# Patient Record
Sex: FEMALE | HISPANIC OR LATINO | URBAN - METROPOLITAN AREA
[De-identification: names, ages, dates, MRNs, and addresses within clinical notes are randomized per-mention and may not be internally consistent; named-entity substitution may affect disease eponyms.]

---

## 2017-05-25 ENCOUNTER — HOSPITAL ENCOUNTER (OUTPATIENT)
Dept: RADIOLOGY | Facility: MEDICAL CENTER | Age: 57
End: 2017-05-25
Attending: PHYSICIAN ASSISTANT
Payer: COMMERCIAL

## 2017-05-25 DIAGNOSIS — Z12.31 VISIT FOR SCREENING MAMMOGRAM: ICD-10-CM

## 2017-05-25 PROCEDURE — G0202 SCR MAMMO BI INCL CAD: HCPCS

## 2019-08-17 ENCOUNTER — HOSPITAL ENCOUNTER (EMERGENCY)
Dept: HOSPITAL 8 - ED | Age: 59
Discharge: HOME | End: 2019-08-17
Payer: COMMERCIAL

## 2019-08-17 VITALS — WEIGHT: 149.91 LBS | HEIGHT: 62 IN | BODY MASS INDEX: 27.59 KG/M2

## 2019-08-17 VITALS — DIASTOLIC BLOOD PRESSURE: 99 MMHG | SYSTOLIC BLOOD PRESSURE: 162 MMHG

## 2019-08-17 DIAGNOSIS — R11.2: ICD-10-CM

## 2019-08-17 DIAGNOSIS — I10: ICD-10-CM

## 2019-08-17 DIAGNOSIS — K80.20: Primary | ICD-10-CM

## 2019-08-17 DIAGNOSIS — R10.13: ICD-10-CM

## 2019-08-17 PROCEDURE — 81001 URINALYSIS AUTO W/SCOPE: CPT

## 2019-08-17 PROCEDURE — 96361 HYDRATE IV INFUSION ADD-ON: CPT

## 2019-08-17 PROCEDURE — 76700 US EXAM ABDOM COMPLETE: CPT

## 2019-08-17 PROCEDURE — 83690 ASSAY OF LIPASE: CPT

## 2019-08-17 PROCEDURE — 85025 COMPLETE CBC W/AUTO DIFF WBC: CPT

## 2019-08-17 PROCEDURE — 36415 COLL VENOUS BLD VENIPUNCTURE: CPT

## 2019-08-17 PROCEDURE — 99284 EMERGENCY DEPT VISIT MOD MDM: CPT

## 2019-08-17 PROCEDURE — 96374 THER/PROPH/DIAG INJ IV PUSH: CPT

## 2019-08-17 PROCEDURE — 96372 THER/PROPH/DIAG INJ SC/IM: CPT

## 2019-08-17 PROCEDURE — 93005 ELECTROCARDIOGRAM TRACING: CPT

## 2019-08-17 PROCEDURE — 80053 COMPREHEN METABOLIC PANEL: CPT

## 2019-08-17 PROCEDURE — 96375 TX/PRO/DX INJ NEW DRUG ADDON: CPT

## 2024-01-31 ENCOUNTER — ANESTHESIA (OUTPATIENT)
Dept: RADIOLOGY | Facility: MEDICAL CENTER | Age: 64
DRG: 020 | End: 2024-01-31
Payer: COMMERCIAL

## 2024-01-31 ENCOUNTER — HOSPITAL ENCOUNTER (INPATIENT)
Facility: MEDICAL CENTER | Age: 64
LOS: 23 days | DRG: 020 | End: 2024-02-23
Attending: INTERNAL MEDICINE | Admitting: INTERNAL MEDICINE
Payer: COMMERCIAL

## 2024-01-31 ENCOUNTER — ANESTHESIA EVENT (OUTPATIENT)
Dept: RADIOLOGY | Facility: MEDICAL CENTER | Age: 64
DRG: 020 | End: 2024-01-31
Payer: COMMERCIAL

## 2024-01-31 ENCOUNTER — APPOINTMENT (OUTPATIENT)
Dept: RADIOLOGY | Facility: MEDICAL CENTER | Age: 64
DRG: 020 | End: 2024-01-31
Attending: NEUROLOGICAL SURGERY
Payer: COMMERCIAL

## 2024-01-31 ENCOUNTER — APPOINTMENT (OUTPATIENT)
Dept: RADIOLOGY | Facility: MEDICAL CENTER | Age: 64
DRG: 020 | End: 2024-01-31
Attending: RADIOLOGY
Payer: COMMERCIAL

## 2024-01-31 ENCOUNTER — HOSPITAL ENCOUNTER (OUTPATIENT)
Dept: RADIOLOGY | Facility: MEDICAL CENTER | Age: 64
End: 2024-01-31

## 2024-01-31 ENCOUNTER — APPOINTMENT (OUTPATIENT)
Dept: RADIOLOGY | Facility: MEDICAL CENTER | Age: 64
DRG: 020 | End: 2024-01-31
Attending: INTERNAL MEDICINE
Payer: COMMERCIAL

## 2024-01-31 ENCOUNTER — ANESTHESIA (OUTPATIENT)
Dept: SURGERY | Facility: MEDICAL CENTER | Age: 64
DRG: 020 | End: 2024-01-31
Payer: COMMERCIAL

## 2024-01-31 ENCOUNTER — APPOINTMENT (OUTPATIENT)
Dept: RADIOLOGY | Facility: MEDICAL CENTER | Age: 64
DRG: 020 | End: 2024-01-31
Attending: NURSE PRACTITIONER
Payer: COMMERCIAL

## 2024-01-31 ENCOUNTER — ANESTHESIA EVENT (OUTPATIENT)
Dept: SURGERY | Facility: MEDICAL CENTER | Age: 64
DRG: 020 | End: 2024-01-31
Payer: COMMERCIAL

## 2024-01-31 DIAGNOSIS — I60.9 SAH (SUBARACHNOID HEMORRHAGE) (HCC): ICD-10-CM

## 2024-01-31 DIAGNOSIS — J96.01 ACUTE RESPIRATORY FAILURE WITH HYPOXIA (HCC): ICD-10-CM

## 2024-01-31 DIAGNOSIS — I71.21 ANEURYSM OF ASCENDING AORTA WITHOUT RUPTURE (HCC): ICD-10-CM

## 2024-01-31 DIAGNOSIS — R56.9 SEIZURE (HCC): ICD-10-CM

## 2024-01-31 PROBLEM — I15.9 SECONDARY HYPERTENSION: Status: ACTIVE | Noted: 2024-01-31

## 2024-01-31 PROBLEM — J81.0 ACUTE PULMONARY EDEMA (HCC): Status: ACTIVE | Noted: 2024-01-31

## 2024-01-31 LAB
AMPHET UR QL SCN: NEGATIVE
ANION GAP SERPL CALC-SCNC: 14 MMOL/L (ref 7–16)
ANION GAP SERPL CALC-SCNC: 17 MMOL/L (ref 7–16)
APTT PPP: 37 SEC (ref 24.7–36)
APTT PPP: >240 SEC (ref 24.7–36)
ARTERIAL PATENCY WRIST A: ABNORMAL
BARBITURATES UR QL SCN: NEGATIVE
BASE EXCESS BLDA CALC-SCNC: -2 MMOL/L (ref -4–3)
BASE EXCESS BLDA CALC-SCNC: -2 MMOL/L (ref -4–3)
BASE EXCESS BLDA CALC-SCNC: -4 MMOL/L (ref -4–3)
BASOPHILS # BLD AUTO: 0.3 % (ref 0–1.8)
BASOPHILS # BLD AUTO: 0.3 % (ref 0–1.8)
BASOPHILS # BLD: 0.05 K/UL (ref 0–0.12)
BASOPHILS # BLD: 0.05 K/UL (ref 0–0.12)
BENZODIAZ UR QL SCN: NEGATIVE
BODY TEMPERATURE: ABNORMAL DEGREES
BREATHS SETTING VENT: 20
BREATHS SETTING VENT: 26
BREATHS SETTING VENT: 26
BUN SERPL-MCNC: 10 MG/DL (ref 8–22)
BUN SERPL-MCNC: 8 MG/DL (ref 8–22)
BZE UR QL SCN: NEGATIVE
CALCIUM SERPL-MCNC: 8 MG/DL (ref 8.5–10.5)
CALCIUM SERPL-MCNC: 8.2 MG/DL (ref 8.5–10.5)
CANNABINOIDS UR QL SCN: NEGATIVE
CFT BLD TEG: 5.7 MIN (ref 4.6–9.1)
CFT P HPASE BLD TEG: 5.3 MIN (ref 4.3–8.3)
CHLORIDE SERPL-SCNC: 102 MMOL/L (ref 96–112)
CHLORIDE SERPL-SCNC: 103 MMOL/L (ref 96–112)
CHOLEST SERPL-MCNC: 165 MG/DL (ref 100–199)
CLOT ANGLE BLD TEG: 73.8 DEGREES (ref 63–78)
CLOT LYSIS 30M P MA LENFR BLD TEG: 0 % (ref 0–2.6)
CO2 BLDA-SCNC: 24 MMOL/L (ref 20–33)
CO2 BLDA-SCNC: 24 MMOL/L (ref 20–33)
CO2 BLDA-SCNC: 26 MMOL/L (ref 20–33)
CO2 SERPL-SCNC: 20 MMOL/L (ref 20–33)
CO2 SERPL-SCNC: 23 MMOL/L (ref 20–33)
CREAT SERPL-MCNC: 0.52 MG/DL (ref 0.5–1.4)
CREAT SERPL-MCNC: 0.61 MG/DL (ref 0.5–1.4)
CT.EXTRINSIC BLD ROTEM: 1.3 MIN (ref 0.8–2.1)
DELSYS IDSYS: ABNORMAL
END TIDAL CARBON DIOXIDE IECO2: 33 MMHG
END TIDAL CARBON DIOXIDE IECO2: 36 MMHG
END TIDAL CARBON DIOXIDE IECO2: 37 MMHG
EOSINOPHIL # BLD AUTO: 0 K/UL (ref 0–0.51)
EOSINOPHIL # BLD AUTO: 0.01 K/UL (ref 0–0.51)
EOSINOPHIL NFR BLD: 0 % (ref 0–6.9)
EOSINOPHIL NFR BLD: 0.1 % (ref 0–6.9)
ERYTHROCYTE [DISTWIDTH] IN BLOOD BY AUTOMATED COUNT: 36.7 FL (ref 35.9–50)
ERYTHROCYTE [DISTWIDTH] IN BLOOD BY AUTOMATED COUNT: 37.7 FL (ref 35.9–50)
EXPOSED MRN EXMRN: NORMAL
EXPOSED MRN EXMRN: NORMAL
FENTANYL UR QL: NEGATIVE
GFR SERPLBLD CREATININE-BSD FMLA CKD-EPI: 101 ML/MIN/1.73 M 2
GFR SERPLBLD CREATININE-BSD FMLA CKD-EPI: 105 ML/MIN/1.73 M 2
GLUCOSE SERPL-MCNC: 155 MG/DL (ref 65–99)
GLUCOSE SERPL-MCNC: 186 MG/DL (ref 65–99)
HBV SURFACE AG SER QL: NORMAL
HCO3 BLDA-SCNC: 23 MMOL/L (ref 17–25)
HCO3 BLDA-SCNC: 23.2 MMOL/L (ref 17–25)
HCO3 BLDA-SCNC: 24.5 MMOL/L (ref 17–25)
HCT VFR BLD AUTO: 39.6 % (ref 37–47)
HCT VFR BLD AUTO: 41.7 % (ref 37–47)
HCV AB SER QL: NORMAL
HDLC SERPL-MCNC: 36 MG/DL
HGB BLD-MCNC: 13.4 G/DL (ref 12–16)
HGB BLD-MCNC: 14.2 G/DL (ref 12–16)
HIV 1+2 AB+HIV1 P24 AG SERPL QL IA: NORMAL
HOROWITZ INDEX BLDA+IHG-RTO: 116 MM[HG]
HOROWITZ INDEX BLDA+IHG-RTO: 376 MM[HG]
HOROWITZ INDEX BLDA+IHG-RTO: 96 MM[HG]
IMM GRANULOCYTES # BLD AUTO: 0.08 K/UL (ref 0–0.11)
IMM GRANULOCYTES # BLD AUTO: 0.15 K/UL (ref 0–0.11)
IMM GRANULOCYTES NFR BLD AUTO: 0.4 % (ref 0–0.9)
IMM GRANULOCYTES NFR BLD AUTO: 0.8 % (ref 0–0.9)
INR PPP: 1.06 (ref 0.87–1.13)
LACTATE BLD-SCNC: 1.3 MMOL/L (ref 0.5–2)
LACTATE BLD-SCNC: 1.3 MMOL/L (ref 0.5–2)
LDLC SERPL CALC-MCNC: 113 MG/DL
LYMPHOCYTES # BLD AUTO: 0.87 K/UL (ref 1–4.8)
LYMPHOCYTES # BLD AUTO: 1.42 K/UL (ref 1–4.8)
LYMPHOCYTES NFR BLD: 4.4 % (ref 22–41)
LYMPHOCYTES NFR BLD: 7.9 % (ref 22–41)
MCF BLD TEG: 62.1 MM (ref 52–69)
MCF.PLATELET INHIB BLD ROTEM: 19.9 MM (ref 15–32)
MCH RBC QN AUTO: 25.9 PG (ref 27–33)
MCH RBC QN AUTO: 26.2 PG (ref 27–33)
MCHC RBC AUTO-ENTMCNC: 33.8 G/DL (ref 32.2–35.5)
MCHC RBC AUTO-ENTMCNC: 34.1 G/DL (ref 32.2–35.5)
MCV RBC AUTO: 76.1 FL (ref 81.4–97.8)
MCV RBC AUTO: 77.3 FL (ref 81.4–97.8)
METHADONE UR QL SCN: NEGATIVE
MODE IMODE: ABNORMAL
MONOCYTES # BLD AUTO: 1.2 K/UL (ref 0–0.85)
MONOCYTES # BLD AUTO: 1.3 K/UL (ref 0–0.85)
MONOCYTES NFR BLD AUTO: 6.1 % (ref 0–13.4)
MONOCYTES NFR BLD AUTO: 7.3 % (ref 0–13.4)
NEUTROPHILS # BLD AUTO: 15.07 K/UL (ref 1.82–7.42)
NEUTROPHILS # BLD AUTO: 17.33 K/UL (ref 1.82–7.42)
NEUTROPHILS NFR BLD: 84 % (ref 44–72)
NEUTROPHILS NFR BLD: 88.4 % (ref 44–72)
NRBC # BLD AUTO: 0 K/UL
NRBC # BLD AUTO: 0 K/UL
NRBC BLD-RTO: 0 /100 WBC (ref 0–0.2)
NRBC BLD-RTO: 0 /100 WBC (ref 0–0.2)
O2/TOTAL GAS SETTING VFR VENT: 50 %
O2/TOTAL GAS SETTING VFR VENT: 70 %
O2/TOTAL GAS SETTING VFR VENT: 90 %
OPIATES UR QL SCN: NEGATIVE
OSMOLALITY SERPL: 291 MOSM/KG H2O (ref 278–298)
OSMOLALITY UR: 463 MOSM/KG H2O (ref 300–900)
OXYCODONE UR QL SCN: NEGATIVE
PA AA BLD-ACNC: 25.3 % (ref 0–11)
PA ADP BLD-ACNC: 84.4 % (ref 0–17)
PCO2 BLDA: 40.9 MMHG (ref 26–37)
PCO2 BLDA: 46.2 MMHG (ref 26–37)
PCO2 BLDA: 48 MMHG (ref 26–37)
PCO2 TEMP ADJ BLDA: 41.3 MMHG (ref 26–37)
PCO2 TEMP ADJ BLDA: 43.7 MMHG (ref 26–37)
PCO2 TEMP ADJ BLDA: 45.5 MMHG (ref 26–37)
PCP UR QL SCN: NEGATIVE
PEEP END EXPIRATORY PRESSURE IPEEP: 10 CMH20
PEEP END EXPIRATORY PRESSURE IPEEP: 14 CMH20
PEEP END EXPIRATORY PRESSURE IPEEP: 8 CMH20
PH BLDA: 7.29 [PH] (ref 7.4–7.5)
PH BLDA: 7.33 [PH] (ref 7.4–7.5)
PH BLDA: 7.36 [PH] (ref 7.4–7.5)
PH TEMP ADJ BLDA: 7.31 [PH] (ref 7.4–7.5)
PH TEMP ADJ BLDA: 7.35 [PH] (ref 7.4–7.5)
PH TEMP ADJ BLDA: 7.36 [PH] (ref 7.4–7.5)
PLATELET # BLD AUTO: 256 K/UL (ref 164–446)
PLATELET # BLD AUTO: 271 K/UL (ref 164–446)
PMV BLD AUTO: 10 FL (ref 9–12.9)
PMV BLD AUTO: 10.4 FL (ref 9–12.9)
PO2 BLDA: 338 MMHG (ref 64–87)
PO2 BLDA: 58 MMHG (ref 64–87)
PO2 BLDA: 67 MMHG (ref 64–87)
PO2 TEMP ADJ BLDA: 339 MMHG (ref 64–87)
PO2 TEMP ADJ BLDA: 53 MMHG (ref 64–87)
PO2 TEMP ADJ BLDA: 61 MMHG (ref 64–87)
POTASSIUM SERPL-SCNC: 3.1 MMOL/L (ref 3.6–5.5)
POTASSIUM SERPL-SCNC: 3.6 MMOL/L (ref 3.6–5.5)
PROPOXYPH UR QL SCN: NEGATIVE
PROTHROMBIN TIME: 13.9 SEC (ref 12–14.6)
RBC # BLD AUTO: 5.12 M/UL (ref 4.2–5.4)
RBC # BLD AUTO: 5.48 M/UL (ref 4.2–5.4)
SAO2 % BLDA: 100 % (ref 93–99)
SAO2 % BLDA: 86 % (ref 93–99)
SAO2 % BLDA: 91 % (ref 93–99)
SODIUM SERPL-SCNC: 137 MMOL/L (ref 135–145)
SODIUM SERPL-SCNC: 142 MMOL/L (ref 135–145)
SODIUM UR-SCNC: 167 MMOL/L
SPECIMEN DRAWN FROM PATIENT: ABNORMAL
TEG ALGORITHM TGALG: ABNORMAL
TIDAL VOLUME IVT: 320 ML
TRIGL SERPL-MCNC: 79 MG/DL (ref 0–149)
WBC # BLD AUTO: 17.9 K/UL (ref 4.8–10.8)
WBC # BLD AUTO: 19.6 K/UL (ref 4.8–10.8)

## 2024-01-31 PROCEDURE — 87186 SC STD MICRODIL/AGAR DIL: CPT

## 2024-01-31 PROCEDURE — 700111 HCHG RX REV CODE 636 W/ 250 OVERRIDE (IP): Performed by: INTERNAL MEDICINE

## 2024-01-31 PROCEDURE — 700105 HCHG RX REV CODE 258: Performed by: INTERNAL MEDICINE

## 2024-01-31 PROCEDURE — 87147 CULTURE TYPE IMMUNOLOGIC: CPT

## 2024-01-31 PROCEDURE — 94799 UNLISTED PULMONARY SVC/PX: CPT

## 2024-01-31 PROCEDURE — C1729 CATH, DRAINAGE: HCPCS

## 2024-01-31 PROCEDURE — 110454 HCHG SHELL REV 250: Performed by: NEUROLOGICAL SURGERY

## 2024-01-31 PROCEDURE — 94760 N-INVAS EAR/PLS OXIMETRY 1: CPT

## 2024-01-31 PROCEDURE — 03LG3DZ OCCLUSION OF INTRACRANIAL ARTERY WITH INTRALUMINAL DEVICE, PERCUTANEOUS APPROACH: ICD-10-PCS | Performed by: RADIOLOGY

## 2024-01-31 PROCEDURE — 85347 COAGULATION TIME ACTIVATED: CPT

## 2024-01-31 PROCEDURE — 700111 HCHG RX REV CODE 636 W/ 250 OVERRIDE (IP): Performed by: ANESTHESIOLOGY

## 2024-01-31 PROCEDURE — 700101 HCHG RX REV CODE 250: Performed by: ANESTHESIOLOGY

## 2024-01-31 PROCEDURE — 83935 ASSAY OF URINE OSMOLALITY: CPT

## 2024-01-31 PROCEDURE — 700101 HCHG RX REV CODE 250: Performed by: NEUROLOGICAL SURGERY

## 2024-01-31 PROCEDURE — 160041 HCHG SURGERY MINUTES - EA ADDL 1 MIN LEVEL 4: Performed by: NEUROLOGICAL SURGERY

## 2024-01-31 PROCEDURE — 009630Z DRAINAGE OF CEREBRAL VENTRICLE WITH DRAINAGE DEVICE, PERCUTANEOUS APPROACH: ICD-10-PCS | Performed by: NEUROLOGICAL SURGERY

## 2024-01-31 PROCEDURE — 700111 HCHG RX REV CODE 636 W/ 250 OVERRIDE (IP): Mod: JZ

## 2024-01-31 PROCEDURE — 160048 HCHG OR STATISTICAL LEVEL 1-5: Performed by: NEUROLOGICAL SURGERY

## 2024-01-31 PROCEDURE — A9270 NON-COVERED ITEM OR SERVICE: HCPCS | Performed by: NURSE PRACTITIONER

## 2024-01-31 PROCEDURE — 83605 ASSAY OF LACTIC ACID: CPT

## 2024-01-31 PROCEDURE — 8E09XBZ COMPUTER ASSISTED PROCEDURE OF HEAD AND NECK REGION: ICD-10-PCS | Performed by: NEUROLOGICAL SURGERY

## 2024-01-31 PROCEDURE — 32555 ASPIRATE PLEURA W/ IMAGING: CPT | Mod: RT

## 2024-01-31 PROCEDURE — 36556 INSERT NON-TUNNEL CV CATH: CPT | Performed by: NURSE PRACTITIONER

## 2024-01-31 PROCEDURE — 85384 FIBRINOGEN ACTIVITY: CPT

## 2024-01-31 PROCEDURE — 5A1945Z RESPIRATORY VENTILATION, 24-96 CONSECUTIVE HOURS: ICD-10-PCS | Performed by: INTERNAL MEDICINE

## 2024-01-31 PROCEDURE — 84100 ASSAY OF PHOSPHORUS: CPT

## 2024-01-31 PROCEDURE — 99285 EMERGENCY DEPT VISIT HI MDM: CPT

## 2024-01-31 PROCEDURE — 84300 ASSAY OF URINE SODIUM: CPT

## 2024-01-31 PROCEDURE — 71045 X-RAY EXAM CHEST 1 VIEW: CPT

## 2024-01-31 PROCEDURE — 99153 MOD SED SAME PHYS/QHP EA: CPT

## 2024-01-31 PROCEDURE — 700102 HCHG RX REV CODE 250 W/ 637 OVERRIDE(OP): Performed by: INTERNAL MEDICINE

## 2024-01-31 PROCEDURE — 700117 HCHG RX CONTRAST REV CODE 255: Performed by: INTERNAL MEDICINE

## 2024-01-31 PROCEDURE — 4410465 IR-EMBOLIZATION

## 2024-01-31 PROCEDURE — 700111 HCHG RX REV CODE 636 W/ 250 OVERRIDE (IP): Mod: JZ | Performed by: INTERNAL MEDICINE

## 2024-01-31 PROCEDURE — 83930 ASSAY OF BLOOD OSMOLALITY: CPT

## 2024-01-31 PROCEDURE — 700102 HCHG RX REV CODE 250 W/ 637 OVERRIDE(OP): Performed by: NURSE PRACTITIONER

## 2024-01-31 PROCEDURE — 700101 HCHG RX REV CODE 250: Performed by: INTERNAL MEDICINE

## 2024-01-31 PROCEDURE — 70450 CT HEAD/BRAIN W/O DYE: CPT

## 2024-01-31 PROCEDURE — 700105 HCHG RX REV CODE 258: Performed by: ANESTHESIOLOGY

## 2024-01-31 PROCEDURE — 85576 BLOOD PLATELET AGGREGATION: CPT | Mod: 91

## 2024-01-31 PROCEDURE — 80048 BASIC METABOLIC PNL TOTAL CA: CPT

## 2024-01-31 PROCEDURE — 160029 HCHG SURGERY MINUTES - 1ST 30 MINS LEVEL 4: Performed by: NEUROLOGICAL SURGERY

## 2024-01-31 PROCEDURE — 302022 DRAINAGE MONITORING SYS, VENTR: Performed by: INTERNAL MEDICINE

## 2024-01-31 PROCEDURE — 87040 BLOOD CULTURE FOR BACTERIA: CPT

## 2024-01-31 PROCEDURE — 85730 THROMBOPLASTIN TIME PARTIAL: CPT | Mod: 91

## 2024-01-31 PROCEDURE — 99291 CRITICAL CARE FIRST HOUR: CPT | Mod: 25 | Performed by: INTERNAL MEDICINE

## 2024-01-31 PROCEDURE — 85025 COMPLETE CBC W/AUTO DIFF WBC: CPT

## 2024-01-31 PROCEDURE — B3151ZZ FLUOROSCOPY OF BILATERAL COMMON CAROTID ARTERIES USING LOW OSMOLAR CONTRAST: ICD-10-PCS | Performed by: RADIOLOGY

## 2024-01-31 PROCEDURE — 99291 CRITICAL CARE FIRST HOUR: CPT | Performed by: PSYCHIATRY & NEUROLOGY

## 2024-01-31 PROCEDURE — 87070 CULTURE OTHR SPECIMN AEROBIC: CPT

## 2024-01-31 PROCEDURE — 87205 SMEAR GRAM STAIN: CPT

## 2024-01-31 PROCEDURE — 85610 PROTHROMBIN TIME: CPT

## 2024-01-31 PROCEDURE — 02HV33Z INSERTION OF INFUSION DEVICE INTO SUPERIOR VENA CAVA, PERCUTANEOUS APPROACH: ICD-10-PCS | Performed by: INTERNAL MEDICINE

## 2024-01-31 PROCEDURE — 99152 MOD SED SAME PHYS/QHP 5/>YRS: CPT

## 2024-01-31 PROCEDURE — 83735 ASSAY OF MAGNESIUM: CPT

## 2024-01-31 PROCEDURE — 61107 TDH PNXR IMPLT VENTR CATH: CPT

## 2024-01-31 PROCEDURE — 37799 UNLISTED PX VASCULAR SURGERY: CPT

## 2024-01-31 PROCEDURE — 770022 HCHG ROOM/CARE - ICU (200)

## 2024-01-31 PROCEDURE — 80307 DRUG TEST PRSMV CHEM ANLYZR: CPT

## 2024-01-31 PROCEDURE — 700101 HCHG RX REV CODE 250

## 2024-01-31 PROCEDURE — 87077 CULTURE AEROBIC IDENTIFY: CPT

## 2024-01-31 PROCEDURE — 94002 VENT MGMT INPAT INIT DAY: CPT

## 2024-01-31 PROCEDURE — 82803 BLOOD GASES ANY COMBINATION: CPT | Mod: 91

## 2024-01-31 PROCEDURE — 700111 HCHG RX REV CODE 636 W/ 250 OVERRIDE (IP): Performed by: NEUROLOGICAL SURGERY

## 2024-01-31 PROCEDURE — 160009 HCHG ANES TIME/MIN: Performed by: NEUROLOGICAL SURGERY

## 2024-01-31 PROCEDURE — 80061 LIPID PANEL: CPT

## 2024-01-31 PROCEDURE — C1729 CATH, DRAINAGE: HCPCS | Performed by: NEUROLOGICAL SURGERY

## 2024-01-31 DEVICE — CATHETER VENTRICLEAR EVD ANTIOBITIC (5EA/PK): Type: IMPLANTABLE DEVICE | Status: FUNCTIONAL

## 2024-01-31 RX ORDER — PROCHLORPERAZINE EDISYLATE 5 MG/ML
10 INJECTION INTRAMUSCULAR; INTRAVENOUS EVERY 6 HOURS PRN
Status: DISCONTINUED | OUTPATIENT
Start: 2024-01-31 | End: 2024-02-23 | Stop reason: HOSPADM

## 2024-01-31 RX ORDER — NIMODIPINE 30 MG/1
60 CAPSULE, LIQUID FILLED ORAL EVERY 4 HOURS
Status: DISCONTINUED | OUTPATIENT
Start: 2024-01-31 | End: 2024-01-31

## 2024-01-31 RX ORDER — NOREPINEPHRINE BITARTRATE 0.03 MG/ML
0-1 INJECTION, SOLUTION INTRAVENOUS CONTINUOUS
Status: DISCONTINUED | OUTPATIENT
Start: 2024-01-31 | End: 2024-02-03

## 2024-01-31 RX ORDER — ENALAPRILAT 1.25 MG/ML
1.25 INJECTION INTRAVENOUS EVERY 6 HOURS PRN
Status: DISCONTINUED | OUTPATIENT
Start: 2024-01-31 | End: 2024-02-04

## 2024-01-31 RX ORDER — ONDANSETRON 4 MG/1
4 TABLET, ORALLY DISINTEGRATING ORAL EVERY 4 HOURS PRN
Status: DISCONTINUED | OUTPATIENT
Start: 2024-01-31 | End: 2024-02-21

## 2024-01-31 RX ORDER — CEFAZOLIN SODIUM 1 G/3ML
INJECTION, POWDER, FOR SOLUTION INTRAMUSCULAR; INTRAVENOUS
Status: DISCONTINUED | OUTPATIENT
Start: 2024-01-31 | End: 2024-01-31 | Stop reason: HOSPADM

## 2024-01-31 RX ORDER — SODIUM CHLORIDE 9 MG/ML
INJECTION, SOLUTION INTRAVENOUS
Status: DISCONTINUED | OUTPATIENT
Start: 2024-01-31 | End: 2024-01-31 | Stop reason: SURG

## 2024-01-31 RX ORDER — LEVETIRACETAM 500 MG/5ML
500 INJECTION, SOLUTION, CONCENTRATE INTRAVENOUS EVERY 12 HOURS
Status: DISCONTINUED | OUTPATIENT
Start: 2024-01-31 | End: 2024-02-01

## 2024-01-31 RX ORDER — LABETALOL HYDROCHLORIDE 5 MG/ML
10 INJECTION, SOLUTION INTRAVENOUS EVERY 4 HOURS PRN
Status: DISCONTINUED | OUTPATIENT
Start: 2024-01-31 | End: 2024-02-03

## 2024-01-31 RX ORDER — ACETAMINOPHEN 650 MG/1
650 SUPPOSITORY RECTAL EVERY 4 HOURS PRN
Status: DISCONTINUED | OUTPATIENT
Start: 2024-01-31 | End: 2024-02-08

## 2024-01-31 RX ORDER — DEXTROSE MONOHYDRATE 25 G/50ML
25 INJECTION, SOLUTION INTRAVENOUS
Status: DISCONTINUED | OUTPATIENT
Start: 2024-01-31 | End: 2024-02-04

## 2024-01-31 RX ORDER — ONDANSETRON 2 MG/ML
4 INJECTION INTRAMUSCULAR; INTRAVENOUS EVERY 4 HOURS PRN
Status: DISCONTINUED | OUTPATIENT
Start: 2024-01-31 | End: 2024-02-21

## 2024-01-31 RX ORDER — LORAZEPAM 2 MG/ML
2 INJECTION INTRAMUSCULAR
Status: DISCONTINUED | OUTPATIENT
Start: 2024-01-31 | End: 2024-02-23 | Stop reason: HOSPADM

## 2024-01-31 RX ORDER — SODIUM CHLORIDE 9 MG/ML
INJECTION, SOLUTION INTRAVENOUS CONTINUOUS
Status: DISCONTINUED | OUTPATIENT
Start: 2024-01-31 | End: 2024-01-31

## 2024-01-31 RX ORDER — BISACODYL 10 MG
10 SUPPOSITORY, RECTAL RECTAL
Status: DISCONTINUED | OUTPATIENT
Start: 2024-01-31 | End: 2024-02-19

## 2024-01-31 RX ORDER — BUPIVACAINE HYDROCHLORIDE AND EPINEPHRINE 5; 5 MG/ML; UG/ML
INJECTION, SOLUTION EPIDURAL; INTRACAUDAL; PERINEURAL
Status: DISCONTINUED | OUTPATIENT
Start: 2024-01-31 | End: 2024-01-31 | Stop reason: HOSPADM

## 2024-01-31 RX ORDER — ACETAMINOPHEN 325 MG/1
650 TABLET ORAL EVERY 4 HOURS PRN
Status: DISCONTINUED | OUTPATIENT
Start: 2024-01-31 | End: 2024-02-08

## 2024-01-31 RX ORDER — FAMOTIDINE 20 MG/1
20 TABLET, FILM COATED ORAL EVERY 12 HOURS
Status: DISCONTINUED | OUTPATIENT
Start: 2024-01-31 | End: 2024-02-03

## 2024-01-31 RX ORDER — HEPARIN SODIUM 1000 [USP'U]/ML
INJECTION, SOLUTION INTRAVENOUS; SUBCUTANEOUS
Status: COMPLETED
Start: 2024-01-31 | End: 2024-01-31

## 2024-01-31 RX ORDER — AMOXICILLIN 250 MG
2 CAPSULE ORAL 2 TIMES DAILY
Status: DISCONTINUED | OUTPATIENT
Start: 2024-01-31 | End: 2024-02-19

## 2024-01-31 RX ORDER — SODIUM CHLORIDE 9 MG/ML
500 INJECTION, SOLUTION INTRAVENOUS ONCE
Status: ACTIVE | OUTPATIENT
Start: 2024-01-31 | End: 2024-02-01

## 2024-01-31 RX ORDER — HYDRALAZINE HYDROCHLORIDE 20 MG/ML
10 INJECTION INTRAMUSCULAR; INTRAVENOUS EVERY 4 HOURS PRN
Status: DISCONTINUED | OUTPATIENT
Start: 2024-01-31 | End: 2024-02-03

## 2024-01-31 RX ORDER — FUROSEMIDE 10 MG/ML
20 INJECTION INTRAMUSCULAR; INTRAVENOUS ONCE
Status: COMPLETED | OUTPATIENT
Start: 2024-01-31 | End: 2024-01-31

## 2024-01-31 RX ORDER — ONDANSETRON 2 MG/ML
4 INJECTION INTRAMUSCULAR; INTRAVENOUS
Status: DISCONTINUED | OUTPATIENT
Start: 2024-01-31 | End: 2024-01-31

## 2024-01-31 RX ORDER — DIPHENHYDRAMINE HYDROCHLORIDE 50 MG/ML
12.5 INJECTION INTRAMUSCULAR; INTRAVENOUS
Status: DISCONTINUED | OUTPATIENT
Start: 2024-01-31 | End: 2024-01-31

## 2024-01-31 RX ORDER — PROTAMINE SULFATE 10 MG/ML
10 INJECTION, SOLUTION INTRAVENOUS ONCE
Status: COMPLETED | OUTPATIENT
Start: 2024-01-31 | End: 2024-01-31

## 2024-01-31 RX ORDER — HALOPERIDOL 5 MG/ML
1 INJECTION INTRAMUSCULAR
Status: DISCONTINUED | OUTPATIENT
Start: 2024-01-31 | End: 2024-01-31

## 2024-01-31 RX ORDER — CEFAZOLIN SODIUM 1 G/3ML
INJECTION, POWDER, FOR SOLUTION INTRAMUSCULAR; INTRAVENOUS PRN
Status: DISCONTINUED | OUTPATIENT
Start: 2024-01-31 | End: 2024-01-31 | Stop reason: SURG

## 2024-01-31 RX ORDER — SODIUM CHLORIDE, SODIUM GLUCONATE, SODIUM ACETATE, POTASSIUM CHLORIDE AND MAGNESIUM CHLORIDE 526; 502; 368; 37; 30 MG/100ML; MG/100ML; MG/100ML; MG/100ML; MG/100ML
INJECTION, SOLUTION INTRAVENOUS
Status: DISCONTINUED | OUTPATIENT
Start: 2024-01-31 | End: 2024-01-31 | Stop reason: SURG

## 2024-01-31 RX ORDER — POLYETHYLENE GLYCOL 3350 17 G/17G
1 POWDER, FOR SOLUTION ORAL
Status: DISCONTINUED | OUTPATIENT
Start: 2024-01-31 | End: 2024-02-19

## 2024-01-31 RX ORDER — HEPARIN SODIUM,PORCINE 1000/ML
VIAL (ML) INJECTION PRN
Status: DISCONTINUED | OUTPATIENT
Start: 2024-01-31 | End: 2024-01-31 | Stop reason: SURG

## 2024-01-31 RX ORDER — LIDOCAINE HYDROCHLORIDE 10 MG/ML
INJECTION, SOLUTION INFILTRATION; PERINEURAL
Status: COMPLETED
Start: 2024-01-31 | End: 2024-01-31

## 2024-01-31 RX ADMIN — LIDOCAINE HYDROCHLORIDE: 10 INJECTION, SOLUTION INFILTRATION; PERINEURAL at 12:35

## 2024-01-31 RX ADMIN — ONDANSETRON 4 MG: 4 TABLET, ORALLY DISINTEGRATING ORAL at 22:48

## 2024-01-31 RX ADMIN — HEPARIN SODIUM 4000 UNITS: 1000 INJECTION, SOLUTION INTRAVENOUS; SUBCUTANEOUS at 13:35

## 2024-01-31 RX ADMIN — SODIUM CHLORIDE 5 MG/HR: 9 INJECTION, SOLUTION INTRAVENOUS at 16:01

## 2024-01-31 RX ADMIN — LABETALOL HYDROCHLORIDE 10 MG: 5 INJECTION, SOLUTION INTRAVENOUS at 15:41

## 2024-01-31 RX ADMIN — PROCHLORPERAZINE EDISYLATE 10 MG: 5 INJECTION INTRAMUSCULAR; INTRAVENOUS at 18:58

## 2024-01-31 RX ADMIN — ROCURONIUM BROMIDE 50 MG: 10 INJECTION, SOLUTION INTRAVENOUS at 14:07

## 2024-01-31 RX ADMIN — IOHEXOL 133 ML: 300 INJECTION, SOLUTION INTRAVENOUS at 12:00

## 2024-01-31 RX ADMIN — SODIUM CHLORIDE, SODIUM GLUCONATE, SODIUM ACETATE, POTASSIUM CHLORIDE AND MAGNESIUM CHLORIDE: 526; 502; 368; 37; 30 INJECTION, SOLUTION INTRAVENOUS at 11:55

## 2024-01-31 RX ADMIN — CEFAZOLIN 2 G: 1 INJECTION, POWDER, FOR SOLUTION INTRAMUSCULAR; INTRAVENOUS at 19:24

## 2024-01-31 RX ADMIN — NIMODIPINE 60 MG: 60 SOLUTION ORAL at 22:39

## 2024-01-31 RX ADMIN — NOREPINEPHRINE BITARTRATE 0.05 MCG/KG/MIN: 1 INJECTION, SOLUTION, CONCENTRATE INTRAVENOUS at 22:59

## 2024-01-31 RX ADMIN — NOREPINEPHRINE BITARTRATE 0.05 MCG/KG/MIN: 1 INJECTION, SOLUTION, CONCENTRATE INTRAVENOUS at 12:12

## 2024-01-31 RX ADMIN — PROPOFOL 50 MCG/KG/MIN: 10 INJECTION, EMULSION INTRAVENOUS at 14:33

## 2024-01-31 RX ADMIN — FENTANYL CITRATE 50 MCG: 50 INJECTION, SOLUTION INTRAMUSCULAR; INTRAVENOUS at 17:12

## 2024-01-31 RX ADMIN — CEFTRIAXONE SODIUM 2000 MG: 10 INJECTION, POWDER, FOR SOLUTION INTRAVENOUS at 16:14

## 2024-01-31 RX ADMIN — ROCURONIUM BROMIDE 50 MG: 10 INJECTION, SOLUTION INTRAVENOUS at 19:17

## 2024-01-31 RX ADMIN — ONDANSETRON 4 MG: 2 INJECTION INTRAMUSCULAR; INTRAVENOUS at 18:54

## 2024-01-31 RX ADMIN — POTASSIUM BICARBONATE 50 MEQ: 978 TABLET, EFFERVESCENT ORAL at 22:51

## 2024-01-31 RX ADMIN — FUROSEMIDE 20 MG: 10 INJECTION, SOLUTION INTRAVENOUS at 17:37

## 2024-01-31 RX ADMIN — NITROGLYCERIN 10 ML: 20 INJECTION INTRAVENOUS at 13:05

## 2024-01-31 RX ADMIN — ROCURONIUM BROMIDE 100 MG: 10 INJECTION, SOLUTION INTRAVENOUS at 11:55

## 2024-01-31 RX ADMIN — SODIUM CHLORIDE: 9 INJECTION, SOLUTION INTRAVENOUS at 19:18

## 2024-01-31 RX ADMIN — FAMOTIDINE 20 MG: 10 INJECTION, SOLUTION INTRAVENOUS at 17:37

## 2024-01-31 RX ADMIN — PROTAMINE SULFATE 10 MG: 10 INJECTION, SOLUTION INTRAVENOUS at 17:11

## 2024-01-31 RX ADMIN — LEVETIRACETAM 500 MG: 100 INJECTION, SOLUTION, CONCENTRATE INTRAVENOUS at 17:37

## 2024-01-31 RX ADMIN — ROCURONIUM BROMIDE 50 MG: 10 INJECTION, SOLUTION INTRAVENOUS at 13:02

## 2024-01-31 RX ADMIN — NIMODIPINE 60 MG: 60 SOLUTION ORAL at 16:27

## 2024-01-31 ASSESSMENT — PAIN DESCRIPTION - PAIN TYPE
TYPE: ACUTE PAIN

## 2024-01-31 NOTE — PROGRESS NOTES
Pt presents to IR2. No consent was obtained as this was an emergent case per MD and no family contact info could be found on file. Pt transferred to IR table in supine position. Patient underwent a cerebral angiogram and embolization of the left ICA by Dr. Eason. Procedure site was marked by MD and verified using imaging guidance. Pt placed on monitor, prepped and draped in a sterile fashion. Vitals were taken every 5 minutes and remained stable during procedure (see doc flow sheet for results). CO2 waveform capnography was monitored and remained WNL throughout procedure. Report called to SHAWNA Woods RN. Pt transported by stretcher with RN to [facility].       1.)  MicroVention   TERUMO  HydroFrame 18  Endovascular Embolization Coil  12mm x 43cm  REF: [id number]  LOT: [id number]  EXP: 08/31/2027  Deployed 1/31/24 @ 1315    2.)  MicroVention   TERUMO  HydroFrame 10  Endovascular Embolization Coil  8mm x 17cm  REF: [id number]  LOT: [id number]  EXP: 04/30/2027  Deployed 1/31/24 @ 1328    3.)  MicroVention   TERUMO  HydroFrame 10  Endovascular Embolization Coil  9mm x 31cm  REF: [id number]  LOT: [id number]  EXP: 01/31/2028  Deployed 1/31/24 @ 1333    4.)  MicroVention   TERUMO  HydroFrame 10  Endovascular Embolization Coil  7mm x 15cm  REF: [id number]  LOT: [id number]  EXP: 04/30/2028  Deployed 1/31/24 @ 1337    5.)  MicroVention   TERUMO  HydroFrame 10  Endovascular Embolization Coil  6mm x 19cm  REF: [id number]  LOT: [id number]  EXP: 07/31/2028  Deployed 1/31/24 @ 1340    6.)  MicroVention   TERUMO  HydroFrame 10  Endovascular Embolization Coil  7mm x 28cm  REF: [id number]  LOT: [id number]  EXP: 12/31/2027  Deployed 1/31/24 @1343    7.)  MicroVention   TERUMO  HydroFrame 10  Endovascular Embolization Coil  5mm x 20cm  REF: [id number]  LOT: [id number]  EXP: 05/31/2027  Implanted 1/31/24 @ 1351    8.)  MicroVention   TERUMO  HydroFrame 10  Endovascular Embolization Coil  4mm x 10cm  REF: [id number]  LOT: [id number]  EXP:  09/30/24  Deployed 1/31/24 @ 1355    9.)  MicroVention   TERUMO  Microplex VFC  3-6mm x 10cm  REF: [id number]  LOT: [id number]  EXP: 12/31/2027  Deployed 1/31/24 @ 1358    10.)  MicroVention  TERUMO  Microplex VFC  3-6mm x 10cm  REF: [id number]  LOT: [id number]  EXP: 12/31/2027  Deployed 1/31/24 @ 1402      Abbott  Perclose ProStyle  Suture-Mediated Closure and Repair System  REF: [id number]  LOT: [id number]  EXP: 10/31/2025

## 2024-01-31 NOTE — ANESTHESIA PREPROCEDURE EVALUATION
Anesthesia Start Date/Time: 01/31/24 1155    Scheduled providers: Jhonathan Barrera M.D.    Procedure: IR-EMBOLIZATION    Indications: SAH    Location: [facility]            62y/o F with unknown PMH presents by [facility] from [facility] with ICH for IR embolization.    No past medical history on file.    No past surgical history on file.    No current outpatient medications    There were no vitals filed for this visit.    Physical Exam    Airway     Comments: ETT in place   Cardiovascular - normal exam  Rhythm: regular  Rate: normal     Dental     Unable to assess dental       Pulmonary   (+) decreased breath sounds     Abdominal   (+) obese     Neurological - sedated/unconcious                   Anesthesia Plan    ASA 5- EMERGENT   ASA physical status 5 criteria: intracranial bleed with mass effectASA physical status emergent criteria: acute hemorrhage and neurologic compromise requiring immediate intervention    Plan - general       Airway plan will be ETT    (Arterial line)      Induction: intravenous      Pertinent diagnostic labs and testing reviewed    Informed Consent:  Emergent - Consent given by clinician

## 2024-01-31 NOTE — OR SURGEON
Immediate Post- Operative Note        Findings: Large left pcomm aneurysm. Small rt ICA terminus aneurysm. Left Pcomm aneurysm embolized with coils. Final angiogram shows no significant residual filling.      Procedure(s): Cerebral Angiogram and Intracranial aneurysm embolization      Estimated Blood Loss: ~ 30 ml        Complications: None            1/31/2024     2:28 PM     Stephanie Eason M.D.

## 2024-01-31 NOTE — DISCHARGE PLANNING
Pt family arrived to ED. SW notified Pt was taken straight to IR for procedure. SW contacted IR and it was requested family go to the [facility] ICU waiting area. SW took family to [facility] Waiting area, oriented to area.   No other needs at this time.

## 2024-01-31 NOTE — ANESTHESIA PROCEDURE NOTES
Arterial Line    Performed by: Laure Chaney M.D.  Authorized by: Laure Chaney M.D.    Start Time:  1/31/2024 2:47 PM  End Time:  1/31/2024 2:49 PM  Localization: surface landmarks    Patient Location:  OR  Indication: continuous blood pressure monitoring        Catheter Size:  20 G  Seldinger Technique?: Yes    Laterality:  Left  Site:  Radial artery  Line Secured:  Antimicrobial disc, tape and transparent dressing  Events: patient tolerated procedure well with no complications

## 2024-01-31 NOTE — ANESTHESIA PROCEDURE NOTES
Arterial Line    Performed by: Neptali Castillo M.D.  Authorized by: Neptali Castillo M.D.    Start Time:  1/31/2024 12:07 PM  Localization: ultrasound guidance  Image captured, interpreted and electronically stored.  Patient Location:  OR  Indication: continuous blood pressure monitoring        Catheter Size:  20 G  Seldinger Technique?: Yes    Laterality:  Left  Site:  Radial artery  Line Secured:  Antimicrobial disc, tape and transparent dressing  Events: patient tolerated procedure well with no complications and hematoma     Placed with two attempts. + hematoma at distal site.

## 2024-01-31 NOTE — FLOWSHEET NOTE
01/31/24 1500   Events/Summary/Plan   Events/Summary/Plan pt arrival to the unit, desatting, increased to peep 14, 100%, suctioned moderate amount of bethel blood from ETT

## 2024-01-31 NOTE — ASSESSMENT & PLAN NOTE
Carpio and Potts Classification of Subarachnoid Hemorrhage: 4=Stuporous, More Severe Focal Deficit  Modified Swenson score = 4  Secondary to large left P-comm aneurysm, incidental small right ICA terminus aneurysm  1/31-MAURICE embolization of P-comm aneurysm and EVD placement,removal of posterior drain on 2/2, continue to monitor ICP and output, moved to 20 cm and clamped  2/12-EVD removed    Neuro checks every 2 hours  Nimodipine 60mg every 4 hours  Strict blood pressure management with new goal -200 given potential vasospasm on 2/4  Unable to get TCD's ultrasound windows for spasm monitoring, CTA/CTP as needed neuro changes  Continue close neurologic monitoring  Cleared for Lovenox by NSG/neurology  PT/OT/SLP   Goal euthermia, euvolemia, euglycemia, and eunatremia  On NaCl tabs to maintain eunatremia  On ASA 81mg daily given large coil-vessel lumen interface and high risk for coil-lumen thrombosis     Family members counseled on screening given potential connective tissue disorder seen and patient and sister as cause of SAH and aortic aneurysm    Repeat CT head on 2/12 stable  2/13-Subtle neuro changes this AM, CTA without flow limiting spasm (L MCA narrowing but without perfusion deficit), continue to keep BP > 140, vasospasm watch

## 2024-01-31 NOTE — PROGRESS NOTES
Dr. Vasquez f contacted me regarding Ms. Turner. She is a 63 year old with sudden onset of  neurological symptoms and obtundation.         Imaging Findings:  Large left pcomm region aneurysm with SAH, Carpio lara grade 3-4. Small right ICA terminus aneurysm. No hydrocephalus.    Neurointerventional Recommendation:  Emergent embolization of left Pcomm aneurysm. D/w  and Dr. Monroe.    Consent: No family available, emergent institutional consent.    Plan:  Will proceed with emergent aneurysm embolizatio as soon as patient is brought to the IR suite.  CT after to assess for EVD need.

## 2024-01-31 NOTE — ANESTHESIA POSTPROCEDURE EVALUATION
Patient: Fay Turner    Procedure Summary       Date: 01/31/24 Room / Location: [facility] - [facility]    Anesthesia Start: 1155 Anesthesia Stop: 1518    Procedure: IR-EMBOLIZATION Diagnosis: (SAH)    Scheduled Providers: Jhonathan Barrera M.D. Responsible Provider: Laure Chaney M.D.    Anesthesia Type: general ASA Status: 5 - Emergent            Final Anesthesia Type: general  Last vitals  BP   121/79   Temp     96.5   Pulse   79   Resp   20    SpO2   96%     Anesthesia Post Evaluation    Patient location during evaluation: ICU  Patient participation: complete - patient cannot participate  Post-procedure mental status: sedated.  Pain scale: unable to assess.    Airway patency: patent  Anesthetic complications: no  Cardiovascular status: hemodynamically stable  Respiratory status: ETT  Hydration status: acceptable    Anesthesia PONV: unable to assess.          No notable events documented.     Nurse Pain Score: 0 (NPRS)
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
## 2024-01-31 NOTE — ASSESSMENT & PLAN NOTE
Intubated at outside hospital 1/31-2/3  Encourage incentive spirometry if able/PEP, mobilization  Aggressive pulmonary toilet  Weaning FiO2 as able

## 2024-01-31 NOTE — ANESTHESIA TIME REPORT
Anesthesia Start and Stop Event Times       Date Time Event    1/31/2024 1150 Ready for Procedure     1155 Anesthesia Start     1518 Anesthesia Stop          Responsible Staff  01/31/24      Name Role Begin End    Neptali Castillo M.D. Anesth 1155 1319    Laure Chaney M.D. Anesth 1319 1518          Overtime Reason:  overtime    Comments:

## 2024-02-01 ENCOUNTER — APPOINTMENT (OUTPATIENT)
Dept: RADIOLOGY | Facility: MEDICAL CENTER | Age: 64
DRG: 020 | End: 2024-02-01
Attending: INTERNAL MEDICINE
Payer: COMMERCIAL

## 2024-02-01 ENCOUNTER — HOSPITAL ENCOUNTER (OUTPATIENT)
Dept: RADIOLOGY | Facility: MEDICAL CENTER | Age: 64
End: 2024-02-01
Attending: NEUROLOGICAL SURGERY

## 2024-02-01 PROBLEM — I95.89 HYPOTENSION DUE TO HYPOVOLEMIA: Status: ACTIVE | Noted: 2024-02-01

## 2024-02-01 PROBLEM — E87.6 HYPOKALEMIA: Status: ACTIVE | Noted: 2024-02-01

## 2024-02-01 PROBLEM — E86.1 HYPOTENSION DUE TO HYPOVOLEMIA: Status: ACTIVE | Noted: 2024-02-01

## 2024-02-01 LAB
ANION GAP SERPL CALC-SCNC: 11 MMOL/L (ref 7–16)
APTT PPP: 39.4 SEC (ref 24.7–36)
ARTERIAL PATENCY WRIST A: ABNORMAL
ARTERIAL PATENCY WRIST A: ABNORMAL
BASE EXCESS BLDA CALC-SCNC: -1 MMOL/L (ref -4–3)
BASE EXCESS BLDA CALC-SCNC: 1 MMOL/L (ref -4–3)
BODY TEMPERATURE: ABNORMAL DEGREES
BODY TEMPERATURE: ABNORMAL DEGREES
BREATHS SETTING VENT: 26
BREATHS SETTING VENT: 26
BUN SERPL-MCNC: 10 MG/DL (ref 8–22)
CALCIUM SERPL-MCNC: 8.3 MG/DL (ref 8.5–10.5)
CHLORIDE SERPL-SCNC: 103 MMOL/L (ref 96–112)
CO2 BLDA-SCNC: 24 MMOL/L (ref 20–33)
CO2 BLDA-SCNC: 27 MMOL/L (ref 20–33)
CO2 SERPL-SCNC: 25 MMOL/L (ref 20–33)
CREAT SERPL-MCNC: 0.55 MG/DL (ref 0.5–1.4)
CRP SERPL HS-MCNC: 5.45 MG/DL (ref 0–0.75)
DELSYS IDSYS: ABNORMAL
DELSYS IDSYS: ABNORMAL
END TIDAL CARBON DIOXIDE IECO2: 35 MMHG
END TIDAL CARBON DIOXIDE IECO2: 36 MMHG
EST. AVERAGE GLUCOSE BLD GHB EST-MCNC: 120 MG/DL
GFR SERPLBLD CREATININE-BSD FMLA CKD-EPI: 103 ML/MIN/1.73 M 2
GLUCOSE BLD STRIP.AUTO-MCNC: 116 MG/DL (ref 65–99)
GLUCOSE BLD STRIP.AUTO-MCNC: 130 MG/DL (ref 65–99)
GLUCOSE BLD STRIP.AUTO-MCNC: 156 MG/DL (ref 65–99)
GLUCOSE BLD STRIP.AUTO-MCNC: 161 MG/DL (ref 65–99)
GLUCOSE SERPL-MCNC: 158 MG/DL (ref 65–99)
GRAM STN SPEC: NORMAL
HBA1C MFR BLD: 5.8 % (ref 4–5.6)
HCO3 BLDA-SCNC: 22.8 MMOL/L (ref 17–25)
HCO3 BLDA-SCNC: 25.4 MMOL/L (ref 17–25)
HOROWITZ INDEX BLDA+IHG-RTO: 267 MM[HG]
HOROWITZ INDEX BLDA+IHG-RTO: 321 MM[HG]
LACTATE BLD-SCNC: 1.3 MMOL/L (ref 0.5–2)
MAGNESIUM SERPL-MCNC: 1.8 MG/DL (ref 1.5–2.5)
MAGNESIUM SERPL-MCNC: 1.8 MG/DL (ref 1.5–2.5)
MODE IMODE: ABNORMAL
MODE IMODE: ABNORMAL
O2/TOTAL GAS SETTING VFR VENT: 60 %
O2/TOTAL GAS SETTING VFR VENT: 70 %
PCO2 BLDA: 35.1 MMHG (ref 26–37)
PCO2 BLDA: 39 MMHG (ref 26–37)
PCO2 TEMP ADJ BLDA: 36.5 MMHG (ref 26–37)
PCO2 TEMP ADJ BLDA: 39.9 MMHG (ref 26–37)
PEEP END EXPIRATORY PRESSURE IPEEP: 10 CMH20
PEEP END EXPIRATORY PRESSURE IPEEP: 12 CMH20
PH BLDA: 7.42 [PH] (ref 7.4–7.5)
PH BLDA: 7.42 [PH] (ref 7.4–7.5)
PH TEMP ADJ BLDA: 7.41 [PH] (ref 7.4–7.5)
PH TEMP ADJ BLDA: 7.41 [PH] (ref 7.4–7.5)
PHOSPHATE SERPL-MCNC: 2.8 MG/DL (ref 2.5–4.5)
PHOSPHATE SERPL-MCNC: 3.9 MG/DL (ref 2.5–4.5)
PO2 BLDA: 160 MMHG (ref 64–87)
PO2 BLDA: 225 MMHG (ref 64–87)
PO2 TEMP ADJ BLDA: 163 MMHG (ref 64–87)
PO2 TEMP ADJ BLDA: 229 MMHG (ref 64–87)
POTASSIUM SERPL-SCNC: 3.2 MMOL/L (ref 3.6–5.5)
PREALB SERPL-MCNC: 16.8 MG/DL (ref 18–38)
PROCALCITONIN SERPL-MCNC: 0.41 NG/ML
SAO2 % BLDA: 100 % (ref 93–99)
SAO2 % BLDA: 99 % (ref 93–99)
SIGNIFICANT IND 70042: NORMAL
SITE SITE: NORMAL
SODIUM SERPL-SCNC: 136 MMOL/L (ref 135–145)
SODIUM SERPL-SCNC: 137 MMOL/L (ref 135–145)
SODIUM SERPL-SCNC: 138 MMOL/L (ref 135–145)
SODIUM SERPL-SCNC: 139 MMOL/L (ref 135–145)
SOURCE SOURCE: NORMAL
SPECIMEN DRAWN FROM PATIENT: ABNORMAL
SPECIMEN DRAWN FROM PATIENT: ABNORMAL
TIDAL VOLUME IVT: 320 ML
TIDAL VOLUME IVT: 320 ML

## 2024-02-01 PROCEDURE — 86140 C-REACTIVE PROTEIN: CPT

## 2024-02-01 PROCEDURE — 93886 INTRACRANIAL COMPLETE STUDY: CPT

## 2024-02-01 PROCEDURE — 99233 SBSQ HOSP IP/OBS HIGH 50: CPT | Performed by: PSYCHIATRY & NEUROLOGY

## 2024-02-01 PROCEDURE — 83605 ASSAY OF LACTIC ACID: CPT

## 2024-02-01 PROCEDURE — 83735 ASSAY OF MAGNESIUM: CPT

## 2024-02-01 PROCEDURE — 770022 HCHG ROOM/CARE - ICU (200)

## 2024-02-01 PROCEDURE — 700105 HCHG RX REV CODE 258: Performed by: INTERNAL MEDICINE

## 2024-02-01 PROCEDURE — 94799 UNLISTED PULMONARY SVC/PX: CPT

## 2024-02-01 PROCEDURE — 82962 GLUCOSE BLOOD TEST: CPT | Mod: 91

## 2024-02-01 PROCEDURE — 37799 UNLISTED PX VASCULAR SURGERY: CPT

## 2024-02-01 PROCEDURE — 99292 CRITICAL CARE ADDL 30 MIN: CPT | Performed by: INTERNAL MEDICINE

## 2024-02-01 PROCEDURE — 84134 ASSAY OF PREALBUMIN: CPT

## 2024-02-01 PROCEDURE — 93886 INTRACRANIAL COMPLETE STUDY: CPT | Mod: 26 | Performed by: INTERNAL MEDICINE

## 2024-02-01 PROCEDURE — 700102 HCHG RX REV CODE 250 W/ 637 OVERRIDE(OP): Performed by: INTERNAL MEDICINE

## 2024-02-01 PROCEDURE — 70450 CT HEAD/BRAIN W/O DYE: CPT

## 2024-02-01 PROCEDURE — 83036 HEMOGLOBIN GLYCOSYLATED A1C: CPT

## 2024-02-01 PROCEDURE — 700111 HCHG RX REV CODE 636 W/ 250 OVERRIDE (IP): Mod: JZ | Performed by: INTERNAL MEDICINE

## 2024-02-01 PROCEDURE — 85730 THROMBOPLASTIN TIME PARTIAL: CPT

## 2024-02-01 PROCEDURE — C1751 CATH, INF, PER/CENT/MIDLINE: HCPCS

## 2024-02-01 PROCEDURE — A9270 NON-COVERED ITEM OR SERVICE: HCPCS | Performed by: INTERNAL MEDICINE

## 2024-02-01 PROCEDURE — 80048 BASIC METABOLIC PNL TOTAL CA: CPT

## 2024-02-01 PROCEDURE — 84145 PROCALCITONIN (PCT): CPT

## 2024-02-01 PROCEDURE — 84295 ASSAY OF SERUM SODIUM: CPT | Mod: 91

## 2024-02-01 PROCEDURE — 36556 INSERT NON-TUNNEL CV CATH: CPT

## 2024-02-01 PROCEDURE — 700101 HCHG RX REV CODE 250: Performed by: INTERNAL MEDICINE

## 2024-02-01 PROCEDURE — 84100 ASSAY OF PHOSPHORUS: CPT

## 2024-02-01 PROCEDURE — 99291 CRITICAL CARE FIRST HOUR: CPT | Performed by: INTERNAL MEDICINE

## 2024-02-01 PROCEDURE — 700102 HCHG RX REV CODE 250 W/ 637 OVERRIDE(OP): Performed by: NURSE PRACTITIONER

## 2024-02-01 PROCEDURE — 82803 BLOOD GASES ANY COMBINATION: CPT | Mod: 91

## 2024-02-01 PROCEDURE — 94003 VENT MGMT INPAT SUBQ DAY: CPT

## 2024-02-01 RX ORDER — DEXMEDETOMIDINE HYDROCHLORIDE 4 UG/ML
.1-1.5 INJECTION, SOLUTION INTRAVENOUS CONTINUOUS
Status: DISCONTINUED | OUTPATIENT
Start: 2024-02-01 | End: 2024-02-03

## 2024-02-01 RX ORDER — SODIUM CHLORIDE 9 MG/ML
500 INJECTION, SOLUTION INTRAVENOUS ONCE
Status: COMPLETED | OUTPATIENT
Start: 2024-02-01 | End: 2024-02-01

## 2024-02-01 RX ORDER — LEVETIRACETAM 500 MG/5ML
500 INJECTION, SOLUTION, CONCENTRATE INTRAVENOUS EVERY 12 HOURS
Status: DISCONTINUED | OUTPATIENT
Start: 2024-02-01 | End: 2024-02-02

## 2024-02-01 RX ORDER — MAGNESIUM SULFATE HEPTAHYDRATE 40 MG/ML
2 INJECTION, SOLUTION INTRAVENOUS ONCE
Status: COMPLETED | OUTPATIENT
Start: 2024-02-01 | End: 2024-02-01

## 2024-02-01 RX ADMIN — POTASSIUM BICARBONATE 25 MEQ: 978 TABLET, EFFERVESCENT ORAL at 18:08

## 2024-02-01 RX ADMIN — NIMODIPINE 30 MG: 60 SOLUTION ORAL at 09:37

## 2024-02-01 RX ADMIN — POTASSIUM BICARBONATE 25 MEQ: 978 TABLET, EFFERVESCENT ORAL at 12:13

## 2024-02-01 RX ADMIN — LEVETIRACETAM 500 MG: 100 INJECTION, SOLUTION, CONCENTRATE INTRAVENOUS at 18:08

## 2024-02-01 RX ADMIN — FENTANYL CITRATE 50 MCG: 50 INJECTION, SOLUTION INTRAMUSCULAR; INTRAVENOUS at 08:14

## 2024-02-01 RX ADMIN — FENTANYL CITRATE 50 MCG: 50 INJECTION, SOLUTION INTRAMUSCULAR; INTRAVENOUS at 21:39

## 2024-02-01 RX ADMIN — NIMODIPINE 30 MG: 60 SOLUTION ORAL at 00:15

## 2024-02-01 RX ADMIN — NOREPINEPHRINE BITARTRATE 0.12 MCG/KG/MIN: 1 INJECTION, SOLUTION, CONCENTRATE INTRAVENOUS at 10:55

## 2024-02-01 RX ADMIN — POTASSIUM BICARBONATE 25 MEQ: 978 TABLET, EFFERVESCENT ORAL at 14:10

## 2024-02-01 RX ADMIN — NIMODIPINE 30 MG: 60 SOLUTION ORAL at 02:17

## 2024-02-01 RX ADMIN — NIMODIPINE 30 MG: 60 SOLUTION ORAL at 18:08

## 2024-02-01 RX ADMIN — SODIUM CHLORIDE 500 ML: 9 INJECTION, SOLUTION INTRAVENOUS at 09:43

## 2024-02-01 RX ADMIN — NIMODIPINE 30 MG: 60 SOLUTION ORAL at 22:09

## 2024-02-01 RX ADMIN — FAMOTIDINE 20 MG: 10 INJECTION, SOLUTION INTRAVENOUS at 06:29

## 2024-02-01 RX ADMIN — DOCUSATE SODIUM 50 MG AND SENNOSIDES 8.6 MG 2 TABLET: 8.6; 5 TABLET, FILM COATED ORAL at 06:31

## 2024-02-01 RX ADMIN — NIMODIPINE 30 MG: 60 SOLUTION ORAL at 20:07

## 2024-02-01 RX ADMIN — FENTANYL CITRATE 50 MCG: 50 INJECTION, SOLUTION INTRAMUSCULAR; INTRAVENOUS at 12:31

## 2024-02-01 RX ADMIN — NIMODIPINE 30 MG: 60 SOLUTION ORAL at 06:31

## 2024-02-01 RX ADMIN — MAGNESIUM SULFATE HEPTAHYDRATE 2 G: 2 INJECTION, SOLUTION INTRAVENOUS at 12:16

## 2024-02-01 RX ADMIN — NIMODIPINE 30 MG: 60 SOLUTION ORAL at 10:58

## 2024-02-01 RX ADMIN — FENTANYL CITRATE 50 MCG: 50 INJECTION, SOLUTION INTRAMUSCULAR; INTRAVENOUS at 15:39

## 2024-02-01 RX ADMIN — NIMODIPINE 30 MG: 60 SOLUTION ORAL at 15:39

## 2024-02-01 RX ADMIN — NIMODIPINE 30 MG: 60 SOLUTION ORAL at 14:10

## 2024-02-01 RX ADMIN — INSULIN HUMAN 1 UNITS: 100 INJECTION, SOLUTION PARENTERAL at 00:22

## 2024-02-01 RX ADMIN — DOCUSATE SODIUM 50 MG AND SENNOSIDES 8.6 MG 2 TABLET: 8.6; 5 TABLET, FILM COATED ORAL at 18:08

## 2024-02-01 RX ADMIN — CEFTRIAXONE SODIUM 2000 MG: 10 INJECTION, POWDER, FOR SOLUTION INTRAVENOUS at 15:39

## 2024-02-01 RX ADMIN — LEVETIRACETAM 500 MG: 100 INJECTION, SOLUTION, CONCENTRATE INTRAVENOUS at 06:29

## 2024-02-01 RX ADMIN — NIMODIPINE 30 MG: 60 SOLUTION ORAL at 03:59

## 2024-02-01 RX ADMIN — INSULIN HUMAN 1 UNITS: 100 INJECTION, SOLUTION PARENTERAL at 06:38

## 2024-02-01 RX ADMIN — FAMOTIDINE 20 MG: 20 TABLET ORAL at 18:08

## 2024-02-01 RX ADMIN — NIMODIPINE 30 MG: 60 SOLUTION ORAL at 12:13

## 2024-02-01 ASSESSMENT — PAIN DESCRIPTION - PAIN TYPE
TYPE: ACUTE PAIN

## 2024-02-01 NOTE — DISCHARGE PLANNING
[facility] Transitional Care Coordination  Referral from:  Gonda  Insurance Provider on Facesheet:Gerry  Potential Rehab Diagnosis: SAH    Chart review indicates patient may have on going medical management and may have therapy needs to possibly meet inpatient rehab facility criteria with the goal of returning to community.    D/C support: Unknown     Physiatry consultation pended per protocol.   When medically appropriate, would appreciate therapy evaluations         Thank you for the referral.

## 2024-02-01 NOTE — OP REPORT
DATE OF SERVICE:  01/31/2024     PREOPERATIVE DIAGNOSES:  1.  Obstructive hydrocephalus.  2.  Ruptured aneurysmal subarachnoid hemorrhage.     POSTOPERATIVE DIAGNOSES:  1.  Obstructive hydrocephalus.  2.  Ruptured aneurysmal subarachnoid hemorrhage.     PROCEDURE PERFORMED: Right frontal external ventricular drain.     SURGEON:  René Monroe III, MD     ASSISTANT:  None.     ESTIMATED BLOOD LOSS:  Scant.     FINDINGS:  Unable to cannulate the ventricle despite 4 passes in all 4   quadrants, catheter left and post-placement stealth CT showed a slightly   posterior placement into the third ventricle but not draining, brought back to   the ICU and reprepped and draped.  A repeat anterior korin hole did not yield   any CSF again  despite medialization and anterior trajectories, so will go to the OR for a   stealth-guided EVD placement.     COMPLICATIONS:  None.     DISPOSITION:  To the operative suite.     HISTORY OF PRESENT ILLNESS:  The patient is a 63-year-old woman with a left   PCOM aneurysm rupture with a poor GCS but  ventriculomegaly/hydrocephalus with increasing   ventricular size after coiling.  I consented her for a right frontal   external ventricular drain placement with the family.  Risks to include pain,   infection, bleeding, CSF leak, need for other procedures, need for shunt   eventually and they understood the risks, benefits and agreed to consent      SUMMARY OF OPERATIVE PROCEDURE: The patient was in the ICU and a sterile field   was created.  A midpupillary line 9 cm back from the glabella, which is   standard procedure was drawn on the skin.  Preoperative antibiotics were   given.  Proper timeout was performed.  The patient was prepped and draped in   sterile fashion.     A linear incision was made and soft tissues dissected and held back with   self-retaining retractor. The wound had been infiltrated with Marcaine with   epinephrine.  A twist drill korin hole was made and using standard    perpendicular technique, catheter was passed at 7 cm at the skull but no CSF   was found.  We tried 4-quadrant trajectories with slight deviation in all 4   quadrants.  We were unable to cannulate the ventricle.  The EVD was left,   will go down for a stat CT scan.  The wound was irrigated and closed with   running locking 3-0 Vicryl and the EVD secured to the skin.  A sterile   dressing was applied. It did not drain upon return from CT.     There were no complications.  Needle and sponge count were correct at the end   of the case.        ______________________________  MD WILDA Hubbard III/MARY JO/SAMEER    DD:  01/31/2024 18:46  DT:  01/31/2024 19:19    Job#:  [id number]

## 2024-02-01 NOTE — PROGRESS NOTES
Neurosurgery Progress Note    Subjective:  63 year old presented with HH4 ruptured left Pcomm aneurysm, post coil day 1.     POD#1 anterior EVD placement with stealth, at 10cm H20  Posterior EVD also placed, clamped currently     ICPs stable between 3-9 overnight  EVD output from anterior EVD 7-1cc/hr overnight.     Per RN, with deep suctioning/turning patient opens right eye, spontaneous movement in lowers and right greater than left upper     Exam:  GCS: E1VtM4.  Patient intubated, sedated.   +corneal, +cough/gag.    Pupils right 1-2mm reactive, left 3-4mm non reactive, gaze conjugate  Withdraws to lower extremities, right upper withdraws, very minimal withdrawal to left upper.    EVD in place and functioning at [id number] above the tragus   ICP waveform adequate    BP  Min: 101/69  Max: 158/112  Pulse  Av.4  Min: 68  Max: 105  Resp  Av.9  Min: 18  Max: 58  Monitored Temp 2  Av.2 °C (98.9 °F)  Min: 35.6 °C (96.1 °F)  Max: 37.9 °C (100.2 °F)  SpO2  Av.8 %  Min: 91 %  Max: 100 %    ICP  Av.3 MM HG  Min: 2 MM HG  Max: 9 MM HG    Recent Labs     24   WBC 17.9* 19.6*   RBC 5.12 5.48*   HEMOGLOBIN 13.4 14.2   HEMATOCRIT 39.6 41.7   MCV 77.3* 76.1*   MCH 26.2* 25.9*   MCHC 33.8 34.1   RDW 37.7 36.7   PLATELETCT 256 271   MPV 10.4 10.0     Recent Labs     24  15224   SODIUM 137 142 138   POTASSIUM 3.6 3.1*  --    CHLORIDE 103 102  --    CO2 20 23  --    GLUCOSE 155* 186*  --    BUN 10 8  --    CREATININE 0.52 0.61  --    CALCIUM 8.0* 8.2*  --      Recent Labs     24  1525 24  0320   APTT >240.0* 37.0* 39.4*   INR 1.06  --   --      Recent Labs     24  1819   REACTMIN 5.7   CLOTKINET 1.3   CLOTANGL 73.8   MAXCLOTS 62.1   JIT49MHD 0.0   PRCINADP 84.4*   PRCINAA 25.3*       Intake/Output                         24 07 - 24 0659 24 - 24 0659     9104-7126 4106-2472 Total 8829-9779  7846-0109 Total                 Intake    I.V.  746.1  1549.4 2295.5  --  -- --    Cardene Volume 46.1 -- 46.1 -- -- --    Norepinephrine Volume -- 149.4 149.4 -- -- --    Volume (mL) (electrolyte-A (Plasmalyte-A) infusion) 700 -- 700 -- -- --    Volume (mL) (NS infusion) -- 900 900 -- -- --    Volume (mL) (NS (Bolus) 0.9 % infusion 500 mL) -- 500 500 -- -- --    Other  --  480 480  --  -- --    Medications (PO/Enteral Liquids) -- 480 480 -- -- --    NG/GT  0  0 0  --  -- --    Intake (mL) (Enteral Tube 01/31/24 Nasogastric Right nare) 0 0 0 -- -- --    Total Intake 746.1 2029.4 2775.5 -- -- --       Output    Urine  1200  2345 3545  --  -- --    Urine 500 100 600 -- -- --    Output (mL) (Urethral Catheter Non-latex;Temperature probe) 700 2245 2945 -- -- --    Drains  --  83 83  --  -- --    Output (mL) (ICP/Ventriculostomy Right ) -- 68 68 -- -- --    Output (mL) (ICP/Ventriculostomy Right) -- 15 15 -- -- --    Stool  --  -- --  --  -- --    Number of Times Stooled -- 0 x 0 x -- -- --    Emesis/NG output  0  0 0  --  -- --    Output (mL) (Enteral Tube 01/31/24 Nasogastric Right nare) 0 0 0 -- -- --    Blood  75  5 80  --  -- --    Est. Blood Loss 75 5 80 -- -- --    Total Output 1275 2433 3708 -- -- --       Net I/O     -528.9 -403.6 -932.5 -- -- --              Intake/Output Summary (Last 24 hours) at 2/1/2024 0857  Last data filed at 2/1/2024 0600  Gross per 24 hour   Intake 2775.51 ml   Output 3708 ml   Net -932.49 ml             Respiratory Therapy Consult   Continuous RT    famotidine  20 mg Q12HRS    Or    famotidine  20 mg Q12HRS    senna-docusate  2 Tablet BID    And    polyethylene glycol/lytes  1 Packet QDAY PRN    And    magnesium hydroxide  30 mL QDAY PRN    And    bisacodyl  10 mg QDAY PRN    MD Alert...Adult ICU Electrolyte Replacement per Pharmacy   PHARMACY TO DOSE    lidocaine  2 mL Q30 MIN PRN    Pharmacy  1 Each PHARMACY TO DOSE    [Held by provider] propofol  0-80 mcg/kg/min (Ideal) Continuous     fentaNYL  25 mcg Q HOUR PRN    Or    fentaNYL  50 mcg Q HOUR PRN    Or    fentaNYL  100 mcg Q HOUR PRN    ondansetron  4 mg Q4HRS PRN    Or    ondansetron  4 mg Q4HRS PRN    acetaminophen  650 mg Q4HRS PRN    Or    acetaminophen  650 mg Q4HRS PRN    LORazepam  2 mg Q5 MIN PRN    niCARdipine (Cardene) 25 mg in  mL Standard Infusion  0-15 mg/hr Continuous    labetalol  10 mg Q4HRS PRN    hydrALAZINE  10 mg Q4HRS PRN    enalaprilat  1.25 mg Q6HRS PRN    NORepinephrine  0-1 mcg/kg/min (Ideal) Continuous    cefTRIAXone (ROCEPHIN) IV  2,000 mg Q24HR    levETIRAcetam (Keppra) IV  500 mg Q12HRS    prochlorperazine  10 mg Q6HRS PRN    NS  500 mL Once    insulin regular  1-6 Units Q6HRS    And    dextrose bolus  25 g Q15 MIN PRN    niMODipine  30 mg Q2HRS       Assessment and Plan:  Hospital day # 2  Post coil day 1  Keep posterior EVD clamped, will remove tomorrow 2/1  Anterior EVD at 10cm H20 above tragus.  Continue Q1 neurochecks   Appreciate neurology, IR and intensivist care  Following closely.     Chemical prophylactic DVT therapy: No  Start date/time: tbd

## 2024-02-01 NOTE — FLOWSHEET NOTE
02/01/24 0619   Spontaneous Breathing Trial (SBT)   Safety screen spontaneous breathing trial (SBT)   (no spont at this time, per nursing)

## 2024-02-01 NOTE — THERAPY
Speech Language Therapy Contact Note    Patient Name: Fay Turner  Age:  63 y.o., Sex:  female  Medical Record #: [id number]  Today's Date: 2/1/2024 02/01/24 0826   Treatment Variance   Reason For Missed Therapy Medical - Patient not Able To Participate;Medical - Patient Is Not Medically Stable   Initial Contact Note    Initial Contact Note  Order Received and Verified, Speech Therapy Evaluation in Progress with Full Report to Follow.   Interdisciplinary Plan of Care Collaboration   IDT Collaboration with  Other (See Comments)  (EMR)   Collaboration Comments Order for CSE received/chart reviewed. Per EMR, pt remains intubated at this junction. SLP to follow/monitor for extubation and complete CSE as appropriate. Thank you.
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
## 2024-02-01 NOTE — PROCEDURES
Central Line Insertion    Date/Time: 1/31/2024 10:55 PM    Performed by: Oriana Michaels  Authorized by: Oriana Michaels    Consent:     Consent obtained:  Emergent situation    Consent given by: unable- pt condition, no family.    Risks discussed:  Arterial puncture, incorrect placement, nerve damage, bleeding, infection and pneumothorax    Alternatives discussed:  No treatment and delayed treatment  Universal protocol:     Patient states understanding of procedure being performed: unable- pt condition, no family.      Relevant documents present and verified: unable- pt condition, no family.      Test results available and properly labeled: yes      Imaging studies available: yes      Required blood products, implants, devices, and special equipment available: yes      Site/side marked: yes      Immediately prior to procedure, a time out was called: yes      Patient identity confirmed:  Hospital-assigned identification number and arm band  Pre-procedure details:     Hand hygiene: Hand hygiene performed prior to insertion      Sterile barrier technique: All elements of maximal sterile technique followed      Skin preparation:  ChloraPrep    Skin preparation agent: Skin preparation agent completely dried prior to procedure    Sedation:     Sedation type:  None  Anesthesia:     Anesthesia method:  Local infiltration    Local anesthetic:  Lidocaine 1% w/o epi  Procedure details:     Location:  R internal jugular    Patient position: Maintained HOB up until immediately prior to procedure (EVDx2 clamped briefly), HOB immediately elevated after access was obtained.    Procedural supplies:  Triple lumen    Catheter size:  7 Fr    Landmarks identified: yes      Ultrasound guidance: yes      Sterile ultrasound techniques: Sterile gel and sterile probe covers were used      Number of attempts:  1    Successful placement: yes    Post-procedure details:     Post-procedure:  Dressing applied and line sutured    Guidewire:  guidewire removal confirmed      Assessment:  Blood return through all ports, no pneumothorax on x-ray, free fluid flow and placement verified by x-ray    Patient tolerance of procedure:  Tolerated well, no immediate complications  Comments:      The wire is accounted for, there were no neuro changes (HOB was down for <10min), the line is ok to use.

## 2024-02-01 NOTE — ANESTHESIA TIME REPORT
Anesthesia Start and Stop Event Times       Date Time Event    1/31/2024 1901 Ready for Procedure     1918 Anesthesia Start     2023 Anesthesia Stop          Responsible Staff  01/31/24      Name Role Begin End    Kevin Marquez M.D. Anesth 1918 2023          Overtime Reason:  no overtime (within assigned shift)    Comments:

## 2024-02-01 NOTE — PROGRESS NOTES
Still unable to cannulate ventricle despite anterior new korin hole and medial pass, to OR for stealth guided EVD placement

## 2024-02-01 NOTE — PROGRESS NOTES
*late entry    0000- Anterior EVD fully primed with serosanguineous fluid from patient. Per MD Monroe's order, both anterior and posterior EVDs raised to 10 cm H2O (previously at 0 cm H2O).
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
## 2024-02-01 NOTE — OR NURSING
Patient rolled through preop without stopping in a room, identity verified. ICU RN gave report to anesthesiologist prior to patient rolling back to surgery. Dr Monroe states this is emergent and we need to proceed without getting anesthesia consent signed and prior to coming to preop, the ICU scanned in a surgical consent that they had the  signed. This is not witnessed, but Dr Monroe states that we need to get into the OR right now.

## 2024-02-01 NOTE — ANESTHESIA PREPROCEDURE EVALUATION
Case: [id number] Date/Time: 01/31/24 1840    Procedure: CRANIOTOMY (Right)    Location: [facility] OR 05 / SURGERY [facility]    Surgeons: René Monroe III, M.D.          ICH  Coiling of Cerebral aneurysm    Relevant Problems   NEURO   (positive) Seizure (HCC)      CARDIAC   (positive) Secondary hypertension       Physical Exam    Airway - unable to assess  Neck ROM: limited       Cardiovascular - normal exam  Rhythm: regular  Rate: normal  (-) murmur     Dental - normal exam           Pulmonary - normal exam  Breath sounds clear to auscultation     Abdominal    Neurological - normal exam                   Anesthesia Plan    ASA 4- EMERGENT   ASA physical status 4 criteria: CVA or TIA - recent (< 3 months)ASA physical status emergent criteria: neurologic compromise requiring immediate intervention    Plan - general       Airway plan will be ETT          Induction: intravenous      Pertinent diagnostic labs and testing reviewed    Informed Consent:  Emergent - Consent given by clinician

## 2024-02-01 NOTE — CARE PLAN
The patient is Watcher - Medium risk of patient condition declining or worsening         Progress made toward(s) clinical / shift goals:      Problem: Safety - Medical Restraint  Goal: Remains free of injury from restraints (Restraint for Interference with Medical Device)  Outcome: Progressing  Flowsheets (Taken 1/31/2024 1938)  Addressed this shift: Remains free of injury from restraints (restraint for interference with medical device):   Determine that other, less restrictive measures have been tried or would not be effective before applying the restraint   Evaluate the patient's condition at the time of restraint application   Inform patient/family regarding the reason for restraint   Every 2 hours: Monitor safety, psychosocial status, comfort, nutrition and hydration       Patient is not progressing towards the following goals:      Problem: Safety - Medical Restraint  Goal: Free from restraint(s) (Restraint for Interference with Medical Device)  Outcome: Not Progressing  Flowsheets (Taken 1/31/2024 1938)  Addressed this shift: Free from restraint(s) (restraint for interference with medical device):   ONCE/SHIFT or MINIMUM Every 12 hours: Assess and document the continuing need for restraints   Every 24 hours: Continued use of restraint requires Licensed Independent Practitioner to perform face to face examination and written order   Identify and implement measures to help patient regain control

## 2024-02-01 NOTE — THERAPY
Physical Therapy Contact Note    Patient Name: Fay Turner  Age:  63 y.o., Sex:  female  Medical Record #: [id number]  Today's Date: 2/1/2024    Discussed missed therapy with RN       02/01/24 0952   Treatment Variance   Reason For Missed Therapy Medical - Patient Is Not Medically Stable   Interdisciplinary Plan of Care Collaboration   IDT Collaboration with  Nursing   Collaboration Comments order for PT acknowledged. Pt not medically stable for mobility.   Session Information   Date / Session Number  2/1 hold

## 2024-02-01 NOTE — CARE PLAN
The patient is Watcher - Medium risk of patient condition declining or worsening    Shift Goals  Clinical Goals: Improve neuro, SBP:100-160  Patient Goals: ALEJANDRA  Family Goals: ALEJANDRA    Progress made toward(s) clinical / shift goals:    Problem: Skin Integrity  Goal: Skin integrity is maintained or improved  Outcome: Progressing     Problem: Fall Risk  Goal: Patient will remain free from falls  Outcome: Progressing     Problem: Pain - Standard  Goal: Alleviation of pain or a reduction in pain to the patient’s comfort goal  Outcome: Progressing       Patient is not progressing towards the following goals:      Problem: Knowledge Deficit - Standard  Goal: Patient and family/care givers will demonstrate understanding of plan of care, disease process/condition, diagnostic tests and medications  Outcome: Not Progressing   RASS -4    Problem: Safety - Medical Restraint  Goal: Remains free of injury from restraints (Restraint for Interference with Medical Device)  Outcome: Not Progressing   RASS -4

## 2024-02-01 NOTE — OP REPORT
DATE OF SERVICE:  01/31/2024     PREOPERATIVE DIAGNOSES:  1.  Obstructive hydrocephalus.  2.  Aneurysmal subarachnoid hemorrhage.     POSTOPERATIVE DIAGNOSES:  1.  Obstructive hydrocephalus.  2.  Aneurysmal subarachnoid hemorrhage.     PROCEDURES PERFORMED:  1.  Stealth guidance for the cranium.  2.  Right frontal external ventricular drain under Stealth.     SURGEON:  René Monroe III, MD     ASSISTANT:  None.     ESTIMATED BLOOD LOSS:  Scant.     FINDINGS:  Second EVD had small amount of CSF draining tunneled out laterally,   decided to leave the posterior previous EVD in because it started working   after transport to the OR.     COMPLICATIONS:  None.     DRAINS LEFT:  As above.     DISPOSITION:  To remain intubated back to the ICU.     HISTORY OF PRESENT ILLNESS:  This is a 63-year-old woman who undergone 2   attempted right ventricular external drain placement with freehand and I was   unable despite several passes to cannulate the ventricle.  I decided with the   Stealth guidance I would place, under Stealth guidance to assure it was   working.  I explained the risks, benefits and alternatives to the family   previously and they had already agreed to consent.     SUMMARY OF OPERATIVE PROCEDURE:  The patient was brought to the operating   suite, placed under general anesthesia.  She was on a regular bed supine, head   in a horseshoe.  We prepped all the area of the previous work with   ChloraPrep.  Preoperative antibiotics were given.  Proper timeout was   performed.  The patient was prepped and draped in sterile fashion.     A linear incision was reopened and held back with self-retaining retractors,   using monopolar for hemostasis.  Using Stealth guidance for the cranium that   had been obtained preoperatively with 1.9 mm error, we did pass the EVD   catheter.  After confirming the patency of the dural opening to a depth of 5.2   cm.  A small amount of clear CSF came out and then stopped draining,  probably   because of the posterior EVD started draining on transport.  We deemed this   one to be a little more posterior in the third ventricle more likely to fail,   so we decided that it was important to place this one.  We tunneled this out   laterally and secured to skin with stitch.  We irrigated the wound with   bacitracin saline and closed in anatomic layers with a running 3-0 locking   nylon and then 3-0 nylon, secured to skin with staples.  Sterile dressings   were applied. I decided to leave the posterior EVD in again as well because   this had drained significantly and was getting allow drainage of the anterior   one, so we will get a CAT scan in the morning and will try them both to   drainage and monitoring ICP.     There were no complications.  Needle and sponge count correct at the end of   the case.        ______________________________  MD WILDA Hubbard III/TEO/BANDAR    DD:  01/31/2024 20:12  DT:  01/31/2024 20:34    Job#:  [id number]

## 2024-02-01 NOTE — FLOWSHEET NOTE
02/01/24 0619   Spontaneous Breathing Trial (SBT)   Safety screen spontaneous breathing trial (SBT)   (no spont at this time, pt going to CT)

## 2024-02-01 NOTE — CARE PLAN
Problem: Ventilation  Goal: Ability to achieve and maintain unassisted ventilation or tolerate decreased levels of ventilator support  Description: Target End Date:  4 days     Document on Vent flowsheet    1.  Support and monitor invasive and noninvasive mechanical ventilation  2.  Monitor ventilator weaning response  3.  Perform ventilator associated pneumonia prevention interventions  4.  Manage ventilation therapy by monitoring diagnostic test results  Outcome: Not Met       Vent day 2  ETT 7.5 @ 23  APVCMV 26, 320, 10, 60%

## 2024-02-01 NOTE — CONSULTS
NEUROLOGY INITIAL CONSULT NOTE  Neurohospitalist Service, [facility]    Referring Physician: Jeremy M Gonda, M.D.    STROKE CODE: No chief complaint on file.        HPI: Fay Turner is a 63 y.o. female with history of hypertension who was transferred from [facility] after she presented with alteration of mental status with rapidly progressive neurological changes and obtundation.  She was noted to have left ptosis and dilated left pupil.  She was intubated and underwent brain CT which revealed subarachnoid hemorrhage with evidence of large left P-comm aneurysm on CTA.  I discussed the case with ER physician at [facility] and accepted transfer for further intervention with IR.  I discussed with Dr. Eason, neuro IR with a plan to take the patient to interventional suite for embolization.  Patient has already been seen by neurosurgery at [facility].      Review of systems: In addition to what is detailed in the HPI above, all other systems reviewed and are negative.    Past Medical History:    has no past medical history on file.    FHx:  family history is not on file.    SHx:       Allergies:  Not on File    Medications:    Current Facility-Administered Medications:     Respiratory Therapy Consult, , Nebulization, Continuous RT, Jeremy M Gonda, M.D.    famotidine (Pepcid) tablet 20 mg, 20 mg, Enteral Tube, Q12HRS **OR** famotidine (Pepcid) injection 20 mg, 20 mg, Intravenous, Q12HRS, Jeremy M Gonda, M.D.    senna-docusate (Pericolace Or Senokot S) 8.6-50 MG per tablet 2 Tablet, 2 Tablet, Enteral Tube, BID **AND** polyethylene glycol/lytes (Miralax) Packet 1 Packet, 1 Packet, Enteral Tube, QDAY PRN **AND** magnesium hydroxide (Milk Of Magnesia) suspension 30 mL, 30 mL, Enteral Tube, QDAY PRN **AND** bisacodyl (Dulcolax) suppository 10 mg, 10 mg, Rectal, QDAY PRN, Jeremy M Gonda, M.D.    MD Alert...ICU Electrolyte Replacement per Pharmacy, , Other, PHARMACY  TO DOSE, Jeremy M Gonda, M.D.    lidocaine (Xylocaine) 1 % injection 2 mL, 2 mL, Tracheal Tube, Q30 MIN PRN, Jeremy M Gonda, M.D.    Pharmacy Consult: Enteral tube insertion - review meds/change route/product selection, 1 Each, Other, PHARMACY TO DOSE, Jeremy M Gonda, M.D.    propofol (DIPRIVAN) injection, 0-80 mcg/kg/min (Ideal), Intravenous, Continuous, Paused at 01/31/24 1545 **AND** Triglycerides Starting now and then Every 3 Days, , , Every 3 Days (0300), Jeremy M Gonda, M.D.    fentaNYL (Sublimaze) injection 25 mcg, 25 mcg, Intravenous, Q HOUR PRN **OR** fentaNYL (Sublimaze) injection 50 mcg, 50 mcg, Intravenous, Q HOUR PRN **OR** fentaNYL (Sublimaze) injection 100 mcg, 100 mcg, Intravenous, Q HOUR PRN, Jeremy M Gonda, M.D.    ondansetron (Zofran ODT) dispertab 4 mg, 4 mg, Enteral Tube, Q4HRS PRN **OR** ondansetron (Zofran) syringe/vial injection 4 mg, 4 mg, Intravenous, Q4HRS PRN, Jeremy M Gonda, M.D.    acetaminophen (Tylenol) tablet 650 mg, 650 mg, Enteral Tube, Q4HRS PRN **OR** acetaminophen (Tylenol) suppository 650 mg, 650 mg, Rectal, Q4HRS PRN, Jeremy M Gonda, M.D.    LORazepam (Ativan) injection 2 mg, 2 mg, Intravenous, Q5 MIN PRN, Jeremy M Gonda, M.D.    niCARdipine (Cardene) 25 mg in  mL Standard Infusion, 0-15 mg/hr, Intravenous, Continuous, Jeremy M Gonda, M.D., Last Rate: 50 mL/hr at 01/31/24 1601, 5 mg/hr at 01/31/24 1601    labetalol (Normodyne/Trandate) injection 10 mg, 10 mg, Intravenous, Q4HRS PRN, Jeremy M Gonda, M.D., 10 mg at 01/31/24 1541    hydrALAZINE (Apresoline) injection 10 mg, 10 mg, Intravenous, Q4HRS PRN, Jeremy M Gonda, M.D.    enalaprilat (Vasotec) injection 1.25 mg 1 mL, 1.25 mg, Intravenous, Q6HRS PRN, Jeremy M Gonda, M.D.    norepinephrine (Levophed) 8 mg in 250 mL NS infusion (premix), 0-1 mcg/kg/min (Ideal), Intravenous, Continuous, Jeremy M Gonda, M.D., Dose not Required at 01/31/24 1545    niMODipine (Nymalize) oral syringe 60 mg, 60 mg, Enteral Tube, Q4HRS, Sin  "M Gonda, M.D., 60 mg at 01/31/24 1627    furosemide (Lasix) injection 20 mg, 20 mg, Intravenous, Once, Jeremy M Gonda, M.D.    cefTRIAXone (Rocephin) syringe 2,000 mg, 2,000 mg, Intravenous, Q24HR, Jeremy M Gonda, M.D., 2,000 mg at 01/31/24 1614    levETIRAcetam (Keppra) injection 500 mg, 500 mg, Intravenous, Q12HRS, Jeremy M Gonda, M.D.    Physical Examination:    Vitals:    01/31/24 1546 01/31/24 1600 01/31/24 1615 01/31/24 1632   BP:  (!) 158/112     Pulse: 77 80 89 85   Resp: (!) 28 20 20 (!) 26   SpO2: 98% 94% 93% 93%   Weight:       Height:           General:   Patient is intubated and sedated.  Eyes: Midline, Pupils: Left is 5 mm and nonreactive.  Right is 3 mm and sluggish.  CV: RRR  Lungs: No respiratory distress, on vent.  Extremities: No cyanosis, warm, no significant edema.    NEUROLOGICAL EXAM:   Mental status: The patient is unresponsive, intubated on vent.    Speech and language: Unable to assess, intubated.  Cranial nerve exam: Pupils are equal, round and reactive to light bilaterally but sluggish. Visual fields cannot be tested.  Face appears symmetric.  Facial sensation cannot be tested.  Motor exam: No withdrawal to physical stimulation at this point, however she is sedated.  Sensory exam: No reaction to physical stimulation.  Coordination: Cannot be assessed  Plantar reflexes: Equivocal  Gait: deferred      Baseline modified Nuvia Scale (MRS): 0 = No symptoms    Carpio and Potts Classification of Subarachnoid Hemorrhage: 4=Stuporous, More Severe Focal Deficit    Modified Swenson Scale: 3      Objective Data:    Labs:  No results found for: \"PROTHROMBTM\", \"INR\"   Lab Results   Component Value Date/Time    WBC 17.9 (H) 01/31/2024 03:25 PM    RBC 5.12 01/31/2024 03:25 PM    HEMOGLOBIN 13.4 01/31/2024 03:25 PM    HEMATOCRIT 39.6 01/31/2024 03:25 PM    MCV 77.3 (L) 01/31/2024 03:25 PM    MCH 26.2 (L) 01/31/2024 03:25 PM    MCHC 33.8 01/31/2024 03:25 PM    MPV 10.4 01/31/2024 03:25 PM    NEUTSPOLYS 84.00 " (H) 01/31/2024 03:25 PM    LYMPHOCYTES 7.90 (L) 01/31/2024 03:25 PM    MONOCYTES 7.30 01/31/2024 03:25 PM    EOSINOPHILS 0.10 01/31/2024 03:25 PM    BASOPHILS 0.30 01/31/2024 03:25 PM      Lab Results   Component Value Date/Time    SODIUM 137 01/31/2024 03:25 PM    POTASSIUM 3.6 01/31/2024 03:25 PM    CHLORIDE 103 01/31/2024 03:25 PM    CO2 20 01/31/2024 03:25 PM    GLUCOSE 155 (H) 01/31/2024 03:25 PM    BUN 10 01/31/2024 03:25 PM    CREATININE 0.52 01/31/2024 03:25 PM      Lab Results   Component Value Date/Time    CHOLSTRLTOT 165 01/31/2024 03:25 PM     (H) 01/31/2024 03:25 PM    HDL 36 (A) 01/31/2024 03:25 PM    TRIGLYCERIDE 79 01/31/2024 03:25 PM       Lab Results   Component Value Date/Time    ALKPHOSPHAT 261 (H) 08/27/2019 04:57 AM    ASTSGOT 64 (H) 08/27/2019 04:57 AM    ALTSGPT 125 (H) 08/27/2019 04:57 AM    TBILIRUBIN 0.5 08/27/2019 04:57 AM        Imaging/Testing:    I interpreted and/or reviewed the patient's neuroimaging    DX-CHEST-PORTABLE (1 VIEW)   Final Result      CT-HEAD W/O   Final Result      1. Interval aneurysm coiling of left posterior communicating artery aneurysm.   2. Possible increased subarachnoid hemorrhage.   3. Intraventricular hemorrhage. There is developing mild hydrocephalus with mildly increased ventricles.   4. Small right convexity subdural hematoma measuring 5 mm in thickness.      These findings were discussed with EMILY BYRD on 1/31/2024 3:14 PM.      IR-EMBOLIZATION    (Results Pending)   EC-ECHOCARDIOGRAM COMPLETE W/O CONT    (Results Pending)       Assessment:  Fay Turner is a 63 y.o. female with history of hypertension who was transferred from [facility] after she presented with alteration of mental status with rapidly progressive neurological changes and obtundation.  She was noted to have left ptosis and dilated left pupil.  She was intubated and underwent brain CT which revealed diffuse subarachnoid hemorrhage with evidence of  large left P-comm aneurysm on CTA.  I discussed the case with ER physician at [facility] and accepted transfer for further intervention with IR.  I discussed with Dr. Eason, neuro IR with a plan to take the patient to interventional suite for embolization.  Patient has already been seen by neurosurgery at [facility].  Subsequently patient underwent successful coil embolization of the left P-comm aneurysm.  Patient underwent repeat brain CT following coil embolization which revealed slight increase in subarachnoid hemorrhage with developing hydrocephalus.  Plan for EVD placement by neurosurgery.    Plan:  - q1h and PRN neuro assessment. VS per nursing/unit protocol.   - SBP goal 100-160; continue cardene PRN, may need to liberalize blood pressure in coming days to mitigate vasospasm.  - Antihypertensives per primary team.   - Please call neurology with further concern for neurological decline or new/focal neurological deficits and obtain Stat CT head.   -Neurosurgery was consulted for EVD placement given hydrocephalus and relatively poor exam  - Telemetry   - Maintain strict normothermia, eunatremia, euglycemia, FSBS .   - Net even I/O, normal Na goal 135-145 for now, monitor for cerebral salt  wasting, Current/most recent Na is 137.  - Nimodipine 30 mg PO/NGT q2h or 60mg q4h x21 days.    -Begin daily TCDs starting on post-day 1 for baseline values  - Avoid blood thinners ie. anticoagulation, and antithrombotic  - no known underlying coagulopathy, maintain INR < 1.5, platelets > 100k  -Apparently had a seizure-like activity while in CT scanner at the outside hospital for which she was loaded with Keppra, continue with Keppra 500 mg twice a day.  - All other medical management per ICU team and per neurosurgery.   - DVT Prophylaxis: SCDs.   - Not a current candidate for PT/OT/SLP at this time     The plan of care above has been discussed with Jeremy M Gonda, M.D.  Discussed with Dr. Eason.    Upon  my evaluation, this patient had a high probability of imminent or life-threatening deterioration due to subarachnoid hemorrhage which required my direct attention, intervention, and personal management.  I personally provided 55 minutes of total critical care time outside of time spent on separately billable/documented procedures. Time includes: review of laboratory data, review of radiology studies, discussion with consultants, discussion with family/patient, monitoring for potential decompensation.  Interventions were performed as documented in the chart.      Please note that this dictation was created using voice recognition software.  I have made every reasonable attempt to correct obvious errors, but I expect that there are errors of grammar and possibly content that I did not discover before finalizing the note.         Corey Vasquez MD  [facility]

## 2024-02-01 NOTE — DISCHARGE PLANNING
Case Management Discharge Planning    Admission Date: 1/31/2024  GMLOS: 10.2  ALOS: 1    6-Clicks ADL Score:    6-Clicks Mobility Score:        Anticipated Discharge Disposition: Disch to  rehab facility or distinct part unit (62)    DME Needed: No    Action(s) Taken: Chart reviewed and case discussed in ICU rounds:   Patient admitted on 1/31/2024 for SAH, seizure, acute pulmonary edema, acute respiratory failure with hypoxia.   POD#1 s/p cerebral angiogram and intracranial aneurysm embolization, POD#1 s/p right frontal external ventricular drain, POD#1 s/p right frontal external ventricular drain under Stealth.    +Vent (day 2)/Drips/EVD x 2 (posterior clamped, anterior open)/NGT/Nimodipine/Q6H sodium checks/Q1H neurochecks.  NSGY following.    CM met at bedside with patient's spouse/Jake and daughter/Christel to complete assessment and discuss DCP.   Most info provided by Christel:   Patient lives in a single level house (address: [address] [place] [address] , [place] [address]) with her family (, daughter, and 10 yo grandchild).   Prior to admission she was employed full-time, driving, independent with ADLs, and ambulating w/o assistive devices.  No DME used or owned.   No Hx of ETOH or drug abuse.   No POA for healthcare; Advance Directive Planning Guide provided.    PCP:   Per Christel, patient has a PCP (name unk to Christel or spouse)    PT/OT/SLP eval pending at time of encounter (to be done when appropriate).  Anticipated need for post-acute placement discussed with  and daughter who both verbalized understanding.   DCP to be revisited based on clinical progression.        [facility]        Escalations Completed: None    Medically Clear: No    Next Steps: CM will f/u on clinical progression and submit referrals as indicated    Barriers to Discharge: Medical clearance, pending PT/OT/SLP eval    Is the patient up for discharge tomorrow: No      Care Transition Team Assessment    Information  Source  Orientation Level: Unable to assess (Intubated)  Information Given By: Spouse, Relative (Spouse & daughter)  Informant's Name: Jake Turner (spouse) & Christel Casanova (daughter)  Who is responsible for making decisions for patient? : Legal next of kin  Name(s) of Primary Decision Maker: Jake Kearney (spouse & daughter)    Readmission Evaluation  Is this a readmission?: No    Elopement Risk  Legal Hold: No  Ambulatory or Self Mobile in Wheelchair: No-Not an Elopement Risk  Elopement Risk: Not at Risk for Elopement     Discharge Preparedness  What is your plan after discharge?: Uncertain - pending medical team collaboration  What are your discharge supports?: Spouse, Child (Spouse and adult children (daughter & son))  Prior Functional Level: Ambulatory, Drives Self, Independent with Activities of Daily Living    Functional Assesment  Prior Functional Level: Ambulatory, Drives Self, Independent with Activities of Daily Living    Finances  Financial Barriers to Discharge: No  Prescription Coverage: Yes     Advance Directive  Advance Directive?: None  Advance Directive offered?: AD Booklet given     Psychological Assessment  History of Substance Abuse: None  History of Psychiatric Problems: No  Non-compliant with Treatment: No  Newly Diagnosed Illness: Yes    Discharge Risks or Barriers  Discharge risks or barriers?: Complex medical needs  Patient risk factors: Complex medical needs    Anticipated Discharge Information  Discharge Disposition: Disch to IP rehab facility or distinct part unit (62)

## 2024-02-01 NOTE — PROGRESS NOTES
4 Eyes Skin Assessment Completed by CHARLES Woods and CHARLES Ho.    Head WDL  Ears WDL  Nose WDL  Mouth WDL  Neck WDL  Breast/Chest WDL  Shoulder Blades WDL  Spine WDL  (R) Arm/Elbow/Hand WDL  (L) Arm/Elbow/Hand Bruising  Abdomen WDL  Groin Right femoral access site, clean, dry intact  Scrotum/Coccyx/Buttocks Redness, Blanching, and Scar  (R) Leg WDL  (L) Leg WDL  (R) Heel/Foot/Toe Redness and Blanching  (L) Heel/Foot/Toe Redness and Blanching          Devices In Places ECG, Blood Pressure Cuff, Pulse Ox, Cuellar, Arterial Line, SCD's, ET Tube, and OG/NG      Interventions In Place Pillows, Q2 Turns, Low Air Loss Mattress, Barrier Cream, and Heels Loaded W/Pillows    Possible Skin Injury No    Pictures Uploaded Into Epic N/A  Wound Consult Placed N/A  RN Wound Prevention Protocol Ordered No

## 2024-02-01 NOTE — OR SURGEON
Immediate Post OP Note    PreOp Diagnosis: obstructive hydrocephalus, aneurysmal subarachnoid hemorrhage      PostOp Diagnosis: same      Procedure(s)  R frontal EVD placement, with stealth    Surgeon(s):  René Monroe III, M.D.    Anesthesiologist/Type of Anesthesia:  Anesthesiologist: Kevin Marquez M.D./* No anesthesia type entered *    Surgical Staff:  Circulator: Escobar Elliott R.N.  Scrub Person: Rachel Patel    Specimens removed if any:  * No specimens in log *    Estimated Blood Loss: scant    Findings: see OP note    Complications: none    To ICU intubated        1/31/2024 8:07 PM René Monroe III, M.D.

## 2024-02-01 NOTE — OR NURSING
Patient arrived into the OR with an EVD that appeared to be working when reassessed in the OR. Per MD, case was cancelled. After some deliberation, MD decided another drain would be beneficial as the initial EVD that was placed prior to coming to the OR was not earlier draining properly. Case proceeded, time out completed, and it was decided both EVDs will stay in. Per MD's request, source patient lab drawn as MD stated an exposure during the first EVD placement.

## 2024-02-01 NOTE — OR SURGEON
Immediate Post OP Note    PreOp Diagnosis: non communicating hydrocephalus, aneurysmal SAH      PostOp Diagnosis: same    R Frontal EVD    Anesthesiologist/Type of Anesthesia:  Anesthesia staff cannot be found from this context./* No surgery found *    Surgical Staff:  NAGI Monroe MD        Estimated Blood Loss: scant    Findings: despite 4 passes with slight varying angles, unable to cannulate ventricle    Complications: none    Dispo: check head CT to verify location of current catheter      1/31/2024 4:34 PM René Monroe III, M.D.

## 2024-02-01 NOTE — ANESTHESIA POSTPROCEDURE EVALUATION
Patient: Fay Turner    Procedure Summary       Date: 01/31/24 Room / Location: [facility] 05 / SURGERY [facility]    Anesthesia Start: 1918 Anesthesia Stop: 2023    Procedure: CRANIOTOMY (Right) Diagnosis:     Surgeons: René Monroe III, M.D. Responsible Provider: Kevin Marquez M.D.    Anesthesia Type: general ASA Status: 4 - Emergent            Final Anesthesia Type: general  Last vitals  BP   Blood Pressure: 116/71, NIBP: 113/76, Arterial BP: 132/79    Temp        Pulse   81   Resp   (!) 26    SpO2   99 %      Anesthesia Post Evaluation    Patient location during evaluation: PACU  Patient participation: complete - patient participated  Level of consciousness: awake and alert    Airway patency: patent  Anesthetic complications: no  Cardiovascular status: hemodynamically stable  Respiratory status: acceptable  Hydration status: euvolemic    PONV: none          There were no known notable events for this encounter.     Nurse Pain Score: 0 (NPRS)

## 2024-02-01 NOTE — DIETARY
"Nutrition Support Assessment   Day 1 of admit.  Fay Turner is a 63 y.o. female with admitting DX of Acute aneurysmal subarachnoid bleed.      Current problem list:  SAH  Acute respiratory failure with hypoxia  Secondary HTN  Acute pulmonary edema  Seizure     Assessment:  Estimated Nutritional Needs: based on:   Height: 157.5 cm (5' 2.01\")  Weight: 71.5 kg (157 lb 10.1 oz) - unknown weight scale, very likely bed scale  Weight to Use in Calculations: 71.5 kg (157 lb 10.1 oz)  Body mass index is 28.82 kg/m²., BMI classification: overweight    Calculation/Equation: REE per MSJ x 1.2 = 1475 kcals  RMR per PSU (VE: 12 L/min and Tmax: 37.9 C) = 1667 kcals  Total Calories / day: 1500 - 1700 (Calories / k - 24)  Total Grams Protein / day: 72 - 86 (Grams Protein / k - 1.2)     Evaluation:   Consult received for TF.   IRIS feeding tube placed and confirmed on KUB in stomach for enteral access.   Pt is intubated on ventilator support. Pt with ruptured aneurysmal SAH s/p R EVD placement and cerebral angiogram with embolization of intracranial aneurysm both procedures on .   Per ICU rounds, reports emesis yesterday and okay to start feeding today.  Pt appears adequately nourished. Last weight on file is 160 lbs on 17 at Family Med visit, weight appears stable.  Skin: R head incision, pulmonary edema noted, no edema to extremities.  Labs: K 3.1, glucose 186, A1C 5.8, HDL 36,   Meds: pepcid, SSI, keppra, senna, previously on lasix for one time dose, anti-emetics prn, bowel protocol, cardene paused and Levo is at 0.18 mcg/kg/min per ICU rounds  Last BM: PTA  Low CHO high protein formula indicated while on high dose pressor support - opting for Novasource Renal as formula is fiber free, provides CHO-control, and is concentrated so less volume.     Malnutrition Risk: Does not meet ASPEN criteria at this time.     Recommendations/Plan:  Start TF Novasource Renal at 25 ml/hr and advance per protocol to " goal rate of 35 ml/hr to provide 1680 kcals, 76 gm protein, and 605 ml free water per day.   Fluids per MD.   Monitor Levo infusion rate.     RD following.

## 2024-02-01 NOTE — PROGRESS NOTES
Neurology Progress Note  Neurohospitalist Service, [facility]    Referring Physician: Jeremy M Gonda, M.D.    No chief complaint on file.      HPI: Refer to initial documented Neurology H&P, as detailed in the patient's chart.    Interval History: No acute events overnight.  No significant changes.  Remains intubated on vent.    Review of systems: In addition to what is detailed in the HPI and/or updated in the interval history, all other systems reviewed and are negative.    Past Medical History:    has no past medical history on file.    FHx:  family history is not on file.    SHx:       Medications:    Current Facility-Administered Medications:     Pharmacy Consult: Enteral tube insertion - review meds/change route/product selection, 1 Each, Other, PHARMACY TO DOSE, Jeremy M Gonda, M.D.    dexmedetomidine (PRECEDEX) 400 mcg/100mL NS premix infusion, 0.1-1.5 mcg/kg/hr (Ideal), Intravenous, Continuous, Jeremy M Gonda, M.D.    levETIRAcetam (Keppra) injection 500 mg, 500 mg, Intravenous, Q12HRS, Jeremy M Gonda, M.D.    potassium bicarbonate (Klyte) effervescent tablet 25 mEq, 25 mEq, Enteral Tube, Q4HRS, Jeremy M Gonda, M.D., 25 mEq at 02/01/24 1213    magnesium sulfate IVPB premix 2 g, 2 g, Intravenous, Once, Jeremy M Gonda, M.D., Last Rate: 25 mL/hr at 02/01/24 1216, 2 g at 02/01/24 1216    Respiratory Therapy Consult, , Nebulization, Continuous RT, Jeremy M Gonda, M.D.    famotidine (Pepcid) tablet 20 mg, 20 mg, Enteral Tube, Q12HRS **OR** famotidine (Pepcid) injection 20 mg, 20 mg, Intravenous, Q12HRS, Jeremy M Gonda, M.D., 20 mg at 02/01/24 0629    senna-docusate (Pericolace Or Senokot S) 8.6-50 MG per tablet 2 Tablet, 2 Tablet, Enteral Tube, BID, 2 Tablet at 02/01/24 0631 **AND** polyethylene glycol/lytes (Miralax) Packet 1 Packet, 1 Packet, Enteral Tube, QDAY PRN **AND** magnesium hydroxide (Milk Of Magnesia) suspension 30 mL, 30 mL, Enteral Tube, QDAY PRN **AND** bisacodyl (Dulcolax)  suppository 10 mg, 10 mg, Rectal, QDAY PRN, Jeremy M Gonda, M.D. MD Alert...ICU Electrolyte Replacement per Pharmacy, , Other, PHARMACY TO DOSE, Jeremy M Gonda, M.D.    lidocaine (Xylocaine) 1 % injection 2 mL, 2 mL, Tracheal Tube, Q30 MIN PRN, Jeremy M Gonda, M.D.    fentaNYL (Sublimaze) injection 25 mcg, 25 mcg, Intravenous, Q HOUR PRN **OR** fentaNYL (Sublimaze) injection 50 mcg, 50 mcg, Intravenous, Q HOUR PRN, 50 mcg at 02/01/24 1231 **OR** fentaNYL (Sublimaze) injection 100 mcg, 100 mcg, Intravenous, Q HOUR PRN, Jeremy M Gonda, M.D.    ondansetron (Zofran ODT) dispertab 4 mg, 4 mg, Enteral Tube, Q4HRS PRN, 4 mg at 01/31/24 2248 **OR** ondansetron (Zofran) syringe/vial injection 4 mg, 4 mg, Intravenous, Q4HRS PRN, Jeremy M Gonda, M.D., 4 mg at 01/31/24 1854    acetaminophen (Tylenol) tablet 650 mg, 650 mg, Enteral Tube, Q4HRS PRN **OR** acetaminophen (Tylenol) suppository 650 mg, 650 mg, Rectal, Q4HRS PRN, Jeremy M Gonda, M.D.    LORazepam (Ativan) injection 2 mg, 2 mg, Intravenous, Q5 MIN PRN, Jeremy M Gonda, M.D.    niCARdipine (Cardene) 25 mg in  mL Standard Infusion, 0-15 mg/hr, Intravenous, Continuous, Jeremy M Gonda, M.D., Paused at 01/31/24 1635    labetalol (Normodyne/Trandate) injection 10 mg, 10 mg, Intravenous, Q4HRS PRN, Jeremy M Gonda, M.D., 10 mg at 01/31/24 1541    hydrALAZINE (Apresoline) injection 10 mg, 10 mg, Intravenous, Q4HRS PRN, Jeremy M Gonda, M.D.    enalaprilat (Vasotec) injection 1.25 mg 1 mL, 1.25 mg, Intravenous, Q6HRS PRN, Jeremy M Gonda, M.D.    norepinephrine (Levophed) 8 mg in 250 mL NS infusion (premix), 0-1 mcg/kg/min (Ideal), Intravenous, Continuous, Jeremy M Gonda, M.D., Last Rate: 9.8 mL/hr at 02/01/24 1200, 0.1 mcg/kg/min at 02/01/24 1200    cefTRIAXone (Rocephin) syringe 2,000 mg, 2,000 mg, Intravenous, Q24HR, Jeremy M Gonda, M.D., 2,000 mg at 01/31/24 1614    prochlorperazine (Compazine) injection 10 mg, 10 mg, Intravenous, Q6HRS PRN, David Mclaughlin Jr. D.OJennifer,  10 mg at 01/31/24 1858    NS (Bolus) 0.9 % infusion 500 mL, 500 mL, Intravenous, Once, Oriana Michaels    insulin regular (HumuLIN R,NovoLIN R) injection, 1-6 Units, Subcutaneous, Q6HRS, 1 Units at 02/01/24 0638 **AND** POC blood glucose manual result, , , Q6H **AND** NOTIFY MD and PharmD, , , Once **AND** Administer 20 grams of glucose (approximately 8 ounces of fruit juice) every 15 minutes PRN FSBG less than 70 mg/dL, , , PRN **AND** dextrose 50% (D50W) injection 25 g, 25 g, Intravenous, Q15 MIN PRN, Oriana Michaels    niMODipine (Nymalize) oral syringe 30 mg, 30 mg, Enteral Tube, Q2HRS, Oriana HAINES Latona, 30 mg at 02/01/24 1213    Physical Examination:    Vitals:    02/01/24 1300 02/01/24 1315 02/01/24 1330 02/01/24 1345   BP: 113/68      Pulse: 73 84 73 76   Resp: (!) 24 (!) 24 (!) 24 (!) 24   TempSrc: Bladder      SpO2: 100% 100% 100% 100%   Weight:       Height:           General:   Patient is intubated and sedated.  Eyes: Midline, Pupils: Left is 5 mm and nonreactive.  Right is 3 mm and brisk.  CV: RRR  Lungs: No respiratory distress, on vent.  Extremities: No cyanosis, warm, no significant edema.     NEUROLOGICAL EXAM:   Mental status: The patient is unresponsive, intubated on vent.  She has cough and gag reflex.  Speech and language: Unable to assess, intubated.  Cranial nerve exam: Pupils are equal, round and reactive to light bilaterally but sluggish. Visual fields cannot be tested.  Face appears symmetric.  Facial sensation cannot be tested.  Motor exam: Withdrawal to physical stimulation with some posturing of upper extremity.  Sensory exam: No reaction to physical stimulation.  Coordination: Cannot be assessed  Plantar reflexes: Upgoing bilaterally.  Gait: deferred      Objective Data:    Labs:  Lab Results   Component Value Date/Time    PROTHROMBTM 13.9 01/31/2024 03:25 PM    INR 1.06 01/31/2024 03:25 PM      Lab Results   Component Value Date/Time    WBC 19.6 (H) 01/31/2024 09:18 PM    RBC 5.48 (H)  01/31/2024 09:18 PM    HEMOGLOBIN 14.2 01/31/2024 09:18 PM    HEMATOCRIT 41.7 01/31/2024 09:18 PM    MCV 76.1 (L) 01/31/2024 09:18 PM    MCH 25.9 (L) 01/31/2024 09:18 PM    MCHC 34.1 01/31/2024 09:18 PM    MPV 10.0 01/31/2024 09:18 PM    NEUTSPOLYS 88.40 (H) 01/31/2024 09:18 PM    LYMPHOCYTES 4.40 (L) 01/31/2024 09:18 PM    MONOCYTES 6.10 01/31/2024 09:18 PM    EOSINOPHILS 0.00 01/31/2024 09:18 PM    BASOPHILS 0.30 01/31/2024 09:18 PM      Lab Results   Component Value Date/Time    SODIUM 139 02/01/2024 09:46 AM    POTASSIUM 3.2 (L) 02/01/2024 09:46 AM    CHLORIDE 103 02/01/2024 09:46 AM    CO2 25 02/01/2024 09:46 AM    GLUCOSE 158 (H) 02/01/2024 09:46 AM    BUN 10 02/01/2024 09:46 AM    CREATININE 0.55 02/01/2024 09:46 AM      Lab Results   Component Value Date/Time    CHOLSTRLTOT 165 01/31/2024 03:25 PM     (H) 01/31/2024 03:25 PM    HDL 36 (A) 01/31/2024 03:25 PM    TRIGLYCERIDE 79 01/31/2024 03:25 PM       Lab Results   Component Value Date/Time    ALKPHOSPHAT 261 (H) 08/27/2019 04:57 AM    ASTSGOT 64 (H) 08/27/2019 04:57 AM    ALTSGPT 125 (H) 08/27/2019 04:57 AM    TBILIRUBIN 0.5 08/27/2019 04:57 AM        Imaging/Testing:    I interpreted and/or reviewed the patient's neuroimaging    US-INTRACRANIAL ARTERY COMP         CT-HEAD W/O   Final Result         1. Slightly decreased subarachnoid hemorrhage overlying the cerebral hemispheres.   2. Otherwise, stable as above.         DX-CHEST-FOR LINE PLACEMENT Perform procedure in: Patient's Room   Final Result      1.  Moderate interstitial pulmonary edema.   2.   Right-sided central venous catheter terminates with the tip projecting over the expected region of the mid to distal superior vena cava.   3.  Endotracheal tube terminates in satisfactory position at the level of the aortic arch.   4.  Enteric feeding tube terminates in the left upper quadrant projecting over the expected location of the stomach.      CT-STEALTH HEAD W/O   Final Result      1.   Interval placement of a right transparietal ventriculostomy catheter which terminates with the tip near the third ventricle. There is minimally decreased caliber of the ventricular system and compared to previous exam.   2.  Findings of intracranial hemorrhage appear similar to the previous exam. No definite new acute intracranial hemorrhage is identified.   3.  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         DX-CHEST-PORTABLE (1 VIEW)   Final Result      CT-HEAD W/O   Final Result      1. Interval aneurysm coiling of left posterior communicating artery aneurysm.   2. Possible increased subarachnoid hemorrhage.   3. Intraventricular hemorrhage. There is developing mild hydrocephalus with mildly increased ventricles.   4. Small right convexity subdural hematoma measuring 5 mm in thickness.      These findings were discussed with EMILY BYRD on 1/31/2024 3:14 PM.      IR-EMBOLIZATION    (Results Pending)   EC-ECHOCARDIOGRAM COMPLETE W/O CONT    (Results Pending)       Assessment and Plan:  Fay Turner is a 63 y.o. female with history of hypertension who was transferred from [facility] after she presented with alteration of mental status with rapidly progressive neurological changes and obtundation.  She was noted to have left ptosis and dilated left pupil.  She was intubated and underwent brain CT which revealed diffuse subarachnoid hemorrhage with evidence of large left P-comm aneurysm on CTA.  She is status post successful coil embolization of the left P-comm aneurysm.  Carpio and Potts grade 4, modified Swenson grade 3.  She is status post EVD placement x 2 by neurosurgery.  Postop day #1 for coil embolization of left P-comm aneurysm.  Postop day #1 for EVD placement.     Plan:  - q1h and PRN neuro assessment. VS per nursing/unit protocol.   - SBP goal 120-160; continue cardene PRN, may need to liberalize blood pressure in coming days to mitigate  vasospasm.  - Antihypertensives per primary team.   - Please call neurology with further concern for neurological decline or new/focal neurological deficits and obtain Stat CT head.   - Telemetry   - Maintain strict normothermia, eunatremia, euglycemia, FSBS .   - Net even I/O, normal Na goal 135-145 for now, monitor for cerebral salt  wasting, Current/most recent Na is 139.  - Nimodipine 30 mg PO/NGT q2h or 60mg q4h x21 days.    -Daily TCDs: Limited exam today with no bitemporal window.  - Avoid blood thinners ie. anticoagulation, and antithrombotic  - no known underlying coagulopathy, maintain INR < 1.5, platelets > 100k  -Given seizure at the onset, continue with Keppra 500 mg twice a day.  - All other medical management per ICU team.   - Appreciate EVD management per neurosurgery.  - DVT Prophylaxis: SCDs.   - Not a current candidate for PT/OT/SLP at this time    The evaluation of the patient, and recommended management, was discussed with Jeremy M Gonda, M.D.    Spent 52 minutes evaluating the patient, reviewing hospital notes, lab work, neuroimaging and documenting in the EMR.    Please note that this dictation was created using voice recognition software. I have made every reasonable attempt to correct obvious errors, but I expect that there are errors of grammar and possibly content that I did not discover before finalizing the note.      Corey Vasquez MD  [facility]ute [facility] Neurology Services

## 2024-02-01 NOTE — CONSULTS
DATE OF SERVICE:  01/31/2024     INPATIENT CONSULTATION     CHIEF COMPLAINT:  Aneurysmal subarachnoid hemorrhage.     HISTORY OF PRESENT ILLNESS:  This is a 63-year-old woman whose past medical   history is unknown.  She came into the outside hospital [facility] for   sudden-onset headache.  She collapsed while there.  She was intubated.  She   had a CAT scan up there that showed a starburst subarachnoid hemorrhage with a   significant clot anterior to the brainstem that may have been causing her   pupil asymmetry and ptosis.  She has no hydrocephalus.  It indicates a left   PCOM aneurysm.  She was transferred to [facility] for definitive coiling, Dr. Stephanie Eason.     PAST MEDICAL HISTORY:  Unknown.     PAST SURGICAL HISTORY:  Unknown.     SOCIAL HISTORY:  Unknown.     PHYSICAL EXAMINATION:  GENERAL:  Intubated, sedated with slight pupil asymmetry 1 mm, small.  VITAL SIGNS:  Blood pressure is 150/72.     LABORATORY DATA:  Admission labs show normal platelet count.  Sodium is   pending.     ASSESSMENT AND PLAN:  This is a 63-year-old woman with a subdued neurologic   exam, Carpio-Potts 4, Swenson grade 3 secondary to thickness of the clot.  There   is no intraventricular hemorrhage.  I will follow along.  The patient is going   to be admitted for coiling, q. 1 hour neuro checks and post-coiling head CT.    Blood pressure control should be based on securing the aneurysm of less than   140 for now.  She needs Keppra secondary to the seizure, may need to be   evaluated by neurology and Nimotop 60 mg q. 4.  She will need to be on the ICU   TCD protocol, will be followed by ICU and interventional radiology.  _____   consult.  If it is unable to be coiled effectively, I will discuss with the   neurosurgeons here who do operative open vascular clipping.     Thanks for allowing me to participate in her care.        ______________________________  MD WILDA Hubbard III/VERN/BANDAR    DD:  01/31/2024 15:07  DT:   01/31/2024 18:09    Job#:  [id number]

## 2024-02-02 ENCOUNTER — APPOINTMENT (OUTPATIENT)
Dept: RADIOLOGY | Facility: MEDICAL CENTER | Age: 64
DRG: 020 | End: 2024-02-02
Attending: INTERNAL MEDICINE
Payer: COMMERCIAL

## 2024-02-02 ENCOUNTER — APPOINTMENT (OUTPATIENT)
Dept: CARDIOLOGY | Facility: MEDICAL CENTER | Age: 64
DRG: 020 | End: 2024-02-02
Attending: INTERNAL MEDICINE
Payer: COMMERCIAL

## 2024-02-02 PROBLEM — E83.39 HYPOPHOSPHATEMIA: Status: ACTIVE | Noted: 2024-02-02

## 2024-02-02 PROBLEM — D64.9 ANEMIA: Status: ACTIVE | Noted: 2024-02-02

## 2024-02-02 LAB
ANION GAP SERPL CALC-SCNC: 12 MMOL/L (ref 7–16)
ARTERIAL PATENCY WRIST A: ABNORMAL
BASE EXCESS BLDA CALC-SCNC: 4 MMOL/L (ref -4–3)
BASOPHILS # BLD AUTO: 0.2 % (ref 0–1.8)
BASOPHILS # BLD: 0.03 K/UL (ref 0–0.12)
BODY TEMPERATURE: ABNORMAL DEGREES
BREATHS SETTING VENT: 24
BUN SERPL-MCNC: 13 MG/DL (ref 8–22)
CALCIUM SERPL-MCNC: 9.2 MG/DL (ref 8.5–10.5)
CHLORIDE SERPL-SCNC: 103 MMOL/L (ref 96–112)
CO2 BLDA-SCNC: 29 MMOL/L (ref 20–33)
CO2 SERPL-SCNC: 24 MMOL/L (ref 20–33)
CREAT SERPL-MCNC: 0.54 MG/DL (ref 0.5–1.4)
CRP SERPL HS-MCNC: 10.99 MG/DL (ref 0–0.75)
DELSYS IDSYS: ABNORMAL
END TIDAL CARBON DIOXIDE IECO2: 37 MMHG
EOSINOPHIL # BLD AUTO: 0.01 K/UL (ref 0–0.51)
EOSINOPHIL NFR BLD: 0.1 % (ref 0–6.9)
ERYTHROCYTE [DISTWIDTH] IN BLOOD BY AUTOMATED COUNT: 39.1 FL (ref 35.9–50)
GFR SERPLBLD CREATININE-BSD FMLA CKD-EPI: 104 ML/MIN/1.73 M 2
GLUCOSE BLD STRIP.AUTO-MCNC: 136 MG/DL (ref 65–99)
GLUCOSE BLD STRIP.AUTO-MCNC: 137 MG/DL (ref 65–99)
GLUCOSE BLD STRIP.AUTO-MCNC: 153 MG/DL (ref 65–99)
GLUCOSE BLD STRIP.AUTO-MCNC: 154 MG/DL (ref 65–99)
GLUCOSE SERPL-MCNC: 153 MG/DL (ref 65–99)
HCO3 BLDA-SCNC: 28.2 MMOL/L (ref 17–25)
HCT VFR BLD AUTO: 32 % (ref 37–47)
HGB BLD-MCNC: 10.5 G/DL (ref 12–16)
HOROWITZ INDEX BLDA+IHG-RTO: 220 MM[HG]
IMM GRANULOCYTES # BLD AUTO: 0.07 K/UL (ref 0–0.11)
IMM GRANULOCYTES NFR BLD AUTO: 0.5 % (ref 0–0.9)
LACTATE BLD-SCNC: 1.2 MMOL/L (ref 0.5–2)
LV EJECT FRACT  99904: 55
LYMPHOCYTES # BLD AUTO: 1 K/UL (ref 1–4.8)
LYMPHOCYTES NFR BLD: 6.9 % (ref 22–41)
MAGNESIUM SERPL-MCNC: 2.5 MG/DL (ref 1.5–2.5)
MCH RBC QN AUTO: 26.1 PG (ref 27–33)
MCHC RBC AUTO-ENTMCNC: 32.8 G/DL (ref 32.2–35.5)
MCV RBC AUTO: 79.6 FL (ref 81.4–97.8)
MODE IMODE: ABNORMAL
MONOCYTES # BLD AUTO: 1.19 K/UL (ref 0–0.85)
MONOCYTES NFR BLD AUTO: 8.3 % (ref 0–13.4)
NEUTROPHILS # BLD AUTO: 12.09 K/UL (ref 1.82–7.42)
NEUTROPHILS NFR BLD: 84 % (ref 44–72)
NRBC # BLD AUTO: 0 K/UL
NRBC BLD-RTO: 0 /100 WBC (ref 0–0.2)
O2/TOTAL GAS SETTING VFR VENT: 40 %
PCO2 BLDA: 40.8 MMHG (ref 26–37)
PCO2 TEMP ADJ BLDA: 41.2 MMHG (ref 26–37)
PEEP END EXPIRATORY PRESSURE IPEEP: 8 CMH20
PH BLDA: 7.45 [PH] (ref 7.4–7.5)
PH TEMP ADJ BLDA: 7.44 [PH] (ref 7.4–7.5)
PHOSPHATE SERPL-MCNC: 2.3 MG/DL (ref 2.5–4.5)
PLATELET # BLD AUTO: 203 K/UL (ref 164–446)
PMV BLD AUTO: 11 FL (ref 9–12.9)
PO2 BLDA: 88 MMHG (ref 64–87)
PO2 TEMP ADJ BLDA: 89 MMHG (ref 64–87)
POTASSIUM SERPL-SCNC: 3.4 MMOL/L (ref 3.6–5.5)
PREALB SERPL-MCNC: 11.5 MG/DL (ref 18–38)
RBC # BLD AUTO: 4.02 M/UL (ref 4.2–5.4)
SAO2 % BLDA: 97 % (ref 93–99)
SODIUM SERPL-SCNC: 137 MMOL/L (ref 135–145)
SODIUM SERPL-SCNC: 139 MMOL/L (ref 135–145)
SODIUM SERPL-SCNC: 141 MMOL/L (ref 135–145)
SODIUM SERPL-SCNC: 141 MMOL/L (ref 135–145)
SPECIMEN DRAWN FROM PATIENT: ABNORMAL
TIDAL VOLUME IVT: 320 ML
WBC # BLD AUTO: 14.4 K/UL (ref 4.8–10.8)

## 2024-02-02 PROCEDURE — 85025 COMPLETE CBC W/AUTO DIFF WBC: CPT

## 2024-02-02 PROCEDURE — 82962 GLUCOSE BLOOD TEST: CPT

## 2024-02-02 PROCEDURE — 700102 HCHG RX REV CODE 250 W/ 637 OVERRIDE(OP): Performed by: INTERNAL MEDICINE

## 2024-02-02 PROCEDURE — 93888 INTRACRANIAL LIMITED STUDY: CPT

## 2024-02-02 PROCEDURE — 94799 UNLISTED PULMONARY SVC/PX: CPT

## 2024-02-02 PROCEDURE — 93306 TTE W/DOPPLER COMPLETE: CPT | Mod: 26 | Performed by: INTERNAL MEDICINE

## 2024-02-02 PROCEDURE — 80048 BASIC METABOLIC PNL TOTAL CA: CPT

## 2024-02-02 PROCEDURE — 93888 INTRACRANIAL LIMITED STUDY: CPT | Mod: 26 | Performed by: INTERNAL MEDICINE

## 2024-02-02 PROCEDURE — 700111 HCHG RX REV CODE 636 W/ 250 OVERRIDE (IP): Performed by: INTERNAL MEDICINE

## 2024-02-02 PROCEDURE — 97163 PT EVAL HIGH COMPLEX 45 MIN: CPT

## 2024-02-02 PROCEDURE — A9270 NON-COVERED ITEM OR SERVICE: HCPCS | Performed by: INTERNAL MEDICINE

## 2024-02-02 PROCEDURE — 84100 ASSAY OF PHOSPHORUS: CPT

## 2024-02-02 PROCEDURE — 93306 TTE W/DOPPLER COMPLETE: CPT

## 2024-02-02 PROCEDURE — 97167 OT EVAL HIGH COMPLEX 60 MIN: CPT

## 2024-02-02 PROCEDURE — 700105 HCHG RX REV CODE 258: Performed by: INTERNAL MEDICINE

## 2024-02-02 PROCEDURE — 37799 UNLISTED PX VASCULAR SURGERY: CPT

## 2024-02-02 PROCEDURE — 84134 ASSAY OF PREALBUMIN: CPT

## 2024-02-02 PROCEDURE — 83735 ASSAY OF MAGNESIUM: CPT

## 2024-02-02 PROCEDURE — 700101 HCHG RX REV CODE 250: Performed by: INTERNAL MEDICINE

## 2024-02-02 PROCEDURE — 86140 C-REACTIVE PROTEIN: CPT

## 2024-02-02 PROCEDURE — 84295 ASSAY OF SERUM SODIUM: CPT

## 2024-02-02 PROCEDURE — 99232 SBSQ HOSP IP/OBS MODERATE 35: CPT | Performed by: STUDENT IN AN ORGANIZED HEALTH CARE EDUCATION/TRAINING PROGRAM

## 2024-02-02 PROCEDURE — 700102 HCHG RX REV CODE 250 W/ 637 OVERRIDE(OP): Performed by: NURSE PRACTITIONER

## 2024-02-02 PROCEDURE — 82803 BLOOD GASES ANY COMBINATION: CPT

## 2024-02-02 PROCEDURE — 99291 CRITICAL CARE FIRST HOUR: CPT | Performed by: INTERNAL MEDICINE

## 2024-02-02 PROCEDURE — 71045 X-RAY EXAM CHEST 1 VIEW: CPT

## 2024-02-02 PROCEDURE — 83605 ASSAY OF LACTIC ACID: CPT

## 2024-02-02 PROCEDURE — 770022 HCHG ROOM/CARE - ICU (200)

## 2024-02-02 PROCEDURE — 94003 VENT MGMT INPAT SUBQ DAY: CPT

## 2024-02-02 RX ORDER — LEVETIRACETAM 500 MG/1
500 TABLET ORAL 2 TIMES DAILY
Status: DISCONTINUED | OUTPATIENT
Start: 2024-02-02 | End: 2024-02-21

## 2024-02-02 RX ORDER — POTASSIUM CHLORIDE 14.9 MG/ML
20 INJECTION INTRAVENOUS ONCE
Status: COMPLETED | OUTPATIENT
Start: 2024-02-02 | End: 2024-02-02

## 2024-02-02 RX ADMIN — NIMODIPINE 30 MG: 60 SOLUTION ORAL at 15:26

## 2024-02-02 RX ADMIN — NIMODIPINE 30 MG: 60 SOLUTION ORAL at 10:20

## 2024-02-02 RX ADMIN — LEVETIRACETAM 500 MG: 100 INJECTION, SOLUTION, CONCENTRATE INTRAVENOUS at 05:08

## 2024-02-02 RX ADMIN — POTASSIUM CHLORIDE 20 MEQ: 14.9 INJECTION, SOLUTION INTRAVENOUS at 12:29

## 2024-02-02 RX ADMIN — NIMODIPINE 30 MG: 60 SOLUTION ORAL at 08:25

## 2024-02-02 RX ADMIN — FAMOTIDINE 20 MG: 20 TABLET ORAL at 05:08

## 2024-02-02 RX ADMIN — FENTANYL CITRATE 50 MCG: 50 INJECTION, SOLUTION INTRAMUSCULAR; INTRAVENOUS at 22:00

## 2024-02-02 RX ADMIN — NIMODIPINE 30 MG: 60 SOLUTION ORAL at 04:13

## 2024-02-02 RX ADMIN — INSULIN HUMAN 1 UNITS: 100 INJECTION, SOLUTION PARENTERAL at 05:08

## 2024-02-02 RX ADMIN — NIMODIPINE 30 MG: 60 SOLUTION ORAL at 16:25

## 2024-02-02 RX ADMIN — FENTANYL CITRATE 50 MCG: 50 INJECTION, SOLUTION INTRAMUSCULAR; INTRAVENOUS at 19:59

## 2024-02-02 RX ADMIN — NIMODIPINE 30 MG: 60 SOLUTION ORAL at 17:47

## 2024-02-02 RX ADMIN — FAMOTIDINE 20 MG: 20 TABLET ORAL at 17:47

## 2024-02-02 RX ADMIN — FENTANYL CITRATE 50 MCG: 50 INJECTION, SOLUTION INTRAMUSCULAR; INTRAVENOUS at 16:10

## 2024-02-02 RX ADMIN — POTASSIUM PHOSPHATE, MONOBASIC AND POTASSIUM PHOSPHATE, DIBASIC 15 MMOL: 224; 236 INJECTION, SOLUTION, CONCENTRATE INTRAVENOUS at 08:24

## 2024-02-02 RX ADMIN — NIMODIPINE 30 MG: 60 SOLUTION ORAL at 02:05

## 2024-02-02 RX ADMIN — NIMODIPINE 30 MG: 60 SOLUTION ORAL at 06:04

## 2024-02-02 RX ADMIN — NIMODIPINE 30 MG: 60 SOLUTION ORAL at 00:03

## 2024-02-02 RX ADMIN — NIMODIPINE 30 MG: 60 SOLUTION ORAL at 21:59

## 2024-02-02 RX ADMIN — INSULIN HUMAN 1 UNITS: 100 INJECTION, SOLUTION PARENTERAL at 18:14

## 2024-02-02 RX ADMIN — FENTANYL CITRATE 50 MCG: 50 INJECTION, SOLUTION INTRAMUSCULAR; INTRAVENOUS at 01:35

## 2024-02-02 RX ADMIN — CEFTRIAXONE SODIUM 2000 MG: 10 INJECTION, POWDER, FOR SOLUTION INTRAVENOUS at 16:26

## 2024-02-02 RX ADMIN — FENTANYL CITRATE 50 MCG: 50 INJECTION, SOLUTION INTRAMUSCULAR; INTRAVENOUS at 05:12

## 2024-02-02 RX ADMIN — ONDANSETRON 4 MG: 2 INJECTION INTRAMUSCULAR; INTRAVENOUS at 17:58

## 2024-02-02 RX ADMIN — NIMODIPINE 30 MG: 60 SOLUTION ORAL at 12:16

## 2024-02-02 RX ADMIN — LEVETIRACETAM 500 MG: 500 TABLET, FILM COATED ORAL at 17:47

## 2024-02-02 RX ADMIN — FENTANYL CITRATE 50 MCG: 50 INJECTION, SOLUTION INTRAMUSCULAR; INTRAVENOUS at 23:53

## 2024-02-02 RX ADMIN — NIMODIPINE 30 MG: 60 SOLUTION ORAL at 19:59

## 2024-02-02 ASSESSMENT — PAIN DESCRIPTION - PAIN TYPE
TYPE: ACUTE PAIN

## 2024-02-02 ASSESSMENT — COGNITIVE AND FUNCTIONAL STATUS - GENERAL
DRESSING REGULAR LOWER BODY CLOTHING: TOTAL
SUGGESTED CMS G CODE MODIFIER DAILY ACTIVITY: CN
TURNING FROM BACK TO SIDE WHILE IN FLAT BAD: UNABLE
TOILETING: TOTAL
DAILY ACTIVITIY SCORE: 6
WALKING IN HOSPITAL ROOM: TOTAL
EATING MEALS: TOTAL
HELP NEEDED FOR BATHING: TOTAL
STANDING UP FROM CHAIR USING ARMS: TOTAL
DRESSING REGULAR UPPER BODY CLOTHING: TOTAL
SUGGESTED CMS G CODE MODIFIER MOBILITY: CN
MOVING FROM LYING ON BACK TO SITTING ON SIDE OF FLAT BED: UNABLE
CLIMB 3 TO 5 STEPS WITH RAILING: TOTAL
MOBILITY SCORE: 6
PERSONAL GROOMING: TOTAL
MOVING TO AND FROM BED TO CHAIR: UNABLE

## 2024-02-02 ASSESSMENT — ACTIVITIES OF DAILY LIVING (ADL): TOILETING: INDEPENDENT

## 2024-02-02 ASSESSMENT — GAIT ASSESSMENTS: GAIT LEVEL OF ASSIST: UNABLE TO PARTICIPATE

## 2024-02-02 NOTE — THERAPY
Occupational Therapy   Initial Evaluation     Patient Name: Fay Turner  Age:  63 y.o., Sex:  female  Medical Record #: [id number]  Today's Date: 2/2/2024     Precautions  Precautions: Fall Risk  Comments: EVD clamped pre/off post;    Assessment  Patient is 63 y.o. female with a diagnosis of SAH, .  Pt currently limited by decreased functional mobility, activity tolerance, cognition, sensation, strength, AROM, coordination, balance, and pain which are affecting pt's ability to complete ADLs/IADLs at baseline. Pt would benefit from OT services in the acute care setting to maximize functional recovery.       Plan    Occupational Therapy Initial Treatment Plan   Treatment Interventions: (P) Self Care / Activities of Daily Living, Therapeutic Activity, Neuro Re-Education / Balance  Treatment Frequency: (P) 3 Times per Week  Duration: (P) Until Therapy Goals Met       Discharge Recommendations: (P) Recommend post-acute placement for additional occupational therapy services prior to discharge home        02/02/24 1110   Prior Living Situation   Prior Services Home-Independent   Steps Into Home 0   Steps In Home 0   Equipment Owned None   Lives with - Patient's Self Care Capacity Spouse  (can assist)   Comments per son   Prior Level of ADL Function   Self Feeding Independent   Grooming / Hygiene Independent   Bathing Independent   Dressing Independent   Toileting Independent   ADL Assessment   Grooming Total Assist   Upper Body Dressing Total Assist   Lower Body Dressing Total Assist   Toileting Total Assist   Functional Mobility   Sit to Stand Total Assist   Bed, Chair, Wheelchair Transfer Unable to Participate   Short Term Goals   Short Term Goal # 1 mod A with G/H sitting EOB   Short Term Goal # 2 mod A with UB dressing   Short Term Goal # 3 mod A with ADL txfs   Occupational Therapy Initial Treatment Plan    Treatment Interventions Self Care / Activities of Daily Living;Therapeutic Activity;Neuro Re-Education /  Balance   Treatment Frequency 3 Times per Week   Duration Until Therapy Goals Met   Anticipated Discharge Equipment and Recommendations   Discharge Recommendations Recommend post-acute placement for additional occupational therapy services prior to discharge home
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
## 2024-02-02 NOTE — DISCHARGE PLANNING
Physiatry consult remains pending,  Vent day #3  When medically appropriate , would appreciate therapy evaluations  TCC remains following

## 2024-02-02 NOTE — DIETARY
Nutrition Services Brief Update:    Problem: Nutritional:  Goal: Nutrition support tolerated and meeting greater than 85% of estimated needs  Outcome: Met    Per flowsheet pt currently receiving tube feeding Novasource Renal @ goal rate of 35 mL/hr providing 1680 kcals, 76 grams protein and 605 mL free water.     RD following

## 2024-02-02 NOTE — PROGRESS NOTES
Neurosurgery Progress Note    Subjective:  63 year old presented with HH4 ruptured left Pcomm aneurysm, post coil day 1.     POD#2 anterior EVD placement with stealth, at 10cm H20  Posterior EVD also placed, clamped currently     ICPs stable between 2-12 overnight  EVD output from anterior EVD 20-1cc/hr overnight.     Patient not on sedation currently    Exam:  GCS: [id number].  Patient intubated, off sedation  +corneal, +cough/gag.    Pupils right 1-2mm reactive, left 3-4mm non reactive, gaze conjugate  Follows commands to upper extremities, spontaneous movement to lower extremities, withdrawal in lower extremities.    Anterior EVD in place and functioning at [id number] above the tragus   ICP waveform adequate    BP  Min: 95/55  Max: 125/70  Pulse  Av.6  Min: 73  Max: 114  Resp  Av  Min: 16  Max: 53  Monitored Temp 2  Av.5 °C (99.5 °F)  Min: 37.1 °C (98.8 °F)  Max: 37.7 °C (99.9 °F)  SpO2  Av %  Min: 95 %  Max: 100 %    ICP  Av.7 MM HG  Min: 4 MM HG  Max: 12 MM HG  CPP   Av.8  Min: 75  Max: 83    Recent Labs     24  1525 24  0530   WBC 17.9* 19.6* 14.4*   RBC 5.12 5.48* 4.02*   HEMOGLOBIN 13.4 14.2 10.5*   HEMATOCRIT 39.6 41.7 32.0*   MCV 77.3* 76.1* 79.6*   MCH 26.2* 25.9* 26.1*   MCHC 33.8 34.1 32.8   RDW 37.7 36.7 39.1   PLATELETCT 256 271 203   MPV 10.4 10.0 11.0       Recent Labs     248 24  0320 24  0946 24  1507 24  0320   SODIUM 142   < > 139 136 137 139   POTASSIUM 3.1*  --  3.2*  --   --  3.4*   CHLORIDE 102  --  103  --   --  103   CO2 23  --  25  --   --  24   GLUCOSE 186*  --  158*  --   --  153*   BUN 8  --  10  --   --  13   CREATININE 0.61  --  0.55  --   --  0.54   CALCIUM 8.2*  --  8.3*  --   --  9.2    < > = values in this interval not displayed.       Recent Labs     24  1525 24  2118 24  0320   APTT >240.0* 37.0* 39.4*   INR 1.06  --   --        Recent Labs     24  1819    REACTMIN 5.7   CLOTKINET 1.3   CLOTANGL 73.8   MAXCLOTS 62.1   [id number] 0.0   PRCINADP 84.4*   PRCINAA 25.3*         Intake/Output                         02/01/24 0700 - 02/02/24 0659 02/02/24 0700 - 02/03/24 0659     0700-1859 1900-0659 Total 0700-1859 1900-0659 Total                 Intake    I.V.  665.3  -- 665.3  0  -- 0    Magnesium Sulfate Volume 55 -- 55 -- -- --    Norepinephrine Volume 110 -- 110 0 -- 0    Volume (mL) (NS (Bolus) 0.9 % infusion 500 mL) -- -- -- 0 -- 0    Volume (mL) (NS (Bolus) 0.9 % infusion 500 mL) 500.3 -- 500.3 0 -- 0    Other  470  590 1060  60  -- 60    Medications (PO/Enteral Liquids)  60 -- 60    NG/GT  175  410 585  70  -- 70    Intake (mL) (Enteral Tube 01/31/24 Nasogastric Right nare) 175 410 585 70 -- 70    IV Piggyback  --  -- --  0  -- 0    Volume (mL) (potassium phosphate 15 mmol in dextrose 5% 250 mL ivpb) -- -- -- 0 -- 0    Volume (mL) (potassium chloride in water (Kcl) ivpb **Administer in ICU only** 20 mEq) -- -- -- 0 -- 0    Enteral  --  -- --  30  -- 30    Free Water / Tube Flush -- -- -- 30 -- 30    Total Intake 1310.3 1000 2310.3 160 -- 160       Output    Urine  473  502 975  150  -- 150    Output (mL) (Urethral Catheter Non-latex;Temperature probe) 473 502 975 150 -- 150    Drains  105  131 236  --  -- --    Output (mL) (ICP/Ventriculostomy Right ) 10 0 10 -- -- --    Output (mL) (ICP/Ventriculostomy Right) 95 131 226 -- -- --    Stool  --  -- --  --  -- --    Number of Times Stooled -- 3 x 3 x 0 x -- 0 x    Total Output  150 -- 150       Net I/O     732.3 367 1099.3 10 -- 10              Intake/Output Summary (Last 24 hours) at 2/2/2024 0920  Last data filed at 2/2/2024 0800  Gross per 24 hour   Intake 2400.92 ml   Output 1243 ml   Net 1157.92 ml               potassium phosphate 15 mmol in dextrose 5% 250 mL ivpb  15 mmol Once    potassium chloride (KCL-CENTRAL) IV *Administer in ICU only*  20 mEq Once    levETIRAcetam  500 mg BID     Pharmacy  1 Each PHARMACY TO DOSE    dexmedetomidine (Precedex) infusion  0.1-1.5 mcg/kg/hr (Ideal) Continuous    Respiratory Therapy Consult   Continuous RT    famotidine  20 mg Q12HRS    Or    famotidine  20 mg Q12HRS    senna-docusate  2 Tablet BID    And    polyethylene glycol/lytes  1 Packet QDAY PRN    And    magnesium hydroxide  30 mL QDAY PRN    And    bisacodyl  10 mg QDAY PRN    MD Alert...Adult ICU Electrolyte Replacement per Pharmacy   PHARMACY TO DOSE    lidocaine  2 mL Q30 MIN PRN    fentaNYL  25 mcg Q HOUR PRN    Or    fentaNYL  50 mcg Q HOUR PRN    Or    fentaNYL  100 mcg Q HOUR PRN    ondansetron  4 mg Q4HRS PRN    Or    ondansetron  4 mg Q4HRS PRN    acetaminophen  650 mg Q4HRS PRN    Or    acetaminophen  650 mg Q4HRS PRN    LORazepam  2 mg Q5 MIN PRN    niCARdipine (Cardene) 25 mg in  mL Standard Infusion  0-15 mg/hr Continuous    labetalol  10 mg Q4HRS PRN    hydrALAZINE  10 mg Q4HRS PRN    enalaprilat  1.25 mg Q6HRS PRN    NORepinephrine  0-1 mcg/kg/min (Ideal) Continuous    cefTRIAXone (ROCEPHIN) IV  2,000 mg Q24HR    prochlorperazine  10 mg Q6HRS PRN    insulin regular  1-6 Units Q6HRS    And    dextrose bolus  25 g Q15 MIN PRN    niMODipine  30 mg Q2HRS       Assessment and Plan:  Hospital day # 3  Post coil day 2  Posterior EVD removed without issue today. Staple placed at drain site.   Anterior EVD at 10cm H20 above tragus.ICP monitor moved to anterior EVD.  Continue Q1 neurochecks   Appreciate neurology, IR and intensivist care  Following closely.     Chemical prophylactic DVT therapy: No  Start date/time: tbd

## 2024-02-02 NOTE — CARE PLAN
The patient is Watcher - Medium risk of patient condition declining or worsening    Shift Goals  Clinical Goals: -160, improve neuro exam  Patient Goals: ALEJANDRA  Family Goals: ALEJANDRA    Progress made toward(s) clinical / shift goals:        Problem: Safety - Medical Restraint  Goal: Remains free of injury from restraints (Restraint for Interference with Medical Device)  Outcome: Progressing  Flowsheets (Taken 2/2/2024 0642)  Addressed this shift: Remains free of injury from restraints (restraint for interference with medical device):   Determine that other, less restrictive measures have been tried or would not be effective before applying the restraint   Evaluate the patient's condition at the time of restraint application   Inform patient/family regarding the reason for restraint   Every 2 hours: Monitor safety, psychosocial status, comfort, nutrition and hydration  Goal: Free from restraint(s) (Restraint for Interference with Medical Device)  Outcome: Progressing  Flowsheets (Taken 2/2/2024 0642)  Addressed this shift: Free from restraint(s) (restraint for interference with medical device):   ONCE/SHIFT or MINIMUM Every 12 hours: Assess and document the continuing need for restraints   Every 24 hours: Continued use of restraint requires Licensed Independent Practitioner to perform face to face examination and written order   Identify and implement measures to help patient regain control     Problem: Skin Integrity  Goal: Skin integrity is maintained or improved  Outcome: Progressing     Problem: Hemodynamics  Goal: Patient's hemodynamics, fluid balance and neurologic status will be stable or improve  Outcome: Progressing       Patient is not progressing towards the following goals:      Problem: Pain - Standard  Goal: Alleviation of pain or a reduction in pain to the patient’s comfort goal  Outcome: Not Progressing

## 2024-02-02 NOTE — FLOWSHEET NOTE
02/02/24 1420   Spontaneous Breathing Trial (SBT)   Safety screen spontaneous breathing trial (SBT) Proceed with SBT - no exclusion criteria met   Spontaneous breathing trial (SBT) outcome RSBI >105 (Rapid Shallow Breathing Index) - SBT Failure   Length of Weaning Trial (Hours) .5   Pt passed SBT but not ready to extubate   (RSBI continued to be elevated post spont in ASV >119 switched to CMV for rest)

## 2024-02-02 NOTE — PROGRESS NOTES
Neurology Progress Note  Neurohospitalist Service, [facility]    Referring Physician: Jeremy M Gonda, M.D.      Interval History: Patient seen and examined this AM. Patient remains intubated, has sluggish response to vocal stimuli but not following clear commands. No reported acute overnight events.     Review of systems: In addition to what is detailed in the HPI and/or updated in the interval history, all other systems reviewed and are negative.    Past Medical History, Past Surgical History and Social History reviewed and unchanged from prior    Medications:    Current Facility-Administered Medications:     Pharmacy Consult: Enteral tube insertion - review meds/change route/product selection, 1 Each, Other, PHARMACY TO DOSE, Jeremy M Gonda, M.D.    dexmedetomidine (PRECEDEX) 400 mcg/100mL NS premix infusion, 0.1-1.5 mcg/kg/hr (Ideal), Intravenous, Continuous, Jeremy M Gonda, M.D.    levETIRAcetam (Keppra) injection 500 mg, 500 mg, Intravenous, Q12HRS, Jeremy M Gonda, M.D., 500 mg at 02/02/24 0508    Respiratory Therapy Consult, , Nebulization, Continuous RT, Jeremy M Gonda, M.D.    famotidine (Pepcid) tablet 20 mg, 20 mg, Enteral Tube, Q12HRS, 20 mg at 02/02/24 0508 **OR** famotidine (Pepcid) injection 20 mg, 20 mg, Intravenous, Q12HRS, Jeremy M Gonda, M.D., 20 mg at 02/01/24 0629    senna-docusate (Pericolace Or Senokot S) 8.6-50 MG per tablet 2 Tablet, 2 Tablet, Enteral Tube, BID, 2 Tablet at 02/01/24 1808 **AND** polyethylene glycol/lytes (Miralax) Packet 1 Packet, 1 Packet, Enteral Tube, QDAY PRN **AND** magnesium hydroxide (Milk Of Magnesia) suspension 30 mL, 30 mL, Enteral Tube, QDAY PRN **AND** bisacodyl (Dulcolax) suppository 10 mg, 10 mg, Rectal, QDAY PRN, Jeremy M Gonda, M.D.    MD Alert...ICU Electrolyte Replacement per Pharmacy, , Other, PHARMACY TO DOSE, Jeremy M Gonda, M.D.    lidocaine (Xylocaine) 1 % injection 2 mL, 2 mL, Tracheal Tube, Q30 MIN PRN, Jeremy M Gonda, M.D.     fentaNYL (Sublimaze) injection 25 mcg, 25 mcg, Intravenous, Q HOUR PRN **OR** fentaNYL (Sublimaze) injection 50 mcg, 50 mcg, Intravenous, Q HOUR PRN, 50 mcg at 02/02/24 0512 **OR** fentaNYL (Sublimaze) injection 100 mcg, 100 mcg, Intravenous, Q HOUR PRN, Jeremy M Gonda, M.D.    ondansetron (Zofran ODT) dispertab 4 mg, 4 mg, Enteral Tube, Q4HRS PRN, 4 mg at 01/31/24 2248 **OR** ondansetron (Zofran) syringe/vial injection 4 mg, 4 mg, Intravenous, Q4HRS PRN, Jeremy M Gonda, M.D., 4 mg at 01/31/24 1854    acetaminophen (Tylenol) tablet 650 mg, 650 mg, Enteral Tube, Q4HRS PRN **OR** acetaminophen (Tylenol) suppository 650 mg, 650 mg, Rectal, Q4HRS PRN, Jeremy M Gonda, M.D.    LORazepam (Ativan) injection 2 mg, 2 mg, Intravenous, Q5 MIN PRN, Jeremy M Gonda, M.D.    niCARdipine (Cardene) 25 mg in  mL Standard Infusion, 0-15 mg/hr, Intravenous, Continuous, Jeremy M Gonda, M.D., Paused at 01/31/24 1635    labetalol (Normodyne/Trandate) injection 10 mg, 10 mg, Intravenous, Q4HRS PRN, Jeremy M Gonda, M.D., 10 mg at 01/31/24 1541    hydrALAZINE (Apresoline) injection 10 mg, 10 mg, Intravenous, Q4HRS PRN, Jeremy M Gonda, M.D.    enalaprilat (Vasotec) injection 1.25 mg 1 mL, 1.25 mg, Intravenous, Q6HRS PRN, Jeremy M Gonda, M.D.    norepinephrine (Levophed) 8 mg in 250 mL NS infusion (premix), 0-1 mcg/kg/min (Ideal), Intravenous, Continuous, Jeremy M Gonda, M.D., Stopped at 02/01/24 1540    cefTRIAXone (Rocephin) syringe 2,000 mg, 2,000 mg, Intravenous, Q24HR, Jeremy M Gonda, M.D., 2,000 mg at 02/01/24 1539    prochlorperazine (Compazine) injection 10 mg, 10 mg, Intravenous, Q6HRS PRN, David Mclaughlin Jr., D.O., 10 mg at 01/31/24 1858    insulin regular (HumuLIN R,NovoLIN R) injection, 1-6 Units, Subcutaneous, Q6HRS, 1 Units at 02/02/24 0508 **AND** POC blood glucose manual result, , , Q6H **AND** NOTIFY MD and PharmD, , , Once **AND** Administer 20 grams of glucose (approximately 8 ounces of fruit juice) every 15 minutes  "PRN FSBG less than 70 mg/dL, , , PRN **AND** dextrose 50% (D50W) injection 25 g, 25 g, Intravenous, Q15 MIN PRN, Oriana HAINES Latona    niMODipine (Nymalize) oral syringe 30 mg, 30 mg, Enteral Tube, Q2HRS, Oriana L. Latona, 30 mg at 02/02/24 0604    Physical Examination:   BP 95/55   Pulse 87   Resp (!) 24   Ht 1.575 m (5' 2.01\")   Wt 73.3 kg (161 lb 9.6 oz)   SpO2 95%   Breastfeeding No   BMI 29.55 kg/m²     NEUROLOGICAL EXAM:     Sedated (None). Intubated and Ventilated.    Level of consciousness: Has sluggish eye opening to vigorous tactile stimuli, brisk response to painful stimuli in bilateral upper extremities. Lower extremities with movement to painful stimuli but difficult to differentiate withdrawal from reflexive movements at this time.    Pupils: Reactive to bright light (OU)  Oculomotor: Disconjugate eyes, left eye with 5-6 mm sluggishly reactive pupil in down and out position. Right eye with 2-3 mm, reactive to direct and consensual light, small roving eye movements which divert to midline  Corneal reflexes: Not tested  Blink to threat: Intact  Cough reflex: Intact  Gag reflex: Intact    Face appears symmetric    Motor: Responsive to tactile and painful stimuli in bilateral upper and lower extremities although movement in lower extremities difficult to differentiate reflexive movements vs withdrawal at this time  Deep tendon reflexes:  No clonus.   Plantar responses: Mute      Objective Data:    Labs:  Lab Results   Component Value Date/Time    PROTHROMBTM 13.9 01/31/2024 03:25 PM    INR 1.06 01/31/2024 03:25 PM      Lab Results   Component Value Date/Time    WBC 14.4 (H) 02/02/2024 05:30 AM    RBC 4.02 (L) 02/02/2024 05:30 AM    HEMOGLOBIN 10.5 (L) 02/02/2024 05:30 AM    HEMATOCRIT 32.0 (L) 02/02/2024 05:30 AM    MCV 79.6 (L) 02/02/2024 05:30 AM    MCH 26.1 (L) 02/02/2024 05:30 AM    MCHC 32.8 02/02/2024 05:30 AM    MPV 11.0 02/02/2024 05:30 AM    NEUTSPOLYS 84.00 (H) 02/02/2024 05:30 AM    " LYMPHOCYTES 6.90 (L) 02/02/2024 05:30 AM    MONOCYTES 8.30 02/02/2024 05:30 AM    EOSINOPHILS 0.10 02/02/2024 05:30 AM    BASOPHILS 0.20 02/02/2024 05:30 AM      Lab Results   Component Value Date/Time    SODIUM 139 02/02/2024 03:20 AM    POTASSIUM 3.4 (L) 02/02/2024 03:20 AM    CHLORIDE 103 02/02/2024 03:20 AM    CO2 24 02/02/2024 03:20 AM    GLUCOSE 153 (H) 02/02/2024 03:20 AM    BUN 13 02/02/2024 03:20 AM    CREATININE 0.54 02/02/2024 03:20 AM      Lab Results   Component Value Date/Time    CHOLSTRLTOT 165 01/31/2024 03:25 PM     (H) 01/31/2024 03:25 PM    HDL 36 (A) 01/31/2024 03:25 PM    TRIGLYCERIDE 79 01/31/2024 03:25 PM       Lab Results   Component Value Date/Time    ALKPHOSPHAT 261 (H) 08/27/2019 04:57 AM    ASTSGOT 64 (H) 08/27/2019 04:57 AM    ALTSGPT 125 (H) 08/27/2019 04:57 AM    TBILIRUBIN 0.5 08/27/2019 04:57 AM        Imaging/Testing:    I interpreted and/or reviewed the patient's neuroimaging    DX-CHEST-PORTABLE (1 VIEW)   Final Result      1.  Lines and tubes appear appropriately located      2.  Improvement of pulmonary edema/airspace process      US-INTRACRANIAL ARTERY COMP   Final Result      CT-HEAD W/O   Final Result         1. Slightly decreased subarachnoid hemorrhage overlying the cerebral hemispheres.   2. Otherwise, stable as above.         DX-CHEST-FOR LINE PLACEMENT Perform procedure in: Patient's Room   Final Result      1.  Moderate interstitial pulmonary edema.   2.   Right-sided central venous catheter terminates with the tip projecting over the expected region of the mid to distal superior vena cava.   3.  Endotracheal tube terminates in satisfactory position at the level of the aortic arch.   4.  Enteric feeding tube terminates in the left upper quadrant projecting over the expected location of the stomach.      CT-STEALTH HEAD W/O   Final Result      1.  Interval placement of a right transparietal ventriculostomy catheter which terminates with the tip near the third  ventricle. There is minimally decreased caliber of the ventricular system and compared to previous exam.   2.  Findings of intracranial hemorrhage appear similar to the previous exam. No definite new acute intracranial hemorrhage is identified.   3.  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         DX-CHEST-PORTABLE (1 VIEW)   Final Result      CT-HEAD W/O   Final Result      1. Interval aneurysm coiling of left posterior communicating artery aneurysm.   2. Possible increased subarachnoid hemorrhage.   3. Intraventricular hemorrhage. There is developing mild hydrocephalus with mildly increased ventricles.   4. Small right convexity subdural hematoma measuring 5 mm in thickness.      These findings were discussed with EMILY BYRD on 1/31/2024 3:14 PM.      IR-EMBOLIZATION    (Results Pending)   EC-ECHOCARDIOGRAM COMPLETE W/O CONT    (Results Pending)   US-INTRACRANIAL ARTERY COMP    (Results Pending)       Assessment and Plan:  Fay Turner is a 63 y.o. female with history of hypertension who was transferred from [facility] after she presented with alteration of mental status with rapidly progressive neurological changes and obtundation.  She was noted to have left ptosis and dilated left pupil.  She was intubated and underwent brain CT which revealed diffuse Hunt and Potts grade 4, modified Swenson grade 3. subarachnoid hemorrhage with evidence of large left P-comm aneurysm on CTA.  She is status post successful coil embolization of the left P-comm aneurysm. NSGY have removed posterior EVD today, anterior EVD @ 10cm today. Patient remains arousable but does not follow any specific commands. Still with bilateral extremities movements appearing symmetric.     POD #3 EVD placement  POD #2 Coil embolization of Lt- P-comm aneurysm.  POD #2 EVD placement.    Problem list:    Recommendations:  -- q1h and PRN neuro assessment. VS per nursing/unit protocol,  maintain telemetry monitoring  -- SBP goal 120-160; continue cardene PRN, may need to liberalize blood pressure in coming days to mitigate vasospasm.  -- Please call neurology with further concern for neurological decline or new/focal neurological deficits and obtain Stat CT head.   -- Maintain strict normothermia, eunatremia, euglycemia, FSBS .   -- Net even I/O, normal Na goal 135-145 for now, monitor for cerebral salt  wasting, Current/most recent Na is 139  -- Nimodipine 30 mg PO/NGT q2h or 60mg q4h x21 days.  -- Daily TCDs: Limited exam today with no bitemporal window; may need to pursue CTP imaging in the setting of limited neurologic examination and no ability to visualize intracerebral velocities  -- Hold antithrombotic therapy at this time  -- no known underlying coagulopathy, maintain INR < 1.5, platelets > 100k  -- Given seizure at the onset, continue with Keppra 500 mg twice a day.  -- EVD management per NSGY; adjusted to 10 cm today   ICPs 24 hrs: 4-10   Output 24 hrs: 226  -- DVT Prophylaxis: SCDs.   -- Not a current candidate for PT/OT/SLP at this time      I have performed a physical exam and reviewed and updated ROS and Plan today (2/2/2024).     Bhanu Daniel III, MD  Vascular Neurology, [facility] Acute Care Services

## 2024-02-02 NOTE — PROGRESS NOTES
Neuro Interventional Radiology   Progress Note     Author: Allison Weber D.N.P. Date & Time created: 2/1/2024  5:46 PM   Date of admission  1/31/2024  Note to reader: this note follows the APSO format rather than the historical SOAP format. Assessment and plan located at the top of the note for ease of use.    Chief Complaint  63 y.o. female admitted 1/31/2024 with subarachnoid hemorrhage.    HPI  Fay France is a 62 yo female with PMH significant for HTN who presented to OSH for sudden onset of new eyelid droop, and generalized headache without significant head trauma. Pt seized in OSH CT scan, was loaded with Keppra and transferred for higher level of care. 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 2/1/24 MAURICE Eason completed a cerebral aneurysm with coil embolization of large left PCOMM aneurysm.    Interval History IR:  2/1/24: RIGHT groin access site soft, non-tender, without ecchymosis. Dressing clean, dry, intact. Pedal pulses +2 bilaterally with feet pink, and warm, cap refill <3 sec. Pt is intubated and ventilated with EVD. She is not following commands, but moves right foot to stimuli. I reviewed the most recent labs including WBC 19.6, Hgb 14.2, Cr 0.61. Coordinated post IR procedure care with hospital nursing staff.     Assessment/Plan     Principal Problem:    SAH (subarachnoid hemorrhage) (HCC)  Active Problems:    Acute respiratory failure with hypoxia (HCC)    Secondary hypertension    Acute pulmonary edema (HCC)    Seizure (HCC)    Hypokalemia    Hypotension due to hypovolemia      Plan IR  -- Post-angioseal instructions: no lifting greater than 5 lbs and no baths/ swimming/ soaking in tub for 5 days. Shower OK. OK to change dressings/band aid as needed.  - Neuro checks per ICU protocol  - Neurosurgery EVD placement  - Vascular Neurology and Neurosurgery following  - Outpatient follow up in approximately 4  weeks. Our office will call to schedule      -Thank you for allowing Interventional Radiology team to participate in the patients care, if any additional care or requests are needed in the future please do not hesitate call or place IR order   [phone]                 Review of Systems  Physical Exam   Review of Systems   Unable to perform ROS: Intubated      Vitals:    02/01/24 1645   BP:    Pulse: 82   Resp: (!) 23   SpO2: 100%        Physical Exam  Vitals and nursing note reviewed.   Constitutional:       Appearance: She is obese.   HENT:      Head:      Comments: EVD  Cardiovascular:      Rate and Rhythm: Normal rate.      Pulses: Normal pulses.   Pulmonary:      Effort: No respiratory distress.      Comments: ETT and ventilator  Abdominal:      General: There is no distension.      Palpations: Abdomen is soft.      Tenderness: There is no guarding.   Musculoskeletal:         General: Normal range of motion.      Right lower leg: No edema.      Left lower leg: No edema.   Skin:     General: Skin is warm and dry.      Capillary Refill: Capillary refill takes 2 to 3 seconds.      Comments: RIGHT groin access site soft, non-tender, without ecchymosis; dressing cdi.     Neurological:      Mental Status: She is alert.      Comments: Intubated    Psychiatric:      Comments: intubated             Labs    Recent Labs     01/31/24  1525 01/31/24 2118   WBC 17.9* 19.6*   RBC 5.12 5.48*   HEMOGLOBIN 13.4 14.2   HEMATOCRIT 39.6 41.7   MCV 77.3* 76.1*   MCH 26.2* 25.9*   MCHC 33.8 34.1   RDW 37.7 36.7   PLATELETCT 256 271   MPV 10.4 10.0     Recent Labs     01/31/24  1525 01/31/24  2118 02/01/24  0320 02/01/24  0946 02/01/24  1507   SODIUM 137 142 138 139 136   POTASSIUM 3.6 3.1*  --  3.2*  --    CHLORIDE 103 102  --  103  --    CO2 20 23  --  25  --    GLUCOSE 155* 186*  --  158*  --    BUN 10 8  --  10  --    CREATININE 0.52 0.61  --  0.55  --    CALCIUM 8.0* 8.2*  --  8.3*  --      Recent Labs     01/31/24  1525  01/31/24 2118 02/01/24  0946   CREATININE 0.52 0.61 0.55     US-INTRACRANIAL ARTERY COMP         CT-HEAD W/O   Final Result         1. Slightly decreased subarachnoid hemorrhage overlying the cerebral hemispheres.   2. Otherwise, stable as above.         DX-CHEST-FOR LINE PLACEMENT Perform procedure in: Patient's Room   Final Result      1.  Moderate interstitial pulmonary edema.   2.   Right-sided central venous catheter terminates with the tip projecting over the expected region of the mid to distal superior vena cava.   3.  Endotracheal tube terminates in satisfactory position at the level of the aortic arch.   4.  Enteric feeding tube terminates in the left upper quadrant projecting over the expected location of the stomach.      CT-STEALTH HEAD W/O   Final Result      1.  Interval placement of a right transparietal ventriculostomy catheter which terminates with the tip near the third ventricle. There is minimally decreased caliber of the ventricular system and compared to previous exam.   2.  Findings of intracranial hemorrhage appear similar to the previous exam. No definite new acute intracranial hemorrhage is identified.   3.  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         DX-CHEST-PORTABLE (1 VIEW)   Final Result      CT-HEAD W/O   Final Result      1. Interval aneurysm coiling of left posterior communicating artery aneurysm.   2. Possible increased subarachnoid hemorrhage.   3. Intraventricular hemorrhage. There is developing mild hydrocephalus with mildly increased ventricles.   4. Small right convexity subdural hematoma measuring 5 mm in thickness.      These findings were discussed with EMILY BYRD on 1/31/2024 3:14 PM.      IR-EMBOLIZATION    (Results Pending)   EC-ECHOCARDIOGRAM COMPLETE W/O CONT    (Results Pending)       INR   Date Value Ref Range Status   01/31/2024 1.06 0.87 - 1.13 Final     Comment:     INR - Non-therapeutic Reference Range:  "0.87-1.13  INR - Therapeutic Reference Range: 2.0-4.0       No results found for: \"POCINR\"     Intake/Output Summary (Last 24 hours) at 2/1/2024 1746  Last data filed at 2/1/2024 1600  Gross per 24 hour   Intake 3089.64 ml   Output 3143 ml   Net -53.36 ml      Labs not explicitly included in this progress note were reviewed by the author. Radiology/imaging not explicitly included in this progress note was reviewed by the author.     I have performed a physical exam and reviewed and updated ROS and Plan today (2/1/2024).     56 minutes in directly providing and coordinating care and extensive data review.  No time overlap and excludes procedures.  "
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
## 2024-02-02 NOTE — FLOWSHEET NOTE
02/02/24 0700   Spontaneous Breathing Trial (SBT)   Safety screen spontaneous breathing trial (SBT) Proceed with SBT - no exclusion criteria met   Spontaneous breathing trial (SBT) outcome Pt weaned for 1 hour and returned to rest settings per protocol - SBT Pass   Length of Weaning Trial (Hours) 1   Pt passed SBT but not ready to extubate   (Pt started desatting RSBI climbing, returned to rest.)

## 2024-02-02 NOTE — CARE PLAN
Problem: Ventilation  Goal: Ability to achieve and maintain unassisted ventilation or tolerate decreased levels of ventilator support  Description: Target End Date:  4 days     Document on Vent flowsheet    1.  Support and monitor invasive and noninvasive mechanical ventilation  2.  Monitor ventilator weaning response  3.  Perform ventilator associated pneumonia prevention interventions  4.  Manage ventilation therapy by monitoring diagnostic test results  Outcome: Progressing     Ventilator Daily Summary    Vent Day #3  Airway: 7.5 @24    Ventilator settings:24/320/8/30   Weaning trials: none  Respiratory Procedures: none     Plan: Continue current ventilator settings and wean mechanical ventilation as tolerated per physician orders.

## 2024-02-02 NOTE — CARE PLAN
Problem: Ventilation  Goal: Ability to achieve and maintain unassisted ventilation or tolerate decreased levels of ventilator support  Description: Target End Date:  4 days     Document on Vent flowsheet    1.  Support and monitor invasive and noninvasive mechanical ventilation  2.  Monitor ventilator weaning response  3.  Perform ventilator associated pneumonia prevention interventions  4.  Manage ventilation therapy by monitoring diagnostic test results  Outcome: Progressing   Vent Day 2  Apvcmv: 24, 320, 8, 40%   Spont 1 hr  RSBI > 105

## 2024-02-03 ENCOUNTER — APPOINTMENT (OUTPATIENT)
Dept: RADIOLOGY | Facility: MEDICAL CENTER | Age: 64
DRG: 020 | End: 2024-02-03
Attending: INTERNAL MEDICINE
Payer: COMMERCIAL

## 2024-02-03 ENCOUNTER — APPOINTMENT (OUTPATIENT)
Dept: RADIOLOGY | Facility: MEDICAL CENTER | Age: 64
DRG: 020 | End: 2024-02-03
Attending: NURSE PRACTITIONER
Payer: COMMERCIAL

## 2024-02-03 PROBLEM — J15.211 PNEUMONIA OF BOTH LUNGS DUE TO METHICILLIN SUSCEPTIBLE STAPHYLOCOCCUS AUREUS (MSSA) (HCC): Status: ACTIVE | Noted: 2024-02-03

## 2024-02-03 PROBLEM — I71.21 ANEURYSM OF ASCENDING AORTA WITHOUT RUPTURE (HCC): Status: ACTIVE | Noted: 2024-02-03

## 2024-02-03 LAB
ANION GAP SERPL CALC-SCNC: 11 MMOL/L (ref 7–16)
ARTERIAL PATENCY WRIST A: ABNORMAL
BACTERIA SPEC RESP CULT: ABNORMAL
BACTERIA SPEC RESP CULT: ABNORMAL
BASE EXCESS BLDA CALC-SCNC: 1 MMOL/L (ref -4–3)
BASOPHILS # BLD AUTO: 0.4 % (ref 0–1.8)
BASOPHILS # BLD: 0.05 K/UL (ref 0–0.12)
BODY TEMPERATURE: ABNORMAL DEGREES
BREATHS SETTING VENT: 20
BUN SERPL-MCNC: 13 MG/DL (ref 8–22)
CALCIUM SERPL-MCNC: 8.8 MG/DL (ref 8.5–10.5)
CHLORIDE SERPL-SCNC: 103 MMOL/L (ref 96–112)
CO2 BLDA-SCNC: 26 MMOL/L (ref 20–33)
CO2 SERPL-SCNC: 22 MMOL/L (ref 20–33)
CREAT SERPL-MCNC: 0.42 MG/DL (ref 0.5–1.4)
DELSYS IDSYS: ABNORMAL
END TIDAL CARBON DIOXIDE IECO2: 35 MMHG
EOSINOPHIL # BLD AUTO: 0.04 K/UL (ref 0–0.51)
EOSINOPHIL NFR BLD: 0.3 % (ref 0–6.9)
ERYTHROCYTE [DISTWIDTH] IN BLOOD BY AUTOMATED COUNT: 39.8 FL (ref 35.9–50)
GFR SERPLBLD CREATININE-BSD FMLA CKD-EPI: 110 ML/MIN/1.73 M 2
GLUCOSE BLD STRIP.AUTO-MCNC: 117 MG/DL (ref 65–99)
GLUCOSE BLD STRIP.AUTO-MCNC: 129 MG/DL (ref 65–99)
GLUCOSE BLD STRIP.AUTO-MCNC: 135 MG/DL (ref 65–99)
GLUCOSE BLD STRIP.AUTO-MCNC: 141 MG/DL (ref 65–99)
GLUCOSE SERPL-MCNC: 139 MG/DL (ref 65–99)
GRAM STN SPEC: ABNORMAL
HCO3 BLDA-SCNC: 24.5 MMOL/L (ref 17–25)
HCT VFR BLD AUTO: 31.2 % (ref 37–47)
HGB BLD-MCNC: 10.4 G/DL (ref 12–16)
HOROWITZ INDEX BLDA+IHG-RTO: 210 MM[HG]
IMM GRANULOCYTES # BLD AUTO: 0.05 K/UL (ref 0–0.11)
IMM GRANULOCYTES NFR BLD AUTO: 0.4 % (ref 0–0.9)
LACTATE BLD-SCNC: 0.5 MMOL/L (ref 0.5–2)
LYMPHOCYTES # BLD AUTO: 1.41 K/UL (ref 1–4.8)
LYMPHOCYTES NFR BLD: 10.9 % (ref 22–41)
MAGNESIUM SERPL-MCNC: 2 MG/DL (ref 1.5–2.5)
MCH RBC QN AUTO: 26.4 PG (ref 27–33)
MCHC RBC AUTO-ENTMCNC: 33.3 G/DL (ref 32.2–35.5)
MCV RBC AUTO: 79.2 FL (ref 81.4–97.8)
MODE IMODE: ABNORMAL
MONOCYTES # BLD AUTO: 0.86 K/UL (ref 0–0.85)
MONOCYTES NFR BLD AUTO: 6.7 % (ref 0–13.4)
NEUTROPHILS # BLD AUTO: 10.49 K/UL (ref 1.82–7.42)
NEUTROPHILS NFR BLD: 81.3 % (ref 44–72)
NRBC # BLD AUTO: 0 K/UL
NRBC BLD-RTO: 0 /100 WBC (ref 0–0.2)
O2/TOTAL GAS SETTING VFR VENT: 30 %
PCO2 BLDA: 34.5 MMHG (ref 26–37)
PCO2 TEMP ADJ BLDA: 34.9 MMHG (ref 26–37)
PEEP END EXPIRATORY PRESSURE IPEEP: 8 CMH20
PH BLDA: 7.46 [PH] (ref 7.4–7.5)
PH TEMP ADJ BLDA: 7.46 [PH] (ref 7.4–7.5)
PHOSPHATE SERPL-MCNC: 1.8 MG/DL (ref 2.5–4.5)
PLATELET # BLD AUTO: 220 K/UL (ref 164–446)
PMV BLD AUTO: 10.4 FL (ref 9–12.9)
PO2 BLDA: 63 MMHG (ref 64–87)
PO2 TEMP ADJ BLDA: 64 MMHG (ref 64–87)
POTASSIUM SERPL-SCNC: 3.4 MMOL/L (ref 3.6–5.5)
RBC # BLD AUTO: 3.94 M/UL (ref 4.2–5.4)
SAO2 % BLDA: 93 % (ref 93–99)
SIGNIFICANT IND 70042: ABNORMAL
SITE SITE: ABNORMAL
SODIUM SERPL-SCNC: 136 MMOL/L (ref 135–145)
SODIUM SERPL-SCNC: 138 MMOL/L (ref 135–145)
SODIUM SERPL-SCNC: 138 MMOL/L (ref 135–145)
SODIUM SERPL-SCNC: 139 MMOL/L (ref 135–145)
SODIUM SERPL-SCNC: 141 MMOL/L (ref 135–145)
SOURCE SOURCE: ABNORMAL
SPECIMEN DRAWN FROM PATIENT: ABNORMAL
TIDAL VOLUME IVT: 320 ML
WBC # BLD AUTO: 12.9 K/UL (ref 4.8–10.8)

## 2024-02-03 PROCEDURE — 84295 ASSAY OF SERUM SODIUM: CPT

## 2024-02-03 PROCEDURE — 700101 HCHG RX REV CODE 250: Performed by: INTERNAL MEDICINE

## 2024-02-03 PROCEDURE — 83735 ASSAY OF MAGNESIUM: CPT

## 2024-02-03 PROCEDURE — 770022 HCHG ROOM/CARE - ICU (200)

## 2024-02-03 PROCEDURE — 700111 HCHG RX REV CODE 636 W/ 250 OVERRIDE (IP): Performed by: INTERNAL MEDICINE

## 2024-02-03 PROCEDURE — 99291 CRITICAL CARE FIRST HOUR: CPT | Performed by: INTERNAL MEDICINE

## 2024-02-03 PROCEDURE — 93888 INTRACRANIAL LIMITED STUDY: CPT

## 2024-02-03 PROCEDURE — 71045 X-RAY EXAM CHEST 1 VIEW: CPT

## 2024-02-03 PROCEDURE — 700102 HCHG RX REV CODE 250 W/ 637 OVERRIDE(OP): Performed by: INTERNAL MEDICINE

## 2024-02-03 PROCEDURE — 83605 ASSAY OF LACTIC ACID: CPT

## 2024-02-03 PROCEDURE — 80048 BASIC METABOLIC PNL TOTAL CA: CPT

## 2024-02-03 PROCEDURE — 99233 SBSQ HOSP IP/OBS HIGH 50: CPT | Performed by: PSYCHIATRY & NEUROLOGY

## 2024-02-03 PROCEDURE — 94150 VITAL CAPACITY TEST: CPT

## 2024-02-03 PROCEDURE — 85025 COMPLETE CBC W/AUTO DIFF WBC: CPT

## 2024-02-03 PROCEDURE — 93888 INTRACRANIAL LIMITED STUDY: CPT | Mod: 26 | Performed by: INTERNAL MEDICINE

## 2024-02-03 PROCEDURE — 94003 VENT MGMT INPAT SUBQ DAY: CPT

## 2024-02-03 PROCEDURE — 700102 HCHG RX REV CODE 250 W/ 637 OVERRIDE(OP): Performed by: NURSE PRACTITIONER

## 2024-02-03 PROCEDURE — 37799 UNLISTED PX VASCULAR SURGERY: CPT

## 2024-02-03 PROCEDURE — 82803 BLOOD GASES ANY COMBINATION: CPT

## 2024-02-03 PROCEDURE — 84100 ASSAY OF PHOSPHORUS: CPT

## 2024-02-03 PROCEDURE — 94799 UNLISTED PULMONARY SVC/PX: CPT

## 2024-02-03 PROCEDURE — 700105 HCHG RX REV CODE 258: Performed by: INTERNAL MEDICINE

## 2024-02-03 PROCEDURE — 82962 GLUCOSE BLOOD TEST: CPT

## 2024-02-03 PROCEDURE — A9270 NON-COVERED ITEM OR SERVICE: HCPCS | Performed by: INTERNAL MEDICINE

## 2024-02-03 RX ORDER — LABETALOL HYDROCHLORIDE 5 MG/ML
10-20 INJECTION, SOLUTION INTRAVENOUS EVERY 4 HOURS PRN
Status: DISCONTINUED | OUTPATIENT
Start: 2024-02-03 | End: 2024-02-04

## 2024-02-03 RX ORDER — HYDRALAZINE HYDROCHLORIDE 20 MG/ML
10-20 INJECTION INTRAMUSCULAR; INTRAVENOUS EVERY 4 HOURS PRN
Status: DISCONTINUED | OUTPATIENT
Start: 2024-02-03 | End: 2024-02-04

## 2024-02-03 RX ADMIN — NIMODIPINE 60 MG: 60 SOLUTION ORAL at 15:30

## 2024-02-03 RX ADMIN — LABETALOL HYDROCHLORIDE 10 MG: 5 INJECTION, SOLUTION INTRAVENOUS at 16:50

## 2024-02-03 RX ADMIN — NIMODIPINE 30 MG: 60 SOLUTION ORAL at 00:06

## 2024-02-03 RX ADMIN — LEVETIRACETAM 500 MG: 500 TABLET, FILM COATED ORAL at 17:18

## 2024-02-03 RX ADMIN — DOCUSATE SODIUM 50 MG AND SENNOSIDES 8.6 MG 2 TABLET: 8.6; 5 TABLET, FILM COATED ORAL at 05:14

## 2024-02-03 RX ADMIN — NIMODIPINE 30 MG: 60 SOLUTION ORAL at 14:41

## 2024-02-03 RX ADMIN — HYDRALAZINE HYDROCHLORIDE 10 MG: 20 INJECTION, SOLUTION INTRAMUSCULAR; INTRAVENOUS at 22:33

## 2024-02-03 RX ADMIN — HYDRALAZINE HYDROCHLORIDE 20 MG: 20 INJECTION, SOLUTION INTRAMUSCULAR; INTRAVENOUS at 17:13

## 2024-02-03 RX ADMIN — LEVETIRACETAM 500 MG: 500 TABLET, FILM COATED ORAL at 05:15

## 2024-02-03 RX ADMIN — NIMODIPINE 30 MG: 60 SOLUTION ORAL at 03:59

## 2024-02-03 RX ADMIN — NIMODIPINE 30 MG: 60 SOLUTION ORAL at 10:49

## 2024-02-03 RX ADMIN — CEFTRIAXONE SODIUM 2000 MG: 10 INJECTION, POWDER, FOR SOLUTION INTRAVENOUS at 16:25

## 2024-02-03 RX ADMIN — NIMODIPINE 30 MG: 60 SOLUTION ORAL at 06:02

## 2024-02-03 RX ADMIN — FAMOTIDINE 20 MG: 20 TABLET ORAL at 05:14

## 2024-02-03 RX ADMIN — NIMODIPINE 30 MG: 60 SOLUTION ORAL at 02:04

## 2024-02-03 RX ADMIN — NIMODIPINE 30 MG: 60 SOLUTION ORAL at 08:20

## 2024-02-03 RX ADMIN — LABETALOL HYDROCHLORIDE 10 MG: 5 INJECTION, SOLUTION INTRAVENOUS at 21:37

## 2024-02-03 RX ADMIN — FENTANYL CITRATE 50 MCG: 50 INJECTION, SOLUTION INTRAMUSCULAR; INTRAVENOUS at 03:59

## 2024-02-03 RX ADMIN — POTASSIUM PHOSPHATE, MONOBASIC AND POTASSIUM PHOSPHATE, DIBASIC 30 MMOL: 224; 236 INJECTION, SOLUTION, CONCENTRATE INTRAVENOUS at 08:19

## 2024-02-03 RX ADMIN — NIMODIPINE 30 MG: 60 SOLUTION ORAL at 12:15

## 2024-02-03 RX ADMIN — NIMODIPINE 60 MG: 60 SOLUTION ORAL at 23:14

## 2024-02-03 ASSESSMENT — PAIN DESCRIPTION - PAIN TYPE
TYPE: ACUTE PAIN

## 2024-02-03 ASSESSMENT — PULMONARY FUNCTION TESTS: FVC: 0.75

## 2024-02-03 NOTE — CARE PLAN
The patient is Watcher - Medium risk of patient condition declining or worsening    Shift Goals  Clinical Goals: Improved neuro exam, -160  Patient Goals: ALEJANDRA  Family Goals: ALEJANDRA    Progress made toward(s) clinical / shift goals:        Problem: Safety - Medical Restraint  Goal: Remains free of injury from restraints (Restraint for Interference with Medical Device)  Outcome: Progressing  Flowsheets (Taken 2/3/2024 0409)  Addressed this shift: Remains free of injury from restraints (restraint for interference with medical device):   Determine that other, less restrictive measures have been tried or would not be effective before applying the restraint   Evaluate the patient's condition at the time of restraint application   Inform patient/family regarding the reason for restraint   Every 2 hours: Monitor safety, psychosocial status, comfort, nutrition and hydration  Goal: Free from restraint(s) (Restraint for Interference with Medical Device)  Outcome: Progressing  Flowsheets (Taken 2/3/2024 0409)  Addressed this shift: Free from restraint(s) (restraint for interference with medical device):   ONCE/SHIFT or MINIMUM Every 12 hours: Assess and document the continuing need for restraints   Every 24 hours: Continued use of restraint requires Licensed Independent Practitioner to perform face to face examination and written order   Identify and implement measures to help patient regain control     Problem: Skin Integrity  Goal: Skin integrity is maintained or improved  Outcome: Progressing     Problem: Fall Risk  Goal: Patient will remain free from falls  Outcome: Progressing     Problem: Hemodynamics  Goal: Patient's hemodynamics, fluid balance and neurologic status will be stable or improve  Outcome: Progressing       Patient is not progressing towards the following goals:      Problem: Pain - Standard  Goal: Alleviation of pain or a reduction in pain to the patient’s comfort goal  Outcome: Not Progressing

## 2024-02-03 NOTE — RESPIRATORY CARE
Extubation    Cuff leak noted yes  Stridor present no     FiO2%: 30 % (02/03/24 0611)        Patient toleration yes  RCP Complete? yes  Events/Summary/Plan: Pt returned to full support (02/03/24 0734)      Pt extubated, placed on 5 liter nasal cannula.

## 2024-02-03 NOTE — PROGRESS NOTES
Neurology Progress Note  Neurohospitalist Service, [facility]    Referring Physician: Jeremy M Gonda, M.D.    No chief complaint on file.      HPI: Refer to initial documented Neurology H&P, as detailed in the patient's chart.    Interval History 2/3: No new events overnight.    Review of systems: In addition to what is detailed in the HPI and/or updated in the interval history, all other systems reviewed and are negative.    Past Medical History:    has no past medical history on file.    FHx:  family history is not on file.    SHx:       Medications:    Current Facility-Administered Medications:     potassium phosphate 30 mmol in  mL ivpb, 30 mmol, Intravenous, Once, Jeremy M Gonda, M.D., Last Rate: 125 mL/hr at 02/03/24 0819, 30 mmol at 02/03/24 0819    levETIRAcetam (Keppra) tablet 500 mg, 500 mg, Enteral Tube, BID, Jeremy M Gonda, M.D., 500 mg at 02/03/24 0515    Pharmacy Consult: Enteral tube insertion - review meds/change route/product selection, 1 Each, Other, PHARMACY TO DOSE, Jeremy M Gonda, M.D.    dexmedetomidine (PRECEDEX) 400 mcg/100mL NS premix infusion, 0.1-1.5 mcg/kg/hr (Ideal), Intravenous, Continuous, Jeremy M Gonda, M.D.    Respiratory Therapy Consult, , Nebulization, Continuous RT, Jeremy M Gonda, M.D.    famotidine (Pepcid) tablet 20 mg, 20 mg, Enteral Tube, Q12HRS, 20 mg at 02/03/24 0514 **OR** famotidine (Pepcid) injection 20 mg, 20 mg, Intravenous, Q12HRS, Jeremy M Gonda, M.D., 20 mg at 02/01/24 0629    senna-docusate (Pericolace Or Senokot S) 8.6-50 MG per tablet 2 Tablet, 2 Tablet, Enteral Tube, BID, 2 Tablet at 02/03/24 0514 **AND** polyethylene glycol/lytes (Miralax) Packet 1 Packet, 1 Packet, Enteral Tube, QDAY PRN **AND** magnesium hydroxide (Milk Of Magnesia) suspension 30 mL, 30 mL, Enteral Tube, QDAY PRN **AND** bisacodyl (Dulcolax) suppository 10 mg, 10 mg, Rectal, QDAY PRN, Jeremy M Gonda, M.D.    MD Alert...ICU Electrolyte Replacement per Pharmacy, ,  Other, PHARMACY TO DOSE, Jeremy M Gonda, M.D.    lidocaine (Xylocaine) 1 % injection 2 mL, 2 mL, Tracheal Tube, Q30 MIN PRN, Jeremy M Gonda, M.D.    fentaNYL (Sublimaze) injection 25 mcg, 25 mcg, Intravenous, Q HOUR PRN **OR** fentaNYL (Sublimaze) injection 50 mcg, 50 mcg, Intravenous, Q HOUR PRN, 50 mcg at 02/03/24 0359 **OR** fentaNYL (Sublimaze) injection 100 mcg, 100 mcg, Intravenous, Q HOUR PRN, Jeremy M Gonda, M.D.    ondansetron (Zofran ODT) dispertab 4 mg, 4 mg, Enteral Tube, Q4HRS PRN, 4 mg at 01/31/24 2248 **OR** ondansetron (Zofran) syringe/vial injection 4 mg, 4 mg, Intravenous, Q4HRS PRN, Jeremy M Gonda, M.D., 4 mg at 02/02/24 1758    acetaminophen (Tylenol) tablet 650 mg, 650 mg, Enteral Tube, Q4HRS PRN **OR** acetaminophen (Tylenol) suppository 650 mg, 650 mg, Rectal, Q4HRS PRN, Jeremy M Gonda, M.D.    LORazepam (Ativan) injection 2 mg, 2 mg, Intravenous, Q5 MIN PRN, Jeremy M Gonda, M.D.    niCARdipine (Cardene) 25 mg in  mL Standard Infusion, 0-15 mg/hr, Intravenous, Continuous, Jeremy M Gonda, M.D., Paused at 01/31/24 1635    labetalol (Normodyne/Trandate) injection 10 mg, 10 mg, Intravenous, Q4HRS PRN, Jeremy M Gonda, M.D., 10 mg at 01/31/24 1541    hydrALAZINE (Apresoline) injection 10 mg, 10 mg, Intravenous, Q4HRS PRN, Jeremy M Gonda, M.D.    enalaprilat (Vasotec) injection 1.25 mg 1 mL, 1.25 mg, Intravenous, Q6HRS PRN, Jeremy M Gonda, M.D.    norepinephrine (Levophed) 8 mg in 250 mL NS infusion (premix), 0-1 mcg/kg/min (Ideal), Intravenous, Continuous, Jeremy M Gonda, M.D., Stopped at 02/01/24 1540    cefTRIAXone (Rocephin) syringe 2,000 mg, 2,000 mg, Intravenous, Q24HR, Jeremy M Gonda, M.D., 2,000 mg at 02/02/24 1626    prochlorperazine (Compazine) injection 10 mg, 10 mg, Intravenous, Q6HRS PRN, David Mclaughlin Jr., D.O., 10 mg at 01/31/24 1858    insulin regular (HumuLIN R,NovoLIN R) injection, 1-6 Units, Subcutaneous, Q6HRS, 1 Units at 02/02/24 1814 **AND** POC blood glucose manual  result, , , Q6H **AND** NOTIFY MD and PharmD, , , Once **AND** Administer 20 grams of glucose (approximately 8 ounces of fruit juice) every 15 minutes PRN FSBG less than 70 mg/dL, , , PRN **AND** dextrose 50% (D50W) injection 25 g, 25 g, Intravenous, Q15 MIN PRN, Oriana L. Latona    niMODipine (Nymalize) oral syringe 30 mg, 30 mg, Enteral Tube, Q2HRS, Oriana L. Latona, 30 mg at 02/03/24 0820    Physical Examination:     Vitals:    02/03/24 0300 02/03/24 0400 02/03/24 0500 02/03/24 0600   BP:  129/77     Pulse: (!) 109 (!) 110 89 97   Resp: 20 (!) 24 14 (!) 23   TempSrc:  Bladder  Bladder   SpO2: 100% 95% 96% 95%   Weight:       Height:               NEUROLOGICAL EXAM:     Patient is intubated but off sedation.  She does awaken to sounds.  She follows commands with both upper extremities.  Pupils are unequal.  Right pupil is reactive midline.  Left side has a ptosis with a nonreactive pupil that has gaze to the left typical of a cranial nerve III palsy.  Blinks to threat on the right side no blinks on the right side.  Positive cough.  Moves both upper extremities to command squeezes both hands and able to lift both arms antigravity.  Both lower extremities have lesser movement.  Questionable more movement on the right lower compared to left.  Response to tactile touch in both upper extremities.    Objective Data:    Labs:  Lab Results   Component Value Date/Time    PROTHROMBTM 13.9 01/31/2024 03:25 PM    INR 1.06 01/31/2024 03:25 PM      Lab Results   Component Value Date/Time    WBC 12.9 (H) 02/03/2024 05:10 AM    RBC 3.94 (L) 02/03/2024 05:10 AM    HEMOGLOBIN 10.4 (L) 02/03/2024 05:10 AM    HEMATOCRIT 31.2 (L) 02/03/2024 05:10 AM    MCV 79.2 (L) 02/03/2024 05:10 AM    MCH 26.4 (L) 02/03/2024 05:10 AM    MCHC 33.3 02/03/2024 05:10 AM    MPV 10.4 02/03/2024 05:10 AM    NEUTSPOLYS 81.30 (H) 02/03/2024 05:10 AM    LYMPHOCYTES 10.90 (L) 02/03/2024 05:10 AM    MONOCYTES 6.70 02/03/2024 05:10 AM    EOSINOPHILS 0.30  02/03/2024 05:10 AM    BASOPHILS 0.40 02/03/2024 05:10 AM      Lab Results   Component Value Date/Time    SODIUM 136 02/03/2024 05:10 AM    POTASSIUM 3.4 (L) 02/03/2024 05:10 AM    CHLORIDE 103 02/03/2024 05:10 AM    CO2 22 02/03/2024 05:10 AM    GLUCOSE 139 (H) 02/03/2024 05:10 AM    BUN 13 02/03/2024 05:10 AM    CREATININE 0.42 (L) 02/03/2024 05:10 AM      Lab Results   Component Value Date/Time    CHOLSTRLTOT 165 01/31/2024 03:25 PM     (H) 01/31/2024 03:25 PM    HDL 36 (A) 01/31/2024 03:25 PM    TRIGLYCERIDE 79 01/31/2024 03:25 PM       Lab Results   Component Value Date/Time    ALKPHOSPHAT 261 (H) 08/27/2019 04:57 AM    ASTSGOT 64 (H) 08/27/2019 04:57 AM    ALTSGPT 125 (H) 08/27/2019 04:57 AM    TBILIRUBIN 0.5 08/27/2019 04:57 AM        Imaging/Testing:  DX-CHEST-LIMITED (1 VIEW)   Final Result         1.  Stable chest with perihilar and lower lung zone interstitial and minimal airspace opacities most consistent with pulmonary edema.   2.  No new abnormalities.      EC-ECHOCARDIOGRAM COMPLETE W/O CONT   Final Result      US-INTRACRANIAL ARTERY LIMITED   Final Result      DX-CHEST-PORTABLE (1 VIEW)   Final Result      1.  Lines and tubes appear appropriately located      2.  Improvement of pulmonary edema/airspace process      US-INTRACRANIAL ARTERY COMP   Final Result      CT-HEAD W/O   Final Result         1. Slightly decreased subarachnoid hemorrhage overlying the cerebral hemispheres.   2. Otherwise, stable as above.         DX-CHEST-FOR LINE PLACEMENT Perform procedure in: Patient's Room   Final Result      1.  Moderate interstitial pulmonary edema.   2.   Right-sided central venous catheter terminates with the tip projecting over the expected region of the mid to distal superior vena cava.   3.  Endotracheal tube terminates in satisfactory position at the level of the aortic arch.   4.  Enteric feeding tube terminates in the left upper quadrant projecting over the expected location of the stomach.       CT-STEALTH HEAD W/O   Final Result      1.  Interval placement of a right transparietal ventriculostomy catheter which terminates with the tip near the third ventricle. There is minimally decreased caliber of the ventricular system and compared to previous exam.   2.  Findings of intracranial hemorrhage appear similar to the previous exam. No definite new acute intracranial hemorrhage is identified.   3.  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         DX-CHEST-PORTABLE (1 VIEW)   Final Result      CT-HEAD W/O   Final Result      1. Interval aneurysm coiling of left posterior communicating artery aneurysm.   2. Possible increased subarachnoid hemorrhage.   3. Intraventricular hemorrhage. There is developing mild hydrocephalus with mildly increased ventricles.   4. Small right convexity subdural hematoma measuring 5 mm in thickness.      These findings were discussed with EMILY BYRD on 1/31/2024 3:14 PM.      IR-EMBOLIZATION    (Results Pending)   US-INTRACRANIAL ARTERY LIMITED    (Results Pending)         Assessment and Plan:    63-year-old female transferred from outside facility found to have a left P-comm aneurysm status post coiling.  Post bleed and post coiling day #3.  Carpio Potts scale was a 4.  Modified Swenson scale 3.  Patient still intubated however following some commands.  Unable to obtain TCD's due to lack of window.  EVD has had an output approximately 278 cc last 24 hours.  ICPs ranged from 5-11.  Sodium within normal limits.  Plan today to wean and extubate.    Plan:  1.  Wean and extubate if able per the primary team.  2.  Continue with every hour neurochecks  3.  Blood pressure parameters will maintain between 120-160 for now.  May be able to liberalize lower and upper if we get a better exam and patient extubated.  4.  Stat CTA and CT perfusion if change in neurostatus if concern for vasospasm.  Exam is limited and there is no TCD's tomorrow.  5.   Will try to get TCD's again today.  6.  Maintain that evens I's and O's.   sodium levels.  Normothermia  7.  Continue with SCDs for DVT prophylaxis.  May use chemical DVT prophylactics once cleared by neurosurgery with the EVD.  8.  Dissipate neurosurgery to continue with EVD set at 10 since it is draining with normal ICPs.  9.  Nimodipine 60 mg every 4 hours    Spent over 58 minutes reviewing notes, images, going over the care plan with the primary team.    This chart was partially generated using voice recognition technology and sound alike word replacement may be present, best efforts were made to make the chart accurate.    Jose Raul Tenorio MD  Board Certified Neurology, ABPN  t) [phone]
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
## 2024-02-03 NOTE — PROGRESS NOTES
RIGHT groin access site soft, non-tender, without ecchymosis. Dressing clean dry intact. Distal pulses +2, skin warm, cap refill <3 sec.    Neuro Interventional Radiology signing off.

## 2024-02-03 NOTE — ASSESSMENT & PLAN NOTE
Likely from aspiration event  S/p Rocephin x 5 days and finished 2/8  Aspiration precautions  Repeated CXR 2/11 with rise in WBCs: noted LLL opacity with air bronchograms with slight improvement

## 2024-02-03 NOTE — CARE PLAN
Problem: Ventilation  Goal: Ability to achieve and maintain unassisted ventilation or tolerate decreased levels of ventilator support  Description: Target End Date:  4 days     Document on Vent flowsheet    1.  Support and monitor invasive and noninvasive mechanical ventilation  2.  Monitor ventilator weaning response  3.  Perform ventilator associated pneumonia prevention interventions  4.  Manage ventilation therapy by monitoring diagnostic test results  Outcome: Progressing     Ventilator Daily Summary    Vent Day #4  Airway: 7.5 @22    Ventilator settings:20/320/8/30   Weaning trials: none  Respiratory Procedures: none     Plan: Continue current ventilator settings and wean mechanical ventilation as tolerated per physician orders.

## 2024-02-03 NOTE — CARE PLAN
Problem: Ventilation  Goal: Ability to achieve and maintain unassisted ventilation or tolerate decreased levels of ventilator support  Description: Target End Date:  4 days     Document on Vent flowsheet    1.  Support and monitor invasive and noninvasive mechanical ventilation  2.  Monitor ventilator weaning response  3.  Perform ventilator associated pneumonia prevention interventions  4.  Manage ventilation therapy by monitoring diagnostic test results  Outcome: Progressing   Vent day 3  7.5 22 @ teeth  20/320/8/30%  Order is ASV as tolerated (switch to CMV when RSBI too high)

## 2024-02-03 NOTE — PROGRESS NOTES
Neurosurgery Progress Note    Subjective:  63 year old presented with HH4 ruptured left Pcomm aneurysm, post coil day 3.     ICPs stable between 2-12 overnight  EVD output from anterior EVD 20-1cc/hr overnight. Drainage still pretty bloody.    Patient not on sedation currently    Exam:  GCS: E3VtM6.  Patient intubated, off sedation  +corneal, +cough/gag.    Pupils right 1-2mm reactive, left 3-4mm non reactive, gaze conjugate  Follows commands to upper extremities, spontaneous movement to lower extremities, withdrawal in lower extremities.    Anterior EVD in place and functioning at [id number] above the tragus   ICP waveform adequate    BP  Min: 117/67  Max: 145/76  Pulse  Av.4  Min: 79  Max: 122  Resp  Av.4  Min: 14  Max: 42  Monitored Temp 2  Av.3 °C (99.2 °F)  Min: 37.2 °C (99 °F)  Max: 37.6 °C (99.7 °F)  SpO2  Av.6 %  Min: 91 %  Max: 100 %    ICP  Av.1 MM HG  Min: 6 MM HG  Max: 11 MM HG    Recent Labs     24  2118 24  0530 24  0510   WBC 19.6* 14.4* 12.9*   RBC 5.48* 4.02* 3.94*   HEMOGLOBIN 14.2 10.5* 10.4*   HEMATOCRIT 41.7 32.0* 31.2*   MCV 76.1* 79.6* 79.2*   MCH 25.9* 26.1* 26.4*   MCHC 34.1 32.8 33.3   RDW 36.7 39.1 39.8   PLATELETCT 271 203 220   MPV 10.0 11.0 10.4       Recent Labs     24  0946 24  1507 24  0320 24  1020 24  2210 24  0300 24  0510   SODIUM 139   < > 139   < > 141 138 136   POTASSIUM 3.2*  --  3.4*  --   --   --  3.4*   CHLORIDE 103  --  103  --   --   --  103   CO2 -  24  --   --   --  22   GLUCOSE 158*  --  153*  --   --   --  139*   BUN 10  --  13  --   --   --  13   CREATININE 0.55  --  0.54  --   --   --  0.42*   CALCIUM 8.3*  --  9.2  --   --   --  8.8    < > = values in this interval not displayed.       Recent Labs     24  1525 24  2118 24  0320   APTT >240.0* 37.0* 39.4*   INR 1.06  --   --        Recent Labs     24  1819   REACTMIN 5.7   CLOTKINET 1.3   CLOTANGL 73.8    MAXCLOTS 62.1   [id number] 0.0   PRCINADP 84.4*   PRCINAA 25.3*         Intake/Output                         02/02/24 0700 - 02/03/24 0659 02/03/24 0700 - 02/04/24 0659     0700-1859 1900-0659 Total 0700-1859 1900-0659 Total                 Intake    I.V.  0  -- 0  --  -- --    Norepinephrine Volume 0 -- 0 -- -- --    Volume (mL) (NS (Bolus) 0.9 % infusion 500 mL) 0 -- 0 -- -- --    Volume (mL) (NS (Bolus) 0.9 % infusion 500 mL) 0 -- 0 -- -- --    Other  500  460 960  --  -- --    Medications (PO/Enteral Liquids) 500 460 960 -- -- --    NG/GT  420  100 520  --  -- --    Intake (mL) (Enteral Tube 01/31/24 Nasogastric Right nare) 420 100 520 -- -- --    IV Piggyback  347.1  -- 347.1  --  -- --    Volume (mL) (potassium phosphate 15 mmol in dextrose 5% 250 mL ivpb) 248.3 -- 248.3 -- -- --    Volume (mL) (potassium chloride in water (Kcl) ivpb **Administer in ICU only** 20 mEq) 98.9 -- 98.9 -- -- --    Enteral  90  90 180  --  -- --    Free Water / Tube Flush 90 90 180 -- -- --    Total Intake 1357.1 650 2007.1 -- -- --       Output    Urine  865  675 1540  --  -- --    Output (mL) (Urethral Catheter Non-latex;Temperature probe)  -- -- --    Drains  159  119 278  --  -- --    Output (mL) ([REMOVED] ICP/Ventriculostomy Right  02/02/24 1000) 0 -- 0 -- -- --    Output (mL) (ICP/Ventriculostomy Right) 159 119 278 -- -- --    Stool  --  -- --  --  -- --    Number of Times Stooled 2 x 1 x 3 x -- -- --    Total Output 0428 324 9898 -- -- --       Net I/O     333.1 -144 189.1 -- -- --              Intake/Output Summary (Last 24 hours) at 2/3/2024 0749  Last data filed at 2/3/2024 0600  Gross per 24 hour   Intake 2007.14 ml   Output 1801 ml   Net 206.14 ml               potassium phosphate 30 mmol in  mL ivpb  30 mmol Once    levETIRAcetam  500 mg BID    Pharmacy  1 Each PHARMACY TO DOSE    dexmedetomidine (Precedex) infusion  0.1-1.5 mcg/kg/hr (Ideal) Continuous    Respiratory Therapy Consult   Continuous RT     famotidine  20 mg Q12HRS    Or    famotidine  20 mg Q12HRS    senna-docusate  2 Tablet BID    And    polyethylene glycol/lytes  1 Packet QDAY PRN    And    magnesium hydroxide  30 mL QDAY PRN    And    bisacodyl  10 mg QDAY PRN    MD Alert...Adult ICU Electrolyte Replacement per Pharmacy   PHARMACY TO DOSE    lidocaine  2 mL Q30 MIN PRN    fentaNYL  25 mcg Q HOUR PRN    Or    fentaNYL  50 mcg Q HOUR PRN    Or    fentaNYL  100 mcg Q HOUR PRN    ondansetron  4 mg Q4HRS PRN    Or    ondansetron  4 mg Q4HRS PRN    acetaminophen  650 mg Q4HRS PRN    Or    acetaminophen  650 mg Q4HRS PRN    LORazepam  2 mg Q5 MIN PRN    niCARdipine (Cardene) 25 mg in  mL Standard Infusion  0-15 mg/hr Continuous    labetalol  10 mg Q4HRS PRN    hydrALAZINE  10 mg Q4HRS PRN    enalaprilat  1.25 mg Q6HRS PRN    NORepinephrine  0-1 mcg/kg/min (Ideal) Continuous    cefTRIAXone (ROCEPHIN) IV  2,000 mg Q24HR    prochlorperazine  10 mg Q6HRS PRN    insulin regular  1-6 Units Q6HRS    And    dextrose bolus  25 g Q15 MIN PRN    niMODipine  30 mg Q2HRS       Assessment and Plan:  Hospital day # 4  Post coil day 3  Anterior EVD at 10cm H20 above tragus.ICP   Appreciate neurology, IR and intensivist care  Following closely.     Chemical prophylactic DVT therapy: No  Start date/time: tbd

## 2024-02-03 NOTE — DISCHARGE PLANNING
Following for post acute care ongoing medical management. Therapy evaluations 02/02. Per RT note extubated on 5lpm nc. EVD management. Off sedation Physiatry to consult per protocol.  Prominence Managed care. RRH not a benefit of the provider.

## 2024-02-04 ENCOUNTER — APPOINTMENT (OUTPATIENT)
Dept: RADIOLOGY | Facility: MEDICAL CENTER | Age: 64
DRG: 020 | End: 2024-02-04
Attending: NURSE PRACTITIONER
Payer: COMMERCIAL

## 2024-02-04 ENCOUNTER — APPOINTMENT (OUTPATIENT)
Dept: RADIOLOGY | Facility: MEDICAL CENTER | Age: 64
DRG: 020 | End: 2024-02-04
Attending: INTERNAL MEDICINE
Payer: COMMERCIAL

## 2024-02-04 PROBLEM — R73.9 HYPERGLYCEMIA: Status: ACTIVE | Noted: 2024-02-04

## 2024-02-04 LAB
AMMONIA PLAS-SCNC: 28 UMOL/L (ref 11–45)
ANION GAP SERPL CALC-SCNC: 12 MMOL/L (ref 7–16)
BASOPHILS # BLD AUTO: 0.3 % (ref 0–1.8)
BASOPHILS # BLD: 0.05 K/UL (ref 0–0.12)
BUN SERPL-MCNC: 13 MG/DL (ref 8–22)
CALCIUM SERPL-MCNC: 9 MG/DL (ref 8.5–10.5)
CHLORIDE SERPL-SCNC: 106 MMOL/L (ref 96–112)
CO2 SERPL-SCNC: 20 MMOL/L (ref 20–33)
CREAT SERPL-MCNC: 0.38 MG/DL (ref 0.5–1.4)
EOSINOPHIL # BLD AUTO: 0.03 K/UL (ref 0–0.51)
EOSINOPHIL NFR BLD: 0.2 % (ref 0–6.9)
ERYTHROCYTE [DISTWIDTH] IN BLOOD BY AUTOMATED COUNT: 39.2 FL (ref 35.9–50)
GFR SERPLBLD CREATININE-BSD FMLA CKD-EPI: 113 ML/MIN/1.73 M 2
GLUCOSE BLD STRIP.AUTO-MCNC: 146 MG/DL (ref 65–99)
GLUCOSE BLD STRIP.AUTO-MCNC: 163 MG/DL (ref 65–99)
GLUCOSE BLD STRIP.AUTO-MCNC: 174 MG/DL (ref 65–99)
GLUCOSE BLD STRIP.AUTO-MCNC: 224 MG/DL (ref 65–99)
GLUCOSE SERPL-MCNC: 240 MG/DL (ref 65–99)
HCT VFR BLD AUTO: 34 % (ref 37–47)
HGB BLD-MCNC: 11.6 G/DL (ref 12–16)
IMM GRANULOCYTES # BLD AUTO: 0.14 K/UL (ref 0–0.11)
IMM GRANULOCYTES NFR BLD AUTO: 0.7 % (ref 0–0.9)
LYMPHOCYTES # BLD AUTO: 1.13 K/UL (ref 1–4.8)
LYMPHOCYTES NFR BLD: 5.8 % (ref 22–41)
MAGNESIUM SERPL-MCNC: 2 MG/DL (ref 1.5–2.5)
MCH RBC QN AUTO: 26.5 PG (ref 27–33)
MCHC RBC AUTO-ENTMCNC: 34.1 G/DL (ref 32.2–35.5)
MCV RBC AUTO: 77.6 FL (ref 81.4–97.8)
MONOCYTES # BLD AUTO: 1.19 K/UL (ref 0–0.85)
MONOCYTES NFR BLD AUTO: 6.1 % (ref 0–13.4)
NEUTROPHILS # BLD AUTO: 17.06 K/UL (ref 1.82–7.42)
NEUTROPHILS NFR BLD: 86.9 % (ref 44–72)
NRBC # BLD AUTO: 0 K/UL
NRBC BLD-RTO: 0 /100 WBC (ref 0–0.2)
PHOSPHATE SERPL-MCNC: 2.5 MG/DL (ref 2.5–4.5)
PLATELET # BLD AUTO: 359 K/UL (ref 164–446)
PMV BLD AUTO: 10.4 FL (ref 9–12.9)
POTASSIUM SERPL-SCNC: 3.7 MMOL/L (ref 3.6–5.5)
RBC # BLD AUTO: 4.38 M/UL (ref 4.2–5.4)
SODIUM SERPL-SCNC: 138 MMOL/L (ref 135–145)
SODIUM SERPL-SCNC: 139 MMOL/L (ref 135–145)
SODIUM SERPL-SCNC: 139 MMOL/L (ref 135–145)
SODIUM SERPL-SCNC: 140 MMOL/L (ref 135–145)
WBC # BLD AUTO: 19.6 K/UL (ref 4.8–10.8)

## 2024-02-04 PROCEDURE — 770022 HCHG ROOM/CARE - ICU (200)

## 2024-02-04 PROCEDURE — 700101 HCHG RX REV CODE 250: Performed by: NURSE PRACTITIONER

## 2024-02-04 PROCEDURE — 84100 ASSAY OF PHOSPHORUS: CPT

## 2024-02-04 PROCEDURE — 700111 HCHG RX REV CODE 636 W/ 250 OVERRIDE (IP): Performed by: NURSE PRACTITIONER

## 2024-02-04 PROCEDURE — 700101 HCHG RX REV CODE 250: Performed by: INTERNAL MEDICINE

## 2024-02-04 PROCEDURE — 70498 CT ANGIOGRAPHY NECK: CPT

## 2024-02-04 PROCEDURE — 70496 CT ANGIOGRAPHY HEAD: CPT

## 2024-02-04 PROCEDURE — 99292 CRITICAL CARE ADDL 30 MIN: CPT | Performed by: INTERNAL MEDICINE

## 2024-02-04 PROCEDURE — 80048 BASIC METABOLIC PNL TOTAL CA: CPT

## 2024-02-04 PROCEDURE — 70551 MRI BRAIN STEM W/O DYE: CPT

## 2024-02-04 PROCEDURE — 99291 CRITICAL CARE FIRST HOUR: CPT | Performed by: NURSE PRACTITIONER

## 2024-02-04 PROCEDURE — 99233 SBSQ HOSP IP/OBS HIGH 50: CPT | Performed by: PSYCHIATRY & NEUROLOGY

## 2024-02-04 PROCEDURE — 82140 ASSAY OF AMMONIA: CPT

## 2024-02-04 PROCEDURE — 700105 HCHG RX REV CODE 258: Performed by: NURSE PRACTITIONER

## 2024-02-04 PROCEDURE — 700111 HCHG RX REV CODE 636 W/ 250 OVERRIDE (IP): Performed by: INTERNAL MEDICINE

## 2024-02-04 PROCEDURE — 700102 HCHG RX REV CODE 250 W/ 637 OVERRIDE(OP): Performed by: INTERNAL MEDICINE

## 2024-02-04 PROCEDURE — 700105 HCHG RX REV CODE 258: Performed by: INTERNAL MEDICINE

## 2024-02-04 PROCEDURE — 0042T CT-CEREBRAL PERFUSION ANALYSIS: CPT

## 2024-02-04 PROCEDURE — 700117 HCHG RX CONTRAST REV CODE 255: Performed by: NURSE PRACTITIONER

## 2024-02-04 PROCEDURE — 84295 ASSAY OF SERUM SODIUM: CPT | Mod: 91

## 2024-02-04 PROCEDURE — 85025 COMPLETE CBC W/AUTO DIFF WBC: CPT

## 2024-02-04 PROCEDURE — 83735 ASSAY OF MAGNESIUM: CPT

## 2024-02-04 PROCEDURE — A9270 NON-COVERED ITEM OR SERVICE: HCPCS | Performed by: INTERNAL MEDICINE

## 2024-02-04 PROCEDURE — 82962 GLUCOSE BLOOD TEST: CPT

## 2024-02-04 RX ORDER — DEXTROSE MONOHYDRATE 25 G/50ML
25 INJECTION, SOLUTION INTRAVENOUS
Status: DISCONTINUED | OUTPATIENT
Start: 2024-02-04 | End: 2024-02-09

## 2024-02-04 RX ORDER — SODIUM CHLORIDE 9 MG/ML
500 INJECTION, SOLUTION INTRAVENOUS ONCE
Status: COMPLETED | OUTPATIENT
Start: 2024-02-04 | End: 2024-02-04

## 2024-02-04 RX ORDER — SODIUM CHLORIDE 9 MG/ML
INJECTION, SOLUTION INTRAVENOUS CONTINUOUS
Status: DISCONTINUED | OUTPATIENT
Start: 2024-02-04 | End: 2024-02-18

## 2024-02-04 RX ORDER — NOREPINEPHRINE BITARTRATE 0.03 MG/ML
0-1 INJECTION, SOLUTION INTRAVENOUS CONTINUOUS
Status: DISCONTINUED | OUTPATIENT
Start: 2024-02-04 | End: 2024-02-07

## 2024-02-04 RX ORDER — LABETALOL HYDROCHLORIDE 5 MG/ML
10-20 INJECTION, SOLUTION INTRAVENOUS EVERY 4 HOURS PRN
Status: DISCONTINUED | OUTPATIENT
Start: 2024-02-04 | End: 2024-02-19

## 2024-02-04 RX ORDER — HYDRALAZINE HYDROCHLORIDE 20 MG/ML
10-20 INJECTION INTRAMUSCULAR; INTRAVENOUS EVERY 4 HOURS PRN
Status: DISCONTINUED | OUTPATIENT
Start: 2024-02-04 | End: 2024-02-19

## 2024-02-04 RX ORDER — SODIUM CHLORIDE 9 MG/ML
1000 INJECTION, SOLUTION INTRAVENOUS ONCE
Status: COMPLETED | OUTPATIENT
Start: 2024-02-04 | End: 2024-02-04

## 2024-02-04 RX ORDER — ENALAPRILAT 1.25 MG/ML
1.25 INJECTION INTRAVENOUS EVERY 6 HOURS PRN
Status: DISCONTINUED | OUTPATIENT
Start: 2024-02-04 | End: 2024-02-19

## 2024-02-04 RX ADMIN — INSULIN HUMAN 1 UNITS: 100 INJECTION, SOLUTION PARENTERAL at 00:07

## 2024-02-04 RX ADMIN — NIMODIPINE 60 MG: 60 SOLUTION ORAL at 22:20

## 2024-02-04 RX ADMIN — NIMODIPINE 60 MG: 60 SOLUTION ORAL at 02:02

## 2024-02-04 RX ADMIN — INSULIN HUMAN 2 UNITS: 100 INJECTION, SOLUTION PARENTERAL at 18:30

## 2024-02-04 RX ADMIN — NOREPINEPHRINE BITARTRATE 1 MCG/KG/MIN: 1 INJECTION, SOLUTION, CONCENTRATE INTRAVENOUS at 08:10

## 2024-02-04 RX ADMIN — NOREPINEPHRINE BITARTRATE 0.1 MCG/KG/MIN: 1 INJECTION, SOLUTION, CONCENTRATE INTRAVENOUS at 22:43

## 2024-02-04 RX ADMIN — NOREPINEPHRINE BITARTRATE 0.1 MCG/KG/MIN: 1 INJECTION, SOLUTION, CONCENTRATE INTRAVENOUS at 02:12

## 2024-02-04 RX ADMIN — NOREPINEPHRINE BITARTRATE 0.4 MCG/KG/MIN: 1 INJECTION, SOLUTION, CONCENTRATE INTRAVENOUS at 12:32

## 2024-02-04 RX ADMIN — IOHEXOL 80 ML: 350 INJECTION, SOLUTION INTRAVENOUS at 01:45

## 2024-02-04 RX ADMIN — LEVETIRACETAM 500 MG: 500 TABLET, FILM COATED ORAL at 05:33

## 2024-02-04 RX ADMIN — INSULIN HUMAN 2 UNITS: 100 INJECTION, SOLUTION PARENTERAL at 06:10

## 2024-02-04 RX ADMIN — ACETAMINOPHEN 650 MG: 325 TABLET, FILM COATED ORAL at 04:53

## 2024-02-04 RX ADMIN — LABETALOL HYDROCHLORIDE 10 MG: 5 INJECTION, SOLUTION INTRAVENOUS at 01:45

## 2024-02-04 RX ADMIN — SODIUM CHLORIDE: 9 INJECTION, SOLUTION INTRAVENOUS at 21:41

## 2024-02-04 RX ADMIN — PHENYLEPHRINE HYDROCHLORIDE 0.25 MCG/KG/MIN: 100 INJECTION INTRAVENOUS at 02:48

## 2024-02-04 RX ADMIN — SODIUM CHLORIDE 1000 ML: 9 INJECTION, SOLUTION INTRAVENOUS at 02:09

## 2024-02-04 RX ADMIN — LEVETIRACETAM 500 MG: 500 TABLET, FILM COATED ORAL at 18:15

## 2024-02-04 RX ADMIN — SODIUM CHLORIDE: 9 INJECTION, SOLUTION INTRAVENOUS at 07:40

## 2024-02-04 RX ADMIN — NIMODIPINE 60 MG: 60 SOLUTION ORAL at 14:14

## 2024-02-04 RX ADMIN — IOHEXOL 40 ML: 350 INJECTION, SOLUTION INTRAVENOUS at 01:45

## 2024-02-04 RX ADMIN — NIMODIPINE 60 MG: 60 SOLUTION ORAL at 10:02

## 2024-02-04 RX ADMIN — NIMODIPINE 60 MG: 60 SOLUTION ORAL at 06:05

## 2024-02-04 RX ADMIN — ACETAMINOPHEN 650 MG: 325 TABLET, FILM COATED ORAL at 15:58

## 2024-02-04 RX ADMIN — CEFTRIAXONE SODIUM 2000 MG: 10 INJECTION, POWDER, FOR SOLUTION INTRAVENOUS at 15:58

## 2024-02-04 RX ADMIN — ACETAMINOPHEN 650 MG: 325 TABLET, FILM COATED ORAL at 20:06

## 2024-02-04 RX ADMIN — SODIUM CHLORIDE 500 ML: 9 INJECTION, SOLUTION INTRAVENOUS at 06:21

## 2024-02-04 RX ADMIN — NIMODIPINE 60 MG: 60 SOLUTION ORAL at 18:15

## 2024-02-04 RX ADMIN — POTASSIUM BICARBONATE 25 MEQ: 978 TABLET, EFFERVESCENT ORAL at 10:02

## 2024-02-04 RX ADMIN — NOREPINEPHRINE BITARTRATE 1 MCG/KG/MIN: 1 INJECTION, SOLUTION, CONCENTRATE INTRAVENOUS at 05:10

## 2024-02-04 ASSESSMENT — PAIN DESCRIPTION - PAIN TYPE
TYPE: ACUTE PAIN

## 2024-02-04 NOTE — PROGRESS NOTES
ELIE Michaels notified that patient is no longer following commands on LUE and not answering orientation questions.   APRN to bedside. Order for 500 ml NS bolus.

## 2024-02-04 NOTE — PROGRESS NOTES
Neurosurgery Progress Note    Subjective:  63 year old presented with HH4 ruptured left Pcomm aneurysm, post coil day 4.     ICPs stable between <10 overnight  EVD output from anterior EVD 5-15cc/hr overnight. Drainage still pretty bloody.    Patient not on sedation currently.  Overnight on  had worsening exam with flaccid RUE, decreased mentation, more lethargy. CTA showed left MCA vasospasm. SBP goals increased to 180-200.     Exam:  GCS: E3VtM6.  Extubated.  +corneal, +cough/gag.    Minimal right eye opening to sounds.  Has left CNIII palsy with ptosis.   Pupils right 1-2mm reactive, left 3-4mm non reactive, gaze conjugate.   Trace command following LUE.   Withdrawal in RUE and in lower extremities.    Anterior EVD in place and functioning at [id number] above the tragus   ICP waveform adequate.    BP  Min: 145/75  Max: 212/109  Pulse  Av.6  Min: 88  Max: 125  Resp  Av.9  Min: 20  Max: 36  Monitored Temp 2  Av.7 °C (99.9 °F)  Min: 37.1 °C (98.8 °F)  Max: 38.2 °C (100.8 °F)  SpO2  Av %  Min: 92 %  Max: 99 %    ICP  Av.4 MM HG  Min: -1 MM HG  Max: 13 MM HG    Recent Labs     24  0530 24  0510 24  0415   WBC 14.4* 12.9* 19.6*   RBC 4.02* 3.94* 4.38   HEMOGLOBIN 10.5* 10.4* 11.6*   HEMATOCRIT 32.0* 31.2* 34.0*   MCV 79.6* 79.2* 77.6*   MCH 26.1* 26.4* 26.5*   MCHC 32.8 33.3 34.1   RDW 39.1 39.8 39.2   PLATELETCT 203 220 359   MPV 11.0 10.4 10.4       Recent Labs     24  0320 24  1020 24  0510 24  1050 24  1630 24  2221 24  0415   SODIUM 139   < > 136   < > 139 138 138   POTASSIUM 3.4*  --  3.4*  --   --   --  3.7   CHLORIDE 103  --  103  --   --   --  106   CO2 24  --  22  --   --   --  20   GLUCOSE 153*  --  139*  --   --   --  240*   BUN 13  --  13  --   --   --  13   CREATININE 0.54  --  0.42*  --   --   --  0.38*   CALCIUM 9.2  --  8.8  --   --   --  9.0    < > = values in this interval not displayed.                      Intake/Output                         02/03/24 0700 - 02/04/24 0659 02/04/24 0700 - 02/05/24 0659     0700-1859 1900-0659 Total 0700-1859 1900-0659 Total                 Intake    I.V.  0  1481.7 1481.7  133.2  -- 133.2    Cardene Volume 0 -- 0 -- -- --    Phenylephrine Volume -- 75.3 75.3 63.7 -- 63.7    Norepinephrine Volume -- 406.3 406.3 69.5 -- 69.5    Volume (mL) (NS (Bolus) 0.9 % infusion 1,000 mL) -- 1000.1 1000.1 -- -- --    Other  600  120 720  --  -- --    Medications (PO/Enteral Liquids) 600 120 720 -- -- --    NG/GT  400  350 750  --  -- --    Intake (mL) (Enteral Tube 01/31/24 Nasogastric Right nare) 400 350 750 -- -- --    IV Piggyback  498.6  -- 498.6  --  -- --    Volume (mL) (potassium phosphate 30 mmol in  mL ivpb) 498.6 -- 498.6 -- -- --    Enteral  60  30 90  --  -- --    Free Water / Tube Flush 60 30 90 -- -- --    Total Intake 1558.6 1981.7 3540.3 133.2 -- 133.2       Output    Urine  1580  1375 2955  --  -- --    Output (mL) (Urethral Catheter Non-latex;Temperature probe) 1580 1375 2955 -- -- --    Drains  109  123 232  --  -- --    Output (mL) (ICP/Ventriculostomy Right) 109 123 232 -- -- --    Stool  --  -- --  --  -- --    Number of Times Stooled 2 x 1 x 3 x -- -- --    Total Output 1689 1498 3187 -- -- --       Net I/O     -130.5 483.7 353.3 133.2 -- 133.2              Intake/Output Summary (Last 24 hours) at 2/4/2024 0859  Last data filed at 2/4/2024 0741  Gross per 24 hour   Intake 3533.51 ml   Output 3001 ml   Net 532.51 ml               NORepinephrine  0-1 mcg/kg/min (Ideal) Continuous    phenylephrine infusion  0-5 mcg/kg/min (Ideal) Continuous    NS   Continuous    niMODipine  60 mg Q4HRS    hydrALAZINE  10-20 mg Q4HRS PRN    labetalol  10-20 mg Q4HRS PRN    levETIRAcetam  500 mg BID    Pharmacy  1 Each PHARMACY TO DOSE    Respiratory Therapy Consult   Continuous RT    senna-docusate  2 Tablet BID    And    polyethylene glycol/lytes  1 Packet QDAY PRN    And    magnesium  hydroxide  30 mL QDAY PRN    And    bisacodyl  10 mg QDAY PRN    MD Alert...Adult ICU Electrolyte Replacement per Pharmacy   PHARMACY TO DOSE    ondansetron  4 mg Q4HRS PRN    Or    ondansetron  4 mg Q4HRS PRN    acetaminophen  650 mg Q4HRS PRN    Or    acetaminophen  650 mg Q4HRS PRN    LORazepam  2 mg Q5 MIN PRN    enalaprilat  1.25 mg Q6HRS PRN    cefTRIAXone (ROCEPHIN) IV  2,000 mg Q24HR    prochlorperazine  10 mg Q6HRS PRN    insulin regular  1-6 Units Q6HRS    And    dextrose bolus  25 g Q15 MIN PRN       Assessment and Plan:  Hospital day # 5  Post coil day 4  Anterior EVD at 10cm H20 above tragus and open.    Appreciate neurology, IR and intensivist care.  Brain MRI pending.   Q1 neurochecks.   Following closely.     Chemical prophylactic DVT therapy: No  Start date/time: tbd

## 2024-02-04 NOTE — PROGRESS NOTES
Late Entry    At 0010 this RN went to assess patient, patient following commands on LUE but not RUE as in previous exams. Patient also more lethargic at this time.  ELIE Michaels notified at 0015 and came to bedside immediately to assess patient.     APRN notified on call neurologist of patient condition. STAT head CT ordered.

## 2024-02-04 NOTE — PROGRESS NOTES
"Critical Care Medicine   Brief Cross-Coverage Progress Note    Date of service: 2/4/2024  Time: 0020 - 0630, not continuously        Assessment/Plan:  SAH 2/2 left P-comm aneurysm status post coiling 1/31  Acute mental status change due to possible L MCA vasospasm   - I was notified by the RN that the patient was previously oriented and following commands on all extremities. She is now flaccid to RUE and only says her name and \"ouch,\" eye opening to pain only, RLE moves to pain only  - SBP parameters have been between goal of 120-160 and ICP's have been <10cm.   - I notified Dr. Tenorio on call as his recommendations in his note stated \"get Stat CTA/CTP if change in neurostatus if concern for vasospasm.\"  - TCDs have not yet been obtained  - CTP unremarkable  - CTA showing segmental narrowing of the left MCA c/f vasospasm  - After discussion w/ Dr. Tenorio, will increase SBP goal to 180-200 overnight   - Ordered Levophed, phenylephrine and 1L NS bolus to achieve this goal   - Patient's neuro exam improving somewhat   - Dr. Tenorio to speak with Neuro IR regarding further treatments   - Patient noted to suddenly become less responsive again, SBP suddenly dropped to 150s    - ordered 500ml bolus, patient now maxed on levophed & neosynephrine    - Dr. Tenorio notified     - ICP 10-13 w/ patient laying flat in bed w/ knees up to achieve SBP goals.    I spent 75 min in reviewing the patient's condition, physical examination, laboratory and imaging data, prior documentation, in discussion with bedside RN, Charge RN, Dr. Tenorio, and Dr. Gonda - (in am signout) in formulating an assessment/plan.    Critical Care time: 75 min. No time overlap, procedures not included in time.    "
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
## 2024-02-04 NOTE — THERAPY
Speech Language Therapy Contact Note    Patient Name: Fay Turner  Age:  63 y.o., Sex:  female  Medical Record #: [id number]  Today's Date: 2/4/2024 02/04/24 0813   Treatment Variance   Reason For Missed Therapy Medical - Patient Is Not Medically Stable;Medical - Other (Please Comment)  (Neuro changes overnight)   Initial Contact Note    Initial Contact Note  Order Received and Verified, Speech Therapy Evaluation in Progress with Full Report to Follow.   Vitals   O2 (LPM) 5   O2 Delivery Device Silicone Nasal Cannula   Interdisciplinary Plan of Care Collaboration   IDT Collaboration with  Other (See Comments);Nursing   Collaboration Comments Per EMR - pt extubated. Discussed with RN who reported that patient had neuro changes overnight. Recommended to hold CSE this morning; SLP to complete evaluations as medically appropriate and as time allows.     - Rena Dumont, YAZ-SLP

## 2024-02-04 NOTE — PROGRESS NOTES
Neurology Progress Note  Neurohospitalist Service, [facility]    Referring Physician: Jeremy M Gonda, M.D.    No chief complaint on file.      HPI: Refer to initial documented Neurology H&P, as detailed in the patient's chart.    Interval History 2/4: Patient is extubated now.    Review of systems: In addition to what is detailed in the HPI and/or updated in the interval history, all other systems reviewed and are negative.    Past Medical History:    has no past medical history on file.    FHx:  family history is not on file.    SHx:       Medications:    Current Facility-Administered Medications:     norepinephrine (Levophed) 8 mg in 250 mL NS infusion (premix), 0-1 mcg/kg/min (Ideal), Intravenous, Continuous, Oriana HAINES Latona, Last Rate: 93.9 mL/hr at 02/04/24 0810, 1 mcg/kg/min at 02/04/24 0810    phenylephrine 40 mg/250 mL NS premix, 0-5 mcg/kg/min (Ideal), Intravenous, Continuous, Oriana LJennifer Latona, Last Rate: 65.8 mL/hr at 02/04/24 0734, 3.5 mcg/kg/min at 02/04/24 0734    NS infusion, , Intravenous, Continuous, Jeremy M Gonda, M.D., Last Rate: 83 mL/hr at 02/04/24 0740, New Bag at 02/04/24 0740    niMODipine (Nymalize) oral syringe 60 mg, 60 mg, Enteral Tube, Q4HRS, Jeremy M Gonda, M.D., 60 mg at 02/04/24 0605    hydrALAZINE (Apresoline) injection 10-20 mg, 10-20 mg, Intravenous, Q4HRS PRN, Jeremy M Gonda, M.D., 10 mg at 02/03/24 2233    labetalol (Normodyne/Trandate) injection 10-20 mg, 10-20 mg, Intravenous, Q4HRS PRN, Jeremy M Gonda, M.D., 10 mg at 02/04/24 0145    levETIRAcetam (Keppra) tablet 500 mg, 500 mg, Enteral Tube, BID, Jeremy M Gonda, M.D., 500 mg at 02/04/24 0533    Pharmacy Consult: Enteral tube insertion - review meds/change route/product selection, 1 Each, Other, PHARMACY TO DOSE, Jeremy M Gonda, M.D.    Respiratory Therapy Consult, , Nebulization, Continuous RT, Jeremy M Gonda, M.D.    senna-docusate (Pericolace Or Senokot S) 8.6-50 MG per tablet 2 Tablet, 2 Tablet,  Enteral Tube, BID, 2 Tablet at 02/03/24 0514 **AND** polyethylene glycol/lytes (Miralax) Packet 1 Packet, 1 Packet, Enteral Tube, QDAY PRN **AND** magnesium hydroxide (Milk Of Magnesia) suspension 30 mL, 30 mL, Enteral Tube, QDAY PRN **AND** bisacodyl (Dulcolax) suppository 10 mg, 10 mg, Rectal, QDAY PRN, Jeremy M Gonda, M.D.    MD Alert...ICU Electrolyte Replacement per Pharmacy, , Other, PHARMACY TO DOSE, Jeremy M Gonda, M.D.    ondansetron (Zofran ODT) dispertab 4 mg, 4 mg, Enteral Tube, Q4HRS PRN, 4 mg at 01/31/24 2248 **OR** ondansetron (Zofran) syringe/vial injection 4 mg, 4 mg, Intravenous, Q4HRS PRN, Jeremy M Gonda, M.D., 4 mg at 02/02/24 1758    acetaminophen (Tylenol) tablet 650 mg, 650 mg, Enteral Tube, Q4HRS PRN, 650 mg at 02/04/24 0453 **OR** acetaminophen (Tylenol) suppository 650 mg, 650 mg, Rectal, Q4HRS PRN, Jeremy M Gonda, M.D.    LORazepam (Ativan) injection 2 mg, 2 mg, Intravenous, Q5 MIN PRN, Jeremy M Gonda, M.D.    enalaprilat (Vasotec) injection 1.25 mg 1 mL, 1.25 mg, Intravenous, Q6HRS PRN, Jeremy M Gonda, M.D.    cefTRIAXone (Rocephin) syringe 2,000 mg, 2,000 mg, Intravenous, Q24HR, Jeremy M Gonda, M.D., 2,000 mg at 02/03/24 1625    prochlorperazine (Compazine) injection 10 mg, 10 mg, Intravenous, Q6HRS PRN, David Mclaughlin Jr., D.O., 10 mg at 01/31/24 8418    insulin regular (HumuLIN R,NovoLIN R) injection, 1-6 Units, Subcutaneous, Q6HRS, 2 Units at 02/04/24 0610 **AND** POC blood glucose manual result, , , Q6H **AND** NOTIFY MD and PharmD, , , Once **AND** Administer 20 grams of glucose (approximately 8 ounces of fruit juice) every 15 minutes PRN FSBG less than 70 mg/dL, , , PRN **AND** dextrose 50% (D50W) injection 25 g, 25 g, Intravenous, Q15 MIN PRN, Oriana Michaels    Physical Examination:     Vitals:    02/04/24 0545 02/04/24 0600 02/04/24 0615 02/04/24 0630   BP:  (!) 189/89 (!) 168/79 (!) 168/77   Pulse: 90 98 (!) 111 (!) 104   Resp: (!) 26 (!) 28 (!) 27 (!) 36   TempSrc:  Bladder      SpO2: 98% 98% 97%    Weight:       Height:               NEUROLOGICAL EXAM:       Patient is now extubated.  She responds to sounds by opening her right eye.  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  She will follow some simple commands more so on the left side than the right side.  I can get her to give some yes and no answers to some questions.  In able to get her to tell me her name.  But nothing else verbally.  The right pupil is reactive midline.  Left side is sluggish with the down and out appearance.  Cannot get her to move the lower extremities the legs or the toes to commands.  Upgoing toes bilaterally on stimulus.    Objective Data:    Labs:  Lab Results   Component Value Date/Time    PROTHROMBTM 13.9 01/31/2024 03:25 PM    INR 1.06 01/31/2024 03:25 PM      Lab Results   Component Value Date/Time    WBC 19.6 (H) 02/04/2024 04:15 AM    RBC 4.38 02/04/2024 04:15 AM    HEMOGLOBIN 11.6 (L) 02/04/2024 04:15 AM    HEMATOCRIT 34.0 (L) 02/04/2024 04:15 AM    MCV 77.6 (L) 02/04/2024 04:15 AM    MCH 26.5 (L) 02/04/2024 04:15 AM    MCHC 34.1 02/04/2024 04:15 AM    MPV 10.4 02/04/2024 04:15 AM    NEUTSPOLYS 86.90 (H) 02/04/2024 04:15 AM    LYMPHOCYTES 5.80 (L) 02/04/2024 04:15 AM    MONOCYTES 6.10 02/04/2024 04:15 AM    EOSINOPHILS 0.20 02/04/2024 04:15 AM    BASOPHILS 0.30 02/04/2024 04:15 AM      Lab Results   Component Value Date/Time    SODIUM 138 02/04/2024 04:15 AM    POTASSIUM 3.7 02/04/2024 04:15 AM    CHLORIDE 106 02/04/2024 04:15 AM    CO2 20 02/04/2024 04:15 AM    GLUCOSE 240 (H) 02/04/2024 04:15 AM    BUN 13 02/04/2024 04:15 AM    CREATININE 0.38 (L) 02/04/2024 04:15 AM      Lab Results   Component Value Date/Time    CHOLSTRLTOT 165 01/31/2024 03:25 PM     (H) 01/31/2024 03:25 PM    HDL 36 (A) 01/31/2024 03:25 PM    TRIGLYCERIDE 79 01/31/2024 03:25 PM       Lab Results   Component Value Date/Time    ALKPHOSPHAT 261 (H) 08/27/2019 04:57 AM    ASTSGOT 64 (H) 08/27/2019  04:57 AM    ALTSGPT 125 (H) 08/27/2019 04:57 AM    TBILIRUBIN 0.5 08/27/2019 04:57 AM        Imaging/Testing:  CT-CTA NECK WITH & W/O-POST PROCESSING   Final Result      No focal high-grade stenosis, dissection or occlusion of the cervical carotid or vertebral arteries.      CT-CEREBRAL PERFUSION ANALYSIS   Final Result      1.  Cerebral blood flow less than 30% likely representing completed infarct = 0 mL.      2.  T Max more than 6 seconds likely representing combination of completed infarct and ischemia = 0 mL.      3.  Mismatched volume likely representing ischemic brain/penumbra = None      4.  Please note that the cerebral perfusion was performed on the limited brain tissue around the basal ganglia region. Infarct/ischemia outside the CT perfusion sections can be missed in this study.      CT-CTA HEAD WITH & W/O-POST PROCESS   Final Result      1.  Prior LEFT middle cranial fossa aneurysm coiling.  Artifact limits exam.   2.  No abrupt large vessel cut off.   3.  Segmental narrowing of LEFT middle cerebral artery, vasospasm versus artifact.   4.  Ventriculostomy catheter in similar position.   5.  Evolving RIGHT caudate infarct.   6.  Stable intracranial hemorrhage.         DX-ABDOMEN FOR TUBE PLACEMENT   Final Result      Orogastric tube tip now at the mid stomach.      DX-ABDOMEN FOR TUBE PLACEMENT   Final Result      Orogastric tube tip at the proximal stomach with side port just above the GE junction.      US-INTRACRANIAL ARTERY LIMITED   Final Result      DX-CHEST-LIMITED (1 VIEW)   Final Result         1.  Stable chest with perihilar and lower lung zone interstitial and minimal airspace opacities most consistent with pulmonary edema.   2.  No new abnormalities.      EC-ECHOCARDIOGRAM COMPLETE W/O CONT   Final Result      US-INTRACRANIAL ARTERY LIMITED   Final Result      DX-CHEST-PORTABLE (1 VIEW)   Final Result      1.  Lines and tubes appear appropriately located      2.  Improvement of pulmonary  edema/airspace process      US-INTRACRANIAL ARTERY COMP   Final Result      CT-HEAD W/O   Final Result         1. Slightly decreased subarachnoid hemorrhage overlying the cerebral hemispheres.   2. Otherwise, stable as above.         DX-CHEST-FOR LINE PLACEMENT Perform procedure in: Patient's Room   Final Result      1.  Moderate interstitial pulmonary edema.   2.   Right-sided central venous catheter terminates with the tip projecting over the expected region of the mid to distal superior vena cava.   3.  Endotracheal tube terminates in satisfactory position at the level of the aortic arch.   4.  Enteric feeding tube terminates in the left upper quadrant projecting over the expected location of the stomach.      CT-STEALTH HEAD W/O   Final Result      1.  Interval placement of a right transparietal ventriculostomy catheter which terminates with the tip near the third ventricle. There is minimally decreased caliber of the ventricular system and compared to previous exam.   2.  Findings of intracranial hemorrhage appear similar to the previous exam. No definite new acute intracranial hemorrhage is identified.   3.  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         DX-CHEST-PORTABLE (1 VIEW)   Final Result      CT-HEAD W/O   Final Result      1. Interval aneurysm coiling of left posterior communicating artery aneurysm.   2. Possible increased subarachnoid hemorrhage.   3. Intraventricular hemorrhage. There is developing mild hydrocephalus with mildly increased ventricles.   4. Small right convexity subdural hematoma measuring 5 mm in thickness.      These findings were discussed with EMILY BYRD on 1/31/2024 3:14 PM.      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   This aneurysm was embolized to occlusion as described above. Final angiographic images demonstrate only minimal filling of the neck of the aneurysm. The patient will be followed up in   the neuro interventional clinic and brought back for a follow-up angiogram in 6 months.      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      I, Stephanie Eason was physically present and participated during the entire procedure of the IR-EMBOLIZATION.      MR-BRAIN-W/O    (Results Pending)         Assessment and Plan:    63-year-old female transferred from outside facility found to have a left P-comm aneurysm status post coiling.  Post bleed and post coiling day #3.  Carpio Potts scale was a 4.  Modified Swenson scale 3.  Patient still intubated however following some commands.  Unable to obtain TCD's due to lack of window.  EVD has had an output approximately 278 cc last 24 hours.  ICPs ranged from 5-11.  Sodium within normal limits.  Plan today to wean and extubate.      Update 2/4.  Patient was extubated yesterday.  Later in the afternoon/evening the nursing staff and NP mention the patient was awake and able to communicate following commands.  Around midnight patient became less interactive more lethargic.  A CTA and CT perfusion was done at that time.  Pressures were increased from 140s to 180s 190s systolic.  This morning patient is still interactive she is lethargic.  Based on upon my examination there is not a lot different compared to yesterday morning before she was extubated.  I reviewed the CT scans with neuro IR.  There is no obvious signs of vasospasm on scan.  Perfusion is normal.  At this time recommend keeping the pressure between 140-200 systolic.  Will get an MRI brain.  Unfortunately we do not have TCD's to follow for vasospasm.    Post bleed and postop day  #4.    Plan:  1.  MRI brain  2.  Continue with every hour neurochecks  3.  Blood pressure parameters to keep between 140-200 systolic.  4.  Nimodipine 30 mg every 12 hours.  5.  Will try to get TCD's again today-can DC if no windows also today..  6.  Maintain that evens I's and O's.   sodium levels.  Normothermia  7.  Continue with SCDs for DVT prophylaxis.  May use chemical DVT prophylactics once cleared by neurosurgery with the EVD.  8.  Neurosurgery managing the EVD.  Set at 10.  May try to raise either today or tomorrow.  9.  Continue Keppra patient had a clinical seizure on arrival.    Spent over 62 minutes reviewing notes, examined the patient and going over the care plan with the primary team.      This chart was partially generated using voice recognition technology and sound alike word replacement may be present, best efforts were made to make the chart accurate.    Jose Raul Tenorio MD  Board Certified Neurology, ABPN  t) [phone]

## 2024-02-04 NOTE — CARE PLAN
The patient is Watcher - Medium risk of patient condition declining or worsening    Shift Goals  Clinical Goals: -160, improve neuro exam  Patient Goals: ALEJANDRA  Family Goals: ALEJANDRA    Progress made toward(s) clinical / shift goals:  N/A    Patient is not progressing towards the following goals:      Problem: Safety - Medical Restraint  Goal: Remains free of injury from restraints (Restraint for Interference with Medical Device)  Outcome: Not Progressing  Goal: Free from restraint(s) (Restraint for Interference with Medical Device)  Outcome: Not Progressing     Problem: Hemodynamics  Goal: Patient's hemodynamics, fluid balance and neurologic status will be stable or improve  2/4/2024 0642 by Sandi Lau, R.N.  Outcome: Not Progressing

## 2024-02-05 ENCOUNTER — HOSPITAL ENCOUNTER (INPATIENT)
Facility: REHABILITATION | Age: 64
End: 2024-02-05
Attending: INTERNAL MEDICINE | Admitting: INTERNAL MEDICINE
Payer: COMMERCIAL

## 2024-02-05 LAB
ALBUMIN SERPL BCP-MCNC: 3.4 G/DL (ref 3.2–4.9)
ALBUMIN/GLOB SERPL: 1.1 G/DL
ALP SERPL-CCNC: 127 U/L (ref 30–99)
ALT SERPL-CCNC: 15 U/L (ref 2–50)
ANION GAP SERPL CALC-SCNC: 12 MMOL/L (ref 7–16)
AST SERPL-CCNC: 27 U/L (ref 12–45)
BASOPHILS # BLD AUTO: 0.4 % (ref 0–1.8)
BASOPHILS # BLD: 0.05 K/UL (ref 0–0.12)
BILIRUB SERPL-MCNC: 0.5 MG/DL (ref 0.1–1.5)
BUN SERPL-MCNC: 16 MG/DL (ref 8–22)
CALCIUM ALBUM COR SERPL-MCNC: 9.7 MG/DL (ref 8.5–10.5)
CALCIUM SERPL-MCNC: 9.2 MG/DL (ref 8.5–10.5)
CHLORIDE SERPL-SCNC: 103 MMOL/L (ref 96–112)
CO2 SERPL-SCNC: 25 MMOL/L (ref 20–33)
CREAT SERPL-MCNC: 0.4 MG/DL (ref 0.5–1.4)
EOSINOPHIL # BLD AUTO: 0.17 K/UL (ref 0–0.51)
EOSINOPHIL NFR BLD: 1.2 % (ref 0–6.9)
ERYTHROCYTE [DISTWIDTH] IN BLOOD BY AUTOMATED COUNT: 40.7 FL (ref 35.9–50)
GFR SERPLBLD CREATININE-BSD FMLA CKD-EPI: 111 ML/MIN/1.73 M 2
GLOBULIN SER CALC-MCNC: 3.2 G/DL (ref 1.9–3.5)
GLUCOSE BLD STRIP.AUTO-MCNC: 120 MG/DL (ref 65–99)
GLUCOSE BLD STRIP.AUTO-MCNC: 123 MG/DL (ref 65–99)
GLUCOSE BLD STRIP.AUTO-MCNC: 137 MG/DL (ref 65–99)
GLUCOSE BLD STRIP.AUTO-MCNC: 142 MG/DL (ref 65–99)
GLUCOSE SERPL-MCNC: 152 MG/DL (ref 65–99)
HCT VFR BLD AUTO: 32 % (ref 37–47)
HGB BLD-MCNC: 10.5 G/DL (ref 12–16)
IMM GRANULOCYTES # BLD AUTO: 0.05 K/UL (ref 0–0.11)
IMM GRANULOCYTES NFR BLD AUTO: 0.4 % (ref 0–0.9)
LYMPHOCYTES # BLD AUTO: 0.82 K/UL (ref 1–4.8)
LYMPHOCYTES NFR BLD: 6 % (ref 22–41)
MAGNESIUM SERPL-MCNC: 2.2 MG/DL (ref 1.5–2.5)
MCH RBC QN AUTO: 26 PG (ref 27–33)
MCHC RBC AUTO-ENTMCNC: 32.8 G/DL (ref 32.2–35.5)
MCV RBC AUTO: 79.2 FL (ref 81.4–97.8)
MONOCYTES # BLD AUTO: 0.86 K/UL (ref 0–0.85)
MONOCYTES NFR BLD AUTO: 6.3 % (ref 0–13.4)
NEUTROPHILS # BLD AUTO: 11.72 K/UL (ref 1.82–7.42)
NEUTROPHILS NFR BLD: 85.7 % (ref 44–72)
NRBC # BLD AUTO: 0 K/UL
NRBC BLD-RTO: 0 /100 WBC (ref 0–0.2)
PHOSPHATE SERPL-MCNC: 2.7 MG/DL (ref 2.5–4.5)
PLATELET # BLD AUTO: 258 K/UL (ref 164–446)
PMV BLD AUTO: 9.8 FL (ref 9–12.9)
POTASSIUM SERPL-SCNC: 3.2 MMOL/L (ref 3.6–5.5)
PROT SERPL-MCNC: 6.6 G/DL (ref 6–8.2)
RBC # BLD AUTO: 4.04 M/UL (ref 4.2–5.4)
SODIUM SERPL-SCNC: 140 MMOL/L (ref 135–145)
SODIUM SERPL-SCNC: 140 MMOL/L (ref 135–145)
WBC # BLD AUTO: 13.7 K/UL (ref 4.8–10.8)

## 2024-02-05 PROCEDURE — 84100 ASSAY OF PHOSPHORUS: CPT

## 2024-02-05 PROCEDURE — 99291 CRITICAL CARE FIRST HOUR: CPT | Performed by: INTERNAL MEDICINE

## 2024-02-05 PROCEDURE — 92610 EVALUATE SWALLOWING FUNCTION: CPT

## 2024-02-05 PROCEDURE — 83735 ASSAY OF MAGNESIUM: CPT

## 2024-02-05 PROCEDURE — 700105 HCHG RX REV CODE 258: Performed by: INTERNAL MEDICINE

## 2024-02-05 PROCEDURE — A9270 NON-COVERED ITEM OR SERVICE: HCPCS | Performed by: INTERNAL MEDICINE

## 2024-02-05 PROCEDURE — 99233 SBSQ HOSP IP/OBS HIGH 50: CPT | Performed by: PSYCHIATRY & NEUROLOGY

## 2024-02-05 PROCEDURE — 84295 ASSAY OF SERUM SODIUM: CPT

## 2024-02-05 PROCEDURE — 82962 GLUCOSE BLOOD TEST: CPT | Mod: 91

## 2024-02-05 PROCEDURE — 99292 CRITICAL CARE ADDL 30 MIN: CPT | Performed by: INTERNAL MEDICINE

## 2024-02-05 PROCEDURE — 700102 HCHG RX REV CODE 250 W/ 637 OVERRIDE(OP): Performed by: INTERNAL MEDICINE

## 2024-02-05 PROCEDURE — 85025 COMPLETE CBC W/AUTO DIFF WBC: CPT

## 2024-02-05 PROCEDURE — 80053 COMPREHEN METABOLIC PANEL: CPT

## 2024-02-05 PROCEDURE — 770022 HCHG ROOM/CARE - ICU (200)

## 2024-02-05 RX ADMIN — ACETAMINOPHEN 650 MG: 325 TABLET, FILM COATED ORAL at 22:03

## 2024-02-05 RX ADMIN — ACETAMINOPHEN 650 MG: 325 TABLET, FILM COATED ORAL at 10:01

## 2024-02-05 RX ADMIN — POTASSIUM BICARBONATE 25 MEQ: 978 TABLET, EFFERVESCENT ORAL at 10:00

## 2024-02-05 RX ADMIN — NIMODIPINE 60 MG: 60 SOLUTION ORAL at 02:25

## 2024-02-05 RX ADMIN — POTASSIUM BICARBONATE 25 MEQ: 978 TABLET, EFFERVESCENT ORAL at 17:23

## 2024-02-05 RX ADMIN — LEVETIRACETAM 500 MG: 500 TABLET, FILM COATED ORAL at 17:23

## 2024-02-05 RX ADMIN — NIMODIPINE 60 MG: 60 SOLUTION ORAL at 05:05

## 2024-02-05 RX ADMIN — POTASSIUM BICARBONATE 25 MEQ: 978 TABLET, EFFERVESCENT ORAL at 14:14

## 2024-02-05 RX ADMIN — LEVETIRACETAM 500 MG: 500 TABLET, FILM COATED ORAL at 05:05

## 2024-02-05 RX ADMIN — NIMODIPINE 60 MG: 60 SOLUTION ORAL at 10:00

## 2024-02-05 RX ADMIN — ACETAMINOPHEN 650 MG: 325 TABLET, FILM COATED ORAL at 02:25

## 2024-02-05 RX ADMIN — SODIUM CHLORIDE: 9 INJECTION, SOLUTION INTRAVENOUS at 09:55

## 2024-02-05 RX ADMIN — ACETAMINOPHEN 650 MG: 325 TABLET, FILM COATED ORAL at 15:32

## 2024-02-05 RX ADMIN — NIMODIPINE 60 MG: 60 SOLUTION ORAL at 17:23

## 2024-02-05 RX ADMIN — NIMODIPINE 60 MG: 60 SOLUTION ORAL at 14:13

## 2024-02-05 RX ADMIN — NIMODIPINE 60 MG: 60 SOLUTION ORAL at 22:03

## 2024-02-05 ASSESSMENT — PAIN DESCRIPTION - PAIN TYPE
TYPE: ACUTE PAIN

## 2024-02-05 NOTE — PROGRESS NOTES
Multiple small L MCA infarcts. Hypertensive therapy per ICU and IR, will follow, continue EVD drainage

## 2024-02-05 NOTE — PROGRESS NOTES
Neuro Interventional Radiology   Progress Note     Author: ELIE Hernandez Date & Time created: 2/5/2024  8:43 AM   Date of admission  1/31/2024  Note to reader: this note follows the APSO format rather than the historical SOAP format. Assessment and plan located at the top of the note for ease of use.    Chief Complaint  63 y.o. female admitted 1/31/2024 with subarachnoid hemorrhage.    HPI  Fay France is a 64 yo female with PMH significant for HTN who presented to OSH for sudden onset of new eyelid droop, and generalized headache without significant head trauma. Pt seized in OSH CT scan, was loaded with Keppra and transferred for higher level of care. 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 Carpio Potts 4; Modified Swenson scale 3.  01/31/24 MAURICE Eason completed a cerebral aneurysm with coil embolization of large left PCOMM aneurysm.    Interval History IR:  02/01/24: RIGHT groin access site soft, non-tender, without ecchymosis. Dressing clean, dry, intact. Pedal pulses +2 bilaterally with feet pink, and warm, cap refill <3 sec. Pt is intubated and ventilated with EVD. She is not following commands, but moves right foot to stimuli. I reviewed the most recent labs including WBC 19.6, Hgb 14.2, Cr 0.61. Coordinated post IR procedure care with hospital nursing staff.     02/05/24:  PBD/post Pcom aneurysm embolization with coil #5.  Pt extubated yesterday (2/4). A stat CTA and CT perfusion done early am on 2/4 (increased lethargy) no obvious vasospasm noted. TCD's with poor windows therefor no baseline Tcd's obtained.  I reviewed FU MRI brain(02/04) which showed small L MCA infarcts-Vasopressors infusing and  keeping -200 to mitigate vasospasm. Anterior EVD in place at 10 cm H20 above tragus with good waveform noted on monitor-R groin site soft, nontender without edema, bleeding, small scab with small  ecchymosis. I  reviewed today's labs: WBC 13.7; hemoglobin 10.5;  ; Cr 0.40; respiratory culture- +MSSA in sputum-continuing Rocephin today is day 5 of 5.  I's/O's-last 24 hours+ 551/since admission +1260; ICP 2-10 (good ICP waveform noted on monitor).        Assessment/Plan     Principal Problem:    SAH (subarachnoid hemorrhage) (HCC)  Active Problems:    Acute respiratory failure with hypoxia (HCC)    Secondary hypertension    Acute pulmonary edema (HCC)    Seizure (HCC)    Hypokalemia    Hypotension due to hypovolemia    Hypophosphatemia    Anemia    Pneumonia of both lungs due to methicillin susceptible Staphylococcus aureus (MSSA) (HCC)    Aneurysm of ascending aorta without rupture (HCC)    Hyperglycemia      Plan IR  -Blood pressure parameters per neuro- keep 140-200-to mitigate vasospasm  -Continue Nimodipine  -TCD's (poor windows) and neuro assessment for vasospasm watch  - If suspect vasospasm consider CTA and CT perfusion  - Neuro checks per ICU protocol  - Neurosurgery EVD placement- managed by neurosurgery  - Maintain normoglycemia, normothermia, normo-natremia and euvolemia  -Continue  post-angioseal instructions: no lifting greater than 5 lbs and no baths/ swimming/ soaking in tub for 5 days post Angio-Seal. shower OK.   - Vascular Neurology and Neurosurgery following  - Outpatient follow up in approximately 4 weeks. Our office will call to schedule      -Thank you for allowing Interventional Radiology team to participate in the patients care, if any additional care or requests are needed in the future please do not hesitate call or place IR order   [phone]                Review of Systems  Physical Exam   Review of Systems   Unable to perform ROS: Medical condition      Vitals:    02/05/24 0800   BP: (!) 175/88   Pulse: (!) 101   Resp: (!) 26   SpO2: 99%        Physical Exam  Vitals and nursing note reviewed.   Constitutional:       General: She is sleeping.      Comments: Wakes to stimuli   HENT:      Head:       Comments: Anterior EVD at 10cm H20 above tragus and open.         Mouth/Throat:      Mouth: Mucous membranes are dry.      Pharynx: Oropharynx is clear.   Eyes:      Comments: Left ptosis   CNIII palsy    Cardiovascular:      Rate and Rhythm: Regular rhythm. Tachycardia present.      Pulses:           Radial pulses are 2+ on the right side and 2+ on the left side.        Dorsalis pedis pulses are 2+ on the right side and 2+ on the left side.      Comments: Right groin access site soft, non-tender, without ecchymosis; dressing cdi.       Pulmonary:      Effort: No respiratory distress.      Breath sounds: Normal air entry.   Chest:   Breasts:     Breasts are symmetrical.   Abdominal:      Palpations: Abdomen is soft.   Genitourinary:     Comments: Cuellar cath to down drain- clear yellow urine  Skin:     General: Skin is warm and dry.      Capillary Refill: Capillary refill takes less than 2 seconds.   Neurological:      Mental Status: She is lethargic.      Comments: Opens right eye to voice. Left eye with CN III palsy with ptosis  Pupils- right pupil is reactive at 3-3.5mm; left pupil sluggish at 3.5mm- 4mm  Following commands on the left side - Held up two fingers  No movement to painful stimuli on the right   Non verbal  No movement noted to lower extrem.  Upward toes with stimuli only     Psychiatric:      Comments: Non verbal             Labs    Recent Labs     02/03/24  0510 02/04/24  0415 02/05/24 0415   WBC 12.9* 19.6* 13.7*   RBC 3.94* 4.38 4.04*   HEMOGLOBIN 10.4* 11.6* 10.5*   HEMATOCRIT 31.2* 34.0* 32.0*   MCV 79.2* 77.6* 79.2*   MCH 26.4* 26.5* 26.0*   MCHC 33.3 34.1 32.8   RDW 39.8 39.2 40.7   PLATELETCT 220 359 258   MPV 10.4 10.4 9.8       Recent Labs     02/03/24  0510 02/03/24  1050 02/04/24  0415 02/04/24  1005 02/04/24  1610 02/04/24  2230 02/05/24  0415   SODIUM 136   < > 138   < > 139 139 140   POTASSIUM 3.4*  --  3.7  --   --   --  3.2*   CHLORIDE 103  --  106  --   --   --  103   CO2 22   --  20  --   --   --  25   GLUCOSE 139*  --  240*  --   --   --  152*   BUN 13  --  13  --   --   --  16   CREATININE 0.42*  --  0.38*  --   --   --  0.40*   CALCIUM 8.8  --  9.0  --   --   --  9.2    < > = values in this interval not displayed.       Recent Labs     02/03/24  0510 02/04/24  0415 02/05/24  0415   ALBUMIN  --   --  3.4   TBILIRUBIN  --   --  0.5   ALKPHOSPHAT  --   --  127*   TOTPROTEIN  --   --  6.6   ALTSGPT  --   --  15   ASTSGOT  --   --  27   CREATININE 0.42* 0.38* 0.40*       MR-BRAIN-W/O   Final Result      1.  Multifocal areas of acute infarcts in the left cerebral hemisphere.   2.  Small area of acute infarct in the right basal ganglia adjacent to the tip of the ventriculostomy catheter.   3.  Cortical infarct in the right medial parietal lobe.   4.  Mild amount of subdural hemorrhages surrounding the bilateral cerebellar hemispheres and right cerebellum.   5.  Subarachnoid hemorrhage.   6.  There is no hydrocephalus.   7.  There are changes secondary to the previous endovascular repair left internal carotid aneurysm.      CT-CTA NECK WITH & W/O-POST PROCESSING   Final Result      No focal high-grade stenosis, dissection or occlusion of the cervical carotid or vertebral arteries.      CT-CEREBRAL PERFUSION ANALYSIS   Final Result      1.  Cerebral blood flow less than 30% likely representing completed infarct = 0 mL.      2.  T Max more than 6 seconds likely representing combination of completed infarct and ischemia = 0 mL.      3.  Mismatched volume likely representing ischemic brain/penumbra = None      4.  Please note that the cerebral perfusion was performed on the limited brain tissue around the basal ganglia region. Infarct/ischemia outside the CT perfusion sections can be missed in this study.      CT-CTA HEAD WITH & W/O-POST PROCESS   Final Result      1.  Prior LEFT middle cranial fossa aneurysm coiling.  Artifact limits exam.   2.  No abrupt large vessel cut off.   3.  Segmental  narrowing of LEFT middle cerebral artery, vasospasm versus artifact.   4.  Ventriculostomy catheter in similar position.   5.  Evolving RIGHT caudate infarct.   6.  Stable intracranial hemorrhage.         DX-ABDOMEN FOR TUBE PLACEMENT   Final Result      Orogastric tube tip now at the mid stomach.      DX-ABDOMEN FOR TUBE PLACEMENT   Final Result      Orogastric tube tip at the proximal stomach with side port just above the GE junction.      US-INTRACRANIAL ARTERY LIMITED   Final Result      DX-CHEST-LIMITED (1 VIEW)   Final Result         1.  Stable chest with perihilar and lower lung zone interstitial and minimal airspace opacities most consistent with pulmonary edema.   2.  No new abnormalities.      EC-ECHOCARDIOGRAM COMPLETE W/O CONT   Final Result      US-INTRACRANIAL ARTERY LIMITED   Final Result      DX-CHEST-PORTABLE (1 VIEW)   Final Result      1.  Lines and tubes appear appropriately located      2.  Improvement of pulmonary edema/airspace process      US-INTRACRANIAL ARTERY COMP   Final Result      CT-HEAD W/O   Final Result         1. Slightly decreased subarachnoid hemorrhage overlying the cerebral hemispheres.   2. Otherwise, stable as above.         DX-CHEST-FOR LINE PLACEMENT Perform procedure in: Patient's Room   Final Result      1.  Moderate interstitial pulmonary edema.   2.   Right-sided central venous catheter terminates with the tip projecting over the expected region of the mid to distal superior vena cava.   3.  Endotracheal tube terminates in satisfactory position at the level of the aortic arch.   4.  Enteric feeding tube terminates in the left upper quadrant projecting over the expected location of the stomach.      CT-STEALTH HEAD W/O   Final Result      1.  Interval placement of a right transparietal ventriculostomy catheter which terminates with the tip near the third ventricle. There is minimally decreased caliber of the ventricular system and compared to previous exam.   2.   "Findings of intracranial hemorrhage appear similar to the previous exam. No definite new acute intracranial hemorrhage is identified.   3.  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         DX-CHEST-PORTABLE (1 VIEW)   Final Result      CT-HEAD W/O   Final Result      1. Interval aneurysm coiling of left posterior communicating artery aneurysm.   2. Possible increased subarachnoid hemorrhage.   3. Intraventricular hemorrhage. There is developing mild hydrocephalus with mildly increased ventricles.   4. Small right convexity subdural hematoma measuring 5 mm in thickness.      These findings were discussed with STEPHANIE EASON on 1/31/2024 3:14 PM.      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   This aneurysm was embolized to occlusion as described above. Final angiographic images demonstrate only minimal filling of the neck of the aneurysm. The patient will be followed up in   the neuro interventional clinic and brought back for a follow-up angiogram in 6 months.      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      I, Stephanie Eason was physically present and participated during the entire procedure of the IR-EMBOLIZATION.          INR   Date Value Ref Range Status   01/31/2024 1.06 0.87 - 1.13 Final     Comment:     INR - Non-therapeutic Reference Range: 0.87-1.13  INR - Therapeutic Reference Range: 2.0-4.0       No results found for: \"POCINR\"     Intake/Output Summary (Last 24 hours) at 2/1/2024 1746  Last data filed at 2/1/2024 1600  Gross per 24 hour   Intake 3089.64 ml   Output 3143 ml   Net -53.36 ml      Labs not " explicitly included in this progress note were reviewed by the author. Radiology/imaging not explicitly included in this progress note was reviewed by the author.     I have performed a physical exam and reviewed and updated ROS and Plan today (2/5/2024).     45 minutes in directly providing and coordinating care and extensive data review.  No time overlap and excludes procedures.

## 2024-02-05 NOTE — DIETARY
"Nutrition Services: Brief Update    Pt extubated 2/3 and is on 4 L NC currently. Will reassess needs after extubation. Noted pt nonverbal and not able to follow commands. Pt failed SLP eval, pending FEES. Pt is currently receiving TF Novasource Renal with goal rate 35 ml/hr to provide 1680 kcals, 76 gm protein, and 605 ml free water per day.     Assessment:  Weight: 73.3 kg via bed scale. Weight trend is up from admit.   Height: 5'2\" (175.5 cm), BMI: 29.55 (overweight)    Weight used for assessment: 71.5 kg from admit, unknown weight scale likely bed scale.  Calculation/Equation: REE per MSJ x 1.1 = 1475 kcals  Calories/day: 1500 - 1800 (21 - 25 kcals/kg)  Grams Protein/day: 86 - 107 (1.2 - 1.5 gm/kg)    Skin: R head incision, no edema noted.   Labs: K 3.2, glucose 152, Creat 0.40, Alk phos 127  Meds: SSI, keppra, Klyte, senna, anti-emetics prn, bowel protocol, Levo at 0.1 mcg/kg/min, NS IV, A1C 5.8  Last BM: 2/5 Type 7  Fiber free formula is indicated with high pressor rate, Novasource meets estimated needs.       Recommendations/Plan:  TF Novasource Renal with goal rate of 40 ml/hr will better meet estimated needs at this time and will provide 1920 kcals, 87 gm protein, and 691 ml free water per day.   Fluids per MD.   Monitor Levo infusion rate.     RD following.     "

## 2024-02-05 NOTE — PROGRESS NOTES
Neurosurgery Progress Note    Subjective:  63 year old presented with HH4 ruptured left Pcomm aneurysm, post coil day 4.     ICPs stable between <11 overnight  EVD output from anterior EVD 5-19cc/hr overnight. Drainage still pretty bloody.    Patient not on sedation currently.  Overnight on  had worsening exam with flaccid RUE, decreased mentation, more lethargy. CTA showed left MCA vasospasm. SBP goals increased to 180-200.     No changes overnight. Per nursing, oriented to place and name this AM with command following LUE.     Exam:  GCS: E4VtM6.  Extubated.  +corneal, +cough/gag.    Right eye open spontaneously.   Has left CNIII palsy with ptosis.   Pupils right 1-2mm reactive, left 3-4mm non reactive, gaze conjugate.   Command following LUE.   Flaccid RUE.  Withdrawals to lower extremities.    Anterior EVD in place and functioning at [id number] above the tragus   ICP waveform adequate.    BP  Min: 138/76  Max: 188/95  Pulse  Av.1  Min: 88  Max: 116  Resp  Av.1  Min: 17  Max: 35  Monitored Temp 2  Av.7 °C (99.9 °F)  Min: 37.4 °C (99.3 °F)  Max: 38.4 °C (101.1 °F)  SpO2  Av.6 %  Min: 92 %  Max: 99 %    ICP  Av.2 MM HG  Min: -1 MM HG  Max: 11 MM HG    Recent Labs     24  0510 24  0415 24  0415   WBC 12.9* 19.6* 13.7*   RBC 3.94* 4.38 4.04*   HEMOGLOBIN 10.4* 11.6* 10.5*   HEMATOCRIT 31.2* 34.0* 32.0*   MCV 79.2* 77.6* 79.2*   MCH 26.4* 26.5* 26.0*   MCHC 33.3 34.1 32.8   RDW 39.8 39.2 40.7   PLATELETCT 220 359 258   MPV 10.4 10.4 9.8       Recent Labs     24  0510 24  1050 24  0415 24  1005 24  1610 24  0415   SODIUM 136   < > 138   < > 139 139 140   POTASSIUM 3.4*  --  3.7  --   --   --  3.2*   CHLORIDE 103  --  106  --   --   --  103   CO2 22  --  20  --   --   --  25   GLUCOSE 139*  --  240*  --   --   --  152*   BUN 13  --  13  --   --   --  16   CREATININE 0.42*  --  0.38*  --   --   --  0.40*   CALCIUM 8.8  --  9.0   --   --   --  9.2    < > = values in this interval not displayed.                     Intake/Output                         02/04/24 0700 - 02/05/24 0659 02/05/24 0700 - 02/06/24 0659 0700-1859 1900-0659 Total 0700-1859 1900-0659 Total                 Intake    P.O.  --  0 0  --  -- --    P.O. -- 0 0 -- -- --    I.V.  1936.3  992.7 2929  --  -- --    Phenylephrine Volume 144.3 0 144.3 -- -- --    Norepinephrine Volume 435.3 3.2 438.5 -- -- --    Volume (mL) (NS (Bolus) 0.9 % infusion 500 mL) 639.4 -- 639.4 -- -- --    Volume (mL) (NS infusion) 717.4 989.5 1706.9 -- -- --    Volume (mL) (dextrose 50% (D50W) injection 25 g) 0 -- 0 -- -- --    Volume (mL) (NS (Bolus) 0.9 % infusion 1,000 mL) 0 -- 0 -- -- --    Other  600  360 960  --  -- --    Medications (PO/Enteral Liquids) 600 360 960 -- -- --    NG/GT  420  420 840  --  -- --    Intake (mL) (Enteral Tube 01/31/24 Nasogastric Right nare) 420 420 840 -- -- --    Enteral  90  90 180  --  -- --    Free Water / Tube Flush 90 90 180 -- -- --    Total Intake 3046.3 1862.7 4909 -- -- --       Output    Urine  2875  1250 4125  --  -- --    Output (mL) (Urethral Catheter Non-latex;Temperature probe) 2875 1250 4125 -- -- --    Emesis  --  -- --  --  -- --    Emesis - Number of Times 1 x -- 1 x -- -- --    Drains  126  107 233  --  -- --    Output (mL) (ICP/Ventriculostomy Right) 126 107 233 -- -- --    Stool  --  -- --  --  -- --    Number of Times Stooled 1 x 0 x 1 x -- -- --    Emesis/NG output  --  0 0  --  -- --    Output (mL) (Enteral Tube 01/31/24 Nasogastric Right nare) -- 0 0 -- -- --    Total Output 3001 1357 4358 -- -- --       Net I/O     45.3 505.7 551 -- -- --              Intake/Output Summary (Last 24 hours) at 2/5/2024 0911  Last data filed at 2/5/2024 0600  Gross per 24 hour   Intake 3791.39 ml   Output 3882 ml   Net -90.61 ml               potassium bicarbonate  25 mEq Q4HRS    NORepinephrine  0-1 mcg/kg/min (Ideal) Continuous    phenylephrine infusion   0-5 mcg/kg/min (Ideal) Continuous    NS   Continuous    insulin regular  2-9 Units Q6HRS    And    dextrose bolus  25 g Q15 MIN PRN    enalaprilat  1.25 mg Q6HRS PRN    hydrALAZINE  10-20 mg Q4HRS PRN    labetalol  10-20 mg Q4HRS PRN    niMODipine  60 mg Q4HRS    levETIRAcetam  500 mg BID    Pharmacy  1 Each PHARMACY TO DOSE    Respiratory Therapy Consult   Continuous RT    senna-docusate  2 Tablet BID    And    polyethylene glycol/lytes  1 Packet QDAY PRN    And    magnesium hydroxide  30 mL QDAY PRN    And    bisacodyl  10 mg QDAY PRN    MD Alert...Adult ICU Electrolyte Replacement per Pharmacy   PHARMACY TO DOSE    ondansetron  4 mg Q4HRS PRN    Or    ondansetron  4 mg Q4HRS PRN    acetaminophen  650 mg Q4HRS PRN    Or    acetaminophen  650 mg Q4HRS PRN    LORazepam  2 mg Q5 MIN PRN    prochlorperazine  10 mg Q6HRS PRN       Assessment and Plan:  Hospital day # 6  Post coil day 5  Anterior EVD at 10cm H20 above tragus and open.    Appreciate neurology, IR and intensivist care.  Brain MRI done, will review with Dr. Monroe.   Q1 neurochecks.   Following closely.     Chemical prophylactic DVT therapy: No  Start date/time: tbd

## 2024-02-05 NOTE — PROGRESS NOTES
Neurology Progress Note  Neurohospitalist Service, [facility] Neurosciences    Referring Physician: Dayana Babcock M.D.    No chief complaint on file.      HPI: Refer to initial documented Neurology H&P, as detailed in the patient's chart.    Interval History: No acute events overnight.  Remains nonverbal and not able to follow commands.  Right upper extremity is flaccid.  She was extubated 102/4/24.    Review of systems: In addition to what is detailed in the HPI and/or updated in the interval history, all other systems reviewed and are negative.    Past Medical History:    has no past medical history on file.    FHx:  family history is not on file.    SHx:       Medications:    Current Facility-Administered Medications:     potassium bicarbonate (Klyte) effervescent tablet 25 mEq, 25 mEq, Enteral Tube, Q4HRS, Dayana Babcock M.D., 25 mEq at 02/05/24 1000    norepinephrine (Levophed) 8 mg in 250 mL NS infusion (premix), 0-1 mcg/kg/min (Ideal), Intravenous, Continuous, Jeremy M Gonda, M.D., Last Rate: 9.4 mL/hr at 02/05/24 1045, 0.1 mcg/kg/min at 02/05/24 1045    phenylephrine 40 mg/250 mL NS premix, 0-5 mcg/kg/min (Ideal), Intravenous, Continuous, Jeremy M Gonda, M.D., Stopped at 02/04/24 0911    NS infusion, , Intravenous, Continuous, Jeremy M Gonda, M.D., Last Rate: 83 mL/hr at 02/05/24 0955, New Bag at 02/05/24 0955    insulin regular (HumuLIN R,NovoLIN R) injection, 2-9 Units, Subcutaneous, Q6HRS, 2 Units at 02/04/24 1830 **AND** POC blood glucose manual result, , , Q6H **AND** NOTIFY MD and PharmD, , , Once **AND** Administer 20 grams of glucose (approximately 8 ounces of fruit juice) every 15 minutes PRN FSBG less than 70 mg/dL, , , PRN **AND** dextrose 50% (D50W) injection 25 g, 25 g, Intravenous, Q15 MIN PRN, Jeremy M Gonda, M.D.    enalaprilat (Vasotec) injection 1.25 mg 1 mL, 1.25 mg, Intravenous, Q6HRS PRN, Jeremy M Gonda, M.D.    hydrALAZINE (Apresoline) injection 10-20 mg, 10-20 mg,  Intravenous, Q4HRS PRN, Jeremy M Gonda, M.D.    labetalol (Normodyne/Trandate) injection 10-20 mg, 10-20 mg, Intravenous, Q4HRS PRN, Jeremy M Gonda, M.D.    niMODipine (Nymalize) oral syringe 60 mg, 60 mg, Enteral Tube, Q4HRS, Jeremy M Gonda, M.D., 60 mg at 02/05/24 1000    levETIRAcetam (Keppra) tablet 500 mg, 500 mg, Enteral Tube, BID, Jeremy M Gonda, M.D., 500 mg at 02/05/24 0505    Pharmacy Consult: Enteral tube insertion - review meds/change route/product selection, 1 Each, Other, PHARMACY TO DOSE, Jeremy M Gonda, M.D.    Respiratory Therapy Consult, , Nebulization, Continuous RT, Jeremy M Gonda, M.D.    senna-docusate (Pericolace Or Senokot S) 8.6-50 MG per tablet 2 Tablet, 2 Tablet, Enteral Tube, BID, 2 Tablet at 02/03/24 0514 **AND** polyethylene glycol/lytes (Miralax) Packet 1 Packet, 1 Packet, Enteral Tube, QDAY PRN **AND** magnesium hydroxide (Milk Of Magnesia) suspension 30 mL, 30 mL, Enteral Tube, QDAY PRN **AND** bisacodyl (Dulcolax) suppository 10 mg, 10 mg, Rectal, QDAY PRN, Jeremy M Gonda, M.D. MD Alert...ICU Electrolyte Replacement per Pharmacy, , Other, PHARMACY TO DOSE, Jeremy M Gonda, M.D.    ondansetron (Zofran ODT) dispertab 4 mg, 4 mg, Enteral Tube, Q4HRS PRN, 4 mg at 01/31/24 3308 **OR** ondansetron (Zofran) syringe/vial injection 4 mg, 4 mg, Intravenous, Q4HRS PRN, Jeremy M Gonda, M.D., 4 mg at 02/02/24 1758    acetaminophen (Tylenol) tablet 650 mg, 650 mg, Enteral Tube, Q4HRS PRN, 650 mg at 02/05/24 1001 **OR** acetaminophen (Tylenol) suppository 650 mg, 650 mg, Rectal, Q4HRS PRN, Jeremy M Gonda, M.D.    LORazepam (Ativan) injection 2 mg, 2 mg, Intravenous, Q5 MIN PRN, Jeremy M Gonda, M.D.    prochlorperazine (Compazine) injection 10 mg, 10 mg, Intravenous, Q6HRS PRN, David Mclaughlin Jr., D.O., 10 mg at 01/31/24 6711    Physical Examination:    Vitals:    02/05/24 0800 02/05/24 0900 02/05/24 1000 02/05/24 1100   BP: (!) 175/88 (!) 169/94 (!) 178/92 (!) 156/82   Pulse: (!) 101 92 99 (!)  107   Resp: (!) 26 (!) 24 (!) 27    TempSrc: Bladder  Bladder    SpO2: 99% 98% 98% 96%   Weight:       Height:             NEUROLOGICAL EXAM:   She is extubated, nonverbal and not able to follow commands.  Face appears symmetric, however has left ptosis.  She has unequal pupils: Right pupil is 2 mm and sluggishly reactive.  Left pupil is 3 mm and nonreactive.  She has flaccid right upper extremity and withdraws to physical stimulation in all other extremities.  Gait was deferred.    Objective Data:    Labs:  Lab Results   Component Value Date/Time    PROTHROMBTM 13.9 01/31/2024 03:25 PM    INR 1.06 01/31/2024 03:25 PM      Lab Results   Component Value Date/Time    WBC 13.7 (H) 02/05/2024 04:15 AM    RBC 4.04 (L) 02/05/2024 04:15 AM    HEMOGLOBIN 10.5 (L) 02/05/2024 04:15 AM    HEMATOCRIT 32.0 (L) 02/05/2024 04:15 AM    MCV 79.2 (L) 02/05/2024 04:15 AM    MCH 26.0 (L) 02/05/2024 04:15 AM    MCHC 32.8 02/05/2024 04:15 AM    MPV 9.8 02/05/2024 04:15 AM    NEUTSPOLYS 85.70 (H) 02/05/2024 04:15 AM    LYMPHOCYTES 6.00 (L) 02/05/2024 04:15 AM    MONOCYTES 6.30 02/05/2024 04:15 AM    EOSINOPHILS 1.20 02/05/2024 04:15 AM    BASOPHILS 0.40 02/05/2024 04:15 AM      Lab Results   Component Value Date/Time    SODIUM 140 02/05/2024 04:15 AM    POTASSIUM 3.2 (L) 02/05/2024 04:15 AM    CHLORIDE 103 02/05/2024 04:15 AM    CO2 25 02/05/2024 04:15 AM    GLUCOSE 152 (H) 02/05/2024 04:15 AM    BUN 16 02/05/2024 04:15 AM    CREATININE 0.40 (L) 02/05/2024 04:15 AM      Lab Results   Component Value Date/Time    CHOLSTRLTOT 165 01/31/2024 03:25 PM     (H) 01/31/2024 03:25 PM    HDL 36 (A) 01/31/2024 03:25 PM    TRIGLYCERIDE 79 01/31/2024 03:25 PM       Lab Results   Component Value Date/Time    ALKPHOSPHAT 127 (H) 02/05/2024 04:15 AM    ASTSGOT 27 02/05/2024 04:15 AM    ALTSGPT 15 02/05/2024 04:15 AM    TBILIRUBIN 0.5 02/05/2024 04:15 AM        Imaging/Testing:    I interpreted and/or reviewed the patient's  neuroimaging    MR-BRAIN-W/O   Final Result      1.  Multifocal areas of acute infarcts in the left cerebral hemisphere.   2.  Small area of acute infarct in the right basal ganglia adjacent to the tip of the ventriculostomy catheter.   3.  Cortical infarct in the right medial parietal lobe.   4.  Mild amount of subdural hemorrhages surrounding the bilateral cerebellar hemispheres and right cerebellum.   5.  Subarachnoid hemorrhage.   6.  There is no hydrocephalus.   7.  There are changes secondary to the previous endovascular repair left internal carotid aneurysm.      CT-CTA NECK WITH & W/O-POST PROCESSING   Final Result      No focal high-grade stenosis, dissection or occlusion of the cervical carotid or vertebral arteries.      CT-CEREBRAL PERFUSION ANALYSIS   Final Result      1.  Cerebral blood flow less than 30% likely representing completed infarct = 0 mL.      2.  T Max more than 6 seconds likely representing combination of completed infarct and ischemia = 0 mL.      3.  Mismatched volume likely representing ischemic brain/penumbra = None      4.  Please note that the cerebral perfusion was performed on the limited brain tissue around the basal ganglia region. Infarct/ischemia outside the CT perfusion sections can be missed in this study.      CT-CTA HEAD WITH & W/O-POST PROCESS   Final Result      1.  Prior LEFT middle cranial fossa aneurysm coiling.  Artifact limits exam.   2.  No abrupt large vessel cut off.   3.  Segmental narrowing of LEFT middle cerebral artery, vasospasm versus artifact.   4.  Ventriculostomy catheter in similar position.   5.  Evolving RIGHT caudate infarct.   6.  Stable intracranial hemorrhage.         DX-ABDOMEN FOR TUBE PLACEMENT   Final Result      Orogastric tube tip now at the mid stomach.      DX-ABDOMEN FOR TUBE PLACEMENT   Final Result      Orogastric tube tip at the proximal stomach with side port just above the GE junction.      US-INTRACRANIAL ARTERY LIMITED   Final  Result      DX-CHEST-LIMITED (1 VIEW)   Final Result         1.  Stable chest with perihilar and lower lung zone interstitial and minimal airspace opacities most consistent with pulmonary edema.   2.  No new abnormalities.      EC-ECHOCARDIOGRAM COMPLETE W/O CONT   Final Result      US-INTRACRANIAL ARTERY LIMITED   Final Result      DX-CHEST-PORTABLE (1 VIEW)   Final Result      1.  Lines and tubes appear appropriately located      2.  Improvement of pulmonary edema/airspace process      US-INTRACRANIAL ARTERY COMP   Final Result      CT-HEAD W/O   Final Result         1. Slightly decreased subarachnoid hemorrhage overlying the cerebral hemispheres.   2. Otherwise, stable as above.         DX-CHEST-FOR LINE PLACEMENT Perform procedure in: Patient's Room   Final Result      1.  Moderate interstitial pulmonary edema.   2.   Right-sided central venous catheter terminates with the tip projecting over the expected region of the mid to distal superior vena cava.   3.  Endotracheal tube terminates in satisfactory position at the level of the aortic arch.   4.  Enteric feeding tube terminates in the left upper quadrant projecting over the expected location of the stomach.      CT-STEALTH HEAD W/O   Final Result      1.  Interval placement of a right transparietal ventriculostomy catheter which terminates with the tip near the third ventricle. There is minimally decreased caliber of the ventricular system and compared to previous exam.   2.  Findings of intracranial hemorrhage appear similar to the previous exam. No definite new acute intracranial hemorrhage is identified.   3.  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         DX-CHEST-PORTABLE (1 VIEW)   Final Result      CT-HEAD W/O   Final Result      1. Interval aneurysm coiling of left posterior communicating artery aneurysm.   2. Possible increased subarachnoid hemorrhage.   3. Intraventricular hemorrhage. There is developing mild  hydrocephalus with mildly increased ventricles.   4. Small right convexity subdural hematoma measuring 5 mm in thickness.      These findings were discussed with STEPHANIE BYRD on 1/31/2024 3:14 PM.      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   This aneurysm was embolized to occlusion as described above. Final angiographic images demonstrate only minimal filling of the neck of the aneurysm. The patient will be followed up in   the neuro interventional clinic and brought back for a follow-up angiogram in 6 months.      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      I, Stephanie Byrd was physically present and participated during the entire procedure of the IR-EMBOLIZATION.          Assessment and Plan:  Fay Turner is a 63 y.o. female with history of hypertension who was transferred from outside hospital after she was found to have subarachnoid hemorrhage with left blown pupil on 1/31/2024.  She had Carpio and Potts 4 and modified Swenson of 3.  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  She also underwent EVD placement by neurosurgery.  She is post bleed and postop day #5.  Brain MRI last p.m. revealed multifocal acute infarct in the left hemisphere with a small area of acute infarct in right basal ganglia.  TCD is limited as she has no temporal windows.  CTA of the head and CT perfusion were unremarkable    Plan:  -Continue every hour neurochecks.  - Maintain systolic blood pressure 140-200.  - Continue nimodipine 60 mg every 4 hours via enteral tube.  - Net even I's and O's.  - TCD with no temporal window, follow clinically and if  neurological deterioration, suggest CT of the head and CT perfusion.  - Continue Keppra given seizure at arrival.  - EVD management per neurosurgery, currently at 10 cm H2O.  - No chemical DVT prevention, continue with SCDs.      The evaluation of the patient, and recommended management, was discussed with Dayana Babcock M.D.    I personally spent a total of 58 minutes reviewing hospital notes, lab work and neuroimaging and discussing with primary team.    Please note that this dictation was created using voice recognition software. I have made every reasonable attempt to correct obvious errors, but I expect that there are errors of grammar and possibly content that I did not discover before finalizing the note.      Corey Vasquez MD  [facility]
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
## 2024-02-05 NOTE — THERAPY
Speech Language Pathology   Clinical Swallow Evaluation     Patient Name: Fay Turner  AGE:  63 y.o., SEX:  female  Medical Record #: [id number]  Date of Service: 2/5/2024      History of Present Illness  Pt is a 63 y.o. F w/ PMHx of HTN who was transferred from OSH d/t AMS w/ rapidly progressive neurological changes/obtundation. CT head revealed diffuse subarachnoid hemorrhage w/ evidence of large L P-Comm aneurysm on CTA. Pt now s/p successful coil embolization of the L P-comm aneurysm and EVD placement x2 on 1/31. Pt intubated at OSH on 1/31, extubated 2/4. On 2/4, pt noted to have L MCA vasospasms.     No Hx of ST per EMR.     MRI Brain 2/4:  1.  Multifocal areas of acute infarcts in the left cerebral hemisphere.  2.  Small area of acute infarct in the right basal ganglia adjacent to the tip of the ventriculostomy catheter.  3.  Cortical infarct in the right medial parietal lobe.  4.  Mild amount of subdural hemorrhages surrounding the bilateral cerebellar hemispheres and right cerebellum.  5.  Subarachnoid hemorrhage.  6.  There is no hydrocephalus.  7.  There are changes secondary to the previous endovascular repair left internal carotid aneurysm.    Dx Chest 2/2:   1.  Lines and tubes appear appropriately located  2.  Improvement of pulmonary edema/airspace process    General Information:  Vitals  O2 (LPM): 6  O2 Delivery Device: Silicone Nasal Cannula  Level of Consciousness: Alert, Drowsy  Patient Behaviors: Fatigue  Orientation: Self  Follows Directives: Yes - simple commands only    Prior Living Situation & Level of Function:  Prior Services: Home-Independent  Lives with - Patient's Self Care Capacity: Spouse  Comments: info per son; pt works at [employer],  can provide assist as needed but does work a few hours everyday; son can assist as well; denies falls or other restrictions  Communication: unable to determine at this time, likely WFL  Swallowing: unable to determine at this time, likely  WFL    Oral Mechanism Evaluation:  Dentition: Natural dentition   Facial Symmetry: Central left facial droop  Facial Sensation: Pt did not follow commands to assess     Labial Observations: Left sided weakness   Lingual Observations: Midline  Motor Speech: unable to assess d/t limited verbalizations  Comments: CNIII palsy with ptosis      Laryngeal Function:  Secretion Management: Adequate  Voice Quality: Hoarse, Wet, Whisper  Cough: Perceptually weak  Subjective  Pt cleared by RN for clinical bedside swallow evaluation. Pt was received asleep, however, aroused w/ min-mod verbal/tactile cues. Pt verbalized first/last name- no other verbalizations. Utilizing yes/no questions oriented to self only. Pt followed single step commands w/ some paucity. Utilizing yes/no head shake throughout the evaluation.    Assessment  Current Method of Nutrition: NGT  Positioning: Joseph's (60-90 degrees) (RN positioned prior to trials)  Bolus Administration: SLP  O2 (LPM): 6 O2 Delivery Device: Silicone Nasal Cannula  Factor(s) Affecting Performance: Impaired endurance     Swallowing Trials:  Swallowing Trials  Ice: WFL  Thin Liquid (TN0): Impaired  Liquidised (LQ3): Impaired    Comments:   SLP fed trials of ice chips, TN0, and LQ3 completed. Pt w/ reduced oral acceptance requiring cues from SLP to open oral cavity wide enough for intake. L sided anterior loss of ice chips/TN0. Pt able to achieve labial seal for straw sips- of note, pt did manipulate to R side of oral cavity. Suspect slightly impaired A/P transport as L sided lingual residue was appreciated on trials of LQ3. Did improve w/ liquid wash. Pt w/ consistent weak cough and throat clear on trials of TN0/LQ3 concerning for airway invasion.     Clinical Impressions  Oropharyngeal dysphagia suspected based on clinical bedside evaluation likely acute 2/2 SAH, multifocal acute CVAs, and intub. hx. Pt with increased risk for impaired airway protection and swallow efficiency r/t  such. Pt w/ s/s of aspiration on trials of TN0/LQ3, generalized weakness, notable fatigue, and suspected dystussia. Therefore, SLP unable to recommend a safe oral diet at this time. SLP recommending NPO/NN with ice chips only to maintain moisture of the oral cavity, aid in secretion management, and prevent atrophy of the muscles necessary for deglutition. Instrumental swallow study via FEES recommended to determine oral feeding safety, potential for diet consistency change, assess swallow efficiency and airway protection, and determine the efficacy of swallowing exercises and/or compensatory strategies to improve dysphagia outcomes. Risk or aspiration or related sequelae can be mitigated with the use of frequent, thorough, oral care.      Update- d/t scheduling, unable to complete FEES on this date. Tentatively planned for 2/6. Thank you.     Recommendations  Diet Consistency: NPO/TF - okay for minimal ice chips following oral care w/ 1:1 supv  Instrumentation: FEES  Medication: Non Oral  Supervision: 1:1 feeding with constant supervision (ice chips only)  Oral Care: Q4h     SLP Treatment Plan  Treatment Plan: Dysphagia Treatment, Patient/Family/Caregiver Training  SLP Frequency: 4x Per Week  Estimated Duration: Until Therapy Goals Met    Anticipated Discharge Needs  Discharge Recommendations: Recommend post-acute placement for additional speech therapy services prior to discharge home   Therapy Recommendations Upon DC: Dysphagia Training, Patient / Family / Caregiver Education (pending cog/SLE eval)      Patient / Family Goals  Patient / Family Goal #1: nods yes to ice chips  Short Term Goals  Short Term Goal # 1: Pt will participate in an instrumental swallow study via FEES to determine airway protection/swallow efficiency and guide dysphagia management.    Anai Dumont, SLP

## 2024-02-05 NOTE — CARE PLAN
The patient is Watcher - Medium risk of patient condition declining or worsening    Shift Goals  Clinical Goals: -200, stable Q1 neuro checks  Patient Goals: ALEJANDRA  Family Goals: updates at bedside    Progress made toward(s) clinical / shift goals:      Patient updated regarding her plan of care. Daughter provided with updates at the bedside at change of shift. Q1 EVD/ICPs performed, ICPs remain stable at this time. Q1 neuro checks performed, patient's neuro status remains stable at this time. Patient's SBP managed with use of Levophed gtt per MAR. Levophed gtt titrated as needed, and weaned as able. Patient continues to require medical restraints for safety at this time. Q2 turns, mepilexes, and pillows for support and positioning used in plan of care to maintain skin integrity.     Problem: Knowledge Deficit - Standard  Goal: Patient and family/care givers will demonstrate understanding of plan of care, disease process/condition, diagnostic tests and medications  Outcome: Progressing     Problem: Safety - Medical Restraint  Goal: Remains free of injury from restraints (Restraint for Interference with Medical Device)  Outcome: Progressing     Problem: Skin Integrity  Goal: Skin integrity is maintained or improved  Outcome: Progressing     Problem: Pain - Standard  Goal: Alleviation of pain or a reduction in pain to the patient’s comfort goal  Outcome: Progressing     Problem: Hemodynamics  Goal: Patient's hemodynamics, fluid balance and neurologic status will be stable or improve  Outcome: Progressing     Problem: Neuro Status  Goal: Neuro status will remain stable or improve  Outcome: Progressing     Patient is not progressing towards the following goals:    Problem: Safety - Medical Restraint  Goal: Free from restraint(s) (Restraint for Interference with Medical Device)  Outcome: Not Progressing

## 2024-02-05 NOTE — DISCHARGE PLANNING
Please review the consult from Dr. Dyer regarding post acute recommendations.  TCC will no longer follow.  Please reach out to myself with any questions.

## 2024-02-05 NOTE — CONSULTS
Physical Medicine and Rehabilitation Consultation          Chart Review     Date of initial consultation: 2/4/2024  Requesting provider: ordered by Jeremy M Gonda, M.D. at 02/03/24 0909    Consulting provider: Juanita Dyer D.O.  Reason for consultation: assess for acute inpatient rehab appropriateness  LOS: 4 Day(s)    Chief complaint: Subarachnoid hemorrhage    HPI: The patient is a 63 y.o.  female with a past medical history of hypertension;  who presented on 1/31/2024 11:51 AM with sudden onset of altered mental status.  Per documentation patient had new left eyelid droop and a generalized headache.  Patient then had sudden onset neurologic symptoms and was obtunded.  Upon evaluation at the outside hospital patient's blood pressure was elevated to 216/127.  CT at the outside hospital was noted to have a large left posterior communicating aneurysm and a subarachnoid hemorrhage.  Patient was transferred to [facility].  Patient did have a seizure witnessed at the outside hospital, patient has been started on Keppra.  Neurology was consulted, patient was taken to neuro IR where patient underwent embolization of the left posterior communicating aneurysm with coils.  Status post coiling patient developed hydrocephalus.  Neurosurgery was consulted and EVD was placed at bedside.  Patient continues to have EVD in place with left MCA vasospasms.  On 2/4 patient had overnight vasospasms, patient is now started on Levophed.  Repeat MRI brain is pending.    Given patient's critical condition and inability to participate with therapies patient is not appropriate for formal PM&R consult at this time.  We will continue to follow peripherally patient's improvement for potential PM&R consult.  Please reach out with questions available if formal PM&R consult is needed sooner.      Social Hx:  Patient lives with spouse in a one-story house with no stairs to enter  0 CROW  At prior level of function patient was Independent  with mobility and ADLs.       THERAPY:  Restrictions: Fall risk  PT: Functional mobility   2/2 total assistance for bed mobility    OT: ADLs  2/2 total assistance for ADLs including grooming and upper body dressing    SLP:   Pending    IMAGING:  CT-CTA NECK WITH & W/O-POST PROCESSING  Narrative: 2/4/2024 12:48 AM    HISTORY/REASON FOR EXAM:  neuro changes c/f vasospasm (RUE flaccid, more lethargic, etc); no TCDs on file yet (SAH w/ L PCOM aneurysm s/p coils and EVD)    TECHNIQUE/EXAM DESCRIPTION:  CT angiogram of the neck with contrast.    Postcontrast images were obtained of the neck from the great vessels through the skull base following the power injection of nonionic contrast at 5.0 mL/sec. Thin-section helical images were obtained with overlapping reconstruction interval. Coronal and   oblique multiplanar volume reformats were generated.    Cervical internal carotid artery percent stenosis is calculated using the standard method according to the NASCET criteria wherein a segment of uniform caliber mid or distal cervical internal carotid is used as the reference denominator.    3D angiographic images were reviewed on PACS.  Maximum intensity projection (MIP) images were generated and reviewed    80 mL of Omnipaque 350 nonionic contrast was injected intravenously.    Low dose optimization technique was utilized for this CT exam including automated exposure control and adjustment of the mA and/or kV according to patient size.    COMPARISON:  None.    FINDINGS:  Aortic arch: conventional branching pattern.    There is atherosclerotic plaque of the aorta.    Right common carotid artery: Patent    Right internal carotid artery: Atherosclerotic plaque without significant stenosis (less than 50%).    Left common carotid artery is patent.    Left internal carotid artery: Atherosclerotic plaque without significant stenosis (less than 50%).    The right vertebral artery is patent without dissection or stenosis.    The  left vertebral artery is patent without dissection or stenosis.    Vertebrobasilar confluence: The vertebrobasilar confluence appears normal.    Visualized lung apices show patchy air trapping.  Prominent interstitium, and dependent atelectasis.    Nasogastric tube in place.    3D angiographic/MIP images of the vasculature confirm the vascular findings as described above.  Impression: No focal high-grade stenosis, dissection or occlusion of the cervical carotid or vertebral arteries.  CT-CTA HEAD WITH & W/O-POST PROCESS  Narrative: 2/4/2024 12:48 AM    HISTORY/REASON FOR EXAM:  Neuro changes; no TCDs on file yet (SAH w/ L PCOM aneurysm s/p coils and EVD)    TECHNIQUE/EXAM DESCRIPTION:  CT angiogram of the [place] without and with contrast.  Initial precontrast images were obtained of the head from the skull base through the vertex.  Postcontrast images were obtained of the [place] following the power injection of   nonionic contrast at 5.0 mL/sec. Thin-section helical images were obtained with overlapping reconstruction interval. Coronal and sagittal multiplanar volume reformats were generated.  3D angiographic images were reviewed on PACS.  Maximum intensity   projection (MIP) images were generated and reviewed.    80 mL of Omnipaque 350 nonionic contrast was injected intravenously.    Low dose optimization technique was utilized for this CT exam including automated exposure control and adjustment of the mA and/or kV according to patient size.    COMPARISON:  CT head 2/1/2024, 1/31/2024    FINDINGS:  The posterior circulation shows the distal vertebral arteries to be patent. The vertebrobasilar confluence is intact. The basilar artery is patent. No aneurysm or occlusive lesion is evident.    The anterior circulation shows no occlusive lesion.  Apparent narrowing of LEFT middle cerebral artery.  No aneurysm is evident about the [place].    The lateral ventricles are normal in size and  symmetric.  Low density in seen in the RIGHT caudate head.  RIGHT frontal ventriculostomy catheter in similar position.  The basal cisterns are patent.  No significant mass effect or midline shift.  RIGHT frontal subarachnoid hemorrhage is unchanged.  Probable hemorrhage layering along the tentorium on both sides, grossly stable.  Artifact from prior middle cranial fossa aneurysm coiling.    3D angiographic/MIP images of the vasculature confirm the vascular findings as described above.  Impression: 1.  Prior LEFT middle cranial fossa aneurysm coiling.  Artifact limits exam.  2.  No abrupt large vessel cut off.  3.  Segmental narrowing of LEFT middle cerebral artery, vasospasm versus artifact.  4.  Ventriculostomy catheter in similar position.  5.  Evolving RIGHT caudate infarct.  6.  Stable intracranial hemorrhage.  CT-CEREBRAL PERFUSION ANALYSIS  Narrative: 2/4/2024 12:48 AM    HISTORY/REASON FOR EXAM:  neuro changes c/f vasospasm (RUE flaccid, more lethargic, etc); no TCDs on file yet (SAH w/ L PCOM aneurysm s/p coils and EVD).    TECHNIQUE/EXAM DESCRIPTION AND NUMBER OF VIEWS:  CT Cerebral Perfusion Analysis.    The study was performed on a 128 slice G.E. Lightspeed Multidetector CT scanner. Perfusion data and corresponding time-activity curves are processed and displayed as color-coded maps in the axial plane for Cerebral Blood Flow (CBF), Cerebral Blood Volume   (CBV),T Max and Mean Transit Time (MTT) and are post processed on the Ischemia view-RAPID virtual .    40 mL of Omnipaque 350 nonionic contrast was injected intravenously.    Low dose optimization technique was utilized for this CT exam including automated exposure control and adjustment of the mA and/or kV according to patient size.    COMPARISON:  CT head 2/4/2024    FINDINGS:  Cerebral blood flow less than 30% = 0 mL    T Max more than 6 seconds = 0 mL    Mismatch volume = 0 mL    Mismatch ratio = None  Impression: 1.  Cerebral blood flow less  than 30% likely representing completed infarct = 0 mL.    2.  T Max more than 6 seconds likely representing combination of completed infarct and ischemia = 0 mL.    3.  Mismatched volume likely representing ischemic brain/penumbra = None    4.  Please note that the cerebral perfusion was performed on the limited brain tissue around the basal ganglia region. Infarct/ischemia outside the CT perfusion sections can be missed in this study.        PROCEDURES:  1/31 large left posterior communicating aneurysm angiogram with coiling  1/31 right frontal EVD drain placed by Dr. Monroe    PMH:  History reviewed. No pertinent past medical history.    PSH:  Past Surgical History:   Procedure Laterality Date    CRANIOTOMY Right 1/31/2024    Procedure: External Ventricular Drain Placement with Stealth;  Surgeon: René Monroe III, M.D.;  Location: SURGERY [facility];  Service: Neurosurgery       FHX:  History reviewed. No pertinent family history.    Medications:  Current Facility-Administered Medications   Medication Dose    norepinephrine (Levophed) 8 mg in 250 mL NS infusion (premix)  0-1 mcg/kg/min (Ideal)    phenylephrine 40 mg/250 mL NS premix  0-5 mcg/kg/min (Ideal)    NS infusion      insulin regular (HumuLIN R,NovoLIN R) injection  2-9 Units    And    dextrose 50% (D50W) injection 25 g  25 g    enalaprilat (Vasotec) injection 1.25 mg 1 mL  1.25 mg    hydrALAZINE (Apresoline) injection 10-20 mg  10-20 mg    labetalol (Normodyne/Trandate) injection 10-20 mg  10-20 mg    niMODipine (Nymalize) oral syringe 60 mg  60 mg    levETIRAcetam (Keppra) tablet 500 mg  500 mg    Pharmacy Consult: Enteral tube insertion - review meds/change route/product selection  1 Each    Respiratory Therapy Consult      senna-docusate (Pericolace Or Senokot S) 8.6-50 MG per tablet 2 Tablet  2 Tablet    And    polyethylene glycol/lytes (Miralax) Packet 1 Packet  1 Packet    And    magnesium hydroxide (Milk Of Magnesia) suspension 30 mL  30 mL     "And    bisacodyl (Dulcolax) suppository 10 mg  10 mg    MD Alert...ICU Electrolyte Replacement per Pharmacy      ondansetron (Zofran ODT) dispertab 4 mg  4 mg    Or    ondansetron (Zofran) syringe/vial injection 4 mg  4 mg    acetaminophen (Tylenol) tablet 650 mg  650 mg    Or    acetaminophen (Tylenol) suppository 650 mg  650 mg    LORazepam (Ativan) injection 2 mg  2 mg    prochlorperazine (Compazine) injection 10 mg  10 mg       Allergies:  Allergies   Allergen Reactions    Penicillins Rash     Rxn noted 2014 per Care Everywhere review        Physical Exam:  Vitals: BP (!) 168/94   Pulse (!) 103   Resp (!) 33   Ht 1.575 m (5' 2.01\")   Wt 73.3 kg (161 lb 9.6 oz)   SpO2 97%     Labs: Reviewed and significant for   Recent Labs     02/02/24  0530 02/03/24  0510 02/04/24  0415   RBC 4.02* 3.94* 4.38   HEMOGLOBIN 10.5* 10.4* 11.6*   HEMATOCRIT 32.0* 31.2* 34.0*   PLATELETCT 203 220 359     Recent Labs     02/02/24  0320 02/02/24  1020 02/03/24  0510 02/03/24  1050 02/04/24  0415 02/04/24  1005 02/04/24  1610   SODIUM 139   < > 136   < > 138 140 139   POTASSIUM 3.4*  --  3.4*  --  3.7  --   --    CHLORIDE 103  --  103  --  106  --   --    CO2 24  --  22  --  20  --   --    GLUCOSE 153*  --  139*  --  240*  --   --    BUN 13  --  13  --  13  --   --    CREATININE 0.54  --  0.42*  --  0.38*  --   --    CALCIUM 9.2  --  8.8  --  9.0  --   --     < > = values in this interval not displayed.     Recent Results (from the past 24 hour(s))   POCT glucose device results    Collection Time: 02/03/24  6:11 PM   Result Value Ref Range    POC Glucose, Blood 141 (H) 65 - 99 mg/dL   Sodium Serum (NA)    Collection Time: 02/03/24 10:21 PM   Result Value Ref Range    Sodium 138 135 - 145 mmol/L   POCT glucose device results    Collection Time: 02/04/24 12:05 AM   Result Value Ref Range    POC Glucose, Blood 163 (H) 65 - 99 mg/dL   Basic Metabolic Panel (BMP)    Collection Time: 02/04/24  4:15 AM   Result Value Ref Range    Sodium " 138 135 - 145 mmol/L    Potassium 3.7 3.6 - 5.5 mmol/L    Chloride 106 96 - 112 mmol/L    Co2 20 20 - 33 mmol/L    Glucose 240 (H) 65 - 99 mg/dL    Bun 13 8 - 22 mg/dL    Creatinine 0.38 (L) 0.50 - 1.40 mg/dL    Calcium 9.0 8.5 - 10.5 mg/dL    Anion Gap 12.0 7.0 - 16.0   CBC with Differential    Collection Time: 02/04/24  4:15 AM   Result Value Ref Range    WBC 19.6 (H) 4.8 - 10.8 K/uL    RBC 4.38 4.20 - 5.40 M/uL    Hemoglobin 11.6 (L) 12.0 - 16.0 g/dL    Hematocrit 34.0 (L) 37.0 - 47.0 %    MCV 77.6 (L) 81.4 - 97.8 fL    MCH 26.5 (L) 27.0 - 33.0 pg    MCHC 34.1 32.2 - 35.5 g/dL    RDW 39.2 35.9 - 50.0 fL    Platelet Count 359 164 - 446 K/uL    MPV 10.4 9.0 - 12.9 fL    Neutrophils-Polys 86.90 (H) 44.00 - 72.00 %    Lymphocytes 5.80 (L) 22.00 - 41.00 %    Monocytes 6.10 0.00 - 13.40 %    Eosinophils 0.20 0.00 - 6.90 %    Basophils 0.30 0.00 - 1.80 %    Immature Granulocytes 0.70 0.00 - 0.90 %    Nucleated RBC 0.00 0.00 - 0.20 /100 WBC    Neutrophils (Absolute) 17.06 (H) 1.82 - 7.42 K/uL    Lymphs (Absolute) 1.13 1.00 - 4.80 K/uL    Monos (Absolute) 1.19 (H) 0.00 - 0.85 K/uL    Eos (Absolute) 0.03 0.00 - 0.51 K/uL    Baso (Absolute) 0.05 0.00 - 0.12 K/uL    Immature Granulocytes (abs) 0.14 (H) 0.00 - 0.11 K/uL    NRBC (Absolute) 0.00 K/uL   Magnesium    Collection Time: 02/04/24  4:15 AM   Result Value Ref Range    Magnesium 2.0 1.5 - 2.5 mg/dL   Phosphorus    Collection Time: 02/04/24  4:15 AM   Result Value Ref Range    Phosphorus 2.5 2.5 - 4.5 mg/dL   ESTIMATED GFR    Collection Time: 02/04/24  4:15 AM   Result Value Ref Range    GFR (CKD-EPI) 113 >60 mL/min/1.73 m 2   POCT glucose device results    Collection Time: 02/04/24  6:08 AM   Result Value Ref Range    POC Glucose, Blood 224 (H) 65 - 99 mg/dL   Sodium Serum (NA)    Collection Time: 02/04/24 10:05 AM   Result Value Ref Range    Sodium 140 135 - 145 mmol/L   AMMONIA    Collection Time: 02/04/24 10:05 AM   Result Value Ref Range    Ammonia 28 11 - 45 umol/L    POCT glucose device results    Collection Time: 02/04/24 12:49 PM   Result Value Ref Range    POC Glucose, Blood 146 (H) 65 - 99 mg/dL   Sodium Serum (NA)    Collection Time: 02/04/24  4:10 PM   Result Value Ref Range    Sodium 139 135 - 145 mmol/L         ASSESSMENT:  Patient is a 63 y.o. female admitted with subarachnoid hemorrhage      Additional Recommendations:  Subarachnoid hemorrhage  Hydrocephalus  Seizures  - Patient originally admitted to an outside hospital after neurologic changes after complaining of a headache  - CT head at the outside hospital showed evidence of a subarachnoid hemorrhage secondary to a large left posterior communicating aneurysm  - Status post 1/31 embolization of posterior communicating aneurysm  - Neurosurgery consulted  - Required EVD placement x 2 on 1/31 for obstructive hydrocephalus  - Patient is currently on strict blood pressure management of goal of 1  given recent vasospasm  - Patient is on a Levophed drip  - Repeat MRI brain pending  - PT/OT currently held due to patient's recent vasospasms  - Anticipate that patient will need postacute rehab pending her progress and ability to participate with therapies please reconsult PM&R when patient is able to tolerate participation      Medical Complexity:  Subarachnoid hemorrhage  Hydrocephalus  Seizures  Respiratory failure with hypoxia  Dysphagia  Impaired mobility and ADLs      DVT PPX: SCDs      Thank you for allowing us to participate in the care of this patient.       Juanita Dyer D.O.   Physical Medicine and Rehabilitation     Please note that this dictation was created using voice recognition software. I have made every reasonable attempt to correct obvious errors, but there may be errors of grammar and possibly content that I did not discover before finalizing the note.             no

## 2024-02-06 ENCOUNTER — APPOINTMENT (OUTPATIENT)
Dept: RADIOLOGY | Facility: MEDICAL CENTER | Age: 64
DRG: 020 | End: 2024-02-06
Attending: INTERNAL MEDICINE
Payer: COMMERCIAL

## 2024-02-06 LAB
ANION GAP SERPL CALC-SCNC: 9 MMOL/L (ref 7–16)
BACTERIA BLD CULT: NORMAL
BACTERIA BLD CULT: NORMAL
BASOPHILS # BLD AUTO: 0.4 % (ref 0–1.8)
BASOPHILS # BLD: 0.07 K/UL (ref 0–0.12)
BUN SERPL-MCNC: 15 MG/DL (ref 8–22)
CALCIUM SERPL-MCNC: 9.1 MG/DL (ref 8.5–10.5)
CHLORIDE SERPL-SCNC: 103 MMOL/L (ref 96–112)
CO2 SERPL-SCNC: 26 MMOL/L (ref 20–33)
CREAT SERPL-MCNC: 0.33 MG/DL (ref 0.5–1.4)
EOSINOPHIL # BLD AUTO: 0.26 K/UL (ref 0–0.51)
EOSINOPHIL NFR BLD: 1.5 % (ref 0–6.9)
ERYTHROCYTE [DISTWIDTH] IN BLOOD BY AUTOMATED COUNT: 41.1 FL (ref 35.9–50)
GFR SERPLBLD CREATININE-BSD FMLA CKD-EPI: 117 ML/MIN/1.73 M 2
GLUCOSE BLD STRIP.AUTO-MCNC: 110 MG/DL (ref 65–99)
GLUCOSE BLD STRIP.AUTO-MCNC: 113 MG/DL (ref 65–99)
GLUCOSE BLD STRIP.AUTO-MCNC: 121 MG/DL (ref 65–99)
GLUCOSE BLD STRIP.AUTO-MCNC: 121 MG/DL (ref 65–99)
GLUCOSE SERPL-MCNC: 132 MG/DL (ref 65–99)
HCT VFR BLD AUTO: 33.9 % (ref 37–47)
HGB BLD-MCNC: 10.8 G/DL (ref 12–16)
IMM GRANULOCYTES # BLD AUTO: 0.1 K/UL (ref 0–0.11)
IMM GRANULOCYTES NFR BLD AUTO: 0.6 % (ref 0–0.9)
LYMPHOCYTES # BLD AUTO: 0.87 K/UL (ref 1–4.8)
LYMPHOCYTES NFR BLD: 5.1 % (ref 22–41)
MAGNESIUM SERPL-MCNC: 2.2 MG/DL (ref 1.5–2.5)
MCH RBC QN AUTO: 25.9 PG (ref 27–33)
MCHC RBC AUTO-ENTMCNC: 31.9 G/DL (ref 32.2–35.5)
MCV RBC AUTO: 81.3 FL (ref 81.4–97.8)
MONOCYTES # BLD AUTO: 0.86 K/UL (ref 0–0.85)
MONOCYTES NFR BLD AUTO: 5.1 % (ref 0–13.4)
NEUTROPHILS # BLD AUTO: 14.83 K/UL (ref 1.82–7.42)
NEUTROPHILS NFR BLD: 87.3 % (ref 44–72)
NRBC # BLD AUTO: 0 K/UL
NRBC BLD-RTO: 0 /100 WBC (ref 0–0.2)
PHOSPHATE SERPL-MCNC: 2.6 MG/DL (ref 2.5–4.5)
PLATELET # BLD AUTO: 295 K/UL (ref 164–446)
PMV BLD AUTO: 10.1 FL (ref 9–12.9)
POTASSIUM SERPL-SCNC: 3.4 MMOL/L (ref 3.6–5.5)
RBC # BLD AUTO: 4.17 M/UL (ref 4.2–5.4)
SIGNIFICANT IND 70042: NORMAL
SIGNIFICANT IND 70042: NORMAL
SITE SITE: NORMAL
SITE SITE: NORMAL
SODIUM SERPL-SCNC: 137 MMOL/L (ref 135–145)
SODIUM SERPL-SCNC: 138 MMOL/L (ref 135–145)
SOURCE SOURCE: NORMAL
SOURCE SOURCE: NORMAL
WBC # BLD AUTO: 17 K/UL (ref 4.8–10.8)

## 2024-02-06 PROCEDURE — 770022 HCHG ROOM/CARE - ICU (200)

## 2024-02-06 PROCEDURE — 99233 SBSQ HOSP IP/OBS HIGH 50: CPT | Performed by: PSYCHIATRY & NEUROLOGY

## 2024-02-06 PROCEDURE — 97112 NEUROMUSCULAR REEDUCATION: CPT

## 2024-02-06 PROCEDURE — 700102 HCHG RX REV CODE 250 W/ 637 OVERRIDE(OP): Performed by: INTERNAL MEDICINE

## 2024-02-06 PROCEDURE — A9270 NON-COVERED ITEM OR SERVICE: HCPCS | Performed by: NURSE PRACTITIONER

## 2024-02-06 PROCEDURE — 92612 ENDOSCOPY SWALLOW (FEES) VID: CPT

## 2024-02-06 PROCEDURE — 84100 ASSAY OF PHOSPHORUS: CPT

## 2024-02-06 PROCEDURE — 80048 BASIC METABOLIC PNL TOTAL CA: CPT

## 2024-02-06 PROCEDURE — 700105 HCHG RX REV CODE 258: Performed by: INTERNAL MEDICINE

## 2024-02-06 PROCEDURE — 99292 CRITICAL CARE ADDL 30 MIN: CPT | Performed by: INTERNAL MEDICINE

## 2024-02-06 PROCEDURE — 71045 X-RAY EXAM CHEST 1 VIEW: CPT

## 2024-02-06 PROCEDURE — 84295 ASSAY OF SERUM SODIUM: CPT | Mod: 91

## 2024-02-06 PROCEDURE — A9270 NON-COVERED ITEM OR SERVICE: HCPCS | Performed by: INTERNAL MEDICINE

## 2024-02-06 PROCEDURE — 83735 ASSAY OF MAGNESIUM: CPT

## 2024-02-06 PROCEDURE — 99291 CRITICAL CARE FIRST HOUR: CPT | Performed by: INTERNAL MEDICINE

## 2024-02-06 PROCEDURE — 97530 THERAPEUTIC ACTIVITIES: CPT

## 2024-02-06 PROCEDURE — 82962 GLUCOSE BLOOD TEST: CPT | Mod: 91

## 2024-02-06 PROCEDURE — 85025 COMPLETE CBC W/AUTO DIFF WBC: CPT

## 2024-02-06 PROCEDURE — 700102 HCHG RX REV CODE 250 W/ 637 OVERRIDE(OP): Performed by: NURSE PRACTITIONER

## 2024-02-06 RX ORDER — ASPIRIN 81 MG/1
81 TABLET, CHEWABLE ORAL DAILY
Status: DISCONTINUED | OUTPATIENT
Start: 2024-02-06 | End: 2024-02-12

## 2024-02-06 RX ADMIN — NIMODIPINE 60 MG: 60 SOLUTION ORAL at 10:02

## 2024-02-06 RX ADMIN — SODIUM CHLORIDE: 9 INJECTION, SOLUTION INTRAVENOUS at 00:29

## 2024-02-06 RX ADMIN — NIMODIPINE 60 MG: 60 SOLUTION ORAL at 05:08

## 2024-02-06 RX ADMIN — NIMODIPINE 60 MG: 60 SOLUTION ORAL at 22:10

## 2024-02-06 RX ADMIN — ASPIRIN 81 MG 81 MG: 81 TABLET ORAL at 14:33

## 2024-02-06 RX ADMIN — NIMODIPINE 60 MG: 60 SOLUTION ORAL at 13:41

## 2024-02-06 RX ADMIN — LEVETIRACETAM 500 MG: 500 TABLET, FILM COATED ORAL at 17:28

## 2024-02-06 RX ADMIN — SODIUM CHLORIDE 1000 ML: 9 INJECTION, SOLUTION INTRAVENOUS at 11:58

## 2024-02-06 RX ADMIN — SODIUM CHLORIDE: 9 INJECTION, SOLUTION INTRAVENOUS at 23:41

## 2024-02-06 RX ADMIN — POTASSIUM BICARBONATE 50 MEQ: 978 TABLET, EFFERVESCENT ORAL at 10:01

## 2024-02-06 RX ADMIN — ACETAMINOPHEN 650 MG: 325 TABLET, FILM COATED ORAL at 18:06

## 2024-02-06 RX ADMIN — ACETAMINOPHEN 650 MG: 325 TABLET, FILM COATED ORAL at 06:04

## 2024-02-06 RX ADMIN — NIMODIPINE 60 MG: 60 SOLUTION ORAL at 17:28

## 2024-02-06 RX ADMIN — LEVETIRACETAM 500 MG: 500 TABLET, FILM COATED ORAL at 05:08

## 2024-02-06 RX ADMIN — NIMODIPINE 60 MG: 60 SOLUTION ORAL at 02:18

## 2024-02-06 ASSESSMENT — PAIN DESCRIPTION - PAIN TYPE
TYPE: ACUTE PAIN

## 2024-02-06 ASSESSMENT — COGNITIVE AND FUNCTIONAL STATUS - GENERAL
MOVING TO AND FROM BED TO CHAIR: UNABLE
WALKING IN HOSPITAL ROOM: TOTAL
DAILY ACTIVITIY SCORE: 6
STANDING UP FROM CHAIR USING ARMS: TOTAL
DRESSING REGULAR LOWER BODY CLOTHING: TOTAL
CLIMB 3 TO 5 STEPS WITH RAILING: TOTAL
TURNING FROM BACK TO SIDE WHILE IN FLAT BAD: UNABLE
SUGGESTED CMS G CODE MODIFIER DAILY ACTIVITY: CN
TOILETING: TOTAL
MOBILITY SCORE: 6
SUGGESTED CMS G CODE MODIFIER MOBILITY: CN
EATING MEALS: TOTAL
DRESSING REGULAR UPPER BODY CLOTHING: TOTAL
PERSONAL GROOMING: TOTAL
MOVING FROM LYING ON BACK TO SITTING ON SIDE OF FLAT BED: UNABLE
HELP NEEDED FOR BATHING: TOTAL

## 2024-02-06 NOTE — THERAPY
Physical Therapy   Daily Treatment     Patient Name: Fay Turner  Age:  63 y.o., Sex:  female  Medical Record #: [id number]  Today's Date: 2/6/2024     Precautions  Precautions: Fall Risk;Swallow Precautions;Nasogastric Tube  Comments: EVD    Assessment    Pt received in bed and agreeable to PT session. RN clamped EVD prior to mobilizing. Pt continues to present with delayed command following, minimal to no verbalizations, and no observed LE movement this session. Pt provided with facilitation for weight shifting and working towards finding center of gravity and maintaining it in sitting at EOB. Pt tolerated well and was limited by fatigue. Will continue to follow for acute PT to progress.    Plan    Treatment Plan Status: Continue Current Treatment Plan  Type of Treatment: Equipment, Bed Mobility, Manual Therapy, Neuro Re-Education / Balance, Stair Training, Self Care / Home Evaluation, Therapeutic Activities, Therapeutic Exercise  Treatment Frequency: 3 Times per Week  Treatment Duration: Until Therapy Goals Met    DC Equipment Recommendations: Unable to determine at this time  Discharge Recommendations: Recommend post-acute placement for additional physical therapy services prior to discharge home    Subjective    Pt not verbalizing.     Objective       02/06/24 0935   Precautions   Precautions Fall Risk;Swallow Precautions;Nasogastric Tube   Comments EVD   Vitals   Vitals Comments HR to 110's with mobility, 90's at rest   Pain 0 - 10 Group   Therapist Pain Assessment Post Activity Pain Same as Prior to Activity;Nurse Notified;0   Cognition    Speech/ Communication Delayed Responses   Level of Consciousness Alert   Ability To Follow Commands 1 Step   Comments Pt with delayed responses to single step commands for UE movements, not moving LE at all. L inattention, able to track across midling. No verbalizations   Strength Lower Body   Comments No movement to LE this session   Neuro-Muscular Treatments    Neuro-Muscular Treatments Anterior weight shift;Facilitation;Compensatory Strategies;Tactile Cuing;Verbal Cuing;Weight Shift Left   Comments Facilitation and assist with weight shifting provided in sitting to work from max A for balance to standby with pt able to attempt to self-correct for slow losses of balance. Pt leans R and posterior when losing balance.   Other Treatments   Other Treatments Provided Co-tx with OT due to pt complexity and need for skilled assist, working towards separate goals during session.   Balance   Sitting Balance (Static) Poor +   Sitting Balance (Dynamic) Poor   Weight Shift Sitting Poor   Skilled Intervention Verbal Cuing;Tactile Cuing;Postural Facilitation;Compensatory Strategies   Comments Focus on seated balance   Bed Mobility    Supine to Sit Total Assist   Sit to Supine Total Assist   Scooting Total Assist   Skilled Intervention Verbal Cuing   Comments Pt unable to assist   Functional Mobility   Bed, Chair, Wheelchair Transfer Unable to Participate   Mobility EOB, focus on seated balance   How much difficulty does the patient currently have...   Turning over in bed (including adjusting bedclothes, sheets and blankets)? 1   Sitting down on and standing up from a chair with arms (e.g., wheelchair, bedside commode, etc.) 1   Moving from lying on back to sitting on the side of the bed? 1   How much help from another person does the patient currently need...   Moving to and from a bed to a chair (including a wheelchair)? 1   Need to walk in a hospital room? 1   Climbing 3-5 steps with a railing? 1   6 clicks Mobility Score 6   Short Term Goals    Short Term Goal # 1 Pt will perform supine<>sit from flat HOB/no railing with supervision within 6 visits to ensure independent mobility at home.   Goal Outcome # 1 goal not met   Short Term Goal # 2 Pt will perform sit<>stand with FWW and supervision within 6 visits to ensure independent mobility.   Goal Outcome # 2 Goal not met   Short  Term Goal # 3 Pt will ambulate x 150ft with FWW and supervision within 6 visits to ensure independent mobility at home.   Goal Outcome # 3 Goal not met   Physical Therapy Treatment Plan   Physical Therapy Treatment Plan Continue Current Treatment Plan   Anticipated Discharge Equipment and Recommendations   DC Equipment Recommendations Unable to determine at this time   Discharge Recommendations Recommend post-acute placement for additional physical therapy services prior to discharge home   Interdisciplinary Plan of Care Collaboration   IDT Collaboration with  Nursing;Occupational Therapist   Patient Position at End of Therapy In Bed;Wrist Restraints Applied;Family / Friend in Room   Collaboration Comments RN updated

## 2024-02-06 NOTE — CARE PLAN
The patient is Watcher - Medium risk of patient condition declining or worsening    Shift Goals  Clinical Goals: -200, Q1 neuro checks, Q1 EVD/ICPs  Patient Goals: ALEJANDRA  Family Goals: updates    Progress made toward(s) clinical / shift goals:      Problem: Knowledge Deficit - Standard  Goal: Patient and family/care givers will demonstrate understanding of plan of care, disease process/condition, diagnostic tests and medications  Outcome: Progressing     Problem: Safety - Medical Restraint  Goal: Remains free of injury from restraints (Restraint for Interference with Medical Device)  Outcome: Progressing     Problem: Skin Integrity  Goal: Skin integrity is maintained or improved  Outcome: Progressing     Problem: Pain - Standard  Goal: Alleviation of pain or a reduction in pain to the patient’s comfort goal  Outcome: Progressing     Problem: Hemodynamics  Goal: Patient's hemodynamics, fluid balance and neurologic status will be stable or improve  Outcome: Progressing     Problem: Neuro Status  Goal: Neuro status will remain stable or improve  Outcome: Progressing     Patient is not progressing towards the following goals:    Problem: Safety - Medical Restraint  Goal: Free from restraint(s) (Restraint for Interference with Medical Device)  Outcome: Not Progressing

## 2024-02-06 NOTE — CARE PLAN
The patient is Watcher - Medium risk of patient condition declining or worsening    Shift Goals  Clinical Goals: -200, neuro checks q1, EVD q1  Patient Goals: ALEJANDRA  Family Goals: updates, FEES/for Fay to eat    Progress made toward(s) clinical / shift goals:  No longer requiring levo. Neuro exam stable.      Problem: Knowledge Deficit - Standard  Goal: Patient and family/care givers will demonstrate understanding of plan of care, disease process/condition, diagnostic tests and medications  Outcome: Progressing     Problem: Safety - Medical Restraint  Goal: Remains free of injury from restraints (Restraint for Interference with Medical Device)  Outcome: Progressing  Flowsheets (Taken 2/5/2024 1926)  Addressed this shift: Remains free of injury from restraints (restraint for interference with medical device):   Determine that other, less restrictive measures have been tried or would not be effective before applying the restraint   Evaluate the patient's condition at the time of restraint application   Inform patient/family regarding the reason for restraint   Every 2 hours: Monitor safety, psychosocial status, comfort, nutrition and hydration  Goal: Free from restraint(s) (Restraint for Interference with Medical Device)  Outcome: Progressing     Problem: Skin Integrity  Goal: Skin integrity is maintained or improved  Outcome: Progressing     Problem: Fall Risk  Goal: Patient will remain free from falls  Outcome: Progressing     Problem: Pain - Standard  Goal: Alleviation of pain or a reduction in pain to the patient’s comfort goal  Outcome: Progressing     Problem: Hemodynamics  Goal: Patient's hemodynamics, fluid balance and neurologic status will be stable or improve  Outcome: Progressing     Problem: Neuro Status  Goal: Neuro status will remain stable or improve  Outcome: Progressing

## 2024-02-06 NOTE — PROGRESS NOTES
Around 1415, this RN called to bedside by daughter because patient seemed to be trying to get out of bed. Patient did not follow commands, was moving left side in jerky motions to the side (appeared to be trying to sit up), eyes not tracking and patient looking around. After ~2 minutes, patient became calm with no intervention and is resting comfortably. EEG button pressed to jas in reading. Primary RN notified.
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
## 2024-02-06 NOTE — THERAPY
Speech Language Pathology   Flexible Endoscopic Evaluation of Swallowing (FEES)        Patient Name: Fay Turner  AGE:  63 y.o., SEX:  female  Medical Record #: [id number]  Date of Service: 2/6/2024      History of Present Illness  Pt is a 63 y.o. F w/ PMHx of HTN who was transferred from OSH d/t AMS w/ rapidly progressive neurological changes/obtundation. CT head revealed diffuse subarachnoid hemorrhage w/ evidence of large L P-Comm aneurysm on CTA. Pt now s/p successful coil embolization of the L P-comm aneurysm and EVD placement x2 on 1/31. Pt intubated at OSH on 1/31, extubated 2/4. On 2/4, pt noted to have L MCA vasospasms.     No Hx of ST per EMR.     MRI Brain 2/4:  1.  Multifocal areas of acute infarcts in the left cerebral hemisphere.  2.  Small area of acute infarct in the right basal ganglia adjacent to the tip of the ventriculostomy catheter.  3.  Cortical infarct in the right medial parietal lobe.  4.  Mild amount of subdural hemorrhages surrounding the bilateral cerebellar hemispheres and right cerebellum.  5.  Subarachnoid hemorrhage.  6.  There is no hydrocephalus.  7.  There are changes secondary to the previous endovascular repair left internal carotid aneurysm.    Dx Chest 2/2:   1.  Lines and tubes appear appropriately located  2.  Improvement of pulmonary edema/airspace process    Pertinent Information  Current Method of Nutrition: NGT  Patient Behaviors: Fatigue   Dentition: Natural dentition   Feeding Tube: NGT intact to left nare       Factor(s) Affecting Performance: Impaired endurance     Discussed the risks, benefits, and alternatives of the FEES procedure. Patient/family acknowledged and agreed to proceed.    Assessment  Flexible Endoscopic Evaluation of Swallowing (FEES) completed at bedside today. D/t SYLVIA, RN placed pt in [place] prior to the study. The endoscope was passed transnasally via L nare to evaluate the anatomy and physiology of swallowing. Pt tolerated the procedure with no  apparent distress.  Anatomic Findings: Pt w/ deviated septum- did not impact ability to pass scope. Mild edema of LPWs, PPW, epiglottis, and ventricular folds. Moderate edema of of arytenoids (L>R). Erythema and yellow hue of TVF (R>L), ventricular folds (L>R), and trachea concerning for hemorrhage. B/l space occupying lesions on the posterior 1/3 of TVF. Findings consistent with intubation history.   Vocal Fold Motion: B/l movement of TVF. Reduced ROM of arytenoids (L>R) suspect 2/2 edema, however, still able to approximate at midline.   Secretion Management: Adequate  PO Trials: Ice Chips, Thin Liquid, Mildly Thick Liquid, Liquidised, Pudding, Soft & Bite Sized      Consistency PAS Score Timing Residue Comments   Thin Liquid 1 N/A Vallecular Residue: None (0%)  Pyriform Sinus Residue: None (0%) Tsp- PAS 1, 1  Cup- PAS 1, 1  Straw- PAS 1, 1   Mildly Thick 2 Pre Swallow Vallecular Residue: None (0%)  Pyriform Sinus Residue: None (0%) Tsp- PAS 1, 1   Straw- PAS 2, 1    Liquidised 1 N/A Vallecular Residue: None (0%)  Pyriform Sinus Residue: None (0%)    Pudding 1 N/A Vallecular Residue: None (0%)  Pyriform Sinus Residue: None (0%)    Soft & Bite Sized 1 N/A Vallecular Residue: None (0%)  Pyriform Sinus Residue: None (0%) Of note, severe residue appreciated on PPW/NGT.      Penetration-Aspiration Scale (PAS)  1     No contrast enters airway  2     Contrast enters the airway, remains above the vocal folds, and is ejected from the airway (not seen in the airway at the end of the swallow).  3     Contrast enters the airway, remains above the vocal folds, and is not ejected from the airway (is seen in the airway after the swallow).  4     Contrast enters the airway, contacts the vocal folds, and is ejected from the airway.  5     Contrast enters the airway, contacts the vocal folds, and is not ejected from the airway  6     Contrast enters the airway, crosses the plane of the vocal folds, and is ejected from the airway.  7      Contrast enters the airway, crosses the plane of the vocal folds, and is not ejected from the airway despite effort.  8     Contrast enters the airway, crosses the plane of the vocal folds, is not ejected from the airway and there is no response to aspiration.    Oral phase:  Pt w/ adequate oral acceptance. Single instance of anterior loss of TN0 on cup sip suspect 2/2 tech feeding. Adequate labial seal and generation of intra-oral pressure for straw sips; However, pt intermittently biting down on straw. Prolonged A/P transport w/ pt requiring cues from SLP to trigger the swallow. Presumed complete transport as no significant oral bolus residue was appreciated. Suspected piecemeal deglutition on SB6 w/ patient triggering the swallow with continuation of mastication. Mastication inferred to functional as bolus was visualized well-mashed in the vallecula.     Pharyngeal phase:  -Slightly delayed initiation of the pharyngeal swallow with MT2 appreciated to pool in the vallecula. Swallow initiation appeared timely on all other trials. Suspect improvement with larger bolus volume.   -Single instance of pre-swallow shallow penetration on straw sips of MT2. This is a variation of normal.   -Pt with slightly reduced pharyngeal contraction suspect compounded by NGT resulting in half of bolus volume on PPW/coating on NGT. Pt appreciated to have sensation as she independently utilized a cleansing swallow to clear.     Comments:  -Pt throat clearing/coughing on trials irrespective of penetration/aspiration events. Suspect exacerbated by presence of endoscope.       Severity Rating:  Severity Rating: HONORIO  HONORIO: Mild     Clinical Impressions  The pt presents with a mild oral dysphagia. Swallow efficiency is impaired while swallow safety remains intact. Risk for aspiration is low and risk for malnutrition/dehydration is low; However, may increase d/t AMS. Would recommend a modified diet of MM5/TN0 diet at this time w/ 1:1  feeding d/t such.  Deficits best managed with compensatory strategies at this time. SLP to follow to monitor diet tolerance and advance solids as appropriate at bedside.    Recommendations  Diet Consistency: MM5/TN0 - Consider removal of NGT w/ appropriate oral intake.   Medication: Crush with puree, As tolerated  Supervision: 1:1 feeding with constant supervision, Encourage self-feeding, Direct supervision during meals  Positioning: Fully upright and midline during oral intake  Strategies: Small bites/sips, Slow rate of intake, Reduce environmental distractions  Oral Care: BID  Additional Instrumentation: None    SLP Treatment Plan  Treatment Plan: Dysphagia Treatment, Patient/Family/Caregiver Training  SLP Frequency: 4x Per Week  Estimated Duration: Until Therapy Goals Met    Anticipated Discharge Needs  Discharge Recommendations: Recommend post-acute placement for additional speech therapy services prior to discharge home   Therapy Recommendations Upon DC: Dysphagia Training, Patient / Family / Caregiver Education, Community Re-Integration (pending cog/SLE eval)     Patient / Family Goals  Patient / Family Goal #1: nods yes to ice chips  Goal #1 Outcome: Progressing as expected  Short Term Goal # 1: Pt will participate in an instrumental swallow study via FEES to determine airway protection/swallow efficiency and guide dysphagia management.  Goal Outcome # 1: Goal met, new goal added  Short Term Goal # 1 B : Pt will consume a MM5/TN0 diet w/ 1:1 supv without any overt s/s of aspiration or decline in respiratory status.    Anai Dumont, SLP  Supervised by Mei Mcfarlane CCC-SLP

## 2024-02-06 NOTE — PROGRESS NOTES
Neuro Interventional Radiology   Progress Note     Author: ELIE Hernandez Date & Time created: 2/6/2024  8:52 AM   Date of admission  1/31/2024  Note to reader: this note follows the APSO format rather than the historical SOAP format. Assessment and plan located at the top of the note for ease of use.    Chief Complaint  63 y.o. female admitted 1/31/2024 with subarachnoid hemorrhage.    HPI  Fay France is a 64 yo female with PMH significant for HTN who presented to OSH for sudden onset of new eyelid droop, and generalized headache without significant head trauma. Pt seized in OSH CT scan, was loaded with Keppra and transferred for higher level of care. 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 Carpio Potts 4; Modified Swenson scale 3.  01/31/24 MAURICE Eason completed a cerebral aneurysm with coil embolization of large left PCOMM aneurysm.    Interval History IR:  02/01/24: RIGHT groin access site soft, non-tender, without ecchymosis. Dressing clean, dry, intact. Pedal pulses +2 bilaterally with feet pink, and warm, cap refill <3 sec. Pt is intubated and ventilated with EVD. She is not following commands, but moves right foot to stimuli. I reviewed the most recent labs including WBC 19.6, Hgb 14.2, Cr 0.61. Coordinated post IR procedure care with hospital nursing staff.     02/05/24:  PBD/post Pcom aneurysm embolization with coil #5.  Pt extubated yesterday (2/4). A stat CTA and CT perfusion done early am on 2/4 (increased lethargy) no obvious vasospasm noted. TCD's with poor windows therefor no baseline Tcd's obtained.  I reviewed FU MRI brain(02/04) which showed small L MCA infarcts-Vasopressors infusing and  keeping -200 to mitigate vasospasm. Anterior EVD in place at 10 cm H20 above tragus with good waveform noted on monitor-R groin site soft, nontender without edema, bleeding, small scab with small  ecchymosis. I  reviewed today's labs: WBC 13.7; hemoglobin 10.5;  ; Cr 0.40; respiratory culture- +MSSA in sputum-continuing Rocephin today is day 5 of 5.  I's/O's-last 24 hours+ 551/since admission +1260; ICP 2-10 (good ICP waveform noted on monitor).      02/06/24:  PBD/post Pcom aneurysm embolization with coil #6. TCD's with poor windows, recommend following neuro assessment. I reviewed FU MRI brain(02/04) which showed small L MCA infarcts-Goal -200 to mitigate vasospasm. Anterior EVD in place at 10 cm H20 above tragus with good waveform noted on monitor-R groin site soft, nontender without edema, bleeding, small scab with small  ecchymosis. I reviewed today's labs: WBC 17; hemoglobin 10.8;  ; Cr 0.33; respiratory culture- +MSSA in sputum -completed Rocephin.  I's/O's-last 24 hours -193 /since admission +1037; ICP 5-11 (good ICP waveform noted on monitor). I started ASA therapy 2nd to multiple infarcts noted on MRI.  ASA therapy discussed and approved by Fer Helms and  Dr. Eason.       Assessment/Plan     Principal Problem:    SAH (subarachnoid hemorrhage) (HCC)  Active Problems:    Acute respiratory failure with hypoxia (HCC)    Secondary hypertension    Acute pulmonary edema (HCC)    Seizure (HCC)    Hypokalemia    Hypotension due to hypovolemia    Hypophosphatemia    Anemia    Pneumonia of both lungs due to methicillin susceptible Staphylococcus aureus (MSSA) (HCC)    Aneurysm of ascending aorta without rupture (HCC)    Hyperglycemia      Plan IR  -Blood pressure parameters per neuro- keep 140-200-to mitigate vasospasm  -Continue Nimodipine  -TCD's (poor windows) therefore will need to rely on assessment for vasospasm watch  -Start ASA 81 mg daily    - If suspect vasospasm consider CTA and CT perfusion  - Neuro checks per ICU protocol  - Neurosurgery EVD placement- managed by neurosurgery  - Maintain normoglycemia, normothermia, normo-natremia and euvolemia  -Continue  post-angioseal  instructions: no lifting greater than 5 lbs and no baths/ swimming/ soaking in tub for 5 days post Angio-Seal. shower OK.   - Vascular Neurology and Neurosurgery following  - Outpatient follow up in approximately 4 weeks. Our office will call to schedule      -Thank you for allowing Interventional Radiology team to participate in the patients care, if any additional care or requests are needed in the future please do not hesitate call or place IR order   [phone]                Review of Systems  Physical Exam   Review of Systems   Unable to perform ROS: Medical condition      Vitals:    02/06/24 0600   BP: (!) 156/84   Pulse: (!) 111   Resp: (!) 26   SpO2: 98%        Physical Exam  Vitals and nursing note reviewed.   Constitutional:       General: She is sleeping.      Comments: Wakes to stimuli   HENT:      Head:      Comments: Anterior EVD at 10cm H20 above tragus and open.         Mouth/Throat:      Mouth: Mucous membranes are dry.      Pharynx: Oropharynx is clear.   Eyes:      Comments: Left ptosis   CNIII palsy    Cardiovascular:      Rate and Rhythm: Regular rhythm. Tachycardia present.      Pulses:           Radial pulses are 2+ on the right side and 2+ on the left side.        Dorsalis pedis pulses are 2+ on the right side and 2+ on the left side.      Comments: Right groin access site soft, non-tender, without ecchymosis; dressing cdi.       Pulmonary:      Effort: No respiratory distress.      Breath sounds: Normal air entry.   Chest:   Breasts:     Breasts are symmetrical.   Abdominal:      Palpations: Abdomen is soft.   Genitourinary:     Comments: Cuellar cath to down drain- clear yellow urine  Skin:     General: Skin is warm and dry.      Capillary Refill: Capillary refill takes less than 2 seconds.   Neurological:      Mental Status: She is lethargic.      Comments: Opens right eye to voice. Left eye with CN III palsy with ptosis  Pupils- right pupil is reactive at 3-3.5mm; left pupil sluggish at  3.5mm- 4mm  Following commands on the left side - Held up two fingers  No movement to painful stimuli on the right   Non verbal  No movement noted to lower extrem.  Upward toes with stimuli only     Psychiatric:      Comments: Non verbal           Labs    Recent Labs     02/04/24 0415 02/05/24 0415 02/06/24  0400   WBC 19.6* 13.7* 17.0*   RBC 4.38 4.04* 4.17*   HEMOGLOBIN 11.6* 10.5* 10.8*   HEMATOCRIT 34.0* 32.0* 33.9*   MCV 77.6* 79.2* 81.3*   MCH 26.5* 26.0* 25.9*   MCHC 34.1 32.8 31.9*   RDW 39.2 40.7 41.1   PLATELETCT 359 258 295   MPV 10.4 9.8 10.1       Recent Labs     02/04/24 0415 02/04/24  1005 02/05/24 0415 02/05/24  1225 02/06/24  0020 02/06/24  0400   SODIUM 138   < > 140 140 138 138   POTASSIUM 3.7  --  3.2*  --   --  3.4*   CHLORIDE 106  --  103  --   --  103   CO2 20  --  25  --   --  26   GLUCOSE 240*  --  152*  --   --  132*   BUN 13  --  16  --   --  15   CREATININE 0.38*  --  0.40*  --   --  0.33*   CALCIUM 9.0  --  9.2  --   --  9.1    < > = values in this interval not displayed.       Recent Labs     02/04/24 0415 02/05/24 0415 02/06/24  0400   ALBUMIN  --  3.4  --    TBILIRUBIN  --  0.5  --    ALKPHOSPHAT  --  127*  --    TOTPROTEIN  --  6.6  --    ALTSGPT  --  15  --    ASTSGOT  --  27  --    CREATININE 0.38* 0.40* 0.33*       MR-BRAIN-W/O   Final Result      1.  Multifocal areas of acute infarcts in the left cerebral hemisphere.   2.  Small area of acute infarct in the right basal ganglia adjacent to the tip of the ventriculostomy catheter.   3.  Cortical infarct in the right medial parietal lobe.   4.  Mild amount of subdural hemorrhages surrounding the bilateral cerebellar hemispheres and right cerebellum.   5.  Subarachnoid hemorrhage.   6.  There is no hydrocephalus.   7.  There are changes secondary to the previous endovascular repair left internal carotid aneurysm.      CT-CTA NECK WITH & W/O-POST PROCESSING   Final Result      No focal high-grade stenosis, dissection or  occlusion of the cervical carotid or vertebral arteries.      CT-CEREBRAL PERFUSION ANALYSIS   Final Result      1.  Cerebral blood flow less than 30% likely representing completed infarct = 0 mL.      2.  T Max more than 6 seconds likely representing combination of completed infarct and ischemia = 0 mL.      3.  Mismatched volume likely representing ischemic brain/penumbra = None      4.  Please note that the cerebral perfusion was performed on the limited brain tissue around the basal ganglia region. Infarct/ischemia outside the CT perfusion sections can be missed in this study.      CT-CTA HEAD WITH & W/O-POST PROCESS   Final Result      1.  Prior LEFT middle cranial fossa aneurysm coiling.  Artifact limits exam.   2.  No abrupt large vessel cut off.   3.  Segmental narrowing of LEFT middle cerebral artery, vasospasm versus artifact.   4.  Ventriculostomy catheter in similar position.   5.  Evolving RIGHT caudate infarct.   6.  Stable intracranial hemorrhage.         DX-ABDOMEN FOR TUBE PLACEMENT   Final Result      Orogastric tube tip now at the mid stomach.      DX-ABDOMEN FOR TUBE PLACEMENT   Final Result      Orogastric tube tip at the proximal stomach with side port just above the GE junction.      US-INTRACRANIAL ARTERY LIMITED   Final Result      DX-CHEST-LIMITED (1 VIEW)   Final Result         1.  Stable chest with perihilar and lower lung zone interstitial and minimal airspace opacities most consistent with pulmonary edema.   2.  No new abnormalities.      EC-ECHOCARDIOGRAM COMPLETE W/O CONT   Final Result      US-INTRACRANIAL ARTERY LIMITED   Final Result      DX-CHEST-PORTABLE (1 VIEW)   Final Result      1.  Lines and tubes appear appropriately located      2.  Improvement of pulmonary edema/airspace process      US-INTRACRANIAL ARTERY COMP   Final Result      CT-HEAD W/O   Final Result         1. Slightly decreased subarachnoid hemorrhage overlying the cerebral hemispheres.   2. Otherwise, stable as  above.         DX-CHEST-FOR LINE PLACEMENT Perform procedure in: Patient's Room   Final Result      1.  Moderate interstitial pulmonary edema.   2.   Right-sided central venous catheter terminates with the tip projecting over the expected region of the mid to distal superior vena cava.   3.  Endotracheal tube terminates in satisfactory position at the level of the aortic arch.   4.  Enteric feeding tube terminates in the left upper quadrant projecting over the expected location of the stomach.      CT-STEALTH HEAD W/O   Final Result      1.  Interval placement of a right transparietal ventriculostomy catheter which terminates with the tip near the third ventricle. There is minimally decreased caliber of the ventricular system and compared to previous exam.   2.  Findings of intracranial hemorrhage appear similar to the previous exam. No definite new acute intracranial hemorrhage is identified.   3.  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         DX-CHEST-PORTABLE (1 VIEW)   Final Result      CT-HEAD W/O   Final Result      1. Interval aneurysm coiling of left posterior communicating artery aneurysm.   2. Possible increased subarachnoid hemorrhage.   3. Intraventricular hemorrhage. There is developing mild hydrocephalus with mildly increased ventricles.   4. Small right convexity subdural hematoma measuring 5 mm in thickness.      These findings were discussed with EMILY BYRD on 1/31/2024 3:14 PM.      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   This aneurysm was embolized to occlusion as described above. Final angiographic images demonstrate only minimal filling of the neck  "of the aneurysm. The patient will be followed up in   the neuro interventional clinic and brought back for a follow-up angiogram in 6 months.      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      I, Stephanie Eason was physically present and participated during the entire procedure of the IR-EMBOLIZATION.          INR   Date Value Ref Range Status   01/31/2024 1.06 0.87 - 1.13 Final     Comment:     INR - Non-therapeutic Reference Range: 0.87-1.13  INR - Therapeutic Reference Range: 2.0-4.0       No results found for: \"POCINR\"     Intake/Output Summary (Last 24 hours) at 2/1/2024 1746  Last data filed at 2/1/2024 1600  Gross per 24 hour   Intake 3089.64 ml   Output 3143 ml   Net -53.36 ml      Labs not explicitly included in this progress note were reviewed by the author. Radiology/imaging not explicitly included in this progress note was reviewed by the author.     I have performed a physical exam and reviewed and updated ROS and Plan today (2/6/2024).      50 minutes in directly providing and coordinating care and extensive data review.  No time overlap and excludes procedures.  "

## 2024-02-06 NOTE — DIETARY
Nutrition Services: Brief Update    Pt passed FEES re-eval today with recommendations for minced and moist diet with thin liquids and 1:1 feeds. Levo and Nicko remain stopped.     Recommendations/Plan:  Diet per SLP. Will order Ensure Plus to promote adequate kcal and protein intake.   Hold TF at this time to assess PO intake. Please leave feeding tube in place until PO intake consistently 50%+.  Monitor PO intake.     Will f/u tomorrow on PO intake.

## 2024-02-06 NOTE — PROGRESS NOTES
Neurosurgery Progress Note    Subjective:  63 year old presented with HH4 ruptured left Pcomm aneurysm, post coil day 4.     ICPs stable between <11 overnight  EVD output from anterior EVD 6-19cc/hr overnight. Drainage appears to be clearing a bit and becoming less bloody each day.     Patient not on sedation currently.  Overnight on  had worsening exam with flaccid RUE, decreased mentation, more lethargy. CTA showed left MCA vasospasm. SBP goals increased to 180-200.     No changes overnight. Per nursing, oriented to place and name this AM with command following LUE.     Exam:  GCS: [id number]. Oriented to name and place ([place]).   Extubated.  +corneal, +cough/gag.    Right eye open spontaneously.   Has left CNIII palsy with ptosis.   Pupils right 1-2mm reactive, left 3-4mm non reactive, gaze conjugate.   Command following LUE.   Flaccid RUE.  Withdrawals to lower extremities.    Anterior EVD in place and functioning at [id number] above the tragus   ICP waveform adequate.    BP  Min: 145/76  Max: 185/100  Pulse  Av.2  Min: 91  Max: 115  Resp  Av.1  Min: 15  Max: 35  Monitored Temp 2  Av.8 °C (100 °F)  Min: 37.4 °C (99.3 °F)  Max: 38.3 °C (100.9 °F)  SpO2  Av.9 %  Min: 93 %  Max: 99 %    ICP  Av.9 MM HG  Min: 4 MM HG  Max: 11 MM HG    Recent Labs     24  0415 24  0415 24  0400   WBC 19.6* 13.7* 17.0*   RBC 4.38 4.04* 4.17*   HEMOGLOBIN 11.6* 10.5* 10.8*   HEMATOCRIT 34.0* 32.0* 33.9*   MCV 77.6* 79.2* 81.3*   MCH 26.5* 26.0* 25.9*   MCHC 34.1 32.8 31.9*   RDW 39.2 40.7 41.1   PLATELETCT 359 258 295   MPV 10.4 9.8 10.1       Recent Labs     24  0415 24  1005 24  0415 24  1225 24  0020 24  0400   SODIUM 138   < > 140 140 138 138   POTASSIUM 3.7  --  3.2*  --   --  3.4*   CHLORIDE 106  --  103  --   --  103   CO2 20  --  25  --   --  26   GLUCOSE 240*  --  152*  --   --  132*   BUN 13  --  16  --   --  15   CREATININE 0.38*  --  0.40*  --   --   0.33*   CALCIUM 9.0  --  9.2  --   --  9.1    < > = values in this interval not displayed.                     Intake/Output                         02/05/24 0700 - 02/06/24 0659 02/06/24 0700 - 02/07/24 0659 0700-1859 1900-0659 Total 0700-1859 1900-0659 Total                 Intake    P.O.  --  0 0  --  -- --    P.O. -- 0 0 -- -- --    I.V.  1002.8  784.9 1787.7  --  -- --    Norepinephrine Volume 12.2 -- 12.2 -- -- --    Volume (mL) (NS infusion) 990.5 784.9 1775.5 -- -- --    Other  --  150 150  --  -- --    Medications (PO/Enteral Liquids) -- 150 150 -- -- --    NG/GT  445  400 845  --  -- --    Intake (mL) (Enteral Tube 01/31/24 Nasogastric Right nare) 445 400 845 -- -- --    Enteral  90  90 180  --  -- --    Free Water / Tube Flush 90 90 180 -- -- --    Total Intake 1537.8 1424.9 2962.7 -- -- --       Output    Urine  1280  1645 2925  --  -- --    Output (mL) (Urethral Catheter Non-latex;Temperature probe) 1280 1645 2925 -- -- --    Drains  108  123 231  --  -- --    Output (mL) (ICP/Ventriculostomy Right) 108 123 231 -- -- --    Stool  --  -- --  --  -- --    Number of Times Stooled 2 x 1 x 3 x -- -- --    Emesis/NG output  --  0 0  --  -- --    Output (mL) (Enteral Tube 01/31/24 Nasogastric Right nare) -- 0 0 -- -- --    Total Output 1388 1768 3156 -- -- --       Net I/O     149.8 -343.1 -193.3 -- -- --              Intake/Output Summary (Last 24 hours) at 2/6/2024 0829  Last data filed at 2/6/2024 0600  Gross per 24 hour   Intake 2696.77 ml   Output 2889 ml   Net -192.23 ml               NORepinephrine  0-1 mcg/kg/min (Ideal) Continuous    phenylephrine infusion  0-5 mcg/kg/min (Ideal) Continuous    NS   Continuous    insulin regular  2-9 Units Q6HRS    And    dextrose bolus  25 g Q15 MIN PRN    enalaprilat  1.25 mg Q6HRS PRN    hydrALAZINE  10-20 mg Q4HRS PRN    labetalol  10-20 mg Q4HRS PRN    niMODipine  60 mg Q4HRS    levETIRAcetam  500 mg BID    Pharmacy  1 Each PHARMACY TO DOSE    Respiratory  Therapy Consult   Continuous RT    senna-docusate  2 Tablet BID    And    polyethylene glycol/lytes  1 Packet QDAY PRN    And    magnesium hydroxide  30 mL QDAY PRN    And    bisacodyl  10 mg QDAY PRN    MD Alert...Adult ICU Electrolyte Replacement per Pharmacy   PHARMACY TO DOSE    ondansetron  4 mg Q4HRS PRN    Or    ondansetron  4 mg Q4HRS PRN    acetaminophen  650 mg Q4HRS PRN    Or    acetaminophen  650 mg Q4HRS PRN    LORazepam  2 mg Q5 MIN PRN    prochlorperazine  10 mg Q6HRS PRN       Assessment and Plan:  Hospital day # 7  Post coil day 6  Anterior EVD at 10cm H20 above tragus and open.    Appreciate neurology, IR and intensivist care.  Brain MRI done, will review with Dr. Monroe.   Q2 neurochecks OK from NS perspective as exam has been stable, however clear this with Neurology prior to changing order.    Following closely.     Chemical prophylactic DVT therapy: No  Start date/time: tbd

## 2024-02-06 NOTE — PROGRESS NOTES
Neurology Progress Note  Neurohospitalist Service, [facility]    Referring Physician: Dayana Babcock M.D.    No chief complaint on file.      HPI: Refer to initial documented Neurology H&P, as detailed in the patient's chart.    Interval History: No acute events overnight.  Appears more alert, son is at bedside and apparently she was communicating verbally with few words.  She is able to follow simple commands such as showing thumbs up and closing her eyes.  She was extubated 102/4/24.    Review of systems: In addition to what is detailed in the HPI and/or updated in the interval history, all other systems reviewed and are negative.    Past Medical History:    has no past medical history on file.    FHx:  family history is not on file.    SHx:       Medications:    Current Facility-Administered Medications:     norepinephrine (Levophed) 8 mg in 250 mL NS infusion (premix), 0-1 mcg/kg/min (Ideal), Intravenous, Continuous, Jeremy M Gonda, M.D., Stopped at 02/05/24 1300    phenylephrine 40 mg/250 mL NS premix, 0-5 mcg/kg/min (Ideal), Intravenous, Continuous, Jeremy M Gonda, M.D., Stopped at 02/04/24 0911    NS infusion, , Intravenous, Continuous, Jeremy M Gonda, M.D., Last Rate: 83 mL/hr at 02/06/24 0029, New Bag at 02/06/24 0029    insulin regular (HumuLIN R,NovoLIN R) injection, 2-9 Units, Subcutaneous, Q6HRS, 2 Units at 02/04/24 1830 **AND** POC blood glucose manual result, , , Q6H **AND** NOTIFY MD and PharmD, , , Once **AND** Administer 20 grams of glucose (approximately 8 ounces of fruit juice) every 15 minutes PRN FSBG less than 70 mg/dL, , , PRN **AND** dextrose 50% (D50W) injection 25 g, 25 g, Intravenous, Q15 MIN PRN, Jeremy M Gonda, M.D.    enalaprilat (Vasotec) injection 1.25 mg 1 mL, 1.25 mg, Intravenous, Q6HRS PRN, Jeremy M Gonda, M.D.    hydrALAZINE (Apresoline) injection 10-20 mg, 10-20 mg, Intravenous, Q4HRS PRN, Jeremy M Gonda, M.D.    labetalol (Normodyne/Trandate)  injection 10-20 mg, 10-20 mg, Intravenous, Q4HRS PRN, Jeremy M Gonda, M.D.    niMODipine (Nymalize) oral syringe 60 mg, 60 mg, Enteral Tube, Q4HRS, Jeremy M Gonda, M.D., 60 mg at 02/06/24 1002    levETIRAcetam (Keppra) tablet 500 mg, 500 mg, Enteral Tube, BID, Jeremy M Gonda, M.D., 500 mg at 02/06/24 0508    Pharmacy Consult: Enteral tube insertion - review meds/change route/product selection, 1 Each, Other, PHARMACY TO DOSE, Jeremy M Gonda, M.D.    Respiratory Therapy Consult, , Nebulization, Continuous RT, Jeremy M Gonda, M.D.    senna-docusate (Pericolace Or Senokot S) 8.6-50 MG per tablet 2 Tablet, 2 Tablet, Enteral Tube, BID, 2 Tablet at 02/03/24 0514 **AND** polyethylene glycol/lytes (Miralax) Packet 1 Packet, 1 Packet, Enteral Tube, QDAY PRN **AND** magnesium hydroxide (Milk Of Magnesia) suspension 30 mL, 30 mL, Enteral Tube, QDAY PRN **AND** bisacodyl (Dulcolax) suppository 10 mg, 10 mg, Rectal, QDAY PRN, Jeremy M Gonda, M.D. MD Alert...ICU Electrolyte Replacement per Pharmacy, , Other, PHARMACY TO DOSE, Jeremy M Gonda, M.D.    ondansetron (Zofran ODT) dispertab 4 mg, 4 mg, Enteral Tube, Q4HRS PRN, 4 mg at 01/31/24 2248 **OR** ondansetron (Zofran) syringe/vial injection 4 mg, 4 mg, Intravenous, Q4HRS PRN, Jeremy M Gonda, M.D., 4 mg at 02/02/24 1758    acetaminophen (Tylenol) tablet 650 mg, 650 mg, Enteral Tube, Q4HRS PRN, 650 mg at 02/06/24 0604 **OR** acetaminophen (Tylenol) suppository 650 mg, 650 mg, Rectal, Q4HRS PRN, Jeremy M Gonda, M.D.    LORazepam (Ativan) injection 2 mg, 2 mg, Intravenous, Q5 MIN PRN, Jeremy M Gonda, M.D.    prochlorperazine (Compazine) injection 10 mg, 10 mg, Intravenous, Q6HRS PRN, David Mclaughlin Jr., D.O., 10 mg at 01/31/24 1907    Physical Examination:    Vitals:    02/06/24 0945 02/06/24 1000 02/06/24 1015 02/06/24 1100   BP:  (!) 169/95  (!) 153/87   Pulse: (!) 119 98 (!) 114 (!) 113   Resp: (!) 31 (!) 26 (!) 25 (!) 29   TempSrc:  Bladder     SpO2:  96% 97% 96%   Weight:        Height:             NEUROLOGICAL EXAM:   She appears more alert with eyes open.  She track across the room.  Able to follow simple commands such as closing her eyes and showing thumbs up with her left hand.  Appear to have minimal verbal output.  Face appears symmetric, however has left ptosis.  She has unequal pupils: Right pupil is 2 mm and sluggishly reactive.  Left pupil is 3 mm and nonreactive.  She has flaccid right upper extremity and withdraws to physical stimulation in all other extremities.  Gait was deferred.    Objective Data:    Labs:  Lab Results   Component Value Date/Time    PROTHROMBTM 13.9 01/31/2024 03:25 PM    INR 1.06 01/31/2024 03:25 PM      Lab Results   Component Value Date/Time    WBC 17.0 (H) 02/06/2024 04:00 AM    RBC 4.17 (L) 02/06/2024 04:00 AM    HEMOGLOBIN 10.8 (L) 02/06/2024 04:00 AM    HEMATOCRIT 33.9 (L) 02/06/2024 04:00 AM    MCV 81.3 (L) 02/06/2024 04:00 AM    MCH 25.9 (L) 02/06/2024 04:00 AM    MCHC 31.9 (L) 02/06/2024 04:00 AM    MPV 10.1 02/06/2024 04:00 AM    NEUTSPOLYS 87.30 (H) 02/06/2024 04:00 AM    LYMPHOCYTES 5.10 (L) 02/06/2024 04:00 AM    MONOCYTES 5.10 02/06/2024 04:00 AM    EOSINOPHILS 1.50 02/06/2024 04:00 AM    BASOPHILS 0.40 02/06/2024 04:00 AM      Lab Results   Component Value Date/Time    SODIUM 138 02/06/2024 09:58 AM    POTASSIUM 3.4 (L) 02/06/2024 04:00 AM    CHLORIDE 103 02/06/2024 04:00 AM    CO2 26 02/06/2024 04:00 AM    GLUCOSE 132 (H) 02/06/2024 04:00 AM    BUN 15 02/06/2024 04:00 AM    CREATININE 0.33 (L) 02/06/2024 04:00 AM      Lab Results   Component Value Date/Time    CHOLSTRLTOT 165 01/31/2024 03:25 PM     (H) 01/31/2024 03:25 PM    HDL 36 (A) 01/31/2024 03:25 PM    TRIGLYCERIDE 79 01/31/2024 03:25 PM       Lab Results   Component Value Date/Time    ALKPHOSPHAT 127 (H) 02/05/2024 04:15 AM    ASTSGOT 27 02/05/2024 04:15 AM    ALTSGPT 15 02/05/2024 04:15 AM    TBILIRUBIN 0.5 02/05/2024 04:15 AM        Imaging/Testing:    I interpreted  and/or reviewed the patient's neuroimaging    MR-BRAIN-W/O   Final Result      1.  Multifocal areas of acute infarcts in the left cerebral hemisphere.   2.  Small area of acute infarct in the right basal ganglia adjacent to the tip of the ventriculostomy catheter.   3.  Cortical infarct in the right medial parietal lobe.   4.  Mild amount of subdural hemorrhages surrounding the bilateral cerebellar hemispheres and right cerebellum.   5.  Subarachnoid hemorrhage.   6.  There is no hydrocephalus.   7.  There are changes secondary to the previous endovascular repair left internal carotid aneurysm.      CT-CTA NECK WITH & W/O-POST PROCESSING   Final Result      No focal high-grade stenosis, dissection or occlusion of the cervical carotid or vertebral arteries.      CT-CEREBRAL PERFUSION ANALYSIS   Final Result      1.  Cerebral blood flow less than 30% likely representing completed infarct = 0 mL.      2.  T Max more than 6 seconds likely representing combination of completed infarct and ischemia = 0 mL.      3.  Mismatched volume likely representing ischemic brain/penumbra = None      4.  Please note that the cerebral perfusion was performed on the limited brain tissue around the basal ganglia region. Infarct/ischemia outside the CT perfusion sections can be missed in this study.      CT-CTA HEAD WITH & W/O-POST PROCESS   Final Result      1.  Prior LEFT middle cranial fossa aneurysm coiling.  Artifact limits exam.   2.  No abrupt large vessel cut off.   3.  Segmental narrowing of LEFT middle cerebral artery, vasospasm versus artifact.   4.  Ventriculostomy catheter in similar position.   5.  Evolving RIGHT caudate infarct.   6.  Stable intracranial hemorrhage.         DX-ABDOMEN FOR TUBE PLACEMENT   Final Result      Orogastric tube tip now at the mid stomach.      DX-ABDOMEN FOR TUBE PLACEMENT   Final Result      Orogastric tube tip at the proximal stomach with side port just above the GE junction.       US-INTRACRANIAL ARTERY LIMITED   Final Result      DX-CHEST-LIMITED (1 VIEW)   Final Result         1.  Stable chest with perihilar and lower lung zone interstitial and minimal airspace opacities most consistent with pulmonary edema.   2.  No new abnormalities.      EC-ECHOCARDIOGRAM COMPLETE W/O CONT   Final Result      US-INTRACRANIAL ARTERY LIMITED   Final Result      DX-CHEST-PORTABLE (1 VIEW)   Final Result      1.  Lines and tubes appear appropriately located      2.  Improvement of pulmonary edema/airspace process      US-INTRACRANIAL ARTERY COMP   Final Result      CT-HEAD W/O   Final Result         1. Slightly decreased subarachnoid hemorrhage overlying the cerebral hemispheres.   2. Otherwise, stable as above.         DX-CHEST-FOR LINE PLACEMENT Perform procedure in: Patient's Room   Final Result      1.  Moderate interstitial pulmonary edema.   2.   Right-sided central venous catheter terminates with the tip projecting over the expected region of the mid to distal superior vena cava.   3.  Endotracheal tube terminates in satisfactory position at the level of the aortic arch.   4.  Enteric feeding tube terminates in the left upper quadrant projecting over the expected location of the stomach.      CT-STEALTH HEAD W/O   Final Result      1.  Interval placement of a right transparietal ventriculostomy catheter which terminates with the tip near the third ventricle. There is minimally decreased caliber of the ventricular system and compared to previous exam.   2.  Findings of intracranial hemorrhage appear similar to the previous exam. No definite new acute intracranial hemorrhage is identified.   3.  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         DX-CHEST-PORTABLE (1 VIEW)   Final Result      CT-HEAD W/O   Final Result      1. Interval aneurysm coiling of left posterior communicating artery aneurysm.   2. Possible increased subarachnoid hemorrhage.   3.  Intraventricular hemorrhage. There is developing mild hydrocephalus with mildly increased ventricles.   4. Small right convexity subdural hematoma measuring 5 mm in thickness.      These findings were discussed with STEPHANIE BYRD on 1/31/2024 3:14 PM.      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   This aneurysm was embolized to occlusion as described above. Final angiographic images demonstrate only minimal filling of the neck of the aneurysm. The patient will be followed up in   the neuro interventional clinic and brought back for a follow-up angiogram in 6 months.      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      I, Stephanie Byrd was physically present and participated during the entire procedure of the IR-EMBOLIZATION.          Assessment and Plan:  Fay Turner is a 63 y.o. female with history of hypertension who was transferred from outside hospital after she was found to have subarachnoid hemorrhage with left blown pupil on 1/31/2024.  She had Carpio and Potts 4 and modified Swenson of 3.  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  She also underwent EVD placement by neurosurgery.  She is post bleed and postop day #5.  Brain MRI last p.m. revealed multifocal acute infarct in the left hemisphere with a small area of acute infarct in right basal ganglia.  TCD is limited as she has no temporal windows.  CTA of the head and CT perfusion were unremarkable    Plan:  - Okay for every 2 hours neurochecks.  - Maintain systolic blood pressure 140-200.  - Continue nimodipine 60 mg every 4 hours via enteral tube.  - Net even I's and  O's.  - TCD with no temporal window, follow clinically and if neurological deterioration, suggest CT of the head and CT perfusion.  Her exam today has improved.  - Continue Keppra given seizure at arrival.  - EVD management per neurosurgery, currently at 10 cm H2O. ICPs are stable.  - No chemical DVT prevention, continue with SCDs.      The evaluation of the patient, and recommended management, was discussed with Dayana Babcock M.D.    I spent a total of 52 minutes evaluating the patient, reviewing hospital notes, lab works and neuroimaging.      Please note that this dictation was created using voice recognition software. I have made every reasonable attempt to correct obvious errors, but I expect that there are errors of grammar and possibly content that I did not discover before finalizing the note.      Corey Vasquez MD  Acute Care Neurology Services

## 2024-02-06 NOTE — CARE PLAN
The patient is Watcher - Medium risk of patient condition declining or worsening    Shift Goals  Clinical Goals: sbp 140-200, stabke neuro, q1 evd/icp  Patient Goals: padmini  Family Goals: updates    Progress made toward(s) clinical / shift goals:      Problem: Knowledge Deficit - Standard  Goal: Patient and family/care givers will demonstrate understanding of plan of care, disease process/condition, diagnostic tests and medications  Outcome: Progressing     Problem: Safety - Medical Restraint  Goal: Remains free of injury from restraints (Restraint for Interference with Medical Device)  Outcome: Progressing  Flowsheets (Taken 2/5/2024 1926 by Imelda Moreland R.N.)  Addressed this shift: Remains free of injury from restraints (restraint for interference with medical device):   Determine that other, less restrictive measures have been tried or would not be effective before applying the restraint   Evaluate the patient's condition at the time of restraint application   Inform patient/family regarding the reason for restraint   Every 2 hours: Monitor safety, psychosocial status, comfort, nutrition and hydration     Problem: Skin Integrity  Goal: Skin integrity is maintained or improved  Outcome: Progressing     Problem: Pain - Standard  Goal: Alleviation of pain or a reduction in pain to the patient’s comfort goal  Outcome: Progressing     Problem: Neuro Status  Goal: Neuro status will remain stable or improve  Outcome: Progressing       Patient is not progressing towards the following goals:      Problem: Safety - Medical Restraint  Goal: Free from restraint(s) (Restraint for Interference with Medical Device)  Outcome: Not Progressing  Flowsheets (Taken 2/3/2024 0409 by Sandi Lau, R.N.)  Addressed this shift: Free from restraint(s) (restraint for interference with medical device):   ONCE/SHIFT or MINIMUM Every 12 hours: Assess and document the continuing need for restraints   Every 24 hours: Continued use of  restraint requires Licensed Independent Practitioner to perform face to face examination and written order   Identify and implement measures to help patient regain control

## 2024-02-07 LAB
ANION GAP SERPL CALC-SCNC: 8 MMOL/L (ref 7–16)
BASOPHILS # BLD AUTO: 0.5 % (ref 0–1.8)
BASOPHILS # BLD: 0.07 K/UL (ref 0–0.12)
BUN SERPL-MCNC: 17 MG/DL (ref 8–22)
CALCIUM SERPL-MCNC: 9.3 MG/DL (ref 8.5–10.5)
CHLORIDE SERPL-SCNC: 103 MMOL/L (ref 96–112)
CO2 SERPL-SCNC: 24 MMOL/L (ref 20–33)
CREAT SERPL-MCNC: 0.31 MG/DL (ref 0.5–1.4)
EOSINOPHIL # BLD AUTO: 0.3 K/UL (ref 0–0.51)
EOSINOPHIL NFR BLD: 2 % (ref 0–6.9)
ERYTHROCYTE [DISTWIDTH] IN BLOOD BY AUTOMATED COUNT: 39.5 FL (ref 35.9–50)
GFR SERPLBLD CREATININE-BSD FMLA CKD-EPI: 118 ML/MIN/1.73 M 2
GLUCOSE BLD STRIP.AUTO-MCNC: 112 MG/DL (ref 65–99)
GLUCOSE BLD STRIP.AUTO-MCNC: 118 MG/DL (ref 65–99)
GLUCOSE BLD STRIP.AUTO-MCNC: 134 MG/DL (ref 65–99)
GLUCOSE BLD STRIP.AUTO-MCNC: 148 MG/DL (ref 65–99)
GLUCOSE SERPL-MCNC: 142 MG/DL (ref 65–99)
HCT VFR BLD AUTO: 32.6 % (ref 37–47)
HGB BLD-MCNC: 10.5 G/DL (ref 12–16)
IMM GRANULOCYTES # BLD AUTO: 0.08 K/UL (ref 0–0.11)
IMM GRANULOCYTES NFR BLD AUTO: 0.5 % (ref 0–0.9)
LYMPHOCYTES # BLD AUTO: 1.09 K/UL (ref 1–4.8)
LYMPHOCYTES NFR BLD: 7.2 % (ref 22–41)
MAGNESIUM SERPL-MCNC: 2.2 MG/DL (ref 1.5–2.5)
MCH RBC QN AUTO: 25.5 PG (ref 27–33)
MCHC RBC AUTO-ENTMCNC: 32.2 G/DL (ref 32.2–35.5)
MCV RBC AUTO: 79.1 FL (ref 81.4–97.8)
MONOCYTES # BLD AUTO: 0.83 K/UL (ref 0–0.85)
MONOCYTES NFR BLD AUTO: 5.5 % (ref 0–13.4)
NEUTROPHILS # BLD AUTO: 12.71 K/UL (ref 1.82–7.42)
NEUTROPHILS NFR BLD: 84.3 % (ref 44–72)
NRBC # BLD AUTO: 0 K/UL
NRBC BLD-RTO: 0 /100 WBC (ref 0–0.2)
PHOSPHATE SERPL-MCNC: 3 MG/DL (ref 2.5–4.5)
PLATELET # BLD AUTO: 313 K/UL (ref 164–446)
PMV BLD AUTO: 9.8 FL (ref 9–12.9)
POTASSIUM SERPL-SCNC: 3.8 MMOL/L (ref 3.6–5.5)
RBC # BLD AUTO: 4.12 M/UL (ref 4.2–5.4)
SODIUM SERPL-SCNC: 133 MMOL/L (ref 135–145)
SODIUM SERPL-SCNC: 135 MMOL/L (ref 135–145)
SODIUM SERPL-SCNC: 136 MMOL/L (ref 135–145)
WBC # BLD AUTO: 15.1 K/UL (ref 4.8–10.8)

## 2024-02-07 PROCEDURE — A9270 NON-COVERED ITEM OR SERVICE: HCPCS | Performed by: INTERNAL MEDICINE

## 2024-02-07 PROCEDURE — 85025 COMPLETE CBC W/AUTO DIFF WBC: CPT

## 2024-02-07 PROCEDURE — 99233 SBSQ HOSP IP/OBS HIGH 50: CPT | Performed by: PSYCHIATRY & NEUROLOGY

## 2024-02-07 PROCEDURE — 700105 HCHG RX REV CODE 258: Performed by: INTERNAL MEDICINE

## 2024-02-07 PROCEDURE — 83735 ASSAY OF MAGNESIUM: CPT

## 2024-02-07 PROCEDURE — 80048 BASIC METABOLIC PNL TOTAL CA: CPT

## 2024-02-07 PROCEDURE — 99291 CRITICAL CARE FIRST HOUR: CPT | Performed by: INTERNAL MEDICINE

## 2024-02-07 PROCEDURE — 99292 CRITICAL CARE ADDL 30 MIN: CPT | Performed by: INTERNAL MEDICINE

## 2024-02-07 PROCEDURE — 770022 HCHG ROOM/CARE - ICU (200)

## 2024-02-07 PROCEDURE — 92523 SPEECH SOUND LANG COMPREHEN: CPT

## 2024-02-07 PROCEDURE — 700102 HCHG RX REV CODE 250 W/ 637 OVERRIDE(OP): Performed by: NURSE PRACTITIONER

## 2024-02-07 PROCEDURE — 92526 ORAL FUNCTION THERAPY: CPT

## 2024-02-07 PROCEDURE — 700102 HCHG RX REV CODE 250 W/ 637 OVERRIDE(OP): Performed by: INTERNAL MEDICINE

## 2024-02-07 PROCEDURE — 84100 ASSAY OF PHOSPHORUS: CPT

## 2024-02-07 PROCEDURE — 84295 ASSAY OF SERUM SODIUM: CPT | Mod: 91

## 2024-02-07 PROCEDURE — 82962 GLUCOSE BLOOD TEST: CPT | Mod: 91

## 2024-02-07 PROCEDURE — A9270 NON-COVERED ITEM OR SERVICE: HCPCS | Performed by: NURSE PRACTITIONER

## 2024-02-07 RX ADMIN — NIMODIPINE 60 MG: 60 SOLUTION ORAL at 06:30

## 2024-02-07 RX ADMIN — LEVETIRACETAM 500 MG: 500 TABLET, FILM COATED ORAL at 06:30

## 2024-02-07 RX ADMIN — NIMODIPINE 60 MG: 60 SOLUTION ORAL at 10:03

## 2024-02-07 RX ADMIN — SODIUM CHLORIDE: 9 INJECTION, SOLUTION INTRAVENOUS at 11:49

## 2024-02-07 RX ADMIN — NIMODIPINE 60 MG: 60 SOLUTION ORAL at 14:04

## 2024-02-07 RX ADMIN — ACETAMINOPHEN 650 MG: 325 TABLET, FILM COATED ORAL at 10:03

## 2024-02-07 RX ADMIN — ASPIRIN 81 MG 81 MG: 81 TABLET ORAL at 06:30

## 2024-02-07 RX ADMIN — ACETAMINOPHEN 650 MG: 325 TABLET, FILM COATED ORAL at 04:45

## 2024-02-07 RX ADMIN — POTASSIUM BICARBONATE 25 MEQ: 978 TABLET, EFFERVESCENT ORAL at 10:03

## 2024-02-07 RX ADMIN — NIMODIPINE 60 MG: 60 SOLUTION ORAL at 22:00

## 2024-02-07 RX ADMIN — LEVETIRACETAM 500 MG: 500 TABLET, FILM COATED ORAL at 18:01

## 2024-02-07 RX ADMIN — NIMODIPINE 60 MG: 60 SOLUTION ORAL at 02:05

## 2024-02-07 RX ADMIN — NIMODIPINE 60 MG: 60 SOLUTION ORAL at 18:01

## 2024-02-07 ASSESSMENT — ENCOUNTER SYMPTOMS
ABDOMINAL PAIN: 0
NAUSEA: 0
HEADACHES: 0
BLURRED VISION: 0
FEVER: 0
CHILLS: 0
HEARTBURN: 0
VOMITING: 0

## 2024-02-07 ASSESSMENT — PAIN DESCRIPTION - PAIN TYPE
TYPE: ACUTE PAIN

## 2024-02-07 NOTE — PROGRESS NOTES
Neurosurgery PA contacted about elevated ICP 16-18 and patient complaint of headache, neuro check remains the same.    Orders received to move the EVD to 15 cm H2O

## 2024-02-07 NOTE — THERAPY
Speech Language Pathology   Cognitive Linguistic Communication Evaluation     Patient Name: Fay Turner  AGE:  63 y.o., SEX:  female  Medical Record #: [id number]  Date of Service: 2024      History of Present Illness  Pt is a 63 y.o. F w/ PMHx of HTN who was transferred from OSH d/t AMS w/ rapidly progressive neurological changes/obtundation. CT head revealed diffuse subarachnoid hemorrhage w/ evidence of large L P-Comm aneurysm on CTA. Pt now s/p successful coil embolization of the L P-comm aneurysm and EVD placement x2 on . Pt intubated at OSH on , extubated . On , pt noted to have L MCA vasospasms.     No Hx of ST per EMR.     MRI Brain :  1.  Multifocal areas of acute infarcts in the left cerebral hemisphere.  2.  Small area of acute infarct in the right basal ganglia adjacent to the tip of the ventriculostomy catheter.  3.  Cortical infarct in the right medial parietal lobe.  4.  Mild amount of subdural hemorrhages surrounding the bilateral cerebellar hemispheres and right cerebellum.  5.  Subarachnoid hemorrhage.  6.  There is no hydrocephalus.  7.  There are changes secondary to the previous endovascular repair left internal carotid aneurysm.    Dx Chest :   1.  Lines and tubes appear appropriately located  2.  Improvement of pulmonary edema/airspace process    General Information  Vitals  O2 (LPM): 1  O2 Delivery Device: Silicone Nasal Cannula  Level of Consciousness: Alert, Awake  Patient Behaviors: Fatigue, Flat Affect  Orientation: Self, , General place  Follows Directives: Yes - simple commands only    Prior Living Situation & Level of Function  Prior Services: Home-Independent  Lives with - Patient's Self Care Capacity: Spouse  Comments: info per son; pt works at [employer],  can provide assist as needed but does work a few hours everyday; son can assist as well; denies falls or other restrictions  Communication: Per family WFL  Swallowing: Per family WFL    "  Subjective  Pt cleared by RN for cognitive-linguistic communication evaluation. Completed subsequently to dysphagia treatment. Per family report, pt w/ increased verbal output and improvements in intelligibility on this date. Pt stating, \"my speech sounds a little slurred.\" Of note, testing limited on this date as pt was appreciated to fall asleep and despite cues was unable to maintain appropriate alertness for additional testing.     Communication Domain(s)  Expressive Language: WFL  Receptive Language: Moderate (suspect 2/2 cognitive deficits)  Cognitive-Linguistic:  Mild-Severe  Motor Speech: Mild   Social/Pragmatic: Flat Affect     *Further testing across all domains warranted as appropriate.*      Assessment  Standard and non-standardized measures were utilized for this evaluation.     Cognistat  The Cognistat is multidimensional screening measure of cognition designed to primarily diagnose cognitive impairments. Scores were yielded in the following domains: orientation, attention, language (repetition, naming subscales), verbal memory, and calculation. Further testing discontinued as pt was appreciated to fall asleep.     Orientation: Moderate (6) (Pt oriented to self, age, current location, and city. Despite cues, not oriented to date, day of week, time of day, and reason for admit).   Attention: Mild (5)   Repetition: Average (12)  Naming: Average (8)  Memory: Severe (4) (Pt required initial presentation of stimuli x4 for immediate recall of +4/4 items. After a 5 minute delay, pt was able to recall +2/4 items with category prompt. Unable to recall additional items provided a closed set).   Calculations: Moderate (1) (Pt able to complete simple single digit math. Declined additional questions, \"Too hard. I don't know.\").     *Not all brain lesions produce cognitive deficits that will be detected by cognitstat. Average Range scores, therefore, cannot be taken as evidence that brain pathology does not exist. " Similarly, scores falling in the Mild, Moderate, or Severe range of impairment do not necessarily reflect brain dysfunction.    Auditory Comprehension   Informal measures to assess auditory comprehension were utilized during this evaluation including verbal presentation of one, two, and three step directions as well as simple/moderate/complex level of yes/no questions. Pt followed one step directions with 100% accuracy, two-step directions with 50% accuracy, and unable to follow 3-step directions. Pt was able to answer simple yes/no questions with 100% accuracy, unable to answer moderate/complex yes/no questions. Suspect deficits are 2/2 cognition and STM deficits.     Motor Speech  Primary method of communication: Verbal   Intelligibility at the word level: 100%  Intelligibility at the sentence level: 100%  Intelligibility at the conversation level: 88.5%      Dysarthria severity: mild   Dysarthria subtype: flaccid c/b reduced respiratory strength, breathy voice, reduced speed and ROM of articulators     Comments: Pt w/ whisper like vocal quality throughout the evaluation. Suspect 2/2 fatigue as pt was stimuable to increase intensity w/ cues.      Clinical Impressions  Results of cognitive linguistic testing indicate that the pt has moderate deficits in receptive language (suspect 2/2 STM deficits), mild-severe cognitive deficits, and a mild dysarthria. Suspect deficits are acute 2/2 SAH and multifocal acute CVAs. Testing limited on this date d/t fatigue as pt was appreciated to fall asleep during the evaluation. Further testing to be completed as appropriate. ST indicated in the acute care setting and next level of care to target such deficits. Pt will benefit from intermittent supervision throughout the day and direct A/ IADLS upon discharge d/t same.     NOTE: It is not within the scope of practice of Speech-Language Pathologists to determine patient capacity. Please defer to the physician or psych to complete  this assessment.     Recommendations  Supervision Needs Upons Discharge: Intermittent supervision throughout the day and direct assistance with IADLs (see below)  IADLs: Medication management, Financial management, Appointment management, Household chores, Cooking     SLP Treatment Plan  Treatment Plan: Dysphagia Treatment, Cognitive Treatment, Patient/Family/Caregiver Training (motor speech)  SLP Frequency: 4x Per Week  Estimated Duration: Until Therapy Goals Met    Anticipated Discharge Needs  Discharge Recommendations: Recommend post-acute placement for additional speech therapy services prior to discharge home  Therapy Recommendations Upon DC: Dysphagia Training, Comprehension Training, Reading Training, Writing Training, Cognitive-Linguistic Training, Community Re-Integration, Patient / Family / Caregiver Education, Other (See Comments) (motor speech)    Patient / Family Goals  Patient / Family Goal #1: nods yes to ice chips  Goal #1 Outcome: Goal met  Short Term Goal # 1: Pt will participate in an instrumental swallow study via FEES to determine airway protection/swallow efficiency and guide dysphagia management.  Goal Outcome # 1: Goal met, new goal added  Short Term Goal # 1 B : Pt will consume a MM5/TN0 diet with 1:1 supv without any overt s/s of aspiration or decline in respiratory status  Goal Outcome  # 1 B: Progressing as expected  Short Term Goal # 2: With mod-max cues, pt will be AAOx4 during >65% of opportunities.  Short Term Goal # 3: With max cues, pt will recall salient/functional information after a 5 min delay during >65% of opportunities.  Short Term Goal # 4: With min-mod cues, pt will participate in additional cognitive testing to further guide POC.  Short Term Goal # 5: Pt will utilize 'clear speech' techniques to increase intelligibility at the conversational level to >90%.    Anai Dumont, SLP

## 2024-02-07 NOTE — CARE PLAN
The patient is Watcher - Medium risk of patient condition declining or worsening    Shift Goals  Clinical Goals: stable neuro, Q1 ICP & EVD, -200  Patient Goals: padmini  Family Goals: updates    Progress made toward(s) clinical / shift goals:      Problem: Knowledge Deficit - Standard  Goal: Patient and family/care givers will demonstrate understanding of plan of care, disease process/condition, diagnostic tests and medications  Outcome: Progressing     Problem: Safety - Medical Restraint  Goal: Remains free of injury from restraints (Restraint for Interference with Medical Device)  Outcome: Progressing  Flowsheets (Taken 2/5/2024 1926 by Imelda Moreland, R.N.)  Addressed this shift: Remains free of injury from restraints (restraint for interference with medical device):   Determine that other, less restrictive measures have been tried or would not be effective before applying the restraint   Evaluate the patient's condition at the time of restraint application   Inform patient/family regarding the reason for restraint   Every 2 hours: Monitor safety, psychosocial status, comfort, nutrition and hydration     Problem: Pain - Standard  Goal: Alleviation of pain or a reduction in pain to the patient’s comfort goal  Outcome: Progressing     Problem: Hemodynamics  Goal: Patient's hemodynamics, fluid balance and neurologic status will be stable or improve  Outcome: Progressing     Problem: Neuro Status  Goal: Neuro status will remain stable or improve  Outcome: Progressing       Patient is not progressing towards the following goals:      Problem: Safety - Medical Restraint  Goal: Free from restraint(s) (Restraint for Interference with Medical Device)  Outcome: Not Progressing  Flowsheets (Taken 2/3/2024 0409 by Sandi Lau, R.N.)  Addressed this shift: Free from restraint(s) (restraint for interference with medical device):   ONCE/SHIFT or MINIMUM Every 12 hours: Assess and document the continuing need for  restraints   Every 24 hours: Continued use of restraint requires Licensed Independent Practitioner to perform face to face examination and written order   Identify and implement measures to help patient regain control

## 2024-02-07 NOTE — PROGRESS NOTES
Neurology Progress Note  Neurohospitalist Service, [facility]    Referring Physician: Jeremy M Gonda, M.D.    No chief complaint on file.      HPI: Refer to initial documented Neurology H&P, as detailed in the patient's chart.    Interval History 2/7: No new events overnight.    Review of systems: In addition to what is detailed in the HPI and/or updated in the interval history, all other systems reviewed and are negative.    Past Medical History:    has no past medical history on file.    FHx:  family history is not on file.    SHx:       Medications:    Current Facility-Administered Medications:     aspirin (Asa) chewable tab 81 mg, 81 mg, Enteral Tube, DAILY, Darlin Hope A.P.R.NJennifer, 81 mg at 02/07/24 0630    norepinephrine (Levophed) 8 mg in 250 mL NS infusion (premix), 0-1 mcg/kg/min (Ideal), Intravenous, Continuous, Jeremy M Gonda, M.D., Stopped at 02/05/24 1300    phenylephrine 40 mg/250 mL NS premix, 0-5 mcg/kg/min (Ideal), Intravenous, Continuous, Jeremy M Gonda, M.D., Stopped at 02/04/24 0911    NS infusion, , Intravenous, Continuous, Jeremy M Gonda, M.D., Last Rate: 83 mL/hr at 02/06/24 2341, New Bag at 02/06/24 2341    insulin regular (HumuLIN R,NovoLIN R) injection, 2-9 Units, Subcutaneous, Q6HRS, 2 Units at 02/04/24 1830 **AND** POC blood glucose manual result, , , Q6H **AND** NOTIFY MD and PharmD, , , Once **AND** Administer 20 grams of glucose (approximately 8 ounces of fruit juice) every 15 minutes PRN FSBG less than 70 mg/dL, , , PRN **AND** dextrose 50% (D50W) injection 25 g, 25 g, Intravenous, Q15 MIN PRN, Jeremy M Gonda, M.D.    enalaprilat (Vasotec) injection 1.25 mg 1 mL, 1.25 mg, Intravenous, Q6HRS PRN, Jeremy M Gonda, M.D.    hydrALAZINE (Apresoline) injection 10-20 mg, 10-20 mg, Intravenous, Q4HRS PRN, Jeremy M Gonda, M.D.    labetalol (Normodyne/Trandate) injection 10-20 mg, 10-20 mg, Intravenous, Q4HRS PRN, Jeremy M Gonda, M.D.    niMODipine (Nymalize) oral syringe 60  mg, 60 mg, Enteral Tube, Q4HRS, Jeremy M Gonda, M.D., 60 mg at 02/07/24 0630    levETIRAcetam (Keppra) tablet 500 mg, 500 mg, Enteral Tube, BID, Jeremy M Gonda, M.D., 500 mg at 02/07/24 0630    Pharmacy Consult: Enteral tube insertion - review meds/change route/product selection, 1 Each, Other, PHARMACY TO DOSE, Jeremy M Gonda, M.D.    Respiratory Therapy Consult, , Nebulization, Continuous RT, Jeremy M Gonda, M.D.    senna-docusate (Pericolace Or Senokot S) 8.6-50 MG per tablet 2 Tablet, 2 Tablet, Enteral Tube, BID, 2 Tablet at 02/03/24 0514 **AND** polyethylene glycol/lytes (Miralax) Packet 1 Packet, 1 Packet, Enteral Tube, QDAY PRN **AND** magnesium hydroxide (Milk Of Magnesia) suspension 30 mL, 30 mL, Enteral Tube, QDAY PRN **AND** bisacodyl (Dulcolax) suppository 10 mg, 10 mg, Rectal, QDAY PRN, Jeremy M Gonda, M.D. MD Alert...ICU Electrolyte Replacement per Pharmacy, , Other, PHARMACY TO DOSE, Jeremy M Gonda, M.D.    ondansetron (Zofran ODT) dispertab 4 mg, 4 mg, Enteral Tube, Q4HRS PRN, 4 mg at 01/31/24 2248 **OR** ondansetron (Zofran) syringe/vial injection 4 mg, 4 mg, Intravenous, Q4HRS PRN, Jeremy M Gonda, M.D., 4 mg at 02/02/24 1758    acetaminophen (Tylenol) tablet 650 mg, 650 mg, Enteral Tube, Q4HRS PRN, 650 mg at 02/07/24 0445 **OR** acetaminophen (Tylenol) suppository 650 mg, 650 mg, Rectal, Q4HRS PRN, Jeremy M Gonda, M.D.    LORazepam (Ativan) injection 2 mg, 2 mg, Intravenous, Q5 MIN PRN, Sin M Gonda, M.D.    prochlorperazine (Compazine) injection 10 mg, 10 mg, Intravenous, Q6HRS PRN, David Mclaughlin Jr., D.O., 10 mg at 01/31/24 9884    Physical Examination:     Vitals:    02/07/24 0300 02/07/24 0400 02/07/24 0500 02/07/24 0600   BP: (!) 168/91 (!) 174/93 (!) 160/86 (!) 156/87   Pulse: (!) 111 93 99 93   Resp: (!) 21 (!) 29 (!) 29 (!) 30   TempSrc:  Bladder  Bladder   SpO2: 98% 100% 98% 94%   Weight:       Height:               NEUROLOGICAL EXAM:       Patient is awake alert to herself.  Says  she is in hospital.  Does not appear to know why.  She will follow some simple commands.  Pupils are now equal reactive.  Face looks symmetrical.  She has antigravity movement in the left upper.  She did squeeze my hand on the right.  Moves both toes.        Objective Data:    Labs:  Lab Results   Component Value Date/Time    PROTHROMBTM 13.9 01/31/2024 03:25 PM    INR 1.06 01/31/2024 03:25 PM      Lab Results   Component Value Date/Time    WBC 15.1 (H) 02/07/2024 02:15 AM    RBC 4.12 (L) 02/07/2024 02:15 AM    HEMOGLOBIN 10.5 (L) 02/07/2024 02:15 AM    HEMATOCRIT 32.6 (L) 02/07/2024 02:15 AM    MCV 79.1 (L) 02/07/2024 02:15 AM    MCH 25.5 (L) 02/07/2024 02:15 AM    MCHC 32.2 02/07/2024 02:15 AM    MPV 9.8 02/07/2024 02:15 AM    NEUTSPOLYS 84.30 (H) 02/07/2024 02:15 AM    LYMPHOCYTES 7.20 (L) 02/07/2024 02:15 AM    MONOCYTES 5.50 02/07/2024 02:15 AM    EOSINOPHILS 2.00 02/07/2024 02:15 AM    BASOPHILS 0.50 02/07/2024 02:15 AM      Lab Results   Component Value Date/Time    SODIUM 135 02/07/2024 02:15 AM    POTASSIUM 3.8 02/07/2024 02:15 AM    CHLORIDE 103 02/07/2024 02:15 AM    CO2 24 02/07/2024 02:15 AM    GLUCOSE 142 (H) 02/07/2024 02:15 AM    BUN 17 02/07/2024 02:15 AM    CREATININE 0.31 (L) 02/07/2024 02:15 AM      Lab Results   Component Value Date/Time    CHOLSTRLTOT 165 01/31/2024 03:25 PM     (H) 01/31/2024 03:25 PM    HDL 36 (A) 01/31/2024 03:25 PM    TRIGLYCERIDE 79 01/31/2024 03:25 PM       Lab Results   Component Value Date/Time    ALKPHOSPHAT 127 (H) 02/05/2024 04:15 AM    ASTSGOT 27 02/05/2024 04:15 AM    ALTSGPT 15 02/05/2024 04:15 AM    TBILIRUBIN 0.5 02/05/2024 04:15 AM        Imaging/Testing:  DX-ABDOMEN FOR TUBE PLACEMENT   Final Result         1.  Nonspecific bowel gas pattern in the upper abdomen.   2.  Nasogastric tube tip terminates overlying the expected location of the pylorus or first duodenal segment.   3.  Hazy interstitial pulmonary edema and/or infiltrates, similar to prior  study.   4.  Cardiomegaly      DX-CHEST-PORTABLE (1 VIEW)   Final Result      1.  Supportive tubing as described above.   2.  No other significant change from prior exam.         MR-BRAIN-W/O   Final Result      1.  Multifocal areas of acute infarcts in the left cerebral hemisphere.   2.  Small area of acute infarct in the right basal ganglia adjacent to the tip of the ventriculostomy catheter.   3.  Cortical infarct in the right medial parietal lobe.   4.  Mild amount of subdural hemorrhages surrounding the bilateral cerebellar hemispheres and right cerebellum.   5.  Subarachnoid hemorrhage.   6.  There is no hydrocephalus.   7.  There are changes secondary to the previous endovascular repair left internal carotid aneurysm.      CT-CTA NECK WITH & W/O-POST PROCESSING   Final Result      No focal high-grade stenosis, dissection or occlusion of the cervical carotid or vertebral arteries.      CT-CEREBRAL PERFUSION ANALYSIS   Final Result      1.  Cerebral blood flow less than 30% likely representing completed infarct = 0 mL.      2.  T Max more than 6 seconds likely representing combination of completed infarct and ischemia = 0 mL.      3.  Mismatched volume likely representing ischemic brain/penumbra = None      4.  Please note that the cerebral perfusion was performed on the limited brain tissue around the basal ganglia region. Infarct/ischemia outside the CT perfusion sections can be missed in this study.      CT-CTA HEAD WITH & W/O-POST PROCESS   Final Result      1.  Prior LEFT middle cranial fossa aneurysm coiling.  Artifact limits exam.   2.  No abrupt large vessel cut off.   3.  Segmental narrowing of LEFT middle cerebral artery, vasospasm versus artifact.   4.  Ventriculostomy catheter in similar position.   5.  Evolving RIGHT caudate infarct.   6.  Stable intracranial hemorrhage.         DX-ABDOMEN FOR TUBE PLACEMENT   Final Result      Orogastric tube tip now at the mid stomach.      DX-ABDOMEN FOR TUBE  PLACEMENT   Final Result      Orogastric tube tip at the proximal stomach with side port just above the GE junction.      US-INTRACRANIAL ARTERY LIMITED   Final Result      DX-CHEST-LIMITED (1 VIEW)   Final Result         1.  Stable chest with perihilar and lower lung zone interstitial and minimal airspace opacities most consistent with pulmonary edema.   2.  No new abnormalities.      EC-ECHOCARDIOGRAM COMPLETE W/O CONT   Final Result      US-INTRACRANIAL ARTERY LIMITED   Final Result      DX-CHEST-PORTABLE (1 VIEW)   Final Result      1.  Lines and tubes appear appropriately located      2.  Improvement of pulmonary edema/airspace process      US-INTRACRANIAL ARTERY COMP   Final Result      CT-HEAD W/O   Final Result         1. Slightly decreased subarachnoid hemorrhage overlying the cerebral hemispheres.   2. Otherwise, stable as above.         DX-CHEST-FOR LINE PLACEMENT Perform procedure in: Patient's Room   Final Result      1.  Moderate interstitial pulmonary edema.   2.   Right-sided central venous catheter terminates with the tip projecting over the expected region of the mid to distal superior vena cava.   3.  Endotracheal tube terminates in satisfactory position at the level of the aortic arch.   4.  Enteric feeding tube terminates in the left upper quadrant projecting over the expected location of the stomach.      CT-STEALTH HEAD W/O   Final Result      1.  Interval placement of a right transparietal ventriculostomy catheter which terminates with the tip near the third ventricle. There is minimally decreased caliber of the ventricular system and compared to previous exam.   2.  Findings of intracranial hemorrhage appear similar to the previous exam. No definite new acute intracranial hemorrhage is identified.   3.  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         DX-CHEST-PORTABLE (1 VIEW)   Final Result      CT-HEAD W/O   Final Result      1. Interval aneurysm  coiling of left posterior communicating artery aneurysm.   2. Possible increased subarachnoid hemorrhage.   3. Intraventricular hemorrhage. There is developing mild hydrocephalus with mildly increased ventricles.   4. Small right convexity subdural hematoma measuring 5 mm in thickness.      These findings were discussed with STEPHANIE BYRD on 1/31/2024 3:14 PM.      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   This aneurysm was embolized to occlusion as described above. Final angiographic images demonstrate only minimal filling of the neck of the aneurysm. The patient will be followed up in   the neuro interventional clinic and brought back for a follow-up angiogram in 6 months.      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      I, Stephanie Byrd was physically present and participated during the entire procedure of the IR-EMBOLIZATION.            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  Post bleed and post coiling day #3.  Carpio Potts scale was a 4.  Modified Swenson scale 3.  Patient still intubated however following some commands.  Unable to obtain TCD's due to lack of window.  EVD has had an output approximately 278 cc last 24 hours.  ICPs ranged from 5-11.  Sodium within normal limits.  Plan today to wean and extubate.      Update 2/4.  Patient was extubated yesterday.  Later in the afternoon/evening the nursing staff and NP mention the patient was awake and able to communicate following commands.  Around midnight patient became less interactive more  lethargic.  A CTA and CT perfusion was done at that time.  Pressures were increased from 140s to 180s 190s systolic.  This morning patient is still interactive she is lethargic.  Based on upon my examination there is not a lot different compared to yesterday morning before she was extubated.  I reviewed the CT scans with neuro IR.  There is no obvious signs of vasospasm on scan.  Perfusion is normal.  At this time recommend keeping the pressure between 140-200 systolic.  Will get an MRI brain.  Unfortunately we do not have TCD's to follow for vasospasm.    Post bleed and postop day #4      Update 2/7  Post bleed and postop day number 7.  Patient examination appears to be stable.  Improved compared to last time I saw her in the fourth.  TCD's have been discontinued as cannot get any windows.  EVD showed ICPs all in the single digits.  Output was 227 cc.  Set at 10.  For now continue supportive therapy.  Maintaining slightly higher blood pressures given the lack of TCD monitoring.  Will have to follow clinically.  Repeating CTA and CT perfusion if needed.    Plan:  1.  Obtain as needed CTA and classed CTP if clinically indicated for vasospasm.  2.  Continue with every hour neurochecks  3.  Blood pressure parameters to keep between 140-200 systolic.  4.  Nimodipine 30 mg every 12 hours.  5.  Maintain normothermia  6.  Maintain that evens I's and O's.   sodium levels.    7.  Continue with SCDs for DVT prophylaxis.  May use chemical DVT prophylactics once cleared by neurosurgery with the EVD.  8.  Neurosurgery managing the EVD.  Set at 10.    9.  [facility] on [facility].    Spent over 55 minutes reviewing notes, imaging, examined the patient and going over the care plan the primary team.      This chart was partially generated using voice recognition technology and sound alike word replacement may be present, best efforts were made to make the chart accurate.    Jose Raul Tenorio,  MD  Board Certified Neurology, ABPN  (t) [phone]

## 2024-02-07 NOTE — PROGRESS NOTES
Neuro Interventional Radiology   Progress Note     Author: ELIE Hernandez Date & Time created: 2/7/2024  8:20 AM   Date of admission  1/31/2024  Note to reader: this note follows the APSO format rather than the historical SOAP format. Assessment and plan located at the top of the note for ease of use.    Chief Complaint  63 y.o. female admitted 1/31/2024 with subarachnoid hemorrhage.    HPI  Fay France is a 64 yo female with PMH significant for HTN who presented to OSH for sudden onset of new eyelid droop, and generalized headache without significant head trauma. Pt seized in OSH CT scan, was loaded with Keppra and transferred for higher level of care. 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 Carpio Potts 4; Modified Swenson scale 3.  01/31/24 MAURICE Eason completed a cerebral aneurysm with coil embolization of large left PCOMM aneurysm.    Interval History IR:  02/01/24: RIGHT groin access site soft, non-tender, without ecchymosis. Dressing clean, dry, intact. Pedal pulses +2 bilaterally with feet pink, and warm, cap refill <3 sec. Pt is intubated and ventilated with EVD. She is not following commands, but moves right foot to stimuli. I reviewed the most recent labs including WBC 19.6, Hgb 14.2, Cr 0.61. Coordinated post IR procedure care with hospital nursing staff.     02/05/24:  PBD/post Pcom aneurysm embolization with coil #5.  Pt extubated yesterday (2/4). A stat CTA and CT perfusion done early am on 2/4 (increased lethargy) no obvious vasospasm noted. TCD's with poor windows therefor no baseline Tcd's obtained.  I reviewed FU MRI brain(02/04) which showed small L MCA infarcts-Vasopressors infusing and  keeping -200 to mitigate vasospasm. Anterior EVD in place at 10 cm H20 above tragus with good waveform noted on monitor-R groin site soft, nontender without edema, bleeding, small scab with small  ecchymosis. I  reviewed today's labs: WBC 13.7; hemoglobin 10.5;  ; Cr 0.40; respiratory culture- +MSSA in sputum-continuing Rocephin today is day 5 of 5.  I's/O's-last 24 hours+ 551/since admission +1260; ICP 2-10 (good ICP waveform noted on monitor).      02/06/24:  PBD/post Pcom aneurysm embolization with coil #6. TCD's with poor windows, recommend following neuro assessment. I reviewed FU MRI brain(02/04) which showed small L MCA infarcts-Goal -200 to mitigate vasospasm. Anterior EVD in place at 10 cm H20 above tragus with good waveform noted on monitor-R groin site soft, nontender without edema, bleeding, small scab with small  ecchymosis. I reviewed today's labs: WBC 17; hemoglobin 10.8;  ; Cr 0.33; respiratory culture- +MSSA in sputum -completed Rocephin.  I's/O's-last 24 hours -193 /since admission +1037; ICP 5-11 (good ICP waveform noted on monitor). I started ASA therapy 2nd to multiple infarcts noted on MRI.  ASA therapy discussed and approved by Fer Helms and  Dr. Eason.     02/07/24: SAH, Carpio Potts 4;Modified Swenson 3.  PBD/post Pcom aneurysm embolization with coil #7. TCD's with poor windows, recommend following neuro assessment. -Goal -200 to mitigate vasospasm. Anterior EVD in place at 10 cm H20 above tragus with good waveform noted on monitor-ecchymosis. I reviewed today's labs: WBC 15.1; Hemoglobin 10.5;  ; Cr 0.31; .  I's/O's-last 24 hours -190 /since admission +876; ICP 3-10 (good ICP waveform noted on monitor).     Assessment/Plan     Principal Problem:    SAH (subarachnoid hemorrhage) (HCC)  Active Problems:    Acute respiratory failure with hypoxia (HCC)    Secondary hypertension    Acute pulmonary edema (HCC)    Seizure (HCC)    Hypokalemia    Hypotension due to hypovolemia    Hypophosphatemia    Anemia    Pneumonia of both lungs due to methicillin susceptible Staphylococcus aureus (MSSA) (HCC)    Aneurysm of ascending aorta without rupture (HCC)     Hyperglycemia      Plan IR  -Blood pressure parameters per neuro- keep 140-200-to mitigate vasospasm  -Continue Nimodipine  -TCD's (poor windows) therefore need to rely on assessment for vasospasm watch  - ContinueASA 81 mg daily 2nd to FU MRI brain (02/04) which showed small L MCA infarcts  - If suspect vasospasm consider CTA and CT perfusion  - Neuro checks per ICU protocol  - Neurosurgery EVD placement- managed by neurosurgery  - Maintain normoglycemia, normothermia, normo-natremia and euvolemic  - Vascular Neurology and Neurosurgery following  - Outpatient follow up in approximately 4 weeks from IR intervention (approx 03/0//24) . Our office will call to schedule      -Thank you for allowing Interventional Radiology team to participate in the patients care, if any additional care or requests are needed in the future please do not hesitate call or place IR order   [phone]              ROS-able to answer some ROS questions  Review of Systems  Physical Exam   Review of Systems   Constitutional:  Negative for chills and fever.   HENT:  Negative for hearing loss.    Eyes:  Negative for blurred vision.   Cardiovascular:  Negative for chest pain.   Gastrointestinal:  Negative for abdominal pain, heartburn, nausea and vomiting.   Skin:  Negative for rash.   Neurological:  Negative for headaches.      Vitals:    02/07/24 0600   BP: (!) 156/87   Pulse: 93   Resp: (!) 30   SpO2: 94%        Physical Exam  Vitals and nursing note reviewed.   Constitutional:       General: She is sleeping.      Comments: Wakes to stimuli   HENT:      Head:      Comments: Anterior EVD at 10cm H20 above tragus and open.         Mouth/Throat:      Mouth: Mucous membranes are dry.      Pharynx: Oropharynx is clear.   Eyes:      Comments: Left ptosis   CNIII palsy    Cardiovascular:      Rate and Rhythm: Regular rhythm. Tachycardia present.      Pulses:           Radial pulses are 2+ on the right side and 2+ on the left side.        Dorsalis  pedis pulses are 2+ on the right side and 2+ on the left side.      Comments: Right groin access site soft, non-tender, without ecchymosis; dressing cdi.       Pulmonary:      Effort: No respiratory distress.      Breath sounds: Normal air entry.   Chest:   Breasts:     Breasts are symmetrical.   Abdominal:      Palpations: Abdomen is soft.   Genitourinary:     Comments: Cuellar cath to down drain- clear yellow urine  Skin:     General: Skin is warm and dry.      Capillary Refill: Capillary refill takes less than 2 seconds.   Neurological:      Mental Status: She is lethargic.      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     Psychiatric:      Comments: Non verbal             Labs    Recent Labs     02/05/24 0415 02/06/24  0400 02/07/24  0215   WBC 13.7* 17.0* 15.1*   RBC 4.04* 4.17* 4.12*   HEMOGLOBIN 10.5* 10.8* 10.5*   HEMATOCRIT 32.0* 33.9* 32.6*   MCV 79.2* 81.3* 79.1*   MCH 26.0* 25.9* 25.5*   MCHC 32.8 31.9* 32.2   RDW 40.7 41.1 39.5   PLATELETCT 258 295 313   MPV 9.8 10.1 9.8       Recent Labs     02/05/24  0415 02/05/24  1225 02/06/24  0400 02/06/24  0958 02/06/24  1740 02/07/24  0215   SODIUM 140   < > 138 138 137 135   POTASSIUM 3.2*  --  3.4*  --   --  3.8   CHLORIDE 103  --  103  --   --  103   CO2 25  --  26  --   --  24   GLUCOSE 152*  --  132*  --   --  142*   BUN 16  --  15  --   --  17   CREATININE 0.40*  --  0.33*  --   --  0.31*   CALCIUM 9.2  --  9.1  --   --  9.3    < > = values in this interval not displayed.       Recent Labs     02/05/24  0415 02/06/24  0400 02/07/24  0215   ALBUMIN 3.4  --   --    TBILIRUBIN 0.5  --   --    ALKPHOSPHAT 127*  --   --    TOTPROTEIN 6.6  --   --     ALTSGPT 15  --   --    ASTSGOT 27  --   --    CREATININE 0.40* 0.33* 0.31*       DX-ABDOMEN FOR TUBE PLACEMENT   Final Result         1.  Nonspecific bowel gas pattern in the upper abdomen.   2.  Nasogastric tube tip terminates overlying the expected location of the pylorus or first duodenal segment.   3.  Hazy interstitial pulmonary edema and/or infiltrates, similar to prior study.   4.  Cardiomegaly      DX-CHEST-PORTABLE (1 VIEW)   Final Result      1.  Supportive tubing as described above.   2.  No other significant change from prior exam.         MR-BRAIN-W/O   Final Result      1.  Multifocal areas of acute infarcts in the left cerebral hemisphere.   2.  Small area of acute infarct in the right basal ganglia adjacent to the tip of the ventriculostomy catheter.   3.  Cortical infarct in the right medial parietal lobe.   4.  Mild amount of subdural hemorrhages surrounding the bilateral cerebellar hemispheres and right cerebellum.   5.  Subarachnoid hemorrhage.   6.  There is no hydrocephalus.   7.  There are changes secondary to the previous endovascular repair left internal carotid aneurysm.      CT-CTA NECK WITH & W/O-POST PROCESSING   Final Result      No focal high-grade stenosis, dissection or occlusion of the cervical carotid or vertebral arteries.      CT-CEREBRAL PERFUSION ANALYSIS   Final Result      1.  Cerebral blood flow less than 30% likely representing completed infarct = 0 mL.      2.  T Max more than 6 seconds likely representing combination of completed infarct and ischemia = 0 mL.      3.  Mismatched volume likely representing ischemic brain/penumbra = None      4.  Please note that the cerebral perfusion was performed on the limited brain tissue around the basal ganglia region. Infarct/ischemia outside the CT perfusion sections can be missed in this study.      CT-CTA HEAD WITH & W/O-POST PROCESS   Final Result      1.  Prior LEFT middle cranial fossa aneurysm coiling.  Artifact limits exam.    2.  No abrupt large vessel cut off.   3.  Segmental narrowing of LEFT middle cerebral artery, vasospasm versus artifact.   4.  Ventriculostomy catheter in similar position.   5.  Evolving RIGHT caudate infarct.   6.  Stable intracranial hemorrhage.         DX-ABDOMEN FOR TUBE PLACEMENT   Final Result      Orogastric tube tip now at the mid stomach.      DX-ABDOMEN FOR TUBE PLACEMENT   Final Result      Orogastric tube tip at the proximal stomach with side port just above the GE junction.      US-INTRACRANIAL ARTERY LIMITED   Final Result      DX-CHEST-LIMITED (1 VIEW)   Final Result         1.  Stable chest with perihilar and lower lung zone interstitial and minimal airspace opacities most consistent with pulmonary edema.   2.  No new abnormalities.      EC-ECHOCARDIOGRAM COMPLETE W/O CONT   Final Result      US-INTRACRANIAL ARTERY LIMITED   Final Result      DX-CHEST-PORTABLE (1 VIEW)   Final Result      1.  Lines and tubes appear appropriately located      2.  Improvement of pulmonary edema/airspace process      US-INTRACRANIAL ARTERY COMP   Final Result      CT-HEAD W/O   Final Result         1. Slightly decreased subarachnoid hemorrhage overlying the cerebral hemispheres.   2. Otherwise, stable as above.         DX-CHEST-FOR LINE PLACEMENT Perform procedure in: Patient's Room   Final Result      1.  Moderate interstitial pulmonary edema.   2.   Right-sided central venous catheter terminates with the tip projecting over the expected region of the mid to distal superior vena cava.   3.  Endotracheal tube terminates in satisfactory position at the level of the aortic arch.   4.  Enteric feeding tube terminates in the left upper quadrant projecting over the expected location of the stomach.      CT-STEALTH HEAD W/O   Final Result      1.  Interval placement of a right transparietal ventriculostomy catheter which terminates with the tip near the third ventricle. There is minimally decreased caliber of the  "ventricular system and compared to previous exam.   2.  Findings of intracranial hemorrhage appear similar to the previous exam. No definite new acute intracranial hemorrhage is identified.   3.  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         DX-CHEST-PORTABLE (1 VIEW)   Final Result      CT-HEAD W/O   Final Result      1. Interval aneurysm coiling of left posterior communicating artery aneurysm.   2. Possible increased subarachnoid hemorrhage.   3. Intraventricular hemorrhage. There is developing mild hydrocephalus with mildly increased ventricles.   4. Small right convexity subdural hematoma measuring 5 mm in thickness.      These findings were discussed with STEPHANIE EASON on 1/31/2024 3:14 PM.      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   This aneurysm was embolized to occlusion as described above. Final angiographic images demonstrate only minimal filling of the neck of the aneurysm. The patient will be followed up in   the neuro interventional clinic and brought back for a follow-up angiogram in 6 months.      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      I, Stephanie Eason was physically present and participated during the entire procedure of the IR-EMBOLIZATION.          INR   Date Value Ref Range Status   01/31/2024 1.06 0.87 - 1.13 Final     Comment:     INR - Non-therapeutic Reference Range: 0.87-1.13  INR - Therapeutic Reference Range: 2.0-4.0       No results found for: \"POCINR\"     Intake/Output Summary (Last 24 hours) at 2/1/2024 1746  Last data filed at 2/1/2024 1600  Gross per 24 hour   Intake " 3089.64 ml   Output 3143 ml   Net -53.36 ml      Labs not explicitly included in this progress note were reviewed by the author. Radiology/imaging not explicitly included in this progress note was reviewed by the author.     I have performed a physical exam and reviewed and updated ROS and Plan today (2/7/2024).      38 minutes in directly providing and coordinating care and extensive data review.  No time overlap and excludes procedures.

## 2024-02-07 NOTE — CARE PLAN
The patient is Watcher - Medium risk of patient condition declining or worsening    Shift Goals  Clinical Goals: -200, stable neuros, Q1 EVD/ICPs  Patient Goals: ALEJANDRA  Family Goals: updates, patient to sleep    Progress made toward(s) clinical / shift goals:      Problem: Knowledge Deficit - Standard  Goal: Patient and family/care givers will demonstrate understanding of plan of care, disease process/condition, diagnostic tests and medications  Outcome: Progressing     Problem: Safety - Medical Restraint  Goal: Remains free of injury from restraints (Restraint for Interference with Medical Device)  Outcome: Progressing     Problem: Skin Integrity  Goal: Skin integrity is maintained or improved  Outcome: Progressing     Problem: Pain - Standard  Goal: Alleviation of pain or a reduction in pain to the patient’s comfort goal  Outcome: Progressing     Problem: Hemodynamics  Goal: Patient's hemodynamics, fluid balance and neurologic status will be stable or improve  Outcome: Progressing     Problem: Neuro Status  Goal: Neuro status will remain stable or improve  Outcome: Progressing    Patient is not progressing towards the following goals:    Problem: Safety - Medical Restraint  Goal: Free from restraint(s) (Restraint for Interference with Medical Device)  Outcome: Not Progressing
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
## 2024-02-07 NOTE — THERAPY
"Speech Language Pathology   Daily Treatment     Patient Name: Fay Turner  AGE:  63 y.o., SEX:  female  Medical Record #: [id number]  Date of Service: 2/7/2024    Precautions: Fall Risk, Spinal / Back Precautions , Nasogastric Tube  Comments: EVD     Subjective  Pt cleared by RN for dysphagia treatment. Per RN, pt w/ good tolerance of breakfast/protein shake. Per RN/RD note, now holding TF to help increase appetite/PO intake. Pt was received awake/alert w/ family present at bedside. Pt positioned upright in [facility] by RN prior to SLP arrival. Pt without recall of participation in FEES. \"I love chocolate.\"     Assessment  SLP reviewed results of FEES w/ patient and family including airway protection, swallow efficiency, and recommended diet/compensatory strategies (listed below). Family w/ good learning evidenced. Pt will benefit from continued, frequent, education. SLP removed L wrist restraint so pt could self-feed. Trials of PU4 and TN0 (straw) completed. Pt w/ adequate oral acceptance. L sided anterior loss to the labial commissure appreciated on trials of PU4 x2. Pt appreciated to have sensation as she requested a towel to wipe her face. Pt able to generate adequate intra-oral pressure and labial seal for straw sips- did not attempt to bite straw on this date. Prolonged A/P transport suspected, however, presumed to be complete as no significant oral bolus residue was appreciated in the oral cavity. No clinical indicators of aspiration appreciated. Wrist restraint re-applied after feeding.     Clinical Impressions  Per FEES completed 2/6 pt presents with a mild oral dysphagia. Presentation consistent with such on this date. Recommend continuation of a MM5/TN0 diet with 1:1 feeding d/t AMS/generalized weakness/lethargy. Deficits best managed with compensatory strategies at this time. SLP to follow to monitor diet tolerance and advance solids as appropriate at bedside.     Recommendations  Treatment Completed: " Dysphagia Treatment  Diet Consistency: MM5/TN0  Instrumentation: None indicated at this time  Medication: Crush with applesauce, as appropriate, Crush with pudding/puree, as appropriate, As tolerated  Supervision: 1:1 feeding with constant supervision, Direct supervision during meals, Encourage self-feeding (family okay to provide A w/ feeding)  Positioning: Fully upright and midline during oral intake, Meals sitting upright in a chair, as tolerated  Risk Management : Small bites/sips, Slow rate of intake, Reduce environmental distractions  Oral Care: BID    SLP Treatment Plan  Treatment Plan: Dysphagia Treatment, Cognitive Treatment, Patient/Family/Caregiver Training  SLP Frequency: 4x Per Week  Estimated Duration: Until Therapy Goals Met    Anticipated Discharge Needs  Discharge Recommendations: Recommend post-acute placement for additional speech therapy services prior to discharge home  Therapy Recommendations Upon DC: Dysphagia Training, Comprehension Training, Reading Training, Writing Training, Cognitive-Linguistic Training, Community Re-Integration, Patient / Family / Caregiver Education    Patient / Family Goals  Patient / Family Goal #1: nods yes to ice chips  Goal #1 Outcome: Goal met  Short Term Goals  Short Term Goal # 1: Pt will participate in an instrumental swallow study via FEES to determine airway protection/swallow efficiency and guide dysphagia management.  Goal Outcome # 1: Goal met, new goal added  Short Term Goal # 1 B : Pt will consume a MM5/TN0 diet with 1:1 supv without any overt s/s of aspiration or decline in respiratory status  Goal Outcome  # 1 B: Progressing as expected    Anai Dumont, SLP

## 2024-02-07 NOTE — PROGRESS NOTES
Neurosurgery Progress Note    Subjective:  63 year old presented with HH4 ruptured left Pcomm aneurysm, post coil day 4.     ICPs stable between <10 overnight  EVD output from anterior EVD 5-16cc/hr overnight. Drainage is clearing and becoming less bloody each day.     Patient not on sedation currently.  Overnight on  had worsening exam with flaccid RUE, decreased mentation, more lethargy. CTA showed left MCA vasospasm. SBP goals increased to 180-200.     Positive changes since yesterday. Following on RUE now.     Exam:  GCS: E4VtM6. Oriented to name and place ([place]).   Extubated.  +corneal, +cough/gag.    Right eye open spontaneously.   Has left CNIII palsy with ptosis.   Pupils right 1-2mm reactive, left 3-4mm non reactive, gaze conjugate.   Command following LUE.   Per nursing, did follow in RUE this AM though she did not do this for me.  Withdrawals to lower extremities.    Anterior EVD in place and functioning at [id number] above the tragus   ICP waveform adequate.    BP  Min: 128/68  Max: 183/93  Pulse  Av.2  Min: 84  Max: 119  Resp  Av.7  Min: 21  Max: 38  Monitored Temp 2  Av.7 °C (99.8 °F)  Min: 37.1 °C (98.8 °F)  Max: 38.1 °C (100.6 °F)  SpO2  Av.5 %  Min: 94 %  Max: 100 %    ICP  Av.6 MM HG  Min: 2 MM HG  Max: 12 MM HG    Recent Labs     24  0415 24  0400 24  0215   WBC 13.7* 17.0* 15.1*   RBC 4.04* 4.17* 4.12*   HEMOGLOBIN 10.5* 10.8* 10.5*   HEMATOCRIT 32.0* 33.9* 32.6*   MCV 79.2* 81.3* 79.1*   MCH 26.0* 25.9* 25.5*   MCHC 32.8 31.9* 32.2   RDW 40.7 41.1 39.5   PLATELETCT 258 295 313   MPV 9.8 10.1 9.8       Recent Labs     24  0415 24  1225 24  0400 24  0958 24  1740 24  0215   SODIUM 140   < > 138 138 137 135   POTASSIUM 3.2*  --  3.4*  --   --  3.8   CHLORIDE 103  --  103  --   --  103   CO2 25  --  26  --   --  24   GLUCOSE 152*  --  132*  --   --  142*   BUN 16  --  15  --   --  17   CREATININE 0.40*  --  0.33*  --    --  0.31*   CALCIUM 9.2  --  9.1  --   --  9.3    < > = values in this interval not displayed.                     Intake/Output                         02/06/24 0700 - 02/07/24 0659 02/07/24 0700 - 02/08/24 0659     0700-1859 1900-0659 Total 0700-1859 1900-0659 Total                 Intake    P.O.  --  0 0  --  -- --    P.O. -- 0 0 -- -- --    I.V.  494.4  1487.3 1981.7  165.9  -- 165.9    Volume (mL) (NS infusion) 494.4 1487.3 1981.7 165.9 -- 165.9    Other  --  90 90  --  -- --    Medications (PO/Enteral Liquids) -- 90 90 -- -- --    NG/GT  480  440 920  60  -- 60    Intake (mL) (Enteral Tube 02/06/24 14 Fr. Right nare) -- 360 360 60 -- 60    Intake (mL) ([REMOVED] Enteral Tube 01/31/24 Nasogastric Right nare 02/06/24 2045) 480 80 560 -- -- --    Enteral  60  60 120  30  -- 30    Free Water / Tube Flush 60 60 120 30 -- 30    Total Intake 1034.4 2077.3 3111.7 255.9 -- 255.9       Output    Urine  1350  1725 3075  200  -- 200    Output (mL) (Urethral Catheter Non-latex;Temperature probe) 1350 1725 3075 200 -- 200    Drains  114  113 227  15  -- 15    Output (mL) (ICP/Ventriculostomy Right) 114 113 227 15 -- 15    Stool  --  -- --  --  -- --    Number of Times Stooled 1 x 1 x 2 x -- -- --    Emesis/NG output  --  0 0  --  -- --    Output (mL) (Enteral Tube 02/06/24 14 Fr. Right nare) -- 0 0 -- -- --    Output (mL) ([REMOVED] Enteral Tube 01/31/24 Nasogastric Right nare 02/06/24 2045) -- 0 0 -- -- --    Total Output 1464 1838 3302 215 -- 215       Net I/O     -429.6 239.3 -190.3 40.9 -- 40.9              Intake/Output Summary (Last 24 hours) at 2/7/2024 0932  Last data filed at 2/7/2024 0800  Gross per 24 hour   Intake 3121.6 ml   Output 3083 ml   Net 38.6 ml               potassium bicarbonate  25 mEq Once    aspirin  81 mg DAILY    NORepinephrine  0-1 mcg/kg/min (Ideal) Continuous    phenylephrine infusion  0-5 mcg/kg/min (Ideal) Continuous    NS   Continuous    insulin regular  2-9 Units Q6HRS    And    dextrose  bolus  25 g Q15 MIN PRN    enalaprilat  1.25 mg Q6HRS PRN    hydrALAZINE  10-20 mg Q4HRS PRN    labetalol  10-20 mg Q4HRS PRN    niMODipine  60 mg Q4HRS    levETIRAcetam  500 mg BID    Pharmacy  1 Each PHARMACY TO DOSE    Respiratory Therapy Consult   Continuous RT    senna-docusate  2 Tablet BID    And    polyethylene glycol/lytes  1 Packet QDAY PRN    And    magnesium hydroxide  30 mL QDAY PRN    And    bisacodyl  10 mg QDAY PRN    MD Alert...Adult ICU Electrolyte Replacement per Pharmacy   PHARMACY TO DOSE    ondansetron  4 mg Q4HRS PRN    Or    ondansetron  4 mg Q4HRS PRN    acetaminophen  650 mg Q4HRS PRN    Or    acetaminophen  650 mg Q4HRS PRN    LORazepam  2 mg Q5 MIN PRN    prochlorperazine  10 mg Q6HRS PRN       Assessment and Plan:  Hospital day # 8  Post coil day 7  OK to raise EVD to 20cm H20 above tragus and open. Tentative plan to clamp Thursday with repeat head CT Friday 6AM.   Appreciate neurology, IR and intensivist care.  Brain MRI done, Dr. Monroe reviewed.   Q2 neurochecks OK from NS perspective as exam has been stable, however clear this with Neurology prior to changing order.    Following closely.     Chemical prophylactic DVT therapy: No  Start date/time: tbd

## 2024-02-07 NOTE — DISCHARGE PLANNING
"Case Management Discharge Planning    Chart reviewed and case discussed in ICU rounds:  POD#7 s/p cerebral angiogram and  intracranial aneurysm embolization, POD#7 s/p right frontal external ventricular drain, POD#7 s/p right frontal external ventricular drain under [facility].  Patient extubated on 2/3/2024.   +O2 2L nc/EVD/ICP monitoring/PO diet (NGT to be removed with adequate oral intake; tube feeds held)/Q8H sodium checks/Q1H neurochecks/Nimodipine through 2/21/24.   NSGY and Neurology following.    Per Dr. Dyer, Physiatrist @ [facility], on 2/4/2024: \"Anticipate that patient will need postacute rehab pending her progress and ability to participate with therapies please reconsult PM&R when patient is able to tolerate participation\".     SNF referrals to be submitted when appropriate/based on clinical progression.       "

## 2024-02-07 NOTE — DIETARY
Nutrition Services: Brief Update    Pt passed with SLP 2/6 and oral diet started. Miscommunication with RN, and TF ran overnight, now held this am. Pt had late breakfast with 25-50% and 25-50% of Ensure this am. Discussed with RN pt preference for chocolate Ensure, will adjust and monitor PO intake. Plan to leave feeding tube in today until PO intake >50% consistently.     Will f/u on PO intake tomorrow 2/8 and assess if still needs feeding tube. Adjust supplements. RD following.

## 2024-02-08 ENCOUNTER — APPOINTMENT (OUTPATIENT)
Dept: RADIOLOGY | Facility: MEDICAL CENTER | Age: 64
DRG: 020 | End: 2024-02-08
Attending: INTERNAL MEDICINE
Payer: COMMERCIAL

## 2024-02-08 LAB
ANION GAP SERPL CALC-SCNC: 12 MMOL/L (ref 7–16)
BASOPHILS # BLD AUTO: 0.4 % (ref 0–1.8)
BASOPHILS # BLD: 0.06 K/UL (ref 0–0.12)
BUN SERPL-MCNC: 12 MG/DL (ref 8–22)
CALCIUM SERPL-MCNC: 9.3 MG/DL (ref 8.5–10.5)
CHLORIDE SERPL-SCNC: 97 MMOL/L (ref 96–112)
CO2 SERPL-SCNC: 23 MMOL/L (ref 20–33)
CREAT SERPL-MCNC: 0.36 MG/DL (ref 0.5–1.4)
EOSINOPHIL # BLD AUTO: 0.16 K/UL (ref 0–0.51)
EOSINOPHIL NFR BLD: 1 % (ref 0–6.9)
ERYTHROCYTE [DISTWIDTH] IN BLOOD BY AUTOMATED COUNT: 38.9 FL (ref 35.9–50)
GFR SERPLBLD CREATININE-BSD FMLA CKD-EPI: 114 ML/MIN/1.73 M 2
GLUCOSE BLD STRIP.AUTO-MCNC: 108 MG/DL (ref 65–99)
GLUCOSE BLD STRIP.AUTO-MCNC: 114 MG/DL (ref 65–99)
GLUCOSE BLD STRIP.AUTO-MCNC: 116 MG/DL (ref 65–99)
GLUCOSE BLD STRIP.AUTO-MCNC: 99 MG/DL (ref 65–99)
GLUCOSE SERPL-MCNC: 120 MG/DL (ref 65–99)
HCT VFR BLD AUTO: 33.6 % (ref 37–47)
HGB BLD-MCNC: 11.1 G/DL (ref 12–16)
IMM GRANULOCYTES # BLD AUTO: 0.1 K/UL (ref 0–0.11)
IMM GRANULOCYTES NFR BLD AUTO: 0.6 % (ref 0–0.9)
LYMPHOCYTES # BLD AUTO: 1.38 K/UL (ref 1–4.8)
LYMPHOCYTES NFR BLD: 8.9 % (ref 22–41)
MAGNESIUM SERPL-MCNC: 2.2 MG/DL (ref 1.5–2.5)
MCH RBC QN AUTO: 26.2 PG (ref 27–33)
MCHC RBC AUTO-ENTMCNC: 33 G/DL (ref 32.2–35.5)
MCV RBC AUTO: 79.2 FL (ref 81.4–97.8)
MONOCYTES # BLD AUTO: 1.11 K/UL (ref 0–0.85)
MONOCYTES NFR BLD AUTO: 7.1 % (ref 0–13.4)
NEUTROPHILS # BLD AUTO: 12.76 K/UL (ref 1.82–7.42)
NEUTROPHILS NFR BLD: 82 % (ref 44–72)
NRBC # BLD AUTO: 0 K/UL
NRBC BLD-RTO: 0 /100 WBC (ref 0–0.2)
PHOSPHATE SERPL-MCNC: 3.7 MG/DL (ref 2.5–4.5)
PLATELET # BLD AUTO: 381 K/UL (ref 164–446)
PMV BLD AUTO: 9.8 FL (ref 9–12.9)
POTASSIUM SERPL-SCNC: 3.8 MMOL/L (ref 3.6–5.5)
RBC # BLD AUTO: 4.24 M/UL (ref 4.2–5.4)
SODIUM SERPL-SCNC: 131 MMOL/L (ref 135–145)
SODIUM SERPL-SCNC: 132 MMOL/L (ref 135–145)
SODIUM SERPL-SCNC: 134 MMOL/L (ref 135–145)
SODIUM SERPL-SCNC: 134 MMOL/L (ref 135–145)
WBC # BLD AUTO: 15.6 K/UL (ref 4.8–10.8)

## 2024-02-08 PROCEDURE — 99291 CRITICAL CARE FIRST HOUR: CPT | Performed by: INTERNAL MEDICINE

## 2024-02-08 PROCEDURE — 99292 CRITICAL CARE ADDL 30 MIN: CPT | Performed by: INTERNAL MEDICINE

## 2024-02-08 PROCEDURE — A9270 NON-COVERED ITEM OR SERVICE: HCPCS | Performed by: NURSE PRACTITIONER

## 2024-02-08 PROCEDURE — 85025 COMPLETE CBC W/AUTO DIFF WBC: CPT

## 2024-02-08 PROCEDURE — 700102 HCHG RX REV CODE 250 W/ 637 OVERRIDE(OP): Performed by: INTERNAL MEDICINE

## 2024-02-08 PROCEDURE — 700102 HCHG RX REV CODE 250 W/ 637 OVERRIDE(OP): Performed by: NURSE PRACTITIONER

## 2024-02-08 PROCEDURE — 80048 BASIC METABOLIC PNL TOTAL CA: CPT

## 2024-02-08 PROCEDURE — 700105 HCHG RX REV CODE 258: Performed by: NURSE PRACTITIONER

## 2024-02-08 PROCEDURE — 99233 SBSQ HOSP IP/OBS HIGH 50: CPT | Performed by: PSYCHIATRY & NEUROLOGY

## 2024-02-08 PROCEDURE — 74018 RADEX ABDOMEN 1 VIEW: CPT

## 2024-02-08 PROCEDURE — 82962 GLUCOSE BLOOD TEST: CPT | Mod: 91

## 2024-02-08 PROCEDURE — 770022 HCHG ROOM/CARE - ICU (200)

## 2024-02-08 PROCEDURE — A9270 NON-COVERED ITEM OR SERVICE: HCPCS | Performed by: INTERNAL MEDICINE

## 2024-02-08 PROCEDURE — 84100 ASSAY OF PHOSPHORUS: CPT

## 2024-02-08 PROCEDURE — 83735 ASSAY OF MAGNESIUM: CPT

## 2024-02-08 PROCEDURE — 94669 MECHANICAL CHEST WALL OSCILL: CPT

## 2024-02-08 PROCEDURE — 700105 HCHG RX REV CODE 258: Performed by: INTERNAL MEDICINE

## 2024-02-08 PROCEDURE — 84295 ASSAY OF SERUM SODIUM: CPT

## 2024-02-08 RX ORDER — NYSTATIN 100000 [USP'U]/G
POWDER TOPICAL 2 TIMES DAILY
Status: COMPLETED | OUTPATIENT
Start: 2024-02-08 | End: 2024-02-17

## 2024-02-08 RX ORDER — ACETAMINOPHEN 650 MG/1
650 SUPPOSITORY RECTAL EVERY 4 HOURS PRN
Status: DISCONTINUED | OUTPATIENT
Start: 2024-02-08 | End: 2024-02-21

## 2024-02-08 RX ORDER — OXYCODONE HYDROCHLORIDE 5 MG/1
2.5 TABLET ORAL EVERY 6 HOURS PRN
Status: DISCONTINUED | OUTPATIENT
Start: 2024-02-08 | End: 2024-02-11

## 2024-02-08 RX ORDER — SODIUM CHLORIDE 9 MG/ML
500 INJECTION, SOLUTION INTRAVENOUS ONCE
Status: COMPLETED | OUTPATIENT
Start: 2024-02-08 | End: 2024-02-08

## 2024-02-08 RX ORDER — ATORVASTATIN CALCIUM 40 MG/1
40 TABLET, FILM COATED ORAL EVERY EVENING
Status: DISCONTINUED | OUTPATIENT
Start: 2024-02-08 | End: 2024-02-21

## 2024-02-08 RX ORDER — ACETAMINOPHEN 325 MG/1
650 TABLET ORAL EVERY 4 HOURS PRN
Status: DISCONTINUED | OUTPATIENT
Start: 2024-02-08 | End: 2024-02-21

## 2024-02-08 RX ADMIN — ACETAMINOPHEN 650 MG: 325 TABLET, FILM COATED ORAL at 08:09

## 2024-02-08 RX ADMIN — MICONAZOLE NITRATE: 20 CREAM TOPICAL at 18:03

## 2024-02-08 RX ADMIN — ACETAMINOPHEN 650 MG: 325 TABLET, FILM COATED ORAL at 01:01

## 2024-02-08 RX ADMIN — NYSTATIN: 100000 POWDER TOPICAL at 09:22

## 2024-02-08 RX ADMIN — NIMODIPINE 60 MG: 60 SOLUTION ORAL at 05:20

## 2024-02-08 RX ADMIN — POTASSIUM BICARBONATE 25 MEQ: 978 TABLET, EFFERVESCENT ORAL at 10:44

## 2024-02-08 RX ADMIN — ATORVASTATIN CALCIUM 40 MG: 40 TABLET, FILM COATED ORAL at 18:02

## 2024-02-08 RX ADMIN — SODIUM CHLORIDE: 9 INJECTION, SOLUTION INTRAVENOUS at 00:07

## 2024-02-08 RX ADMIN — SODIUM CHLORIDE 500 ML: 9 INJECTION, SOLUTION INTRAVENOUS at 06:29

## 2024-02-08 RX ADMIN — OXYCODONE 2.5 MG: 5 TABLET ORAL at 15:34

## 2024-02-08 RX ADMIN — NYSTATIN: 100000 POWDER TOPICAL at 18:03

## 2024-02-08 RX ADMIN — SODIUM CHLORIDE: 9 INJECTION, SOLUTION INTRAVENOUS at 12:14

## 2024-02-08 RX ADMIN — NIMODIPINE 60 MG: 60 SOLUTION ORAL at 18:02

## 2024-02-08 RX ADMIN — NIMODIPINE 60 MG: 60 SOLUTION ORAL at 02:35

## 2024-02-08 RX ADMIN — NIMODIPINE 60 MG: 60 SOLUTION ORAL at 10:44

## 2024-02-08 RX ADMIN — NIMODIPINE 60 MG: 60 SOLUTION ORAL at 21:18

## 2024-02-08 RX ADMIN — ASPIRIN 81 MG 81 MG: 81 TABLET ORAL at 05:20

## 2024-02-08 RX ADMIN — LEVETIRACETAM 500 MG: 500 TABLET, FILM COATED ORAL at 18:02

## 2024-02-08 RX ADMIN — LEVETIRACETAM 500 MG: 500 TABLET, FILM COATED ORAL at 05:20

## 2024-02-08 RX ADMIN — ACETAMINOPHEN 650 MG: 325 TABLET, FILM COATED ORAL at 14:18

## 2024-02-08 RX ADMIN — SODIUM CHLORIDE: 9 INJECTION, SOLUTION INTRAVENOUS at 23:53

## 2024-02-08 RX ADMIN — ACETAMINOPHEN 650 MG: 325 TABLET, FILM COATED ORAL at 19:49

## 2024-02-08 RX ADMIN — NIMODIPINE 60 MG: 60 SOLUTION ORAL at 14:18

## 2024-02-08 ASSESSMENT — PAIN DESCRIPTION - PAIN TYPE
TYPE: ACUTE PAIN
TYPE: ACUTE PAIN

## 2024-02-08 ASSESSMENT — COPD QUESTIONNAIRES
HAVE YOU SMOKED AT LEAST 100 CIGARETTES IN YOUR ENTIRE LIFE: NO/DON'T KNOW
DO YOU EVER COUGH UP ANY MUCUS OR PHLEGM?: NO/ONLY WITH OCCASIONAL COLDS OR INFECTIONS
COPD SCREENING SCORE: 2
DURING THE PAST 4 WEEKS HOW MUCH DID YOU FEEL SHORT OF BREATH: NONE/LITTLE OF THE TIME

## 2024-02-08 ASSESSMENT — ENCOUNTER SYMPTOMS
VOMITING: 0
FEVER: 0
ABDOMINAL PAIN: 0
BLURRED VISION: 0
HEADACHES: 0
NAUSEA: 0
CHILLS: 0
HEARTBURN: 0

## 2024-02-08 NOTE — PROGRESS NOTES
Neurology Progress Note  Neurohospitalist Service, [facility]    Referring Physician: Dayana Babcock M.D.    No chief complaint on file.      HPI: Refer to initial documented Neurology H&P, as detailed in the patient's chart.    Interval History:   No acute events overnight.  She appears more interactive and able to follow commands.  Daughter is at bedside.  No new complaint.      Review of systems: In addition to what is detailed in the HPI and/or updated in the interval history, all other systems reviewed and are negative.    Past Medical History:    has no past medical history on file.    FHx:  family history is not on file.    SHx:       Medications:    Current Facility-Administered Medications:     nystatin (Mycostatin) powder, , Topical, BID, Dayana Babcock M.D., Given at 02/08/24 0922    acetaminophen (Tylenol) tablet 650 mg, 650 mg, Enteral Tube, Q4HRS PRN **OR** acetaminophen (Tylenol) suppository 650 mg, 650 mg, Rectal, Q4HRS PRN, Dayana Babcock M.D.    atorvastatin (Lipitor) tablet 40 mg, 40 mg, Enteral Tube, Q EVENING, Dayana Babcock M.D.    aspirin (Asa) chewable tab 81 mg, 81 mg, Enteral Tube, DAILY, Darlin Hope, A.P.R.N., 81 mg at 02/08/24 0520    NS infusion, , Intravenous, Continuous, Jeremy M Gonda, M.D., Last Rate: 83 mL/hr at 02/08/24 0007, New Bag at 02/08/24 0007    insulin regular (HumuLIN R,NovoLIN R) injection, 2-9 Units, Subcutaneous, Q6HRS, 2 Units at 02/04/24 1830 **AND** POC blood glucose manual result, , , Q6H **AND** NOTIFY MD and PharmD, , , Once **AND** Administer 20 grams of glucose (approximately 8 ounces of fruit juice) every 15 minutes PRN FSBG less than 70 mg/dL, , , PRN **AND** dextrose 50% (D50W) injection 25 g, 25 g, Intravenous, Q15 MIN PRN, Jeremy M Gonda, M.D.    enalaprilat (Vasotec) injection 1.25 mg 1 mL, 1.25 mg, Intravenous, Q6HRS PRN, Jeremy M Gonda, M.D.    hydrALAZINE (Apresoline) injection 10-20 mg, 10-20 mg, Intravenous, Q4HRS  PRN, Jeremy M Gonda, M.D.    labetalol (Normodyne/Trandate) injection 10-20 mg, 10-20 mg, Intravenous, Q4HRS PRN, Jeremy M Gonda, M.D.    niMODipine (Nymalize) oral syringe 60 mg, 60 mg, Enteral Tube, Q4HRS, Jeremy M Gonda, M.D., 60 mg at 02/08/24 1044    levETIRAcetam (Keppra) tablet 500 mg, 500 mg, Enteral Tube, BID, Jeremy M Gonda, M.D., 500 mg at 02/08/24 0520    Pharmacy Consult: Enteral tube insertion - review meds/change route/product selection, 1 Each, Other, PHARMACY TO DOSE, Jeremy M Gonda, M.D.    Respiratory Therapy Consult, , Nebulization, Continuous RT, Jeremy M Gonda, M.D.    senna-docusate (Pericolace Or Senokot S) 8.6-50 MG per tablet 2 Tablet, 2 Tablet, Enteral Tube, BID, 2 Tablet at 02/03/24 0514 **AND** polyethylene glycol/lytes (Miralax) Packet 1 Packet, 1 Packet, Enteral Tube, QDAY PRN **AND** magnesium hydroxide (Milk Of Magnesia) suspension 30 mL, 30 mL, Enteral Tube, QDAY PRN **AND** bisacodyl (Dulcolax) suppository 10 mg, 10 mg, Rectal, QDAY PRN, Jeremy M Gonda, M.D. MD Alert...ICU Electrolyte Replacement per Pharmacy, , Other, PHARMACY TO DOSE, Jeremy M Gonda, M.D.    ondansetron (Zofran ODT) dispertab 4 mg, 4 mg, Enteral Tube, Q4HRS PRN, 4 mg at 01/31/24 2508 **OR** ondansetron (Zofran) syringe/vial injection 4 mg, 4 mg, Intravenous, Q4HRS PRN, Jeremy M Gonda, M.D., 4 mg at 02/02/24 1758    LORazepam (Ativan) injection 2 mg, 2 mg, Intravenous, Q5 MIN PRN, Jeremy M Gonda, M.D.    prochlorperazine (Compazine) injection 10 mg, 10 mg, Intravenous, Q6HRS PRN, David Mclaughlin Jr., D.OJennifer, 10 mg at 01/31/24 9856    Physical Examination:    Vitals:    02/08/24 0900 02/08/24 1000 02/08/24 1050 02/08/24 1100   BP: (!) 143/69 (!) 176/98 (!) 176/98 (!) 144/81   Pulse: (!) 104 (!) 111 (!) 119 (!) 117   Resp: 20 (!) 33 (!) 29 (!) 27   TempSrc:  Bladder     SpO2: 96% 91% 96% 92%   Weight:       Height:             NEUROLOGICAL EXAM:   She appears more alert with eyes open.  She track across the room.   Able to follow simple commands such as closing her eyes and showing thumbs up with her left hand.  Appear to have minimal verbal output.  Face appears symmetric, however has left ptosis.  She has unequal pupils: Right pupil is 2 mm and sluggishly reactive.  Left pupil is 3 mm and nonreactive.  She has flaccid right upper extremity and withdraws to physical stimulation in all other extremities.  Gait was deferred.    Objective Data:    Labs:  Lab Results   Component Value Date/Time    PROTHROMBTM 13.9 01/31/2024 03:25 PM    INR 1.06 01/31/2024 03:25 PM      Lab Results   Component Value Date/Time    WBC 15.6 (H) 02/08/2024 04:30 AM    RBC 4.24 02/08/2024 04:30 AM    HEMOGLOBIN 11.1 (L) 02/08/2024 04:30 AM    HEMATOCRIT 33.6 (L) 02/08/2024 04:30 AM    MCV 79.2 (L) 02/08/2024 04:30 AM    MCH 26.2 (L) 02/08/2024 04:30 AM    MCHC 33.0 02/08/2024 04:30 AM    MPV 9.8 02/08/2024 04:30 AM    NEUTSPOLYS 82.00 (H) 02/08/2024 04:30 AM    LYMPHOCYTES 8.90 (L) 02/08/2024 04:30 AM    MONOCYTES 7.10 02/08/2024 04:30 AM    EOSINOPHILS 1.00 02/08/2024 04:30 AM    BASOPHILS 0.40 02/08/2024 04:30 AM      Lab Results   Component Value Date/Time    SODIUM 132 (L) 02/08/2024 04:30 AM    POTASSIUM 3.8 02/08/2024 04:30 AM    CHLORIDE 97 02/08/2024 04:30 AM    CO2 23 02/08/2024 04:30 AM    GLUCOSE 120 (H) 02/08/2024 04:30 AM    BUN 12 02/08/2024 04:30 AM    CREATININE 0.36 (L) 02/08/2024 04:30 AM      Lab Results   Component Value Date/Time    CHOLSTRLTOT 165 01/31/2024 03:25 PM     (H) 01/31/2024 03:25 PM    HDL 36 (A) 01/31/2024 03:25 PM    TRIGLYCERIDE 79 01/31/2024 03:25 PM       Lab Results   Component Value Date/Time    ALKPHOSPHAT 127 (H) 02/05/2024 04:15 AM    ASTSGOT 27 02/05/2024 04:15 AM    ALTSGPT 15 02/05/2024 04:15 AM    TBILIRUBIN 0.5 02/05/2024 04:15 AM        Imaging/Testing:    I interpreted and/or reviewed the patient's neuroimaging    DX-ABDOMEN FOR TUBE PLACEMENT   Final Result      Enteric sump tube in situ with  its tip at the level of the distal gastric antrum or pylorus.      DX-ABDOMEN FOR TUBE PLACEMENT   Final Result         1.  Nonspecific bowel gas pattern in the upper abdomen.   2.  Nasogastric tube tip terminates overlying the expected location of the pylorus or first duodenal segment.   3.  Hazy interstitial pulmonary edema and/or infiltrates, similar to prior study.   4.  Cardiomegaly      DX-CHEST-PORTABLE (1 VIEW)   Final Result      1.  Supportive tubing as described above.   2.  No other significant change from prior exam.         MR-BRAIN-W/O   Final Result      1.  Multifocal areas of acute infarcts in the left cerebral hemisphere.   2.  Small area of acute infarct in the right basal ganglia adjacent to the tip of the ventriculostomy catheter.   3.  Cortical infarct in the right medial parietal lobe.   4.  Mild amount of subdural hemorrhages surrounding the bilateral cerebellar hemispheres and right cerebellum.   5.  Subarachnoid hemorrhage.   6.  There is no hydrocephalus.   7.  There are changes secondary to the previous endovascular repair left internal carotid aneurysm.      CT-CTA NECK WITH & W/O-POST PROCESSING   Final Result      No focal high-grade stenosis, dissection or occlusion of the cervical carotid or vertebral arteries.      CT-CEREBRAL PERFUSION ANALYSIS   Final Result      1.  Cerebral blood flow less than 30% likely representing completed infarct = 0 mL.      2.  T Max more than 6 seconds likely representing combination of completed infarct and ischemia = 0 mL.      3.  Mismatched volume likely representing ischemic brain/penumbra = None      4.  Please note that the cerebral perfusion was performed on the limited brain tissue around the basal ganglia region. Infarct/ischemia outside the CT perfusion sections can be missed in this study.      CT-CTA HEAD WITH & W/O-POST PROCESS   Final Result      1.  Prior LEFT middle cranial fossa aneurysm coiling.  Artifact limits exam.   2.  No abrupt  large vessel cut off.   3.  Segmental narrowing of LEFT middle cerebral artery, vasospasm versus artifact.   4.  Ventriculostomy catheter in similar position.   5.  Evolving RIGHT caudate infarct.   6.  Stable intracranial hemorrhage.         DX-ABDOMEN FOR TUBE PLACEMENT   Final Result      Orogastric tube tip now at the mid stomach.      DX-ABDOMEN FOR TUBE PLACEMENT   Final Result      Orogastric tube tip at the proximal stomach with side port just above the GE junction.      US-INTRACRANIAL ARTERY LIMITED   Final Result      DX-CHEST-LIMITED (1 VIEW)   Final Result         1.  Stable chest with perihilar and lower lung zone interstitial and minimal airspace opacities most consistent with pulmonary edema.   2.  No new abnormalities.      EC-ECHOCARDIOGRAM COMPLETE W/O CONT   Final Result      US-INTRACRANIAL ARTERY LIMITED   Final Result      DX-CHEST-PORTABLE (1 VIEW)   Final Result      1.  Lines and tubes appear appropriately located      2.  Improvement of pulmonary edema/airspace process      US-INTRACRANIAL ARTERY COMP   Final Result      CT-HEAD W/O   Final Result         1. Slightly decreased subarachnoid hemorrhage overlying the cerebral hemispheres.   2. Otherwise, stable as above.         DX-CHEST-FOR LINE PLACEMENT Perform procedure in: Patient's Room   Final Result      1.  Moderate interstitial pulmonary edema.   2.   Right-sided central venous catheter terminates with the tip projecting over the expected region of the mid to distal superior vena cava.   3.  Endotracheal tube terminates in satisfactory position at the level of the aortic arch.   4.  Enteric feeding tube terminates in the left upper quadrant projecting over the expected location of the stomach.      CT-STEALTH HEAD W/O   Final Result      1.  Interval placement of a right transparietal ventriculostomy catheter which terminates with the tip near the third ventricle. There is minimally decreased caliber of the ventricular system and  compared to previous exam.   2.  Findings of intracranial hemorrhage appear similar to the previous exam. No definite new acute intracranial hemorrhage is identified.   3.  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         DX-CHEST-PORTABLE (1 VIEW)   Final Result      CT-HEAD W/O   Final Result      1. Interval aneurysm coiling of left posterior communicating artery aneurysm.   2. Possible increased subarachnoid hemorrhage.   3. Intraventricular hemorrhage. There is developing mild hydrocephalus with mildly increased ventricles.   4. Small right convexity subdural hematoma measuring 5 mm in thickness.      These findings were discussed with STEPHANIE BYRD on 1/31/2024 3:14 PM.      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   This aneurysm was embolized to occlusion as described above. Final angiographic images demonstrate only minimal filling of the neck of the aneurysm. The patient will be followed up in   the neuro interventional clinic and brought back for a follow-up angiogram in 6 months.      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      I, Stephanie Byrd was physically present and participated during the entire procedure of the IR-EMBOLIZATION.          Assessment and Plan:  Fay Turner is a 63 y.o. female with history of hypertension who was transferred from outside hospital after she was found to have subarachnoid hemorrhage with left blown pupil on 1/31/2024.  She had Carpio and Potts 4 and modified Swenson of 3.  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  She also underwent EVD placement  by neurosurgery.  She is post bleed and postop day #8.  Brain MRI on 2/4/24 revealed multifocal acute infarct in the left hemisphere with a small area of acute infarct in right basal ganglia.  TCD is limited as she has no temporal windows.   Clinically she is stable with some gradual improvement.    Plan:  - Okay for every 2 hours neurochecks.  - Maintain systolic blood pressure 140-200.  - Continue nimodipine 60 mg every 4 hours via enteral tube.  - Net even I's and O's.  - TCD with no temporal window, follow clinically and if neurological deterioration, suggest CT of the head and CT perfusion.  Her exam is a stable and improved.  - Continue Keppra given seizure at arrival.  - EVD management per neurosurgery, raised to 15 cm H2O. ICPs are stable.  - Chemical DVT prevention when okay with neurosurgery given EVD, continue with SCDs for now.      The evaluation of the patient, and recommended management, was discussed with Dayana Babcock M.D.    I spent a total of 51 minutes evaluating the patient, reviewing neuroimaging, lab works, hospital notes and discussing with ICU team.      Please note that this dictation was created using voice recognition software. I have made every reasonable attempt to correct obvious errors, but I expect that there are errors of grammar and possibly content that I did not discover before finalizing the note.      Corey Vasquez MD  [facility]ute [facility] Neurology Services

## 2024-02-08 NOTE — DIETARY
"Nutrition support weekly update:  Day 8 of admit.  Fay Turner is a 63 y.o. female with admitting DX of SAH.      Tube feeding initiated on 2/1. Current TF via IRIS feeding tube is Novasource Renal with goal rate 40 ml/hr to provide 1920 kcals, 87 gm protein, and 691 ml free water per day. Current free water order is 30 ml Q 4 hours.     Assessment:  Weight 2/2: 73.3 kg via bed scale. Weight is increased from admit weight of 71.5 kg. Pt is currently +0.4L fluids per I/O.   Height: 5'2\" (157.5 cm), BMI: 29.55 (overweight)    Re-estimate of nutritional needs as indicated since extubation and TF has been on hold to assess PO intake.     Calculation/Equation: MSJ x 1.1 = 1372 kcals  Calories/day: 1400 - 1700 (19 - 23 kcals/kg)  Grams Protein/day: 88 - 110 (1.2 - 1.5 gm/kg)    Evaluation:   Pt has had TF held since yesterday morning to assess PO intake. PO intake is 25-50% of meals and <25-50% of supplements. Discussed with RN, pt did not eat breakfast at my time of visit. RN and I feel pt would benefit from nocturnal TF while PO intake and appetite slowly improving.   Pt passed SLP eval on 2/7 and recommends minced and moist diet with thin liquids.   Pt has EVD in place, possible clamp trial tomorrow 2/9.   Skin: R head incision, no edema noted.   Labs: Na 134, glucose 120, Creat 0.36, last A1C is 5.8 on 2/1/24  Meds: lipitor, SSI, keppra, senna, anti-emetics prn, bowel protocol, NS infusion  Last BM: 2/7 Type 7  CHO-controlled formula is appropriate for blood sugar control.     Malnutrition risk: Pt is adequately nourished.    Recommendations/Plan:  Change TF to nocturnal TF, start Glucerna 1.2 with goal rate 60 ml/hr x 12 hours (9436-3393) to provide 864 kcals, 43 gm protein, and 579 ml free water (~50% estimated needs).  Fluids per MD.   Diet upgrades per SLP. Pt has Ensure Plus with meals - chocolate only, continue.  Once consistently eating 50%+ meals will consider feeding tube removal.     RD following.  "
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
## 2024-02-08 NOTE — PROGRESS NOTES
Neurosurgery Progress Note    Subjective:  63 year old presented with HH4 ruptured left Pcomm aneurysm, post coil day 4.     ICPs stable between <12 overnight  EVD output from anterior EVD 4-15cc/hr overnight. Drainage is clearing and becoming less bloody each day.     Patient not on sedation currently.  Overnight on  had worsening exam with flaccid RUE, decreased mentation, more lethargy. CTA showed left MCA vasospasm.      Started following on RUE , intermittently.    EVD Raised to 15cm H20 , tolerating well.      Exam:  GCS: E4VtM6. Oriented to name and place ([place]).   Extubated.  +corneal, +cough/gag.    Right eye open spontaneously.   Has left CNIII palsy with ptosis.   Pupils right 1-2mm reactive, left 3-4mm non reactive, gaze conjugate.   Command following LUE.   Intermittently follows in RUE.  Withdrawals to lower extremities.    Anterior EVD in place and functioning at [id number] above the tragus   ICP waveform adequate.    BP  Min: 139/78  Max: 190/102  Pulse  Av.7  Min: 86  Max: 118  Resp  Av.7  Min: 20  Max: 36  Monitored Temp 2  Av.6 °C (99.6 °F)  Min: 37 °C (98.6 °F)  Max: 38.1 °C (100.6 °F)  SpO2  Av.8 %  Min: 92 %  Max: 96 %    ICP  Avg: 10.4 MM HG  Min: 5 MM HG  Max: 16 MM HG    Recent Labs     24  0400 24  0215 24  0430   WBC 17.0* 15.1* 15.6*   RBC 4.17* 4.12* 4.24   HEMOGLOBIN 10.8* 10.5* 11.1*   HEMATOCRIT 33.9* 32.6* 33.6*   MCV 81.3* 79.1* 79.2*   MCH 25.9* 25.5* 26.2*   MCHC 31.9* 32.2 33.0   RDW 41.1 39.5 38.9   PLATELETCT 295 313 381   MPV 10.1 9.8 9.8       Recent Labs     24  0400 24  0958 24  0215 24  0910 24  1758 24  0238 24  0430   SODIUM 138   < > 135   < > 133* 131* 132*   POTASSIUM 3.4*  --  3.8  --   --   --  3.8   CHLORIDE 103  --  103  --   --   --  97   CO2   --  24  --   --   --  23   GLUCOSE 132*  --  142*  --   --   --  120*   BUN 15  --  17  --   --   --  12   CREATININE 0.33*  --   0.31*  --   --   --  0.36*   CALCIUM 9.1  --  9.3  --   --   --  9.3    < > = values in this interval not displayed.                     Intake/Output                         02/07/24 0700 - 02/08/24 0659 02/08/24 0700 - 02/09/24 0659     [id number] 5937-9190 Total 0700-1859 1900-0659 Total                 Intake    P.O.  --  60 60  --  -- --    P.O. -- 60 60 -- -- --    I.V.  984.3  952.6 1936.8  --  -- --    Volume (mL) (NS infusion) 984.3 952.6 1936.8 -- -- --    Other  --  270 270  --  -- --    Medications (PO/Enteral Liquids) -- 270 270 -- -- --    NG/GT  60  0 60  --  -- --    Intake (mL) (Enteral Tube 02/06/24 14 Fr. Right nare) 60 0 60 -- -- --    Enteral  90  -- 90  --  -- --    Free Water / Tube Flush 90 -- 90 -- -- --    Total Intake 1134.3 1282.6 2416.8 -- -- --       Output    Urine  1710  1625 3335  --  -- --    Output (mL) (Urethral Catheter Non-latex;Temperature probe) 1710 1625 3335 -- -- --    Drains  67  106 173  --  -- --    Output (mL) (ICP/Ventriculostomy Right) 67 106 173 -- -- --    Stool  --  -- --  --  -- --    Number of Times Stooled 2 x 0 x 2 x -- -- --    Emesis/NG output  0  0 0  --  -- --    Output (mL) (Enteral Tube 02/06/24 14 Fr. Right nare) 0 0 0 -- -- --    Total Output 1777 1731 3508 -- -- --       Net I/O     -642.7 -448.5 -1091.2 -- -- --              Intake/Output Summary (Last 24 hours) at 2/8/2024 0920  Last data filed at 2/8/2024 0600  Gross per 24 hour   Intake 2160.89 ml   Output 3290 ml   Net -1129.11 ml               nystatin   BID    potassium bicarbonate  25 mEq Once    aspirin  81 mg DAILY    NS   Continuous    insulin regular  2-9 Units Q6HRS    And    dextrose bolus  25 g Q15 MIN PRN    enalaprilat  1.25 mg Q6HRS PRN    hydrALAZINE  10-20 mg Q4HRS PRN    labetalol  10-20 mg Q4HRS PRN    niMODipine  60 mg Q4HRS    levETIRAcetam  500 mg BID    Pharmacy  1 Each PHARMACY TO DOSE    Respiratory Therapy Consult   Continuous RT    senna-docusate  2 Tablet BID    And     polyethylene glycol/lytes  1 Packet QDAY PRN    And    magnesium hydroxide  30 mL QDAY PRN    And    bisacodyl  10 mg QDAY PRN    MD Alert...Adult ICU Electrolyte Replacement per Pharmacy   PHARMACY TO DOSE    ondansetron  4 mg Q4HRS PRN    Or    ondansetron  4 mg Q4HRS PRN    acetaminophen  650 mg Q4HRS PRN    Or    acetaminophen  650 mg Q4HRS PRN    LORazepam  2 mg Q5 MIN PRN    prochlorperazine  10 mg Q6HRS PRN       Assessment and Plan:  Hospital day # 9  Post coil day 8  OK to raise EVD to 20cm H20 above tragus and open. Tentative plan to clamp Friday with repeat head CT Saturday 6AM.   Appreciate neurology, IR and intensivist care.  Brain MRI done, Dr. Monroe reviewed.   Q2 neurochecks OK from NS perspective as exam has been stable, however clear this with Neurology prior to changing order.    Following closely.     Chemical prophylactic DVT therapy: No  Start date/time: tbd

## 2024-02-08 NOTE — PROGRESS NOTES
Neuro Interventional Radiology   Progress Note     Author: ELIE Hernandez Date & Time created: 2/8/2024  8:25 AM   Date of admission  1/31/2024  Note to reader: this note follows the APSO format rather than the historical SOAP format. Assessment and plan located at the top of the note for ease of use.    Chief Complaint  63 y.o. female admitted 1/31/2024 with subarachnoid hemorrhage.    HPI  Fay France is a 62 yo female with PMH significant for HTN who presented to OSH for sudden onset of new eyelid droop, and generalized headache without significant head trauma. Pt seized in OSH CT scan, was loaded with Keppra and transferred for higher level of care. 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 Carpio Potts 4; Modified Swenson scale 3.  01/31/24 MAURICE Eason completed a cerebral aneurysm with coil embolization of large left PCOMM aneurysm.    Interval History IR:  02/01/24: RIGHT groin access site soft, non-tender, without ecchymosis. Dressing clean, dry, intact. Pedal pulses +2 bilaterally with feet pink, and warm, cap refill <3 sec. Pt is intubated and ventilated with EVD. She is not following commands, but moves right foot to stimuli. I reviewed the most recent labs including WBC 19.6, Hgb 14.2, Cr 0.61. Coordinated post IR procedure care with hospital nursing staff.     02/05/24:  PBD/post Pcom aneurysm embolization with coil #5.  Pt extubated yesterday (2/4). A stat CTA and CT perfusion done early am on 2/4 (increased lethargy) no obvious vasospasm noted. TCD's with poor windows therefor no baseline Tcd's obtained.  I reviewed FU MRI brain(02/04) which showed small L MCA infarcts-Vasopressors infusing and  keeping -200 to mitigate vasospasm. Anterior EVD in place at 10 cm H20 above tragus with good waveform noted on monitor-R groin site soft, nontender without edema, bleeding, small scab with small  ecchymosis. I  reviewed today's labs: WBC 13.7; hemoglobin 10.5;  ; Cr 0.40; respiratory culture- +MSSA in sputum-continuing Rocephin today is day 5 of 5.  I's/O's-last 24 hours+ 551/since admission +1260; ICP 2-10 (good ICP waveform noted on monitor).      02/06/24:  PBD/post Pcom aneurysm embolization with coil #6. TCD's with poor windows, recommend following neuro assessment. I reviewed FU MRI brain(02/04) which showed small L MCA infarcts-Goal -200 to mitigate vasospasm. Anterior EVD in place at 10 cm H20 above tragus with good waveform noted on monitor-R groin site soft, nontender without edema, bleeding, small scab with small  ecchymosis. I reviewed today's labs: WBC 17; hemoglobin 10.8;  ; Cr 0.33; respiratory culture- +MSSA in sputum -completed Rocephin.  I's/O's-last 24 hours -193 /since admission +1037; ICP 5-11 (good ICP waveform noted on monitor). I started ASA therapy 2nd to multiple infarcts noted on MRI.  ASA therapy discussed and approved by Fer Helms and  Dr. Eason.     02/07/24: SAH, Carpio Potts 4;Modified Swenson 3.  PBD/post Pcom aneurysm embolization with coil #7. TCD's discontinued 2nd to poor windows, recommend following neuro assessment. -Goal -200 to mitigate vasospasm. Anterior EVD in place at 10 cm H20 above tragus with good waveform noted on monitor-ecchymosis. I reviewed today's labs: WBC 15.6; Hemoglobin 11.1 ; Cr 0.36; .  I's/O's-last 24 hours -190 /since admission +876; ICP 3-10 (good ICP waveform noted on monitor).     02/08/24: SAH, Carpio Potts 4;Modified Swenson 3.  PBD/post Pcom aneurysm embolization with coil #8. TCD's with poor windows, recommend following neuro assessment. -Goal -200 to mitigate vasospasm. Anterior EVD open, raised today to 20 cm H20 above tragus with good waveform noted on monitor-ecchymosis. I reviewed today's labs: WBC 15.1; Hemoglobin 10.5;  ; Cr 0.31;  I's/O's-last 24 hours --1091/since admission -214 ICP 4-11/CPP > 100  (good ICP waveform noted on monitor).  Tmax 100.6    Assessment/Plan     Principal Problem:    SAH (subarachnoid hemorrhage) (HCC)  Active Problems:    Acute respiratory failure with hypoxia (HCC)    Secondary hypertension    Acute pulmonary edema (HCC)    Seizure (HCC)    Hypokalemia    Hypotension due to hypovolemia    Hypophosphatemia    Anemia    Pneumonia of both lungs due to methicillin susceptible Staphylococcus aureus (MSSA) (HCC)    Aneurysm of ascending aorta without rupture (HCC)    Hyperglycemia      Plan IR  -Blood pressure parameters per neuro- keep 140-200-to mitigate vasospasm  -Continue Nimodipine  -TCD's (poor windows) therefore need to rely on assessment for vasospasm watch  - ContinueASA 81 mg daily 2nd to FU MRI brain (02/04) which showed small L MCA infarcts  - If suspect vasospasm consider CTA and CT perfusion  -Routine/follow-up CT on Saturday (02/10).  - Neuro checks per ICU protocol  - Neurosurgery EVD placement- managed by neurosurgery  - Maintain normoglycemia, normothermia, normo-natremia and euvolemic  - Vascular Neurology and Neurosurgery following  - Outpatient follow up in approximately 4 weeks from IR intervention (approx 03/0//24) . Our office will call to schedule      -Thank you for allowing Interventional Radiology team to participate in the patients care, if any additional care or requests are needed in the future please do not hesitate call or place IR order   [phone]              ROS-able to answer some ROS questions  Review of Systems  Physical Exam   Review of Systems   Constitutional:  Negative for chills and fever.   HENT:  Negative for hearing loss.    Eyes:  Negative for blurred vision.   Cardiovascular:  Negative for chest pain.   Gastrointestinal:  Negative for abdominal pain, heartburn, nausea and vomiting.   Skin:  Negative for rash.   Neurological:  Negative for headaches.      Vitals:    02/08/24 0600   BP: (!) 162/91   Pulse: 99   Resp: (!) 27   SpO2: 95%         Physical Exam  Vitals and nursing note reviewed.   Constitutional:       General: She is sleeping.      Comments: Wakes to stimuli   HENT:      Head:      Comments: Anterior EVD at 10cm H20 above tragus and open.         Mouth/Throat:      Mouth: Mucous membranes are dry.      Pharynx: Oropharynx is clear.   Eyes:      Comments: Left ptosis   CNIII palsy    Cardiovascular:      Rate and Rhythm: Regular rhythm. Tachycardia present.      Pulses:           Radial pulses are 2+ on the right side and 2+ on the left side.        Dorsalis pedis pulses are 2+ on the right side and 2+ on the left side.      Comments: Right groin access site soft, non-tender, without ecchymosis;      Pulmonary:      Effort: No respiratory distress.      Breath sounds: Normal air entry.   Chest:   Breasts:     Breasts are symmetrical.   Abdominal:      Palpations: Abdomen is soft.   Genitourinary:     Comments: Cuellar cath to down drain- clear yellow urine  Skin:     General: Skin is warm and dry.      Capillary Refill: Capillary refill takes less than 2 seconds.   Neurological:      Mental Status: She is lethargic.      Comments: Awake alert to person and place  Following  commands  Left eye with CN III palsy with ptosis  Pupils- right pupil is reactive at 3-3.5mm; left pupil reactive 3.5mm- 4mm  -Minimal movement in right upper extremity  -Spontaneous movement bilateral lower extremities  Following commands on the left side -able to hold up 2 fingers on command  .     Psychiatric:      Comments: Non verbal             Labs    Recent Labs     02/06/24  0400 02/07/24  0215 02/08/24  0430   WBC 17.0* 15.1* 15.6*   RBC 4.17* 4.12* 4.24   HEMOGLOBIN 10.8* 10.5* 11.1*   HEMATOCRIT 33.9* 32.6* 33.6*   MCV 81.3* 79.1* 79.2*   MCH 25.9* 25.5* 26.2*   MCHC 31.9* 32.2 33.0   RDW 41.1 39.5 38.9   PLATELETCT 295 313 381   MPV 10.1 9.8 9.8       Recent Labs     02/06/24  0400 02/06/24  0958 02/07/24  0215 02/07/24  0910 02/07/24  1758 02/08/24  0238  02/08/24  0430   SODIUM 138   < > 135   < > 133* 131* 132*   POTASSIUM 3.4*  --  3.8  --   --   --  3.8   CHLORIDE 103  --  103  --   --   --  97   CO2 26  --  24  --   --   --  23   GLUCOSE 132*  --  142*  --   --   --  120*   BUN 15  --  17  --   --   --  12   CREATININE 0.33*  --  0.31*  --   --   --  0.36*   CALCIUM 9.1  --  9.3  --   --   --  9.3    < > = values in this interval not displayed.       Recent Labs     02/06/24  0400 02/07/24  0215 02/08/24  0430   CREATININE 0.33* 0.31* 0.36*       DX-ABDOMEN FOR TUBE PLACEMENT   Final Result         1.  Nonspecific bowel gas pattern in the upper abdomen.   2.  Nasogastric tube tip terminates overlying the expected location of the pylorus or first duodenal segment.   3.  Hazy interstitial pulmonary edema and/or infiltrates, similar to prior study.   4.  Cardiomegaly      DX-CHEST-PORTABLE (1 VIEW)   Final Result      1.  Supportive tubing as described above.   2.  No other significant change from prior exam.         MR-BRAIN-W/O   Final Result      1.  Multifocal areas of acute infarcts in the left cerebral hemisphere.   2.  Small area of acute infarct in the right basal ganglia adjacent to the tip of the ventriculostomy catheter.   3.  Cortical infarct in the right medial parietal lobe.   4.  Mild amount of subdural hemorrhages surrounding the bilateral cerebellar hemispheres and right cerebellum.   5.  Subarachnoid hemorrhage.   6.  There is no hydrocephalus.   7.  There are changes secondary to the previous endovascular repair left internal carotid aneurysm.      CT-CTA NECK WITH & W/O-POST PROCESSING   Final Result      No focal high-grade stenosis, dissection or occlusion of the cervical carotid or vertebral arteries.      CT-CEREBRAL PERFUSION ANALYSIS   Final Result      1.  Cerebral blood flow less than 30% likely representing completed infarct = 0 mL.      2.  T Max more than 6 seconds likely representing combination of completed infarct and ischemia = 0  mL.      3.  Mismatched volume likely representing ischemic brain/penumbra = None      4.  Please note that the cerebral perfusion was performed on the limited brain tissue around the basal ganglia region. Infarct/ischemia outside the CT perfusion sections can be missed in this study.      CT-CTA HEAD WITH & W/O-POST PROCESS   Final Result      1.  Prior LEFT middle cranial fossa aneurysm coiling.  Artifact limits exam.   2.  No abrupt large vessel cut off.   3.  Segmental narrowing of LEFT middle cerebral artery, vasospasm versus artifact.   4.  Ventriculostomy catheter in similar position.   5.  Evolving RIGHT caudate infarct.   6.  Stable intracranial hemorrhage.         DX-ABDOMEN FOR TUBE PLACEMENT   Final Result      Orogastric tube tip now at the mid stomach.      DX-ABDOMEN FOR TUBE PLACEMENT   Final Result      Orogastric tube tip at the proximal stomach with side port just above the GE junction.      US-INTRACRANIAL ARTERY LIMITED   Final Result      DX-CHEST-LIMITED (1 VIEW)   Final Result         1.  Stable chest with perihilar and lower lung zone interstitial and minimal airspace opacities most consistent with pulmonary edema.   2.  No new abnormalities.      EC-ECHOCARDIOGRAM COMPLETE W/O CONT   Final Result      US-INTRACRANIAL ARTERY LIMITED   Final Result      DX-CHEST-PORTABLE (1 VIEW)   Final Result      1.  Lines and tubes appear appropriately located      2.  Improvement of pulmonary edema/airspace process      US-INTRACRANIAL ARTERY COMP   Final Result      CT-HEAD W/O   Final Result         1. Slightly decreased subarachnoid hemorrhage overlying the cerebral hemispheres.   2. Otherwise, stable as above.         DX-CHEST-FOR LINE PLACEMENT Perform procedure in: Patient's Room   Final Result      1.  Moderate interstitial pulmonary edema.   2.   Right-sided central venous catheter terminates with the tip projecting over the expected region of the mid to distal superior vena cava.   3.   Endotracheal tube terminates in satisfactory position at the level of the aortic arch.   4.  Enteric feeding tube terminates in the left upper quadrant projecting over the expected location of the stomach.      CT-STEALTH HEAD W/O   Final Result      1.  Interval placement of a right transparietal ventriculostomy catheter which terminates with the tip near the third ventricle. There is minimally decreased caliber of the ventricular system and compared to previous exam.   2.  Findings of intracranial hemorrhage appear similar to the previous exam. No definite new acute intracranial hemorrhage is identified.   3.  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         DX-CHEST-PORTABLE (1 VIEW)   Final Result      CT-HEAD W/O   Final Result      1. Interval aneurysm coiling of left posterior communicating artery aneurysm.   2. Possible increased subarachnoid hemorrhage.   3. Intraventricular hemorrhage. There is developing mild hydrocephalus with mildly increased ventricles.   4. Small right convexity subdural hematoma measuring 5 mm in thickness.      These findings were discussed with STEPHANIE EASON on 1/31/2024 3:14 PM.      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   This aneurysm was embolized to occlusion as described above. Final angiographic images demonstrate only minimal filling of the neck of the aneurysm. The patient will be followed up in   the neuro interventional clinic and brought back for a follow-up angiogram in 6 months.      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      I, Stephanie Eason was physically  "present and participated during the entire procedure of the IR-EMBOLIZATION.          INR   Date Value Ref Range Status   01/31/2024 1.06 0.87 - 1.13 Final     Comment:     INR - Non-therapeutic Reference Range: 0.87-1.13  INR - Therapeutic Reference Range: 2.0-4.0       No results found for: \"POCINR\"     Intake/Output Summary (Last 24 hours) at 2/1/2024 1746  Last data filed at 2/1/2024 1600  Gross per 24 hour   Intake 3089.64 ml   Output 3143 ml   Net -53.36 ml      Labs not explicitly included in this progress note were reviewed by the author. Radiology/imaging not explicitly included in this progress note was reviewed by the author.     I have performed a physical exam and reviewed and updated ROS and Plan today (2/8/2024).      35 minutes in directly providing and coordinating care and extensive data review.  No time overlap and excludes procedures.  "

## 2024-02-09 ENCOUNTER — APPOINTMENT (OUTPATIENT)
Dept: RADIOLOGY | Facility: MEDICAL CENTER | Age: 64
DRG: 020 | End: 2024-02-09
Attending: INTERNAL MEDICINE
Payer: COMMERCIAL

## 2024-02-09 LAB
ANION GAP SERPL CALC-SCNC: 13 MMOL/L (ref 7–16)
BASOPHILS # BLD AUTO: 0.5 % (ref 0–1.8)
BASOPHILS # BLD: 0.08 K/UL (ref 0–0.12)
BUN SERPL-MCNC: 12 MG/DL (ref 8–22)
CALCIUM SERPL-MCNC: 9 MG/DL (ref 8.5–10.5)
CHLORIDE SERPL-SCNC: 97 MMOL/L (ref 96–112)
CO2 SERPL-SCNC: 22 MMOL/L (ref 20–33)
CREAT SERPL-MCNC: 0.29 MG/DL (ref 0.5–1.4)
EOSINOPHIL # BLD AUTO: 0.18 K/UL (ref 0–0.51)
EOSINOPHIL NFR BLD: 1.2 % (ref 0–6.9)
ERYTHROCYTE [DISTWIDTH] IN BLOOD BY AUTOMATED COUNT: 38 FL (ref 35.9–50)
GFR SERPLBLD CREATININE-BSD FMLA CKD-EPI: 120 ML/MIN/1.73 M 2
GLUCOSE BLD STRIP.AUTO-MCNC: 108 MG/DL (ref 65–99)
GLUCOSE BLD STRIP.AUTO-MCNC: 127 MG/DL (ref 65–99)
GLUCOSE BLD STRIP.AUTO-MCNC: 136 MG/DL (ref 65–99)
GLUCOSE SERPL-MCNC: 133 MG/DL (ref 65–99)
HCT VFR BLD AUTO: 33.1 % (ref 37–47)
HGB BLD-MCNC: 11.2 G/DL (ref 12–16)
IMM GRANULOCYTES # BLD AUTO: 0.12 K/UL (ref 0–0.11)
IMM GRANULOCYTES NFR BLD AUTO: 0.8 % (ref 0–0.9)
LYMPHOCYTES # BLD AUTO: 1.11 K/UL (ref 1–4.8)
LYMPHOCYTES NFR BLD: 7.4 % (ref 22–41)
MAGNESIUM SERPL-MCNC: 2.1 MG/DL (ref 1.5–2.5)
MCH RBC QN AUTO: 26.2 PG (ref 27–33)
MCHC RBC AUTO-ENTMCNC: 33.8 G/DL (ref 32.2–35.5)
MCV RBC AUTO: 77.5 FL (ref 81.4–97.8)
MONOCYTES # BLD AUTO: 1.03 K/UL (ref 0–0.85)
MONOCYTES NFR BLD AUTO: 6.9 % (ref 0–13.4)
NEUTROPHILS # BLD AUTO: 12.42 K/UL (ref 1.82–7.42)
NEUTROPHILS NFR BLD: 83.2 % (ref 44–72)
NRBC # BLD AUTO: 0 K/UL
NRBC BLD-RTO: 0 /100 WBC (ref 0–0.2)
PHOSPHATE SERPL-MCNC: 2.9 MG/DL (ref 2.5–4.5)
PLATELET # BLD AUTO: 425 K/UL (ref 164–446)
PMV BLD AUTO: 9.7 FL (ref 9–12.9)
POTASSIUM SERPL-SCNC: 3.9 MMOL/L (ref 3.6–5.5)
RBC # BLD AUTO: 4.27 M/UL (ref 4.2–5.4)
SODIUM SERPL-SCNC: 132 MMOL/L (ref 135–145)
SODIUM SERPL-SCNC: 134 MMOL/L (ref 135–145)
WBC # BLD AUTO: 14.9 K/UL (ref 4.8–10.8)

## 2024-02-09 PROCEDURE — 80048 BASIC METABOLIC PNL TOTAL CA: CPT

## 2024-02-09 PROCEDURE — 84100 ASSAY OF PHOSPHORUS: CPT

## 2024-02-09 PROCEDURE — 99233 SBSQ HOSP IP/OBS HIGH 50: CPT | Performed by: PSYCHIATRY & NEUROLOGY

## 2024-02-09 PROCEDURE — 97130 THER IVNTJ EA ADDL 15 MIN: CPT

## 2024-02-09 PROCEDURE — A9270 NON-COVERED ITEM OR SERVICE: HCPCS | Performed by: INTERNAL MEDICINE

## 2024-02-09 PROCEDURE — 700102 HCHG RX REV CODE 250 W/ 637 OVERRIDE(OP): Performed by: NURSE PRACTITIONER

## 2024-02-09 PROCEDURE — 99292 CRITICAL CARE ADDL 30 MIN: CPT | Performed by: INTERNAL MEDICINE

## 2024-02-09 PROCEDURE — 85025 COMPLETE CBC W/AUTO DIFF WBC: CPT

## 2024-02-09 PROCEDURE — 700105 HCHG RX REV CODE 258: Performed by: INTERNAL MEDICINE

## 2024-02-09 PROCEDURE — 97530 THERAPEUTIC ACTIVITIES: CPT

## 2024-02-09 PROCEDURE — 99291 CRITICAL CARE FIRST HOUR: CPT | Performed by: INTERNAL MEDICINE

## 2024-02-09 PROCEDURE — 700102 HCHG RX REV CODE 250 W/ 637 OVERRIDE(OP): Performed by: INTERNAL MEDICINE

## 2024-02-09 PROCEDURE — 97129 THER IVNTJ 1ST 15 MIN: CPT

## 2024-02-09 PROCEDURE — 770022 HCHG ROOM/CARE - ICU (200)

## 2024-02-09 PROCEDURE — 82962 GLUCOSE BLOOD TEST: CPT

## 2024-02-09 PROCEDURE — 94669 MECHANICAL CHEST WALL OSCILL: CPT

## 2024-02-09 PROCEDURE — 83735 ASSAY OF MAGNESIUM: CPT

## 2024-02-09 PROCEDURE — 84295 ASSAY OF SERUM SODIUM: CPT

## 2024-02-09 PROCEDURE — A9270 NON-COVERED ITEM OR SERVICE: HCPCS | Performed by: NURSE PRACTITIONER

## 2024-02-09 RX ADMIN — NIMODIPINE 60 MG: 60 SOLUTION ORAL at 15:06

## 2024-02-09 RX ADMIN — POTASSIUM BICARBONATE 25 MEQ: 978 TABLET, EFFERVESCENT ORAL at 12:19

## 2024-02-09 RX ADMIN — NIMODIPINE 60 MG: 60 SOLUTION ORAL at 11:00

## 2024-02-09 RX ADMIN — NIMODIPINE 60 MG: 60 SOLUTION ORAL at 05:26

## 2024-02-09 RX ADMIN — NYSTATIN: 100000 POWDER TOPICAL at 05:27

## 2024-02-09 RX ADMIN — ATORVASTATIN CALCIUM 40 MG: 40 TABLET, FILM COATED ORAL at 17:52

## 2024-02-09 RX ADMIN — NIMODIPINE 60 MG: 60 SOLUTION ORAL at 01:00

## 2024-02-09 RX ADMIN — MICONAZOLE NITRATE: 20 CREAM TOPICAL at 05:26

## 2024-02-09 RX ADMIN — SODIUM CHLORIDE: 9 INJECTION, SOLUTION INTRAVENOUS at 13:49

## 2024-02-09 RX ADMIN — LEVETIRACETAM 500 MG: 500 TABLET, FILM COATED ORAL at 05:26

## 2024-02-09 RX ADMIN — NYSTATIN: 100000 POWDER TOPICAL at 17:53

## 2024-02-09 RX ADMIN — ACETAMINOPHEN 650 MG: 325 TABLET, FILM COATED ORAL at 03:45

## 2024-02-09 RX ADMIN — ACETAMINOPHEN 650 MG: 325 TABLET, FILM COATED ORAL at 19:52

## 2024-02-09 RX ADMIN — MICONAZOLE NITRATE: 20 CREAM TOPICAL at 17:53

## 2024-02-09 RX ADMIN — ASPIRIN 81 MG 81 MG: 81 TABLET ORAL at 06:00

## 2024-02-09 RX ADMIN — NIMODIPINE 60 MG: 60 SOLUTION ORAL at 21:29

## 2024-02-09 RX ADMIN — ACETAMINOPHEN 650 MG: 325 TABLET, FILM COATED ORAL at 15:05

## 2024-02-09 RX ADMIN — LEVETIRACETAM 500 MG: 500 TABLET, FILM COATED ORAL at 17:52

## 2024-02-09 RX ADMIN — NIMODIPINE 60 MG: 60 SOLUTION ORAL at 17:53

## 2024-02-09 ASSESSMENT — COGNITIVE AND FUNCTIONAL STATUS - GENERAL
PERSONAL GROOMING: A LOT
DRESSING REGULAR UPPER BODY CLOTHING: A LOT
DRESSING REGULAR LOWER BODY CLOTHING: TOTAL
EATING MEALS: TOTAL
DAILY ACTIVITIY SCORE: 8
TOILETING: TOTAL
HELP NEEDED FOR BATHING: TOTAL
SUGGESTED CMS G CODE MODIFIER DAILY ACTIVITY: CM

## 2024-02-09 ASSESSMENT — ENCOUNTER SYMPTOMS
HEARTBURN: 0
HEADACHES: 0
VOMITING: 0
BLURRED VISION: 0
FEVER: 0
ABDOMINAL PAIN: 0
NAUSEA: 0
CHILLS: 0

## 2024-02-09 ASSESSMENT — FIBROSIS 4 INDEX: FIB4 SCORE: 1.02

## 2024-02-09 NOTE — THERAPY
Speech Language Pathology   Daily Treatment     Patient Name: Fay Turner  AGE:  63 y.o., SEX:  female  Medical Record #: [id number]  Date of Service: 2024    Precautions: Fall Risk, Swallow Precautions  Comments: EVD     Subjective  Pt seen on this date for cognitive treatment. Of note, dysphagia tx attempted, however, pt declined d/t satiety. Pt was received asleep and required consistent cueing to maintain appropriate levels of alertness. Per family, pt w/ increased fatigue after working w/ PT. Of note, family endorsing pt converses more with them- expressing wants/needs.     Assessment  -With max cues and of yes/no questions, pt only oriented to self/. -RN aware  -With max cues, pt was able to recall salient/functional information after a 2 min delay with 25% accuracy.   -Pt did not answer simple egocentric yes/no questions from SLP- did respond appropriately when asked by son (I.e., Are you in pain? Are you hungry?).   -Despite max cues, pt did not attempt to repeat items presented by SLP.    Clinical Impressions  Pt presenting with severe cognitive deficits on this date. RN updated to same. Suspect deficits may be attributed to fatigue s/p working w/ PT and preference for communication w/ family over staff. SLP to follow for cognitive treatment and further testing as appropriate. ST indicated in the acute care setting and next level of care to target such deficits. Pt will benefit from intermittent supervision throughout the day and direct A/ IADLS upon discharge d/t same.      Recommendations  Treatment Completed: Cognitive Treatment  Supervision Needs Upons Discharge: Intermittent supervision throughout the day and direct assistance with IADLs (see below)  IADLs: Medication management, Financial management, Appointment management, Household chores, Cooking    SLP Treatment Plan  Treatment Plan: Dysphagia Treatment, Cognitive Treatment, Patient/Family/Caregiver Training (motor speech)  SLP Frequency:  4x Per Week  Estimated Duration: Until Therapy Goals Met    Anticipated Discharge Needs  Discharge Recommendations: Recommend post-acute placement for additional speech therapy services prior to discharge home  Therapy Recommendations Upon DC: Dysphagia Training, Comprehension Training, Expression Training, Reading Training, Writing Training, Cognitive-Linguistic Training, Community Re-Integration, Patient / Family / Caregiver Education, Other (See Comments) (motor speech)    Patient / Family Goals  Patient / Family Goal #1: nods yes to ice chips  Goal #1 Outcome: Goal met  Short Term Goals  Short Term Goal # 1: Pt will participate in an instrumental swallow study via FEES to determine airway protection/swallow efficiency and guide dysphagia management.  Goal Outcome # 1: Goal met, new goal added  Short Term Goal # 1 B : Pt will consume a MM5/TN0 diet with 1:1 supv without any overt s/s of aspiration or decline in respiratory status  Goal Outcome  # 1 B: Progressing slower than expected  Short Term Goal # 2: With mod-max cues, pt will be AAOx4 during >65% of opportunities.  Goal Outcome # 2 : Progressing slower than expected  Short Term Goal # 3: With max cues, pt will recall salient/functional information after a 5 min delay during >65% of opportunities.  Goal Outcome  # 3: Progressing slower than expected  Short Term Goal # 4: With min-mod cues, pt will participate in additional cognitive testing to further guide POC.  Goal Outcome  # 4: Progressing slower than expected  Short Term Goal # 5: Pt will utilize 'clear speech' techniques to increase intelligibility at the conversational level to >90%.  Goal Outcome  # 5: Other (see comments) (not targeted on this date)    Anai Dumont, SLP

## 2024-02-09 NOTE — PROGRESS NOTES
Neurosurgery Progress Note    Subjective:  63 year old presented with HH4 ruptured left Pcomm aneurysm, post coil day 4.     Overnight on  had worsening exam with flaccid RUE, decreased mentation, more lethargy. CTA showed left MCA vasospasm.      Started following on RUE , intermittently.    ICPs stable, 12-15 overnight  EVD output from anterior EVD 2-11cc/hr overnight.   Drainage clear overall, slightly blood tinged.   EVD Raised to 20cm H20 , tolerating well.      Sedation off.   No changes overnight.         Exam:  GCS: [id number]. Oriented to name and place ([place]).   Extubated.  +corneal, +cough/gag.    Right eye open spontaneously.   Has left CNIII palsy with ptosis.  Pupils right 1-2mm reactive, left 3-4mm non reactive, gaze conjugate.   Command following LUE.   Intermittently follows in RUE.  Withdrawals to lower extremities.    Anterior EVD in place and functioning at [id number] above the tragus   ICP waveform adequate.    BP  Min: 142/100  Max: 176/98  Pulse  Av.2  Min: 98  Max: 136  Resp  Av.2  Min: 21  Max: 33  Monitored Temp 2  Av.6 °C (99.7 °F)  Min: 37.1 °C (98.8 °F)  Max: 38.3 °C (100.9 °F)  SpO2  Av.4 %  Min: 91 %  Max: 97 %    ICP  Av MM HG  Min: 9 MM HG  Max: 16 MM HG    Recent Labs     24  0215 24  0430 24  0353   WBC 15.1* 15.6* 14.9*   RBC 4.12* 4.24 4.27   HEMOGLOBIN 10.5* 11.1* 11.2*   HEMATOCRIT 32.6* 33.6* 33.1*   MCV 79.1* 79.2* 77.5*   MCH 25.5* 26.2* 26.2*   MCHC 32.2 33.0 33.8   RDW 39.5 38.9 38.0   PLATELETCT 313 381 425   MPV 9.8 9.8 9.7       Recent Labs     24  0215 24  0910 24  0430 24  1220 24  1956 24  0353   SODIUM 135   < > 132* 134* 134* 132*   POTASSIUM 3.8  --  3.8  --   --  3.9   CHLORIDE 103  --  97  --   --  97   CO2 24  --  23  --   --  22   GLUCOSE 142*  --  120*  --   --  133*   BUN 17  --  12  --   --  12   CREATININE 0.31*  --  0.36*  --   --  0.29*   CALCIUM 9.3  --  9.3  --   --   9.0    < > = values in this interval not displayed.                     Intake/Output                         02/08/24 0700 - 02/09/24 0659 02/09/24 0700 - 02/10/24 0659     9988-4041 7849-7835 Total 8923-9415 1492-4907 Total                 Intake    P.O.  120  -- 120  --  -- --    P.O. 120 -- 120 -- -- --    I.V.  1490.2  988 2478.2  --  -- --    Volume (mL) (NS infusion) 990.7 988 1978.7 -- -- --    Volume (mL) (NS (Bolus) 0.9 % infusion 500 mL) 499.5 -- 499.5 -- -- --    Other  240  245 485  --  -- --    Medications (PO/Enteral Liquids) 240 245 485 -- -- --    NG/GT  0  295 295  --  -- --    Intake (mL) (Enteral Tube 02/06/24 14 Fr. Right nare) 0 295 295 -- -- --    Enteral  --  30 30  --  -- --    Free Water / Tube Flush -- 30 30 -- -- --    Total Intake 1850.2 1558 3408.2 -- -- --       Output    Urine  1760  1620 3380  --  -- --    Output (mL) (Urethral Catheter Non-latex;Temperature probe) 1760 1620 3380 -- -- --    Drains  62  77 139  --  -- --    Output (mL) (ICP/Ventriculostomy Right) 62 77 139 -- -- --    Stool  --  -- --  --  -- --    Number of Times Stooled 1 x 2 x 3 x -- -- --    Total Output 1822 1697 3519 -- -- --       Net I/O     28.2 -139 -110.8 -- -- --              Intake/Output Summary (Last 24 hours) at 2/9/2024 0923  Last data filed at 2/9/2024 0600  Gross per 24 hour   Intake 2742.76 ml   Output 3299 ml   Net -556.24 ml               potassium bicarbonate  25 mEq Once    nystatin   BID    acetaminophen  650 mg Q4HRS PRN    Or    acetaminophen  650 mg Q4HRS PRN    atorvastatin  40 mg Q EVENING    oxyCODONE immediate-release  2.5 mg Q6HRS PRN    miconazole   BID    aspirin  81 mg DAILY    NS   Continuous    insulin regular  2-9 Units Q6HRS    And    dextrose bolus  25 g Q15 MIN PRN    enalaprilat  1.25 mg Q6HRS PRN    hydrALAZINE  10-20 mg Q4HRS PRN    labetalol  10-20 mg Q4HRS PRN    niMODipine  60 mg Q4HRS    levETIRAcetam  500 mg BID    Pharmacy  1 Each PHARMACY TO DOSE    Respiratory  Therapy Consult   Continuous RT    senna-docusate  2 Tablet BID    And    polyethylene glycol/lytes  1 Packet QDAY PRN    And    magnesium hydroxide  30 mL QDAY PRN    And    bisacodyl  10 mg QDAY PRN    MD Alert...Adult ICU Electrolyte Replacement per Pharmacy   PHARMACY TO DOSE    ondansetron  4 mg Q4HRS PRN    Or    ondansetron  4 mg Q4HRS PRN    LORazepam  2 mg Q5 MIN PRN    prochlorperazine  10 mg Q6HRS PRN       Assessment and Plan:  Hospital day # 10  Post coil day 9  Keep EVD at 20cm H20 above tragus and open.   Will plan to clamp tomorrow, 2/10 and leave clamped through the weekend.   Will plan to repeat head CT Monday AM, this will need to be ordered depending on how she does with the clamp.   If any neuro changes/sustained rise in ICP after clamp, page NS and we will likely reopen.   Appreciate neurology, IR and intensivist care.  Brain MRI done, Dr. Monroe reviewed.   Q2 neurochecks.    Following closely.     Chemical prophylactic DVT therapy: OK from NS perspective  Start date/time: per intensivist.
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
## 2024-02-09 NOTE — CARE PLAN
Problem: Bronchopulmonary Hygiene  Goal: Increase mobilization of retained secretions  Description: Target End Date:  2 to 3 days    1.  Perform bronchopulmonary therapy as indicated by assessment  2.  Perform airway suctioning  3.  Perform actions to maintain patient airway  Outcome: Progressing   Flutter QID     Glycopyrrolate Counseling:  I discussed with the patient the risks of glycopyrrolate including but not limited to skin rash, drowsiness, dry mouth, difficulty urinating, and blurred vision.

## 2024-02-09 NOTE — PROGRESS NOTES
Neuro Interventional Radiology   Progress Note     Author: ELIE Hernandez Date & Time created: 2/9/2024  10:17 AM   Date of admission  1/31/2024  Note to reader: this note follows the APSO format rather than the historical SOAP format. Assessment and plan located at the top of the note for ease of use.    Chief Complaint  63 y.o. female admitted 1/31/2024 with subarachnoid hemorrhage.    HPI  Fay France is a 62 yo female with PMH significant for HTN who presented to OSH for sudden onset of new eyelid droop, and generalized headache without significant head trauma. Pt seized in OSH CT scan, was loaded with Keppra and transferred for higher level of care. 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 Carpio Potts 4; Modified Swenson scale 3.  01/31/24 MAURICE Eason completed a cerebral aneurysm with coil embolization of large left PCOMM aneurysm.    Interval History IR:  02/01/24: RIGHT groin access site soft, non-tender, without ecchymosis. Dressing clean, dry, intact. Pedal pulses +2 bilaterally with feet pink, and warm, cap refill <3 sec. Pt is intubated and ventilated with EVD. She is not following commands, but moves right foot to stimuli. I reviewed the most recent labs including WBC 19.6, Hgb 14.2, Cr 0.61. Coordinated post IR procedure care with hospital nursing staff.     02/05/24:  PBD/post Pcom aneurysm embolization with coil #5.  Pt extubated yesterday (2/4). A stat CTA and CT perfusion done early am on 2/4 (increased lethargy) no obvious vasospasm noted. TCD's with poor windows therefor no baseline Tcd's obtained.  I reviewed FU MRI brain(02/04) which showed small L MCA infarcts-Vasopressors infusing and  keeping -200 to mitigate vasospasm. Anterior EVD in place at 10 cm H20 above tragus with good waveform noted on monitor-R groin site soft, nontender without edema, bleeding, small scab with small  ecchymosis. I  reviewed today's labs: WBC 13.7; hemoglobin 10.5;  ; Cr 0.40; respiratory culture- +MSSA in sputum-continuing Rocephin today is day 5 of 5.  I's/O's-last 24 hours+ 551/since admission +1260; ICP 2-10 (good ICP waveform noted on monitor).      02/06/24:  PBD/post Pcom aneurysm embolization with coil #6. TCD's with poor windows, recommend following neuro assessment. I reviewed FU MRI brain(02/04) which showed small L MCA infarcts-Goal -200 to mitigate vasospasm. Anterior EVD in place at 10 cm H20 above tragus with good waveform noted on monitor-R groin site soft, nontender without edema, bleeding, small scab with small  ecchymosis. I reviewed today's labs: WBC 17; hemoglobin 10.8;  ; Cr 0.33; respiratory culture- +MSSA in sputum -completed Rocephin.  I's/O's-last 24 hours -193 /since admission +1037; ICP 5-11 (good ICP waveform noted on monitor). I started ASA therapy 2nd to multiple infarcts noted on MRI.  ASA therapy discussed and approved by Fer Helms and  Dr. Eason.     02/07/24: SAH, Carpio Potts 4;Modified Swenson 3.  PBD/post Pcom aneurysm embolization with coil #7. TCD's discontinued 2nd to poor windows, recommend following neuro assessment. -Goal -200 to mitigate vasospasm. Anterior EVD in place at 10 cm H20 above tragus with good waveform noted on monitor-ecchymosis. I reviewed today's labs: WBC 15.6; Hemoglobin 11.1 ; Cr 0.36; .  I's/O's-last 24 hours -190 /since admission +876; ICP 3-10 (good ICP waveform noted on monitor).     02/08/24: SAH, Carpio Potts 4;Modified Swenson 3.  PBD/post Pcom aneurysm embolization with coil #8. TCD's with poor windows, recommend following neuro assessment. -Goal -200 to mitigate vasospasm. Anterior EVD open, raised today to 20 cm H20 above tragus with good waveform noted on monitor-ecchymosis. I reviewed today's labs: WBC 15.1; Hemoglobin 10.5;  ; Cr 0.31;  I's/O's-last 24 hours --1091/since admission -214 ICP 4-11/CPP > 100  (good ICP waveform noted on monitor).  Tmax 100.6    02/09/24: SAH, Carpio Potts 4;Modified Swenson 3. PBD/post Pcom aneurysm embolization with coil #9. No longer following TCD's 2nd to poor windows.   Follow neuro assessment. -Goal -200 to mitigate vasospasm. Anterior EVD open, @15 cm H20 above tragus with good waveform noted on monitor. I reviewed today's labs: WBC 14.9; Hemoglobin 11.2;  ; Cr 0.29;  I's/O's-last 24 hours -110 ml; output since admission - 675 ml.  ICP 4-11/CPP ; EVD output -139 mL in the last 24 hours (good ICP waveform noted on monitor). Tmax 100. 9    23 hello hello hello hi Allison how are you perfect  Assessment/Plan     Principal Problem:    SAH (subarachnoid hemorrhage) (HCC)  Active Problems:    Acute respiratory failure with hypoxia (HCC)    Secondary hypertension    Acute pulmonary edema (HCC)    Seizure (HCC)    Hypokalemia    Hypotension due to hypovolemia    Hypophosphatemia    Anemia    Pneumonia of both lungs due to methicillin susceptible Staphylococcus aureus (MSSA) (HCC)    Aneurysm of ascending aorta without rupture (HCC)    Hyperglycemia      Plan IR  -Blood pressure parameters per neuro- keep 140-200-to mitigate vasospasm  -Continue Nimodipine  -TCD's (poor windows) therefore rely on assessment for vasospasm watch  - If suspect vasospasm consider CTA and CT perfusion  -Continue ASA 81 mg daily 2nd to FU MRI brain (02/04) which showed small L MCA infarcts  - Routine/follow-up CT on Saturday (02/10).  - Neuro checks per ICU protocol  - Neurosurgery EVD placement- managed by neurosurgery  - Maintain normoglycemia, normothermia, normo-natremia and euvolemic  - Vascular Neurology and Neurosurgery following  - Outpatient follow up in approximately 4 weeks from IR intervention (approx 03/0//24) . Our office will call to schedule      -Thank you for allowing Interventional Radiology team to participate in the patients care, if any additional care or requests are needed in  the future please do not hesitate call or place IR order   [phone]              ROS-able to answer some ROS questions  Review of Systems  Physical Exam   Review of Systems   Constitutional:  Negative for chills and fever.   HENT:  Negative for hearing loss.    Eyes:  Negative for blurred vision.   Cardiovascular:  Negative for chest pain.   Gastrointestinal:  Negative for abdominal pain, heartburn, nausea and vomiting.   Skin:  Negative for rash.   Neurological:  Negative for headaches.      Vitals:    02/09/24 0900   BP: (!) 154/91   Pulse: (!) 102   Resp: (!) 23   SpO2: 96%        Physical Exam  Vitals and nursing note reviewed.   Constitutional:       General: She is sleeping.      Comments: Wakes to stimuli   HENT:      Head:      Comments: Anterior EVD at 10cm H20 above tragus and open.         Mouth/Throat:      Mouth: Mucous membranes are dry.      Pharynx: Oropharynx is clear.   Eyes:      Comments: Left ptosis   CNIII palsy    Cardiovascular:      Rate and Rhythm: Regular rhythm. Tachycardia present.      Pulses:           Radial pulses are 2+ on the right side and 2+ on the left side.        Dorsalis pedis pulses are 2+ on the right side and 2+ on the left side.      Comments: Right groin access site soft, non-tender, without ecchymosis;      Pulmonary:      Effort: No respiratory distress.      Breath sounds: Normal air entry.   Chest:   Breasts:     Breasts are symmetrical.   Abdominal:      Palpations: Abdomen is soft.   Genitourinary:     Comments: Cuellar cath to down drain- clear yellow urine  Skin:     General: Skin is warm and dry.      Capillary Refill: Capillary refill takes less than 2 seconds.   Neurological:      Mental Status: She is lethargic.      Comments: Sleepy.  Wakes to stimuli and is oriented to person and place  Following  commands  Left eye with CN III palsy with ptosis  Pupils- right pupil is reactive at 3-3.5mm; left pupil reactive 3.5mm- 4mm  -Minimal movement in right upper  extremity  -Spontaneous movement bilateral lower extremities  Following commands on the left side -able to hold up 2 fingers on command  .     Psychiatric:      Comments: Non verbal             Labs    Recent Labs     02/07/24 0215 02/08/24 0430 02/09/24  0353   WBC 15.1* 15.6* 14.9*   RBC 4.12* 4.24 4.27   HEMOGLOBIN 10.5* 11.1* 11.2*   HEMATOCRIT 32.6* 33.6* 33.1*   MCV 79.1* 79.2* 77.5*   MCH 25.5* 26.2* 26.2*   MCHC 32.2 33.0 33.8   RDW 39.5 38.9 38.0   PLATELETCT 313 381 425   MPV 9.8 9.8 9.7       Recent Labs     02/07/24 0215 02/07/24  0910 02/08/24 0430 02/08/24  1220 02/08/24 1956 02/09/24  0353   SODIUM 135   < > 132* 134* 134* 132*   POTASSIUM 3.8  --  3.8  --   --  3.9   CHLORIDE 103  --  97  --   --  97   CO2 24  --  23  --   --  22   GLUCOSE 142*  --  120*  --   --  133*   BUN 17  --  12  --   --  12   CREATININE 0.31*  --  0.36*  --   --  0.29*   CALCIUM 9.3  --  9.3  --   --  9.0    < > = values in this interval not displayed.       Recent Labs     02/07/24 0215 02/08/24 0430 02/09/24 0353   CREATININE 0.31* 0.36* 0.29*       [id number] VIEW   Final Result         1.  Nonspecific bowel gas pattern in the upper abdomen.   2.  Nasogastric tube tip terminates overlying the expected location of the pylorus or first duodenal segment.   3.  Hazy interstitial pulmonary edema and/or infiltrates   4.  Cardiomegaly      DX-ABDOMEN FOR TUBE PLACEMENT   Final Result      Enteric sump tube in situ with its tip at the level of the distal gastric antrum or pylorus.      DX-ABDOMEN FOR TUBE PLACEMENT   Final Result         1.  Nonspecific bowel gas pattern in the upper abdomen.   2.  Nasogastric tube tip terminates overlying the expected location of the pylorus or first duodenal segment.   3.  Hazy interstitial pulmonary edema and/or infiltrates, similar to prior study.   4.  Cardiomegaly      DX-CHEST-PORTABLE (1 VIEW)   Final Result      1.  Supportive tubing as described above.   2.  No other  significant change from prior exam.         MR-BRAIN-W/O   Final Result      1.  Multifocal areas of acute infarcts in the left cerebral hemisphere.   2.  Small area of acute infarct in the right basal ganglia adjacent to the tip of the ventriculostomy catheter.   3.  Cortical infarct in the right medial parietal lobe.   4.  Mild amount of subdural hemorrhages surrounding the bilateral cerebellar hemispheres and right cerebellum.   5.  Subarachnoid hemorrhage.   6.  There is no hydrocephalus.   7.  There are changes secondary to the previous endovascular repair left internal carotid aneurysm.      CT-CTA NECK WITH & W/O-POST PROCESSING   Final Result      No focal high-grade stenosis, dissection or occlusion of the cervical carotid or vertebral arteries.      CT-CEREBRAL PERFUSION ANALYSIS   Final Result      1.  Cerebral blood flow less than 30% likely representing completed infarct = 0 mL.      2.  T Max more than 6 seconds likely representing combination of completed infarct and ischemia = 0 mL.      3.  Mismatched volume likely representing ischemic brain/penumbra = None      4.  Please note that the cerebral perfusion was performed on the limited brain tissue around the basal ganglia region. Infarct/ischemia outside the CT perfusion sections can be missed in this study.      CT-CTA HEAD WITH & W/O-POST PROCESS   Final Result      1.  Prior LEFT middle cranial fossa aneurysm coiling.  Artifact limits exam.   2.  No abrupt large vessel cut off.   3.  Segmental narrowing of LEFT middle cerebral artery, vasospasm versus artifact.   4.  Ventriculostomy catheter in similar position.   5.  Evolving RIGHT caudate infarct.   6.  Stable intracranial hemorrhage.         DX-ABDOMEN FOR TUBE PLACEMENT   Final Result      Orogastric tube tip now at the mid stomach.      DX-ABDOMEN FOR TUBE PLACEMENT   Final Result      Orogastric tube tip at the proximal stomach with side port just above the GE junction.      US-INTRACRANIAL  ARTERY LIMITED   Final Result      DX-CHEST-LIMITED (1 VIEW)   Final Result         1.  Stable chest with perihilar and lower lung zone interstitial and minimal airspace opacities most consistent with pulmonary edema.   2.  No new abnormalities.      EC-ECHOCARDIOGRAM COMPLETE W/O CONT   Final Result      US-INTRACRANIAL ARTERY LIMITED   Final Result      DX-CHEST-PORTABLE (1 VIEW)   Final Result      1.  Lines and tubes appear appropriately located      2.  Improvement of pulmonary edema/airspace process      US-INTRACRANIAL ARTERY COMP   Final Result      CT-HEAD W/O   Final Result         1. Slightly decreased subarachnoid hemorrhage overlying the cerebral hemispheres.   2. Otherwise, stable as above.         DX-CHEST-FOR LINE PLACEMENT Perform procedure in: Patient's Room   Final Result      1.  Moderate interstitial pulmonary edema.   2.   Right-sided central venous catheter terminates with the tip projecting over the expected region of the mid to distal superior vena cava.   3.  Endotracheal tube terminates in satisfactory position at the level of the aortic arch.   4.  Enteric feeding tube terminates in the left upper quadrant projecting over the expected location of the stomach.      CT-STEALTH HEAD W/O   Final Result      1.  Interval placement of a right transparietal ventriculostomy catheter which terminates with the tip near the third ventricle. There is minimally decreased caliber of the ventricular system and compared to previous exam.   2.  Findings of intracranial hemorrhage appear similar to the previous exam. No definite new acute intracranial hemorrhage is identified.   3.  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         DX-CHEST-PORTABLE (1 VIEW)   Final Result      CT-HEAD W/O   Final Result      1. Interval aneurysm coiling of left posterior communicating artery aneurysm.   2. Possible increased subarachnoid hemorrhage.   3. Intraventricular hemorrhage.  "There is developing mild hydrocephalus with mildly increased ventricles.   4. Small right convexity subdural hematoma measuring 5 mm in thickness.      These findings were discussed with STEPHANIE EASON on 1/31/2024 3:14 PM.      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   This aneurysm was embolized to occlusion as described above. Final angiographic images demonstrate only minimal filling of the neck of the aneurysm. The patient will be followed up in   the neuro interventional clinic and brought back for a follow-up angiogram in 6 months.      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      I, Stephanie Eason was physically present and participated during the entire procedure of the IR-EMBOLIZATION.          INR   Date Value Ref Range Status   01/31/2024 1.06 0.87 - 1.13 Final     Comment:     INR - Non-therapeutic Reference Range: 0.87-1.13  INR - Therapeutic Reference Range: 2.0-4.0       No results found for: \"POCINR\"     Intake/Output Summary (Last 24 hours) at 2/1/2024 1746  Last data filed at 2/1/2024 1600  Gross per 24 hour   Intake 3089.64 ml   Output 3143 ml   Net -53.36 ml      Labs not explicitly included in this progress note were reviewed by the author. Radiology/imaging not explicitly included in this progress note was reviewed by the author.     I have performed a physical exam and reviewed and updated ROS and Plan today (2/9/2024).      33 minutes in directly providing and coordinating care and extensive data review.  No time overlap and excludes procedures.  "

## 2024-02-09 NOTE — PROGRESS NOTES
Neurology Progress Note  Neurohospitalist Service, [facility] Neurosciences    Referring Physician: Dayana Babcock M.D.    No chief complaint on file.      HPI: Refer to initial documented Neurology H&P, as detailed in the patient's chart.    Interval History:   No new complaints.  Continue to have slow and gradual improvement.  No headaches.  Family are at bedside.      Review of systems: In addition to what is detailed in the HPI and/or updated in the interval history, all other systems reviewed and are negative.    Past Medical History:    has no past medical history on file.    FHx:  family history is not on file.    SHx:       Medications:    Current Facility-Administered Medications:     potassium bicarbonate (Klyte) effervescent tablet 25 mEq, 25 mEq, Enteral Tube, Once, Dayana Babcock M.D.    nystatin (Mycostatin) powder, , Topical, BID, Dayana Babcock M.D., Given at 02/09/24 0527    acetaminophen (Tylenol) tablet 650 mg, 650 mg, Enteral Tube, Q4HRS PRN, 650 mg at 02/09/24 0345 **OR** acetaminophen (Tylenol) suppository 650 mg, 650 mg, Rectal, Q4HRS PRN, Dayana Babcock M.D.    atorvastatin (Lipitor) tablet 40 mg, 40 mg, Enteral Tube, Q EVENING, Dayana Babcock M.D., 40 mg at 02/08/24 1802    oxyCODONE immediate-release (Roxicodone) tablet 2.5 mg, 2.5 mg, Enteral Tube, Q6HRS PRN, Dayana Babcock M.D., 2.5 mg at 02/08/24 1534    miconazole (Micotin) 2 % cream, , Topical, BID, Dayana Babcock M.D., Given at 02/09/24 0526    aspirin (Asa) chewable tab 81 mg, 81 mg, Enteral Tube, DAILY, JAMEL GarzaR.N., 81 mg at 02/09/24 0600    NS infusion, , Intravenous, Continuous, Jeremy M Gonda, M.D., Last Rate: 83 mL/hr at 02/08/24 2353, New Bag at 02/08/24 2353    insulin regular (HumuLIN R,NovoLIN R) injection, 2-9 Units, Subcutaneous, Q6HRS, 2 Units at 02/04/24 1830 **AND** POC blood glucose manual result, , , Q6H **AND** NOTIFY MD and PharmD, , , Once **AND** Administer 20 grams of  glucose (approximately 8 ounces of fruit juice) every 15 minutes PRN FSBG less than 70 mg/dL, , , PRN **AND** dextrose 50% (D50W) injection 25 g, 25 g, Intravenous, Q15 MIN PRN, Jeremy M Gonda, M.D.    enalaprilat (Vasotec) injection 1.25 mg 1 mL, 1.25 mg, Intravenous, Q6HRS PRN, Jeremy M Gonda, M.D.    hydrALAZINE (Apresoline) injection 10-20 mg, 10-20 mg, Intravenous, Q4HRS PRN, Jeremy M Gonda, M.D.    labetalol (Normodyne/Trandate) injection 10-20 mg, 10-20 mg, Intravenous, Q4HRS PRN, Jeremy M Gonda, M.D.    niMODipine (Nymalize) oral syringe 60 mg, 60 mg, Enteral Tube, Q4HRS, Jeremy M Gonda, M.D., 60 mg at 02/09/24 0526    levETIRAcetam (Keppra) tablet 500 mg, 500 mg, Enteral Tube, BID, Jeremy M Gonda, M.D., 500 mg at 02/09/24 0526    Pharmacy Consult: Enteral tube insertion - review meds/change route/product selection, 1 Each, Other, PHARMACY TO DOSE, Jeremy M Gonda, M.D.    Respiratory Therapy Consult, , Nebulization, Continuous RT, Jeremy M Gonda, M.D.    senna-docusate (Pericolace Or Senokot S) 8.6-50 MG per tablet 2 Tablet, 2 Tablet, Enteral Tube, BID, 2 Tablet at 02/03/24 0514 **AND** polyethylene glycol/lytes (Miralax) Packet 1 Packet, 1 Packet, Enteral Tube, QDAY PRN **AND** magnesium hydroxide (Milk Of Magnesia) suspension 30 mL, 30 mL, Enteral Tube, QDAY PRN **AND** bisacodyl (Dulcolax) suppository 10 mg, 10 mg, Rectal, QDAY PRN, Jeremy M Gonda, M.D. MD Alert...ICU Electrolyte Replacement per Pharmacy, , Other, PHARMACY TO DOSE, Jeremy M Gonda, M.D.    ondansetron (Zofran ODT) dispertab 4 mg, 4 mg, Enteral Tube, Q4HRS PRN, 4 mg at 01/31/24 6918 **OR** ondansetron (Zofran) syringe/vial injection 4 mg, 4 mg, Intravenous, Q4HRS PRN, Jeremy M Gonda, M.D., 4 mg at 02/02/24 5572    LORazepam (Ativan) injection 2 mg, 2 mg, Intravenous, Q5 MIN PRN, Jeremy M Gonda, M.D.    prochlorperazine (Compazine) injection 10 mg, 10 mg, Intravenous, Q6HRS PRN, David Mclaughlin Jr., D.O., 10 mg at 01/31/24  1858    Physical Examination:    Vitals:    02/09/24 0600 02/09/24 0700 02/09/24 0800 02/09/24 0900   BP:  (!) 142/100 (!) 172/95 (!) 154/91   Pulse: (!) 125 98 97 (!) 102   Resp: (!) 25 (!) 25 (!) 31 (!) 23   TempSrc: Bladder      SpO2: 97% 96% 96% 96%   Weight:       Height:             NEUROLOGICAL EXAM:   She is awake, alert with eyes open.  She track across the room.  Able to follow simple commands such as closing her eyes and showing thumbs up with her left hand.  Appear to have minimal verbal output.  Face appears symmetric, however has left ptosis.  She has unequal pupils: Right pupil is 2 mm and sluggishly reactive.  Left pupil is 3 mm and nonreactive.  She has disconjugate gaze.  She has flaccid right upper extremity and withdraws to physical stimulation in all other extremities.  Gait was deferred.    Objective Data:    Labs:  Lab Results   Component Value Date/Time    PROTHROMBTM 13.9 01/31/2024 03:25 PM    INR 1.06 01/31/2024 03:25 PM      Lab Results   Component Value Date/Time    WBC 14.9 (H) 02/09/2024 03:53 AM    RBC 4.27 02/09/2024 03:53 AM    HEMOGLOBIN 11.2 (L) 02/09/2024 03:53 AM    HEMATOCRIT 33.1 (L) 02/09/2024 03:53 AM    MCV 77.5 (L) 02/09/2024 03:53 AM    MCH 26.2 (L) 02/09/2024 03:53 AM    MCHC 33.8 02/09/2024 03:53 AM    MPV 9.7 02/09/2024 03:53 AM    NEUTSPOLYS 83.20 (H) 02/09/2024 03:53 AM    LYMPHOCYTES 7.40 (L) 02/09/2024 03:53 AM    MONOCYTES 6.90 02/09/2024 03:53 AM    EOSINOPHILS 1.20 02/09/2024 03:53 AM    BASOPHILS 0.50 02/09/2024 03:53 AM      Lab Results   Component Value Date/Time    SODIUM 132 (L) 02/09/2024 03:53 AM    POTASSIUM 3.9 02/09/2024 03:53 AM    CHLORIDE 97 02/09/2024 03:53 AM    CO2 22 02/09/2024 03:53 AM    GLUCOSE 133 (H) 02/09/2024 03:53 AM    BUN 12 02/09/2024 03:53 AM    CREATININE 0.29 (L) 02/09/2024 03:53 AM      Lab Results   Component Value Date/Time    CHOLSTRLTOT 165 01/31/2024 03:25 PM     (H) 01/31/2024 03:25 PM    HDL 36 (A) 01/31/2024 03:25  PM    TRIGLYCERIDE 79 01/31/2024 03:25 PM       Lab Results   Component Value Date/Time    ALKPHOSPHAT 127 (H) 02/05/2024 04:15 AM    ASTSGOT 27 02/05/2024 04:15 AM    ALTSGPT 15 02/05/2024 04:15 AM    TBILIRUBIN 0.5 02/05/2024 04:15 AM        Imaging/Testing:    I interpreted and/or reviewed the patient's neuroimaging    [id number] VIEW   Final Result         1.  Nonspecific bowel gas pattern in the upper abdomen.   2.  Nasogastric tube tip terminates overlying the expected location of the pylorus or first duodenal segment.   3.  Hazy interstitial pulmonary edema and/or infiltrates   4.  Cardiomegaly      DX-ABDOMEN FOR TUBE PLACEMENT   Final Result      Enteric sump tube in situ with its tip at the level of the distal gastric antrum or pylorus.      DX-ABDOMEN FOR TUBE PLACEMENT   Final Result         1.  Nonspecific bowel gas pattern in the upper abdomen.   2.  Nasogastric tube tip terminates overlying the expected location of the pylorus or first duodenal segment.   3.  Hazy interstitial pulmonary edema and/or infiltrates, similar to prior study.   4.  Cardiomegaly      DX-CHEST-PORTABLE (1 VIEW)   Final Result      1.  Supportive tubing as described above.   2.  No other significant change from prior exam.         MR-BRAIN-W/O   Final Result      1.  Multifocal areas of acute infarcts in the left cerebral hemisphere.   2.  Small area of acute infarct in the right basal ganglia adjacent to the tip of the ventriculostomy catheter.   3.  Cortical infarct in the right medial parietal lobe.   4.  Mild amount of subdural hemorrhages surrounding the bilateral cerebellar hemispheres and right cerebellum.   5.  Subarachnoid hemorrhage.   6.  There is no hydrocephalus.   7.  There are changes secondary to the previous endovascular repair left internal carotid aneurysm.      CT-CTA NECK WITH & W/O-POST PROCESSING   Final Result      No focal high-grade stenosis, dissection or occlusion of the cervical carotid or  vertebral arteries.      CT-CEREBRAL PERFUSION ANALYSIS   Final Result      1.  Cerebral blood flow less than 30% likely representing completed infarct = 0 mL.      2.  T Max more than 6 seconds likely representing combination of completed infarct and ischemia = 0 mL.      3.  Mismatched volume likely representing ischemic brain/penumbra = None      4.  Please note that the cerebral perfusion was performed on the limited brain tissue around the basal ganglia region. Infarct/ischemia outside the CT perfusion sections can be missed in this study.      CT-CTA HEAD WITH & W/O-POST PROCESS   Final Result      1.  Prior LEFT middle cranial fossa aneurysm coiling.  Artifact limits exam.   2.  No abrupt large vessel cut off.   3.  Segmental narrowing of LEFT middle cerebral artery, vasospasm versus artifact.   4.  Ventriculostomy catheter in similar position.   5.  Evolving RIGHT caudate infarct.   6.  Stable intracranial hemorrhage.         DX-ABDOMEN FOR TUBE PLACEMENT   Final Result      Orogastric tube tip now at the mid stomach.      DX-ABDOMEN FOR TUBE PLACEMENT   Final Result      Orogastric tube tip at the proximal stomach with side port just above the GE junction.      US-INTRACRANIAL ARTERY LIMITED   Final Result      DX-CHEST-LIMITED (1 VIEW)   Final Result         1.  Stable chest with perihilar and lower lung zone interstitial and minimal airspace opacities most consistent with pulmonary edema.   2.  No new abnormalities.      EC-ECHOCARDIOGRAM COMPLETE W/O CONT   Final Result      US-INTRACRANIAL ARTERY LIMITED   Final Result      DX-CHEST-PORTABLE (1 VIEW)   Final Result      1.  Lines and tubes appear appropriately located      2.  Improvement of pulmonary edema/airspace process      US-INTRACRANIAL ARTERY COMP   Final Result      CT-HEAD W/O   Final Result         1. Slightly decreased subarachnoid hemorrhage overlying the cerebral hemispheres.   2. Otherwise, stable as above.         DX-CHEST-FOR LINE  PLACEMENT Perform procedure in: Patient's Room   Final Result      1.  Moderate interstitial pulmonary edema.   2.   Right-sided central venous catheter terminates with the tip projecting over the expected region of the mid to distal superior vena cava.   3.  Endotracheal tube terminates in satisfactory position at the level of the aortic arch.   4.  Enteric feeding tube terminates in the left upper quadrant projecting over the expected location of the stomach.      CT-STEALTH HEAD W/O   Final Result      1.  Interval placement of a right transparietal ventriculostomy catheter which terminates with the tip near the third ventricle. There is minimally decreased caliber of the ventricular system and compared to previous exam.   2.  Findings of intracranial hemorrhage appear similar to the previous exam. No definite new acute intracranial hemorrhage is identified.   3.  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         DX-CHEST-PORTABLE (1 VIEW)   Final Result      CT-HEAD W/O   Final Result      1. Interval aneurysm coiling of left posterior communicating artery aneurysm.   2. Possible increased subarachnoid hemorrhage.   3. Intraventricular hemorrhage. There is developing mild hydrocephalus with mildly increased ventricles.   4. Small right convexity subdural hematoma measuring 5 mm in thickness.      These findings were discussed with EMILY BYRD on 1/31/2024 3:14 PM.      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   This aneurysm was embolized to occlusion as described above. Final angiographic images demonstrate only minimal filling of the neck of the aneurysm. The patient will  be followed up in   the neuro interventional clinic and brought back for a follow-up angiogram in 6 months.      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      IStephanie was physically present and participated during the entire procedure of the IR-EMBOLIZATION.          Assessment and Plan:  Fay Turner is a 63 y.o. female with history of hypertension who was transferred from outside hospital after she was found to have subarachnoid hemorrhage with left blown pupil on 1/31/2024.  She had Carpio and Potts 4 and modified Swenson of 3.  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  She also underwent EVD placement by neurosurgery.  She is post bleed and postop day #9.  Brain MRI on 2/4/24 revealed multifocal acute infarct in the left hemisphere with a small area of acute infarct in right basal ganglia.  TCD is limited as she has no temporal windows.   Clinically she remains stable with some gradual improvement.    Plan:  - Okay for every 2 hours neurochecks.  - Maintain systolic blood pressure 140-200.  - Continue nimodipine 60 mg every 4 hours via enteral tube.  - Net even I's and O's.  - TCD with no temporal window, follow clinically and if neurological deterioration, suggest CT of the head and CT perfusion.  Her exam is a stable and improved.  - Continue Keppra given seizure at arrival.  - EVD management per neurosurgery, raised to 20 cm H2O. ICPs have been stable.  Plan for clamping EVD starting tomorrow with a follow-up CT on Monday per neurosurgery.  -Okay to start Lovenox for DVT prevention per neurosurgery.      The evaluation of the patient, and recommended management, was discussed with Dayana Babcock M.D.    Spent total of 48 minutes evaluating the patient, reviewing hospital notes, discussing with ICU team and patient's family.      Please note that this dictation was created using voice recognition software. I have  made every reasonable attempt to correct obvious errors, but I expect that there are errors of grammar and possibly content that I did not discover before finalizing the note.      Corey Vasquez MD  Acute Care Neurology Services

## 2024-02-09 NOTE — WOUND TEAM
[facility]  Inpatient Services  Wound and Skin Care Brief Evaluation    Admission Date: 1/31/2024     Last order of IP CONSULT TO WOUND CARE was found on 2/7/2024 from Hospital Encounter on 1/31/2024     HPI, PMH, SH: Reviewed    No chief complaint on file.    Diagnosis: SAH (subarachnoid hemorrhage) ([facility]) [I60.9]    Unit where seen by Wound Team: R106/00     Wound consult placed regarding groin. Chart and images reviewed. Pt has a yeast infection from groins to elizabeth anal area.  Recommend nystatin for groins and barrier paste with miconazole for buttocks.     No pressure injuries or advanced wound care needs identified. Wound consult completed. No further follow up unless indicated and consulted.          PREVENTATIVE INTERVENTIONS:    Q shift Stephen - performed per nursing policy  Q shift pressure point assessments - performed per nursing policy    Surface/Positioning  ICU Low Airloss - Currently in Place    Offloading/Redistribution  Heel float boots (Prevalon boot) - Currently in Place

## 2024-02-09 NOTE — CARE PLAN
The patient is Stable - Low risk of patient condition declining or worsening    Shift Goals  Clinical Goals: stable neuro, sbp 140-200  Patient Goals: padmini  Family Goals: padmini    Progress made toward(s) clinical / shift goals:    Problem: Knowledge Deficit - Standard  Goal: Patient and family/care givers will demonstrate understanding of plan of care, disease process/condition, diagnostic tests and medications  Outcome: Progressing     Problem: Safety - Medical Restraint  Goal: Remains free of injury from restraints (Restraint for Interference with Medical Device)  Outcome: Progressing     Problem: Skin Integrity  Goal: Skin integrity is maintained or improved  Outcome: Progressing     Problem: Fall Risk  Goal: Patient will remain free from falls  Outcome: Progressing     Problem: Pain - Standard  Goal: Alleviation of pain or a reduction in pain to the patient’s comfort goal  Outcome: Progressing     Problem: Hemodynamics  Goal: Patient's hemodynamics, fluid balance and neurologic status will be stable or improve  Outcome: Progressing     Problem: Neuro Status  Goal: Neuro status will remain stable or improve  Outcome: Progressing       Patient is not progressing towards the following goals:

## 2024-02-09 NOTE — CARE PLAN
Problem: Bronchopulmonary Hygiene  Goal: Increase mobilization of retained secretions  Description: Target End Date:  2 to 3 days    1.  Perform bronchopulmonary therapy as indicated by assessment  2.  Perform airway suctioning  3.  Perform actions to maintain patient airway  Outcome: Progressing   f    Respiratory Update    Treatment modality:  Flutter   Frequency: QID     Pt tolerating current treatments well with no adverse reactions.

## 2024-02-09 NOTE — THERAPY
Occupational Therapy  Daily Treatment     Patient Name: Fay Turner  Age:  63 y.o., Sex:  female  Medical Record #: [id number]  Today's Date: 2/9/2024     Precautions  Precautions: Fall Risk, Swallow Precautions, Nasogastric Tube  Comments: EVD    Assessment    Pt currently limited by decreased functional mobility, activity tolerance, cognition, sensation, strength, AROM, coordination, balance, and pain which are affecting pt's ability to complete ADLs/IADLs at baseline. Pt would benefit from OT services in the acute care setting to maximize functional recovery.      Plan    Treatment Plan Status: (P) Continue Current Treatment Plan  Type of Treatment: Self Care / Activities of Daily Living, Therapeutic Activity, Neuro Re-Education / Balance  Treatment Frequency: 3 Times per Week  Treatment Duration: Until Therapy Goals Met       Discharge Recommendations: (P) Recommend post-acute placement for additional occupational therapy services prior to discharge home       02/09/24 1116   Strength Upper Body   Upper Body Strength  X   Comments L UE 2+/5, R UE 2/5   Activities of Daily Living   Grooming Maximal Assist   Upper Body Dressing Maximal Assist   Lower Body Dressing Total Assist   Toileting Total Assist   Functional Mobility   Sit to Stand Unable to Participate   Bed, Chair, Wheelchair Transfer Unable to Participate   Short Term Goals   Short Term Goal # 1 mod A with G/H sitting EOB   Goal Outcome # 1 Progressing as expected   Short Term Goal # 2 mod A with UB dressing   Goal Outcome # 2 Progressing as expected   Short Term Goal # 3 mod A with ADL txfs   Goal Outcome # 3 Progressing slower than expected   Occupational Therapy Treatment Plan    O.T. Treatment Plan Continue Current Treatment Plan   Anticipated Discharge Equipment and Recommendations   Discharge Recommendations Recommend post-acute placement for additional occupational therapy services prior to discharge home

## 2024-02-09 NOTE — CARE PLAN
The patient is Watcher - Medium risk of patient condition declining or worsening    Shift Goals  Clinical Goals: improve nutrition, neuro stability  Patient Goals: padmini  Family Goals: engagement    Progress made toward(s) clinical / shift goals:    Problem: Knowledge Deficit - Standard  Goal: Patient and family/care givers will demonstrate understanding of plan of care, disease process/condition, diagnostic tests and medications  Outcome: Progressing     Problem: Safety - Medical Restraint  Goal: Remains free of injury from restraints (Restraint for Interference with Medical Device)  Outcome: Progressing  Goal: Free from restraint(s) (Restraint for Interference with Medical Device)  Outcome: Progressing     Problem: Skin Integrity  Goal: Skin integrity is maintained or improved  Outcome: Progressing     Problem: Fall Risk  Goal: Patient will remain free from falls  Outcome: Progressing     Problem: Pain - Standard  Goal: Alleviation of pain or a reduction in pain to the patient’s comfort goal  Outcome: Progressing     Problem: Hemodynamics  Goal: Patient's hemodynamics, fluid balance and neurologic status will be stable or improve  Outcome: Progressing     Problem: Neuro Status  Goal: Neuro status will remain stable or improve  Outcome: Progressing       Patient is not progressing towards the following goals:

## 2024-02-10 ENCOUNTER — APPOINTMENT (OUTPATIENT)
Dept: RADIOLOGY | Facility: MEDICAL CENTER | Age: 64
DRG: 020 | End: 2024-02-10
Attending: INTERNAL MEDICINE
Payer: COMMERCIAL

## 2024-02-10 LAB
ANION GAP SERPL CALC-SCNC: 10 MMOL/L (ref 7–16)
BASOPHILS # BLD AUTO: 0.6 % (ref 0–1.8)
BASOPHILS # BLD: 0.09 K/UL (ref 0–0.12)
BUN SERPL-MCNC: 15 MG/DL (ref 8–22)
CALCIUM SERPL-MCNC: 8.7 MG/DL (ref 8.5–10.5)
CHLORIDE SERPL-SCNC: 99 MMOL/L (ref 96–112)
CO2 SERPL-SCNC: 24 MMOL/L (ref 20–33)
CREAT SERPL-MCNC: 0.36 MG/DL (ref 0.5–1.4)
EOSINOPHIL # BLD AUTO: 0.22 K/UL (ref 0–0.51)
EOSINOPHIL NFR BLD: 1.5 % (ref 0–6.9)
ERYTHROCYTE [DISTWIDTH] IN BLOOD BY AUTOMATED COUNT: 38.4 FL (ref 35.9–50)
GFR SERPLBLD CREATININE-BSD FMLA CKD-EPI: 114 ML/MIN/1.73 M 2
GLUCOSE SERPL-MCNC: 135 MG/DL (ref 65–99)
HCT VFR BLD AUTO: 31.6 % (ref 37–47)
HGB BLD-MCNC: 10.6 G/DL (ref 12–16)
IMM GRANULOCYTES # BLD AUTO: 0.12 K/UL (ref 0–0.11)
IMM GRANULOCYTES NFR BLD AUTO: 0.8 % (ref 0–0.9)
LYMPHOCYTES # BLD AUTO: 1.41 K/UL (ref 1–4.8)
LYMPHOCYTES NFR BLD: 9.9 % (ref 22–41)
MAGNESIUM SERPL-MCNC: 2.2 MG/DL (ref 1.5–2.5)
MCH RBC QN AUTO: 26.1 PG (ref 27–33)
MCHC RBC AUTO-ENTMCNC: 33.5 G/DL (ref 32.2–35.5)
MCV RBC AUTO: 77.8 FL (ref 81.4–97.8)
MONOCYTES # BLD AUTO: 1.17 K/UL (ref 0–0.85)
MONOCYTES NFR BLD AUTO: 8.2 % (ref 0–13.4)
NEUTROPHILS # BLD AUTO: 11.24 K/UL (ref 1.82–7.42)
NEUTROPHILS NFR BLD: 79 % (ref 44–72)
NRBC # BLD AUTO: 0 K/UL
NRBC BLD-RTO: 0 /100 WBC (ref 0–0.2)
PHOSPHATE SERPL-MCNC: 2.9 MG/DL (ref 2.5–4.5)
PLATELET # BLD AUTO: 443 K/UL (ref 164–446)
PMV BLD AUTO: 9.7 FL (ref 9–12.9)
POTASSIUM SERPL-SCNC: 3.6 MMOL/L (ref 3.6–5.5)
RBC # BLD AUTO: 4.06 M/UL (ref 4.2–5.4)
SODIUM SERPL-SCNC: 131 MMOL/L (ref 135–145)
SODIUM SERPL-SCNC: 133 MMOL/L (ref 135–145)
SODIUM SERPL-SCNC: 134 MMOL/L (ref 135–145)
SODIUM SERPL-SCNC: 134 MMOL/L (ref 135–145)
WBC # BLD AUTO: 14.3 K/UL (ref 4.8–10.8)

## 2024-02-10 PROCEDURE — 700102 HCHG RX REV CODE 250 W/ 637 OVERRIDE(OP): Performed by: INTERNAL MEDICINE

## 2024-02-10 PROCEDURE — A9270 NON-COVERED ITEM OR SERVICE: HCPCS | Performed by: NURSE PRACTITIONER

## 2024-02-10 PROCEDURE — 85025 COMPLETE CBC W/AUTO DIFF WBC: CPT

## 2024-02-10 PROCEDURE — 700105 HCHG RX REV CODE 258: Performed by: INTERNAL MEDICINE

## 2024-02-10 PROCEDURE — 94669 MECHANICAL CHEST WALL OSCILL: CPT

## 2024-02-10 PROCEDURE — 700111 HCHG RX REV CODE 636 W/ 250 OVERRIDE (IP): Mod: JZ | Performed by: INTERNAL MEDICINE

## 2024-02-10 PROCEDURE — 83735 ASSAY OF MAGNESIUM: CPT

## 2024-02-10 PROCEDURE — 84295 ASSAY OF SERUM SODIUM: CPT | Mod: 91

## 2024-02-10 PROCEDURE — 306565 RIGID MIT RESTRAINT(PAIR): Performed by: INTERNAL MEDICINE

## 2024-02-10 PROCEDURE — 84100 ASSAY OF PHOSPHORUS: CPT

## 2024-02-10 PROCEDURE — 99291 CRITICAL CARE FIRST HOUR: CPT | Performed by: INTERNAL MEDICINE

## 2024-02-10 PROCEDURE — 700102 HCHG RX REV CODE 250 W/ 637 OVERRIDE(OP): Performed by: NURSE PRACTITIONER

## 2024-02-10 PROCEDURE — 99233 SBSQ HOSP IP/OBS HIGH 50: CPT | Performed by: PSYCHIATRY & NEUROLOGY

## 2024-02-10 PROCEDURE — 80048 BASIC METABOLIC PNL TOTAL CA: CPT

## 2024-02-10 PROCEDURE — A9270 NON-COVERED ITEM OR SERVICE: HCPCS | Performed by: INTERNAL MEDICINE

## 2024-02-10 PROCEDURE — 99292 CRITICAL CARE ADDL 30 MIN: CPT | Performed by: INTERNAL MEDICINE

## 2024-02-10 PROCEDURE — 770022 HCHG ROOM/CARE - ICU (200)

## 2024-02-10 RX ORDER — ENOXAPARIN SODIUM 100 MG/ML
40 INJECTION SUBCUTANEOUS DAILY
Status: DISCONTINUED | OUTPATIENT
Start: 2024-02-10 | End: 2024-02-23 | Stop reason: HOSPADM

## 2024-02-10 RX ORDER — SODIUM CHLORIDE 1 G/1
1 TABLET ORAL 3 TIMES DAILY
Status: DISCONTINUED | OUTPATIENT
Start: 2024-02-10 | End: 2024-02-11

## 2024-02-10 RX ADMIN — NYSTATIN: 100000 POWDER TOPICAL at 17:49

## 2024-02-10 RX ADMIN — ACETAMINOPHEN 650 MG: 325 TABLET, FILM COATED ORAL at 02:47

## 2024-02-10 RX ADMIN — ENOXAPARIN SODIUM 40 MG: 100 INJECTION SUBCUTANEOUS at 17:49

## 2024-02-10 RX ADMIN — MICONAZOLE NITRATE: 20 CREAM TOPICAL at 05:07

## 2024-02-10 RX ADMIN — NIMODIPINE 60 MG: 60 SOLUTION ORAL at 02:48

## 2024-02-10 RX ADMIN — NIMODIPINE 60 MG: 60 SOLUTION ORAL at 09:38

## 2024-02-10 RX ADMIN — SODIUM CHLORIDE 1 G: 1 TABLET ORAL at 11:08

## 2024-02-10 RX ADMIN — SODIUM CHLORIDE 1 G: 1 TABLET ORAL at 21:02

## 2024-02-10 RX ADMIN — POTASSIUM BICARBONATE 50 MEQ: 978 TABLET, EFFERVESCENT ORAL at 09:39

## 2024-02-10 RX ADMIN — SODIUM CHLORIDE 1 G: 1 TABLET ORAL at 14:14

## 2024-02-10 RX ADMIN — LEVETIRACETAM 500 MG: 500 TABLET, FILM COATED ORAL at 17:49

## 2024-02-10 RX ADMIN — LEVETIRACETAM 500 MG: 500 TABLET, FILM COATED ORAL at 05:06

## 2024-02-10 RX ADMIN — ASPIRIN 81 MG 81 MG: 81 TABLET ORAL at 05:07

## 2024-02-10 RX ADMIN — ACETAMINOPHEN 650 MG: 325 TABLET, FILM COATED ORAL at 09:38

## 2024-02-10 RX ADMIN — SODIUM CHLORIDE: 9 INJECTION, SOLUTION INTRAVENOUS at 02:33

## 2024-02-10 RX ADMIN — OXYCODONE 2.5 MG: 5 TABLET ORAL at 11:07

## 2024-02-10 RX ADMIN — ATORVASTATIN CALCIUM 40 MG: 40 TABLET, FILM COATED ORAL at 17:49

## 2024-02-10 RX ADMIN — NIMODIPINE 60 MG: 60 SOLUTION ORAL at 21:02

## 2024-02-10 RX ADMIN — SODIUM CHLORIDE: 9 INJECTION, SOLUTION INTRAVENOUS at 14:08

## 2024-02-10 RX ADMIN — NIMODIPINE 60 MG: 60 SOLUTION ORAL at 17:49

## 2024-02-10 RX ADMIN — NIMODIPINE 60 MG: 60 SOLUTION ORAL at 05:07

## 2024-02-10 RX ADMIN — OXYCODONE 2.5 MG: 5 TABLET ORAL at 03:36

## 2024-02-10 RX ADMIN — MICONAZOLE NITRATE: 20 CREAM TOPICAL at 17:50

## 2024-02-10 RX ADMIN — NYSTATIN: 100000 POWDER TOPICAL at 05:07

## 2024-02-10 RX ADMIN — NIMODIPINE 60 MG: 60 SOLUTION ORAL at 14:14

## 2024-02-10 ASSESSMENT — PAIN DESCRIPTION - PAIN TYPE
TYPE: ACUTE PAIN

## 2024-02-10 ASSESSMENT — ENCOUNTER SYMPTOMS: HEADACHES: 1

## 2024-02-10 NOTE — CARE PLAN
Problem: Bronchopulmonary Hygiene  Goal: Increase mobilization of retained secretions  Description: Target End Date:  2 to 3 days    1.  Perform bronchopulmonary therapy as indicated by assessment  2.  Perform airway suctioning  3.  Perform actions to maintain patient airway  Outcome: Progressing   Flutter QID

## 2024-02-10 NOTE — PROGRESS NOTES
Neurosurgery Progress Note    Subjective:  63 year old presented with HH4 ruptured left Pcomm aneurysm, post coil day 4.     Overnight on  had worsening exam with flaccid RUE, decreased mentation, more lethargy. CTA showed left MCA vasospasm.      Started following on RUE , intermittently.    ICPs stable, 6-16 overnight  EVD output from anterior EVD 2-12cc/hr overnight.   Drainage clear overall, slightly blood tinged.   EVD Raised to 20cm H20 , tolerating well.      Sedation off.   No changes overnight.         Exam:  GCS: [id number]. Oriented to name and place ([place]).   Extubated.  +corneal, +cough/gag.    Right eye open spontaneously.   Has left CNIII palsy with ptosis.  Pupils right 1-2mm reactive, left 3-4mm non reactive, gaze conjugate.   Command following LUE.   Intermittently follows in RUE.  Withdrawals to lower extremities.    Anterior EVD in place and functioning at [id number] above the tragus   ICP waveform adequate.    BP  Min: 138/82  Max: 173/106  Pulse  Av.6  Min: 90  Max: 119  Resp  Av.6  Min: 16  Max: 38  Monitored Temp 2  Av.8 °C (100.1 °F)  Min: 37.5 °C (99.5 °F)  Max: 38.4 °C (101.1 °F)  SpO2  Av.2 %  Min: 89 %  Max: 99 %    ICP  Av.5 MM HG  Min: 7 MM HG  Max: 16 MM HG    Recent Labs     24  0430 24  0353 02/10/24  0404   WBC 15.6* 14.9* 14.3*   RBC 4.24 4.27 4.06*   HEMOGLOBIN 11.1* 11.2* 10.6*   HEMATOCRIT 33.6* 33.1* 31.6*   MCV 79.2* 77.5* 77.8*   MCH 26.2* 26.2* 26.1*   MCHC 33.0 33.8 33.5   RDW 38.9 38.0 38.4   PLATELETCT 381 425 443   MPV 9.8 9.7 9.7       Recent Labs     24  0430 24  1220 24  0353 24  1300 02/10/24  0123 02/10/24  0404   SODIUM 132*   < > 132* 134* 134* 133*   POTASSIUM 3.8  --  3.9  --   --  3.6   CHLORIDE 97  --  97  --   --  99   CO2 23  --  22  --   --  24   GLUCOSE 120*  --  133*  --   --  135*   BUN 12  --  12  --   --  15   CREATININE 0.36*  --  0.29*  --   --  0.36*   CALCIUM 9.3  --  9.0  --   --   8.7    < > = values in this interval not displayed.                     Intake/Output                         02/09/24 0700 - 02/10/24 0659 02/10/24 0700 - 02/11/24 0659     [id number] 1900-0659 Total 5551-8427 5954-2901 Total                 Intake    I.V.  785.1  947.2 1732.3  --  -- --    Volume (mL) (NS infusion) 785.1 947.2 1732.3 -- -- --    Other  380  810 1190  --  -- --    Medications (PO/Enteral Liquids)  -- -- --    Enteral  --  60 60  30  -- 30    Free Water / Tube Flush -- 60 60 30 -- 30    Total Intake 1165.1 1817.2 2982.3 30 -- 30       Output    Urine  1105  1020 2125  --  -- --    Output (mL) (Urethral Catheter Non-latex;Temperature probe) 1105 1020 2125 -- -- --    Drains  64  75 139  --  -- --    Output (mL) (ICP/Ventriculostomy Right) 64 75 139 -- -- --    Stool  --  -- --  --  -- --    Number of Times Stooled 1 x 2 x 3 x -- -- --    Total Output 1169 1095 2264 -- -- --       Net I/O     -3.9 722.2 718.3 30 -- 30              Intake/Output Summary (Last 24 hours) at 2/10/2024 0911  Last data filed at 2/10/2024 0730  Gross per 24 hour   Intake 2846.39 ml   Output 2100 ml   Net 746.39 ml               potassium bicarbonate  50 mEq Once    nystatin   BID    acetaminophen  650 mg Q4HRS PRN    Or    acetaminophen  650 mg Q4HRS PRN    atorvastatin  40 mg Q EVENING    oxyCODONE immediate-release  2.5 mg Q6HRS PRN    miconazole   BID    aspirin  81 mg DAILY    NS   Continuous    enalaprilat  1.25 mg Q6HRS PRN    hydrALAZINE  10-20 mg Q4HRS PRN    labetalol  10-20 mg Q4HRS PRN    niMODipine  60 mg Q4HRS    levETIRAcetam  500 mg BID    Pharmacy  1 Each PHARMACY TO DOSE    Respiratory Therapy Consult   Continuous RT    senna-docusate  2 Tablet BID    And    polyethylene glycol/lytes  1 Packet QDAY PRN    And    magnesium hydroxide  30 mL QDAY PRN    And    bisacodyl  10 mg QDAY PRN    MD Alert...Adult ICU Electrolyte Replacement per Pharmacy   PHARMACY TO DOSE    ondansetron  4 mg Q4HRS PRN    Or     ondansetron  4 mg Q4HRS PRN    LORazepam  2 mg Q5 MIN PRN    prochlorperazine  10 mg Q6HRS PRN       Assessment and Plan:  Hospital day # 11  Post coil day 10  Clamp EVD at 20cm H20 above tragus,keep clamped through the weekend.   Will plan to repeat head CT Monday AM, this will need to be ordered depending on how she does with the clamp.   If any neuro changes/sustained rise in ICP after clamp, page NS and we will likely reopen.   Appreciate neurology, IR and intensivist care.  Brain MRI done, Dr. Monroe reviewed.   Q2 neurochecks.    Following closely.     Chemical prophylactic DVT therapy: OK from NS perspective  Start date/time: per intensivist.

## 2024-02-10 NOTE — PROGRESS NOTES
Neurology Progress Note  Neurohospitalist Service, [facility]    Referring Physician: Jeremy M Gonda, M.D.    No chief complaint on file.      HPI: Refer to initial documented Neurology H&P, as detailed in the patient's chart.    Interval History 2/10: No new events overnight.    Review of systems: In addition to what is detailed in the HPI and/or updated in the interval history, all other systems reviewed and are negative.    Past Medical History:    has no past medical history on file.    FHx:  family history is not on file.    SHx:       Medications:    Current Facility-Administered Medications:     potassium bicarbonate (Klyte) effervescent tablet 50 mEq, 50 mEq, Enteral Tube, Once, Dayana Babcock M.D.    nystatin (Mycostatin) powder, , Topical, BID, Dayana Babcock M.D., Given at 02/10/24 0507    acetaminophen (Tylenol) tablet 650 mg, 650 mg, Enteral Tube, Q4HRS PRN, 650 mg at 02/10/24 0247 **OR** acetaminophen (Tylenol) suppository 650 mg, 650 mg, Rectal, Q4HRS PRN, Dayana Babcock M.D.    atorvastatin (Lipitor) tablet 40 mg, 40 mg, Enteral Tube, Q EVENING, Dayana Babcock M.D., 40 mg at 02/09/24 1752    oxyCODONE immediate-release (Roxicodone) tablet 2.5 mg, 2.5 mg, Enteral Tube, Q6HRS PRN, Dayana Babcock M.D., 2.5 mg at 02/10/24 0336    miconazole (Micotin) 2 % cream, , Topical, BID, Dayana Babcock M.D., Given at 02/10/24 0507    aspirin (Asa) chewable tab 81 mg, 81 mg, Enteral Tube, DAILY, YESSENIA GarzaP.R.N., 81 mg at 02/10/24 0507    NS infusion, , Intravenous, Continuous, Jeremy M Gonda, M.D., Last Rate: 83 mL/hr at 02/10/24 0233, New Bag at 02/10/24 0233    enalaprilat (Vasotec) injection 1.25 mg 1 mL, 1.25 mg, Intravenous, Q6HRS PRN, Jeremy M Gonda, M.D.    hydrALAZINE (Apresoline) injection 10-20 mg, 10-20 mg, Intravenous, Q4HRS PRN, Jeremy M Gonda, M.D.    labetalol (Normodyne/Trandate) injection 10-20 mg, 10-20 mg, Intravenous, Q4HRS PRN, Jeremy M Gonda,  M.D.    niMODipine (Nymalize) oral syringe 60 mg, 60 mg, Enteral Tube, Q4HRS, Jeremy M Gonda, M.D., 60 mg at 02/10/24 0507    levETIRAcetam (Keppra) tablet 500 mg, 500 mg, Enteral Tube, BID, Jeremy M Gonda, M.D., 500 mg at 02/10/24 0506    Pharmacy Consult: Enteral tube insertion - review meds/change route/product selection, 1 Each, Other, PHARMACY TO DOSE, Jeremy M Gonda, M.D.    Respiratory Therapy Consult, , Nebulization, Continuous RT, Jeremy M Gonda, M.D.    senna-docusate (Pericolace Or Senokot S) 8.6-50 MG per tablet 2 Tablet, 2 Tablet, Enteral Tube, BID, 2 Tablet at 02/03/24 0514 **AND** polyethylene glycol/lytes (Miralax) Packet 1 Packet, 1 Packet, Enteral Tube, QDAY PRN **AND** magnesium hydroxide (Milk Of Magnesia) suspension 30 mL, 30 mL, Enteral Tube, QDAY PRN **AND** bisacodyl (Dulcolax) suppository 10 mg, 10 mg, Rectal, QDAY PRN, Jeremy M Gonda, M.D. MD Alert...ICU Electrolyte Replacement per Pharmacy, , Other, PHARMACY TO DOSE, Jeremy M Gonda, M.D.    ondansetron (Zofran ODT) dispertab 4 mg, 4 mg, Enteral Tube, Q4HRS PRN, 4 mg at 01/31/24 2248 **OR** ondansetron (Zofran) syringe/vial injection 4 mg, 4 mg, Intravenous, Q4HRS PRN, Jeremy M Gonda, M.D., 4 mg at 02/02/24 1758    LORazepam (Ativan) injection 2 mg, 2 mg, Intravenous, Q5 MIN PRN, Jeremy M Gonda, M.D.    prochlorperazine (Compazine) injection 10 mg, 10 mg, Intravenous, Q6HRS PRN, David Mclaughlin Jr., D.O., 10 mg at 01/31/24 1858    Physical Examination:     Vitals:    02/10/24 0500 02/10/24 0600 02/10/24 0700 02/10/24 0714   BP: (!) 163/92 (!) 140/73 (!) 164/86 (!) 164/86   Pulse: (!) 114 (!) 108 90 92   Resp: 16 (!) 26 (!) 24 (!) 25   TempSrc:  Bladder     SpO2: 95% 95% 95% 96%   Weight:       Height:               NEUROLOGICAL EXAM:       Patient is awake and alert.  Her examination does fluctuate throughout the day.  Nurses at bedside.  Sometimes she is cooperative and will follow commands other times she will not.  This morning she  was not moving her obeying commands.  She was tracking in the room.  She did give appropriate answers with head nodding to some questions.  Continues to have a left cranial nerve III palsy with severe ptosis.  Face is lateralize symmetrical.  Spontaneously moves the left arm and leg to antigravity movement.  No movement in the right upper extremity.        Objective Data:    Labs:  Lab Results   Component Value Date/Time    PROTHROMBTM 13.9 01/31/2024 03:25 PM    INR 1.06 01/31/2024 03:25 PM      Lab Results   Component Value Date/Time    WBC 14.3 (H) 02/10/2024 04:04 AM    RBC 4.06 (L) 02/10/2024 04:04 AM    HEMOGLOBIN 10.6 (L) 02/10/2024 04:04 AM    HEMATOCRIT 31.6 (L) 02/10/2024 04:04 AM    MCV 77.8 (L) 02/10/2024 04:04 AM    MCH 26.1 (L) 02/10/2024 04:04 AM    MCHC 33.5 02/10/2024 04:04 AM    MPV 9.7 02/10/2024 04:04 AM    NEUTSPOLYS 79.00 (H) 02/10/2024 04:04 AM    LYMPHOCYTES 9.90 (L) 02/10/2024 04:04 AM    MONOCYTES 8.20 02/10/2024 04:04 AM    EOSINOPHILS 1.50 02/10/2024 04:04 AM    BASOPHILS 0.60 02/10/2024 04:04 AM      Lab Results   Component Value Date/Time    SODIUM 133 (L) 02/10/2024 04:04 AM    POTASSIUM 3.6 02/10/2024 04:04 AM    CHLORIDE 99 02/10/2024 04:04 AM    CO2 24 02/10/2024 04:04 AM    GLUCOSE 135 (H) 02/10/2024 04:04 AM    BUN 15 02/10/2024 04:04 AM    CREATININE 0.36 (L) 02/10/2024 04:04 AM      Lab Results   Component Value Date/Time    CHOLSTRLTOT 165 01/31/2024 03:25 PM     (H) 01/31/2024 03:25 PM    HDL 36 (A) 01/31/2024 03:25 PM    TRIGLYCERIDE 79 01/31/2024 03:25 PM       Lab Results   Component Value Date/Time    ALKPHOSPHAT 127 (H) 02/05/2024 04:15 AM    ASTSGOT 27 02/05/2024 04:15 AM    ALTSGPT 15 02/05/2024 04:15 AM    TBILIRUBIN 0.5 02/05/2024 04:15 AM        Imaging/Testing:  DX-ABDOMEN FOR TUBE PLACEMENT   Final Result      NG tube tip projects at the peripyloric region.      DX-ABDOMEN FOR TUBE PLACEMENT   Final Result      NG tube tip projects at the peripyloric region.       DX-ABDOMEN FOR TUBE PLACEMENT   Final Result      Feeding tube in place with tip at the distal stomach/pylorus.      ME-MASIQSU-5 VIEW   Final Result         1.  Nonspecific bowel gas pattern in the upper abdomen.   2.  Nasogastric tube tip terminates overlying the expected location of the pylorus or first duodenal segment.   3.  Hazy interstitial pulmonary edema and/or infiltrates   4.  Cardiomegaly      DX-ABDOMEN FOR TUBE PLACEMENT   Final Result      Enteric sump tube in situ with its tip at the level of the distal gastric antrum or pylorus.      DX-ABDOMEN FOR TUBE PLACEMENT   Final Result         1.  Nonspecific bowel gas pattern in the upper abdomen.   2.  Nasogastric tube tip terminates overlying the expected location of the pylorus or first duodenal segment.   3.  Hazy interstitial pulmonary edema and/or infiltrates, similar to prior study.   4.  Cardiomegaly      DX-CHEST-PORTABLE (1 VIEW)   Final Result      1.  Supportive tubing as described above.   2.  No other significant change from prior exam.         MR-BRAIN-W/O   Final Result      1.  Multifocal areas of acute infarcts in the left cerebral hemisphere.   2.  Small area of acute infarct in the right basal ganglia adjacent to the tip of the ventriculostomy catheter.   3.  Cortical infarct in the right medial parietal lobe.   4.  Mild amount of subdural hemorrhages surrounding the bilateral cerebellar hemispheres and right cerebellum.   5.  Subarachnoid hemorrhage.   6.  There is no hydrocephalus.   7.  There are changes secondary to the previous endovascular repair left internal carotid aneurysm.      CT-CTA NECK WITH & W/O-POST PROCESSING   Final Result      No focal high-grade stenosis, dissection or occlusion of the cervical carotid or vertebral arteries.      CT-CEREBRAL PERFUSION ANALYSIS   Final Result      1.  Cerebral blood flow less than 30% likely representing completed infarct = 0 mL.      2.  T Max more than 6 seconds likely  representing combination of completed infarct and ischemia = 0 mL.      3.  Mismatched volume likely representing ischemic brain/penumbra = None      4.  Please note that the cerebral perfusion was performed on the limited brain tissue around the basal ganglia region. Infarct/ischemia outside the CT perfusion sections can be missed in this study.      CT-CTA HEAD WITH & W/O-POST PROCESS   Final Result      1.  Prior LEFT middle cranial fossa aneurysm coiling.  Artifact limits exam.   2.  No abrupt large vessel cut off.   3.  Segmental narrowing of LEFT middle cerebral artery, vasospasm versus artifact.   4.  Ventriculostomy catheter in similar position.   5.  Evolving RIGHT caudate infarct.   6.  Stable intracranial hemorrhage.         DX-ABDOMEN FOR TUBE PLACEMENT   Final Result      Orogastric tube tip now at the mid stomach.      DX-ABDOMEN FOR TUBE PLACEMENT   Final Result      Orogastric tube tip at the proximal stomach with side port just above the GE junction.      US-INTRACRANIAL ARTERY LIMITED   Final Result      DX-CHEST-LIMITED (1 VIEW)   Final Result         1.  Stable chest with perihilar and lower lung zone interstitial and minimal airspace opacities most consistent with pulmonary edema.   2.  No new abnormalities.      EC-ECHOCARDIOGRAM COMPLETE W/O CONT   Final Result      US-INTRACRANIAL ARTERY LIMITED   Final Result      DX-CHEST-PORTABLE (1 VIEW)   Final Result      1.  Lines and tubes appear appropriately located      2.  Improvement of pulmonary edema/airspace process      US-INTRACRANIAL ARTERY COMP   Final Result      CT-HEAD W/O   Final Result         1. Slightly decreased subarachnoid hemorrhage overlying the cerebral hemispheres.   2. Otherwise, stable as above.         DX-CHEST-FOR LINE PLACEMENT Perform procedure in: Patient's Room   Final Result      1.  Moderate interstitial pulmonary edema.   2.   Right-sided central venous catheter terminates with the tip projecting over the expected  region of the mid to distal superior vena cava.   3.  Endotracheal tube terminates in satisfactory position at the level of the aortic arch.   4.  Enteric feeding tube terminates in the left upper quadrant projecting over the expected location of the stomach.      CT-STEALTH HEAD W/O   Final Result      1.  Interval placement of a right transparietal ventriculostomy catheter which terminates with the tip near the third ventricle. There is minimally decreased caliber of the ventricular system and compared to previous exam.   2.  Findings of intracranial hemorrhage appear similar to the previous exam. No definite new acute intracranial hemorrhage is identified.   3.  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         DX-CHEST-PORTABLE (1 VIEW)   Final Result      CT-HEAD W/O   Final Result      1. Interval aneurysm coiling of left posterior communicating artery aneurysm.   2. Possible increased subarachnoid hemorrhage.   3. Intraventricular hemorrhage. There is developing mild hydrocephalus with mildly increased ventricles.   4. Small right convexity subdural hematoma measuring 5 mm in thickness.      These findings were discussed with EMILY BYRD on 1/31/2024 3:14 PM.      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   This aneurysm was embolized to occlusion as described above. Final angiographic images demonstrate only minimal filling of the neck of the aneurysm. The patient will be followed up in   the neuro interventional clinic and brought back for a follow-up angiogram in 6 months.      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      I, Sinaclaudymirna Eason was physically present and participated during the entire procedure of the IR-EMBOLIZATION.            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  Post bleed and post coiling day #3.  Carpio Potts scale was a 4.  Modified Swenson scale 3.  Patient still intubated however following some commands.  Unable to obtain TCD's due to lack of window.  EVD has had an output approximately 278 cc last 24 hours.  ICPs ranged from 5-11.  Sodium within normal limits.  Plan today to wean and extubate.      Update 2/4.  Patient was extubated yesterday.  Later in the afternoon/evening the nursing staff and NP mention the patient was awake and able to communicate following commands.  Around midnight patient became less interactive more lethargic.  A CTA and CT perfusion was done at that time.  Pressures were increased from 140s to 180s 190s systolic.  This morning patient is still interactive she is lethargic.  Based on upon my examination there is not a lot different compared to yesterday morning before she was extubated.  I reviewed the CT scans with neuro IR.  There is no obvious signs of vasospasm on scan.  Perfusion is normal.  At this time recommend keeping the pressure between 140-200 systolic.  Will get an MRI brain.  Unfortunately we do not have TCD's to follow for vasospasm.    Post bleed and postop day #4      Update 2/7  Post bleed and postop day number 7.  Patient examination appears to be stable.  Improved compared to last time I saw her in the fourth.  TCD's have been discontinued as cannot get any windows.  EVD showed ICPs all in the single digits.  Output was 227 cc.  Set at 10.  For now continue supportive therapy.  Maintaining slightly higher blood pressures given the lack of TCD monitoring.  Will have to follow clinically.  Repeating CTA and CT perfusion if needed.    Update 2/10  Post  bleed and postop day #10.  P-comm aneurysm status post coiling.  Unable to obtain TCD's.  EVD had an output of 139 cc.  ICPs ranging from 10-16 in the last 24 hours.  MRI brain earlier this week showed scattered infarcts over the left hemisphere.  7 has been running a little number plus couple days.  Continue to monitor for salt wasting consider replacement.  No clinical signs of seizures.  Will continue the Keppra for now.  Had a seizure on the first day.  Has not also been slightly positive for last few days.  For now continue supportive therapy.  Patient is in teak vasospasm window.    Plan:  1.  Obtain as needed CTA and classed CTP if clinically indicated for vasospasm.  2.  Every 2 hour neurochecks  3.  Blood pressure parameters to keep between 140-200 systolic.  4.  Nimodipine 30 mg every 12 hours.  5.  Maintain normothermia  6.  Maintain that evens I's and O's.   sodium levels.    7.  Continue with SCDs for DVT prophylaxis.  May use chemical DVT prophylactics once cleared by neurosurgery with the EVD.  8.  Neurosurgery managing the EVD.  Set at 10.    9.  Continue Keppra patient had a clinical seizure on arrival.    Spent over 58 minutes reviewing imaging, examined patient and going the care plan with the primary team.      This chart was partially generated using voice recognition technology and sound alike word replacement may be present, best efforts were made to make the chart accurate.    Jose Raul Tenorio MD  Board Certified Neurology, ABPN  (t) [phone]
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
## 2024-02-10 NOTE — CARE PLAN
The patient is Watcher - Medium risk of patient condition declining or worsening    Shift Goals  Clinical Goals: stable neuro, sbp 140-200  Patient Goals: padmini  Family Goals: padmini    Progress made toward(s) clinical / shift goals:    Problem: Knowledge Deficit - Standard  Goal: Patient and family/care givers will demonstrate understanding of plan of care, disease process/condition, diagnostic tests and medications  Outcome: Progressing     Problem: Safety - Medical Restraint  Goal: Remains free of injury from restraints (Restraint for Interference with Medical Device)  Outcome: Progressing  Goal: Free from restraint(s) (Restraint for Interference with Medical Device)  Outcome: Progressing     Problem: Skin Integrity  Goal: Skin integrity is maintained or improved  Outcome: Progressing     Problem: Fall Risk  Goal: Patient will remain free from falls  Outcome: Progressing     Problem: Pain - Standard  Goal: Alleviation of pain or a reduction in pain to the patient’s comfort goal  Outcome: Progressing     Problem: Hemodynamics  Goal: Patient's hemodynamics, fluid balance and neurologic status will be stable or improve  Outcome: Progressing     Problem: Neuro Status  Goal: Neuro status will remain stable or improve  Outcome: Progressing       Patient is not progressing towards the following goals:

## 2024-02-11 ENCOUNTER — APPOINTMENT (OUTPATIENT)
Dept: RADIOLOGY | Facility: MEDICAL CENTER | Age: 64
DRG: 020 | End: 2024-02-11
Attending: INTERNAL MEDICINE
Payer: COMMERCIAL

## 2024-02-11 ENCOUNTER — APPOINTMENT (OUTPATIENT)
Dept: RADIOLOGY | Facility: MEDICAL CENTER | Age: 64
DRG: 020 | End: 2024-02-11
Payer: COMMERCIAL

## 2024-02-11 ENCOUNTER — APPOINTMENT (OUTPATIENT)
Dept: RADIOLOGY | Facility: MEDICAL CENTER | Age: 64
DRG: 020 | End: 2024-02-11
Attending: NURSE PRACTITIONER
Payer: COMMERCIAL

## 2024-02-11 LAB
ANION GAP SERPL CALC-SCNC: 10 MMOL/L (ref 7–16)
BASOPHILS # BLD AUTO: 0.4 % (ref 0–1.8)
BASOPHILS # BLD: 0.07 K/UL (ref 0–0.12)
BUN SERPL-MCNC: 13 MG/DL (ref 8–22)
CALCIUM SERPL-MCNC: 9.3 MG/DL (ref 8.5–10.5)
CHLORIDE SERPL-SCNC: 98 MMOL/L (ref 96–112)
CO2 SERPL-SCNC: 25 MMOL/L (ref 20–33)
CREAT SERPL-MCNC: 0.33 MG/DL (ref 0.5–1.4)
EOSINOPHIL # BLD AUTO: 0.16 K/UL (ref 0–0.51)
EOSINOPHIL NFR BLD: 0.9 % (ref 0–6.9)
ERYTHROCYTE [DISTWIDTH] IN BLOOD BY AUTOMATED COUNT: 38 FL (ref 35.9–50)
GFR SERPLBLD CREATININE-BSD FMLA CKD-EPI: 117 ML/MIN/1.73 M 2
GLUCOSE SERPL-MCNC: 150 MG/DL (ref 65–99)
HCT VFR BLD AUTO: 32.8 % (ref 37–47)
HGB BLD-MCNC: 11 G/DL (ref 12–16)
IMM GRANULOCYTES # BLD AUTO: 0.1 K/UL (ref 0–0.11)
IMM GRANULOCYTES NFR BLD AUTO: 0.6 % (ref 0–0.9)
LYMPHOCYTES # BLD AUTO: 1.14 K/UL (ref 1–4.8)
LYMPHOCYTES NFR BLD: 6.5 % (ref 22–41)
MAGNESIUM SERPL-MCNC: 2.1 MG/DL (ref 1.5–2.5)
MCH RBC QN AUTO: 26 PG (ref 27–33)
MCHC RBC AUTO-ENTMCNC: 33.5 G/DL (ref 32.2–35.5)
MCV RBC AUTO: 77.5 FL (ref 81.4–97.8)
MONOCYTES # BLD AUTO: 1.33 K/UL (ref 0–0.85)
MONOCYTES NFR BLD AUTO: 7.6 % (ref 0–13.4)
NEUTROPHILS # BLD AUTO: 14.66 K/UL (ref 1.82–7.42)
NEUTROPHILS NFR BLD: 84 % (ref 44–72)
NRBC # BLD AUTO: 0 K/UL
NRBC BLD-RTO: 0 /100 WBC (ref 0–0.2)
PHOSPHATE SERPL-MCNC: 2.9 MG/DL (ref 2.5–4.5)
PLATELET # BLD AUTO: 498 K/UL (ref 164–446)
PMV BLD AUTO: 9.4 FL (ref 9–12.9)
POTASSIUM SERPL-SCNC: 4 MMOL/L (ref 3.6–5.5)
RBC # BLD AUTO: 4.23 M/UL (ref 4.2–5.4)
SODIUM SERPL-SCNC: 131 MMOL/L (ref 135–145)
SODIUM SERPL-SCNC: 131 MMOL/L (ref 135–145)
SODIUM SERPL-SCNC: 133 MMOL/L (ref 135–145)
SODIUM SERPL-SCNC: 133 MMOL/L (ref 135–145)
WBC # BLD AUTO: 17.5 K/UL (ref 4.8–10.8)

## 2024-02-11 PROCEDURE — A9270 NON-COVERED ITEM OR SERVICE: HCPCS | Performed by: NURSE PRACTITIONER

## 2024-02-11 PROCEDURE — 99292 CRITICAL CARE ADDL 30 MIN: CPT | Performed by: INTERNAL MEDICINE

## 2024-02-11 PROCEDURE — 94669 MECHANICAL CHEST WALL OSCILL: CPT

## 2024-02-11 PROCEDURE — A9270 NON-COVERED ITEM OR SERVICE: HCPCS | Performed by: INTERNAL MEDICINE

## 2024-02-11 PROCEDURE — 84295 ASSAY OF SERUM SODIUM: CPT

## 2024-02-11 PROCEDURE — 84100 ASSAY OF PHOSPHORUS: CPT

## 2024-02-11 PROCEDURE — 770022 HCHG ROOM/CARE - ICU (200)

## 2024-02-11 PROCEDURE — 700101 HCHG RX REV CODE 250: Performed by: INTERNAL MEDICINE

## 2024-02-11 PROCEDURE — 83735 ASSAY OF MAGNESIUM: CPT

## 2024-02-11 PROCEDURE — 700102 HCHG RX REV CODE 250 W/ 637 OVERRIDE(OP): Performed by: INTERNAL MEDICINE

## 2024-02-11 PROCEDURE — 700102 HCHG RX REV CODE 250 W/ 637 OVERRIDE(OP): Performed by: NURSE PRACTITIONER

## 2024-02-11 PROCEDURE — 700105 HCHG RX REV CODE 258: Performed by: INTERNAL MEDICINE

## 2024-02-11 PROCEDURE — 700111 HCHG RX REV CODE 636 W/ 250 OVERRIDE (IP): Mod: JZ | Performed by: INTERNAL MEDICINE

## 2024-02-11 PROCEDURE — 80048 BASIC METABOLIC PNL TOTAL CA: CPT

## 2024-02-11 PROCEDURE — 99291 CRITICAL CARE FIRST HOUR: CPT | Performed by: INTERNAL MEDICINE

## 2024-02-11 PROCEDURE — 71045 X-RAY EXAM CHEST 1 VIEW: CPT

## 2024-02-11 PROCEDURE — 85025 COMPLETE CBC W/AUTO DIFF WBC: CPT

## 2024-02-11 PROCEDURE — 99233 SBSQ HOSP IP/OBS HIGH 50: CPT | Performed by: PSYCHIATRY & NEUROLOGY

## 2024-02-11 RX ORDER — LIDOCAINE 4 G/G
2 PATCH TOPICAL EVERY 24 HOURS
Status: DISCONTINUED | OUTPATIENT
Start: 2024-02-11 | End: 2024-02-23 | Stop reason: HOSPADM

## 2024-02-11 RX ORDER — SODIUM CHLORIDE 1 G/1
2 TABLET ORAL 3 TIMES DAILY
Status: DISCONTINUED | OUTPATIENT
Start: 2024-02-11 | End: 2024-02-12

## 2024-02-11 RX ADMIN — LEVETIRACETAM 500 MG: 500 TABLET, FILM COATED ORAL at 05:41

## 2024-02-11 RX ADMIN — NIMODIPINE 60 MG: 60 SOLUTION ORAL at 17:15

## 2024-02-11 RX ADMIN — NYSTATIN: 100000 POWDER TOPICAL at 05:42

## 2024-02-11 RX ADMIN — NIMODIPINE 60 MG: 60 SOLUTION ORAL at 15:04

## 2024-02-11 RX ADMIN — LIDOCAINE 2 PATCH: 4 PATCH TOPICAL at 16:29

## 2024-02-11 RX ADMIN — OXYCODONE 2.5 MG: 5 TABLET ORAL at 09:35

## 2024-02-11 RX ADMIN — NYSTATIN: 100000 POWDER TOPICAL at 17:15

## 2024-02-11 RX ADMIN — NIMODIPINE 60 MG: 60 SOLUTION ORAL at 09:36

## 2024-02-11 RX ADMIN — ACETAMINOPHEN 650 MG: 325 TABLET, FILM COATED ORAL at 22:11

## 2024-02-11 RX ADMIN — ASPIRIN 81 MG 81 MG: 81 TABLET ORAL at 05:41

## 2024-02-11 RX ADMIN — SODIUM CHLORIDE 2 G: 1 TABLET ORAL at 15:04

## 2024-02-11 RX ADMIN — LEVETIRACETAM 500 MG: 500 TABLET, FILM COATED ORAL at 17:14

## 2024-02-11 RX ADMIN — MICONAZOLE NITRATE: 20 CREAM TOPICAL at 05:42

## 2024-02-11 RX ADMIN — SODIUM CHLORIDE 1 G: 1 TABLET ORAL at 09:36

## 2024-02-11 RX ADMIN — NIMODIPINE 60 MG: 60 SOLUTION ORAL at 22:11

## 2024-02-11 RX ADMIN — ATORVASTATIN CALCIUM 40 MG: 40 TABLET, FILM COATED ORAL at 17:14

## 2024-02-11 RX ADMIN — SODIUM CHLORIDE 2 G: 1 TABLET ORAL at 22:11

## 2024-02-11 RX ADMIN — SODIUM CHLORIDE: 9 INJECTION, SOLUTION INTRAVENOUS at 02:46

## 2024-02-11 RX ADMIN — ENOXAPARIN SODIUM 40 MG: 100 INJECTION SUBCUTANEOUS at 17:14

## 2024-02-11 RX ADMIN — MICONAZOLE NITRATE: 20 CREAM TOPICAL at 22:12

## 2024-02-11 RX ADMIN — NIMODIPINE 60 MG: 60 SOLUTION ORAL at 05:41

## 2024-02-11 RX ADMIN — NIMODIPINE 60 MG: 60 SOLUTION ORAL at 01:51

## 2024-02-11 ASSESSMENT — PAIN DESCRIPTION - PAIN TYPE
TYPE: ACUTE PAIN

## 2024-02-11 ASSESSMENT — ENCOUNTER SYMPTOMS
CHILLS: 0
HEADACHES: 1
MYALGIAS: 0
HEADACHES: 0
ABDOMINAL PAIN: 0
FEVER: 0
SHORTNESS OF BREATH: 0

## 2024-02-11 NOTE — PROGRESS NOTES
Neurology Progress Note  Neurohospitalist Service, [facility]    Referring Physician: Jeremy M Gonda, M.D.    No chief complaint on file.      HPI: Refer to initial documented Neurology H&P, as detailed in the patient's chart.    Interval History 2/11: EVD was clamped yesterday    Review of systems: In addition to what is detailed in the HPI and/or updated in the interval history, all other systems reviewed and are negative.    Past Medical History:    has no past medical history on file.    FHx:  family history is not on file.    SHx:       Medications:    Current Facility-Administered Medications:     enoxaparin (Lovenox) inj 40 mg, 40 mg, Subcutaneous, DAILY AT 1800, Dayana Babcock M.D., 40 mg at 02/10/24 1749    sodium chloride (Salt) tablet 1 g, 1 g, Enteral Tube, TID, Dayana Babcock M.D., 1 g at 02/10/24 2102    nystatin (Mycostatin) powder, , Topical, BID, Dayana Babcock M.D., Given at 02/11/24 0542    acetaminophen (Tylenol) tablet 650 mg, 650 mg, Enteral Tube, Q4HRS PRN, 650 mg at 02/10/24 0938 **OR** acetaminophen (Tylenol) suppository 650 mg, 650 mg, Rectal, Q4HRS PRN, Dayana Babcock M.D.    atorvastatin (Lipitor) tablet 40 mg, 40 mg, Enteral Tube, Q EVENING, Dayana Babcock M.D., 40 mg at 02/10/24 1749    oxyCODONE immediate-release (Roxicodone) tablet 2.5 mg, 2.5 mg, Enteral Tube, Q6HRS PRN, Dayana Babcock M.D., 2.5 mg at 02/10/24 1107    miconazole (Micotin) 2 % cream, , Topical, BID, Dayana Babcock M.D., Given at 02/11/24 0542    aspirin (Asa) chewable tab 81 mg, 81 mg, Enteral Tube, DAILY, YESSENIA GarzaP.R.N., 81 mg at 02/11/24 0541    NS infusion, , Intravenous, Continuous, Jeremy M Gonda, M.D., Last Rate: 83 mL/hr at 02/11/24 0246, New Bag at 02/11/24 0246    enalaprilat (Vasotec) injection 1.25 mg 1 mL, 1.25 mg, Intravenous, Q6HRS PRN, Jeremy M Gonda, M.D.    hydrALAZINE (Apresoline) injection 10-20 mg, 10-20 mg, Intravenous, Q4HRS PRN, Jeremy M Gonda,  M.D.    labetalol (Normodyne/Trandate) injection 10-20 mg, 10-20 mg, Intravenous, Q4HRS PRN, Jeremy M Gonda, M.D.    niMODipine (Nymalize) oral syringe 60 mg, 60 mg, Enteral Tube, Q4HRS, Jeremy M Gonda, M.D., 60 mg at 02/11/24 0541    levETIRAcetam (Keppra) tablet 500 mg, 500 mg, Enteral Tube, BID, Jeremy M Gonda, M.D., 500 mg at 02/11/24 0541    Pharmacy Consult: Enteral tube insertion - review meds/change route/product selection, 1 Each, Other, PHARMACY TO DOSE, Jeremy M Gonda, M.D.    Respiratory Therapy Consult, , Nebulization, Continuous RT, Jeremy M Gonda, M.D.    senna-docusate (Pericolace Or Senokot S) 8.6-50 MG per tablet 2 Tablet, 2 Tablet, Enteral Tube, BID, 2 Tablet at 02/03/24 0514 **AND** polyethylene glycol/lytes (Miralax) Packet 1 Packet, 1 Packet, Enteral Tube, QDAY PRN **AND** magnesium hydroxide (Milk Of Magnesia) suspension 30 mL, 30 mL, Enteral Tube, QDAY PRN **AND** bisacodyl (Dulcolax) suppository 10 mg, 10 mg, Rectal, QDAY PRN, Jeremy M Gonda, M.D. MD Alert...ICU Electrolyte Replacement per Pharmacy, , Other, PHARMACY TO DOSE, Jeremy M Gonda, M.D.    ondansetron (Zofran ODT) dispertab 4 mg, 4 mg, Enteral Tube, Q4HRS PRN, 4 mg at 01/31/24 2248 **OR** ondansetron (Zofran) syringe/vial injection 4 mg, 4 mg, Intravenous, Q4HRS PRN, Jeremy M Gonda, M.D., 4 mg at 02/02/24 1758    LORazepam (Ativan) injection 2 mg, 2 mg, Intravenous, Q5 MIN PRN, Jeremy M Gonda, M.D.    prochlorperazine (Compazine) injection 10 mg, 10 mg, Intravenous, Q6HRS PRN, David Mclaughlin Jr., D.O., 10 mg at 01/31/24 2837    Physical Examination:     Vitals:    02/11/24 0400 02/11/24 0500 02/11/24 0600 02/11/24 0700   BP: (!) 161/89 (!) 173/96 (!) 151/85 (!) 162/101   Pulse: (!) 115 (!) 114 (!) 111 (!) 115   Resp: (!) 29 (!) 27 (!) 27 (!) 30   TempSrc: Bladder  Bladder    SpO2: 95% 95% 96% 97%   Weight:       Height:               NEUROLOGICAL EXAM:       Patient is awake and alert more so today compared to yesterday able to  tell me her name and tell me she is in [facility].  Follows commands.  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  Right pupil reactive normal.  Moves both lower extremities antigravity spontaneously.  Left upper extremity spontaneous antigravity movement.  Minimal to no movement in the right upper extremity.      Objective Data:    Labs:  Lab Results   Component Value Date/Time    PROTHROMBTM 13.9 01/31/2024 03:25 PM    INR 1.06 01/31/2024 03:25 PM      Lab Results   Component Value Date/Time    WBC 17.5 (H) 02/11/2024 04:02 AM    RBC 4.23 02/11/2024 04:02 AM    HEMOGLOBIN 11.0 (L) 02/11/2024 04:02 AM    HEMATOCRIT 32.8 (L) 02/11/2024 04:02 AM    MCV 77.5 (L) 02/11/2024 04:02 AM    MCH 26.0 (L) 02/11/2024 04:02 AM    MCHC 33.5 02/11/2024 04:02 AM    MPV 9.4 02/11/2024 04:02 AM    NEUTSPOLYS 84.00 (H) 02/11/2024 04:02 AM    LYMPHOCYTES 6.50 (L) 02/11/2024 04:02 AM    MONOCYTES 7.60 02/11/2024 04:02 AM    EOSINOPHILS 0.90 02/11/2024 04:02 AM    BASOPHILS 0.40 02/11/2024 04:02 AM      Lab Results   Component Value Date/Time    SODIUM 133 (L) 02/11/2024 04:02 AM    POTASSIUM 4.0 02/11/2024 04:02 AM    CHLORIDE 98 02/11/2024 04:02 AM    CO2 25 02/11/2024 04:02 AM    GLUCOSE 150 (H) 02/11/2024 04:02 AM    BUN 13 02/11/2024 04:02 AM    CREATININE 0.33 (L) 02/11/2024 04:02 AM      Lab Results   Component Value Date/Time    CHOLSTRLTOT 165 01/31/2024 03:25 PM     (H) 01/31/2024 03:25 PM    HDL 36 (A) 01/31/2024 03:25 PM    TRIGLYCERIDE 79 01/31/2024 03:25 PM       Lab Results   Component Value Date/Time    ALKPHOSPHAT 127 (H) 02/05/2024 04:15 AM    ASTSGOT 27 02/05/2024 04:15 AM    ALTSGPT 15 02/05/2024 04:15 AM    TBILIRUBIN 0.5 02/05/2024 04:15 AM        Imaging/Testing:  DX-ABDOMEN FOR TUBE PLACEMENT   Final Result      Enteric tube tip projects over the gastric antrum or proximal duodenum, similar to prior      DX-ABDOMEN FOR TUBE PLACEMENT   Final Result      NG tube tip projects at the peripyloric region.       DX-ABDOMEN FOR TUBE PLACEMENT   Final Result      NG tube tip projects at the peripyloric region.      DX-ABDOMEN FOR TUBE PLACEMENT   Final Result      Feeding tube in place with tip at the distal stomach/pylorus.      UV-ISGUDGN-7 VIEW   Final Result         1.  Nonspecific bowel gas pattern in the upper abdomen.   2.  Nasogastric tube tip terminates overlying the expected location of the pylorus or first duodenal segment.   3.  Hazy interstitial pulmonary edema and/or infiltrates   4.  Cardiomegaly      DX-ABDOMEN FOR TUBE PLACEMENT   Final Result      Enteric sump tube in situ with its tip at the level of the distal gastric antrum or pylorus.      DX-ABDOMEN FOR TUBE PLACEMENT   Final Result         1.  Nonspecific bowel gas pattern in the upper abdomen.   2.  Nasogastric tube tip terminates overlying the expected location of the pylorus or first duodenal segment.   3.  Hazy interstitial pulmonary edema and/or infiltrates, similar to prior study.   4.  Cardiomegaly      DX-CHEST-PORTABLE (1 VIEW)   Final Result      1.  Supportive tubing as described above.   2.  No other significant change from prior exam.         MR-BRAIN-W/O   Final Result      1.  Multifocal areas of acute infarcts in the left cerebral hemisphere.   2.  Small area of acute infarct in the right basal ganglia adjacent to the tip of the ventriculostomy catheter.   3.  Cortical infarct in the right medial parietal lobe.   4.  Mild amount of subdural hemorrhages surrounding the bilateral cerebellar hemispheres and right cerebellum.   5.  Subarachnoid hemorrhage.   6.  There is no hydrocephalus.   7.  There are changes secondary to the previous endovascular repair left internal carotid aneurysm.      CT-CTA NECK WITH & W/O-POST PROCESSING   Final Result      No focal high-grade stenosis, dissection or occlusion of the cervical carotid or vertebral arteries.      CT-CEREBRAL PERFUSION ANALYSIS   Final Result      1.  Cerebral blood flow less than  30% likely representing completed infarct = 0 mL.      2.  T Max more than 6 seconds likely representing combination of completed infarct and ischemia = 0 mL.      3.  Mismatched volume likely representing ischemic brain/penumbra = None      4.  Please note that the cerebral perfusion was performed on the limited brain tissue around the basal ganglia region. Infarct/ischemia outside the CT perfusion sections can be missed in this study.      CT-CTA HEAD WITH & W/O-POST PROCESS   Final Result      1.  Prior LEFT middle cranial fossa aneurysm coiling.  Artifact limits exam.   2.  No abrupt large vessel cut off.   3.  Segmental narrowing of LEFT middle cerebral artery, vasospasm versus artifact.   4.  Ventriculostomy catheter in similar position.   5.  Evolving RIGHT caudate infarct.   6.  Stable intracranial hemorrhage.         DX-ABDOMEN FOR TUBE PLACEMENT   Final Result      Orogastric tube tip now at the mid stomach.      DX-ABDOMEN FOR TUBE PLACEMENT   Final Result      Orogastric tube tip at the proximal stomach with side port just above the GE junction.      US-INTRACRANIAL ARTERY LIMITED   Final Result      DX-CHEST-LIMITED (1 VIEW)   Final Result         1.  Stable chest with perihilar and lower lung zone interstitial and minimal airspace opacities most consistent with pulmonary edema.   2.  No new abnormalities.      EC-ECHOCARDIOGRAM COMPLETE W/O CONT   Final Result      US-INTRACRANIAL ARTERY LIMITED   Final Result      DX-CHEST-PORTABLE (1 VIEW)   Final Result      1.  Lines and tubes appear appropriately located      2.  Improvement of pulmonary edema/airspace process      US-INTRACRANIAL ARTERY COMP   Final Result      CT-HEAD W/O   Final Result         1. Slightly decreased subarachnoid hemorrhage overlying the cerebral hemispheres.   2. Otherwise, stable as above.         DX-CHEST-FOR LINE PLACEMENT Perform procedure in: Patient's Room   Final Result      1.  Moderate interstitial pulmonary edema.   2.    Right-sided central venous catheter terminates with the tip projecting over the expected region of the mid to distal superior vena cava.   3.  Endotracheal tube terminates in satisfactory position at the level of the aortic arch.   4.  Enteric feeding tube terminates in the left upper quadrant projecting over the expected location of the stomach.      CT-STEALTH HEAD W/O   Final Result      1.  Interval placement of a right transparietal ventriculostomy catheter which terminates with the tip near the third ventricle. There is minimally decreased caliber of the ventricular system and compared to previous exam.   2.  Findings of intracranial hemorrhage appear similar to the previous exam. No definite new acute intracranial hemorrhage is identified.   3.  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         DX-CHEST-PORTABLE (1 VIEW)   Final Result      CT-HEAD W/O   Final Result      1. Interval aneurysm coiling of left posterior communicating artery aneurysm.   2. Possible increased subarachnoid hemorrhage.   3. Intraventricular hemorrhage. There is developing mild hydrocephalus with mildly increased ventricles.   4. Small right convexity subdural hematoma measuring 5 mm in thickness.      These findings were discussed with EMILY BYRD on 1/31/2024 3:14 PM.      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   This aneurysm was embolized to occlusion as described above. Final angiographic images demonstrate only minimal filling of the neck of the aneurysm. The patient will be followed up in   the neuro interventional clinic and brought back for a follow-up angiogram in 6 months.       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      I, Stephanie Eason was physically present and participated during the entire procedure of the IR-EMBOLIZATION.            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  Post bleed and post coiling day #3.  Carpio Potts scale was a 4.  Modified Swenson scale 3.  Patient still intubated however following some commands.  Unable to obtain TCD's due to lack of window.  EVD has had an output approximately 278 cc last 24 hours.  ICPs ranged from 5-11.  Sodium within normal limits.  Plan today to wean and extubate.      Update 2/4.  Patient was extubated yesterday.  Later in the afternoon/evening the nursing staff and NP mention the patient was awake and able to communicate following commands.  Around midnight patient became less interactive more lethargic.  A CTA and CT perfusion was done at that time.  Pressures were increased from 140s to 180s 190s systolic.  This morning patient is still interactive she is lethargic.  Based on upon my examination there is not a lot different compared to yesterday morning before she was extubated.  I reviewed the CT scans with neuro IR.  There is no obvious signs of vasospasm on scan.  Perfusion is normal.  At this time recommend keeping the pressure between 140-200 systolic.  Will get an MRI brain.  Unfortunately we do not have TCD's to follow for vasospasm.    Post bleed and postop day #4      Update 2/7  Post bleed and postop day number 7.  Patient examination appears to be stable.  Improved compared to last time I saw her in the fourth.  TCD's have been discontinued as cannot get any windows.  EVD showed ICPs all in the single digits.  Output was 227 cc.  Set at 10.  For now continue supportive therapy.  Maintaining slightly higher blood pressures given the lack of TCD monitoring.  Will  have to follow clinically.  Repeating CTA and CT perfusion if needed.    Update 2/10  Post bleed and postop day #10.  P-comm aneurysm status post coiling.  Unable to obtain TCD's.  EVD had an output of 139 cc.  ICPs ranging from 10-16 in the last 24 hours.  MRI brain earlier this week showed scattered infarcts over the left hemisphere.  Continue to monitor for salt wasting consider replacement.  No clinical signs of seizures.  Will continue the Keppra for now.  Had a seizure on the first day.  Has also been slightly positive fluid intake for last few days.  For now continue supportive therapy.  Patient is in peak vasospasm window.    Update 2/11  Post bleed and postop day #11.  Today's exam is improved compared to yesterday.  She is more awake and alert follows commands.  Plan to repeat CT head tomorrow per neurosurgery possibly pulling the EVD.  ICPs been running in the mid teens up to 19 overnight.  Will need to follow clinically for vasospasm.  Will leave the blood pressure parameters at the current level.  She has been running in the 150s to 160 systolic.  On salt tabs for smoking.  Still slightly low 132.  Watch for salt wasting.    Plan:  1.  Obtain as needed CTA and classed CTP if clinically indicated for vasospasm.  2.  Every 2 hour neurochecks  3.  Blood pressure parameters to keep between 140-200 systolic.  4.  Nimodipine 30 mg every 12 hours.  5.  Maintain normal sodium levels.  Continue salt tabs.  6.  Maintain that evens I's and O's.  -1.1 L yesterday.  7.  Continue Lovenox for DVT prophylaxis.    8.  Neurosurgery managing the EVD.  Currently clamped planning on pulling tomorrow if repeat CT head is unremarkable.  9.  Continue Keppra patient had a clinical seizure on arrival.    Spent over 56 minutes reviewing notes including physician and nursing notes and examined the patient and going the care plan the primary team.    This chart was partially generated using voice recognition technology and sound  alike word replacement may be present, best efforts were made to make the chart accurate.    Jose Raul Tenorio MD  Board Certified Neurology, ABPN  t) [phone]

## 2024-02-11 NOTE — PROGRESS NOTES
Neurosurgery Progress Note    Subjective:  63 year old presented with HH4 ruptured left Pcomm aneurysm, post coil day 4.     Overnight on  had worsening exam with flaccid RUE, decreased mentation, more lethargy. CTA showed left MCA vasospasm.      Started following on RUE , intermittently.    ICPs stable, 12-20 overnight  EVD clamped at 20 since 2/10, tolerating well  Drainage clear overall, slightly blood tinged.     Sedation off.   No changes overnight.   Slightly tachycardic this AM, white count increasing, may have pna        Exam:  GCS: E4VtM6. Oriented to name and place ([place]).   Extubated.  +corneal, +cough/gag.    Right eye open spontaneously.  Has left CNIII palsy with ptosis.  Pupils right 1-2mm reactive, left 3-4mm non reactive, gaze conjugate.   Command following LUE.   Intermittently follows in RUE.  Withdrawals to lower extremities.    Anterior EVD in place and clamped at [id number] above the tragus   ICP waveform adequate.    BP  Min: 144/76  Max: 183/101  Pulse  Av.3  Min: 95  Max: 126  Resp  Av.4  Min: 20  Max: 66  Monitored Temp 2  Av.6 °C (99.7 °F)  Min: 37.1 °C (98.8 °F)  Max: 38 °C (100.4 °F)  SpO2  Av.4 %  Min: 93 %  Max: 98 %    ICP  Avg: 15.7 MM HG  Min: 10 MM HG  Max: 19 MM HG    Recent Labs     24  0353 02/10/24  0404 24  0402   WBC 14.9* 14.3* 17.5*   RBC 4.27 4.06* 4.23   HEMOGLOBIN 11.2* 10.6* 11.0*   HEMATOCRIT 33.1* 31.6* 32.8*   MCV 77.5* 77.8* 77.5*   MCH 26.2* 26.1* 26.0*   MCHC 33.8 33.5 33.5   RDW 38.0 38.4 38.0   PLATELETCT 425 443 498*   MPV 9.7 9.7 9.4       Recent Labs     24  0353 24  1300 02/10/24  0404 02/10/24  1009 02/10/24  1545 02/10/24  2355 24  0402   SODIUM 132*   < > 133*   < > 134* 133* 133*   POTASSIUM 3.9  --  3.6  --   --   --  4.0   CHLORIDE 97  --  99  --   --   --  98   CO2 -    --   --   --  25   GLUCOSE 133*  --  135*  --   --   --  150*   BUN 12  --  15  --   --   --  13   CREATININE 0.29*  --   0.36*  --   --   --  0.33*   CALCIUM 9.0  --  8.7  --   --   --  9.3    < > = values in this interval not displayed.                     Intake/Output                         02/10/24 0700 - 02/11/24 0659 02/11/24 0700 - 02/12/24 0659 0700-1859 1900-0659 Total 0700-1859 1900-0659 Total                 Intake    I.V.  822.4  969.5 1791.9  --  -- --    Volume (mL) (NS infusion) 822.4 969.5 1791.9 -- -- --    Other  --  770 770  --  -- --    Medications (PO/Enteral Liquids) -- 770 770 -- -- --    Enteral  60  90 150  --  -- --    Free Water / Tube Flush 60 90 150 -- -- --    Total Intake 882.4 1829.5 2711.9 -- -- --       Output    Urine  2525  1350 3875  --  -- --    Output (mL) (Urethral Catheter Non-latex;Temperature probe) 2525 1350 3875 -- -- --    Stool  --  -- --  --  -- --    Number of Times Stooled 1 x 0 x 1 x -- -- --    Total Output 2525 1350 3875 -- -- --       Net I/O     -1642.6 479.5 -1163.1 -- -- --              Intake/Output Summary (Last 24 hours) at 2/11/2024 0835  Last data filed at 2/11/2024 0600  Gross per 24 hour   Intake 2515.95 ml   Output 3425 ml   Net -909.05 ml               enoxaparin (LOVENOX) injection  40 mg DAILY AT 1800    sodium chloride  1 g TID    nystatin   BID    acetaminophen  650 mg Q4HRS PRN    Or    acetaminophen  650 mg Q4HRS PRN    atorvastatin  40 mg Q EVENING    oxyCODONE immediate-release  2.5 mg Q6HRS PRN    miconazole   BID    aspirin  81 mg DAILY    NS   Continuous    enalaprilat  1.25 mg Q6HRS PRN    hydrALAZINE  10-20 mg Q4HRS PRN    labetalol  10-20 mg Q4HRS PRN    niMODipine  60 mg Q4HRS    levETIRAcetam  500 mg BID    Pharmacy  1 Each PHARMACY TO DOSE    Respiratory Therapy Consult   Continuous RT    senna-docusate  2 Tablet BID    And    polyethylene glycol/lytes  1 Packet QDAY PRN    And    magnesium hydroxide  30 mL QDAY PRN    And    bisacodyl  10 mg QDAY PRN    MD Alert...Adult ICU Electrolyte Replacement per Pharmacy   PHARMACY TO DOSE    ondansetron  4  mg Q4HRS PRN    Or    ondansetron  4 mg Q4HRS PRN    LORazepam  2 mg Q5 MIN PRN    prochlorperazine  10 mg Q6HRS PRN       Assessment and Plan:  Hospital day # 12  Post coil day 11  Keep EVD clamped at 20cm H20 above tragus, repeat CT ordered for tomorrow AM, depending on results may get EVD out tomorrow.  If any neuro changes/sustained rise in ICP after clamp, page NS and we will likely reopen.   Appreciate neurology, IR and intensivist care.  Brain MRI done, Dr. Monroe reviewed.   Q2 neurochecks.    Following closely.     Chemical prophylactic DVT therapy: OK from NS perspective  Start date/time: per intensivist.

## 2024-02-11 NOTE — CARE PLAN
The patient is Stable - Low risk of patient condition declining or worsening    Shift Goals  Clinical Goals: sbp 140-200; q2 neuro  Patient Goals: padmini  Family Goals: padmini    Progress made toward(s) clinical / shift goals:    Problem: Knowledge Deficit - Standard  Goal: Patient and family/care givers will demonstrate understanding of plan of care, disease process/condition, diagnostic tests and medications  Outcome: Progressing     Problem: Safety - Medical Restraint  Goal: Remains free of injury from restraints (Restraint for Interference with Medical Device)  Outcome: Progressing     Problem: Skin Integrity  Goal: Skin integrity is maintained or improved  Outcome: Progressing     Problem: Fall Risk  Goal: Patient will remain free from falls  Outcome: Progressing     Problem: Pain - Standard  Goal: Alleviation of pain or a reduction in pain to the patient’s comfort goal  Outcome: Progressing     Problem: Hemodynamics  Goal: Patient's hemodynamics, fluid balance and neurologic status will be stable or improve  Outcome: Progressing       Patient is not progressing towards the following goals:
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
## 2024-02-11 NOTE — PROGRESS NOTES
Neuro Interventional Radiology   Progress Note     Author: Allison Weber DNP Date & Time created: 2/10/2024  6:32 PM   Date of admission  1/31/2024  Note to reader: this note follows the APSO format rather than the historical SOAP format. Assessment and plan located at the top of the note for ease of use.    Chief Complaint  63 y.o. female admitted 1/31/2024 with subarachnoid hemorrhage.    HPI  Fay France is a 62 yo female with PMH significant for HTN who presented to OSH for sudden onset of new eyelid droop, and generalized headache without significant head trauma. Pt seized in OSH CT scan, was loaded with Keppra and transferred for higher level of care. 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 Carpio Potts 4; Modified Swenson scale 3.  01/31/24 MAURICE Eason completed a cerebral aneurysm with coil embolization of large left PCOMM aneurysm.    Interval History IR:  02/01/24: RIGHT groin access site soft, non-tender, without ecchymosis. Dressing clean, dry, intact. Pedal pulses +2 bilaterally with feet pink, and warm, cap refill <3 sec. Pt is intubated and ventilated with EVD. She is not following commands, but moves right foot to stimuli. I reviewed the most recent labs including WBC 19.6, Hgb 14.2, Cr 0.61. Coordinated post IR procedure care with hospital nursing staff.     02/05/24:  PBD/post Pcom aneurysm embolization with coil #5.  Pt extubated yesterday (2/4). A stat CTA and CT perfusion done early am on 2/4 (increased lethargy) no obvious vasospasm noted. TCD's with poor windows therefor no baseline Tcd's obtained.  I reviewed FU MRI brain(02/04) which showed small L MCA infarcts-Vasopressors infusing and  keeping -200 to mitigate vasospasm. Anterior EVD in place at 10 cm H20 above tragus with good waveform noted on monitor-R groin site soft, nontender without edema, bleeding, small scab with small  ecchymosis. I  reviewed today's labs: WBC 13.7; hemoglobin 10.5;  ; Cr 0.40; respiratory culture- +MSSA in sputum-continuing Rocephin today is day 5 of 5.  I's/O's-last 24 hours+ 551/since admission +1260; ICP 2-10 (good ICP waveform noted on monitor).      02/06/24:  PBD/post Pcom aneurysm embolization with coil #6. TCD's with poor windows, recommend following neuro assessment. I reviewed FU MRI brain(02/04) which showed small L MCA infarcts-Goal -200 to mitigate vasospasm. Anterior EVD in place at 10 cm H20 above tragus with good waveform noted on monitor-R groin site soft, nontender without edema, bleeding, small scab with small  ecchymosis. I reviewed today's labs: WBC 17; hemoglobin 10.8;  ; Cr 0.33; respiratory culture- +MSSA in sputum -completed Rocephin.  I's/O's-last 24 hours -193 /since admission +1037; ICP 5-11 (good ICP waveform noted on monitor). I started ASA therapy 2nd to multiple infarcts noted on MRI.  ASA therapy discussed and approved by Fer Helms and  Dr. Eason.     02/07/24: SAH, Carpio Potts 4;Modified Swenson 3.  PBD/post Pcom aneurysm embolization with coil #7. TCD's discontinued 2nd to poor windows, recommend following neuro assessment. -Goal -200 to mitigate vasospasm. Anterior EVD in place at 10 cm H20 above tragus with good waveform noted on monitor-ecchymosis. I reviewed today's labs: WBC 15.6; Hemoglobin 11.1 ; Cr 0.36; .  I's/O's-last 24 hours -190 /since admission +876; ICP 3-10 (good ICP waveform noted on monitor).     02/08/24: SAH, Carpio Potts 4;Modified Swenson 3.  PBD/post Pcom aneurysm embolization with coil #8. TCD's with poor windows, recommend following neuro assessment. -Goal -200 to mitigate vasospasm. Anterior EVD open, raised today to 20 cm H20 above tragus with good waveform noted on monitor-ecchymosis. I reviewed today's labs: WBC 15.1; Hemoglobin 10.5;  ; Cr 0.31;  I's/O's-last 24 hours --1091/since admission -214 ICP 4-11/CPP > 100  (good ICP waveform noted on monitor).  Tmax 100.6    02/09/24: SAH, Carpio Potts 4;Modified Swenson 3. PBD/post Pcom aneurysm embolization with coil #9. No longer following TCD's 2nd to poor windows.   Follow neuro assessment. -Goal -200 to mitigate vasospasm. Anterior EVD open, @15 cm H20 above tragus with good waveform noted on monitor. I reviewed today's labs: WBC 14.9; Hemoglobin 11.2;  ; Cr 0.29;  I's/O's-last 24 hours -110 ml; output since admission - 675 ml.  ICP 4-11/CPP ; EVD output -139 mL in the last 24 hours (good ICP waveform noted on monitor). Tmax 100. 9    2/10/24: SAH, Carpio Potts 4;Modified Swenson 3. PBD #10 /post Pcom aneurysm embolization with 10 coils (1/31/24). No longer following TCD's 2nd to poor windows.   Follow neuro assessment. -Goal -200 to mitigate vasospasm. Anterior EVD open, @ 20 cm H20 above tragus with good waveform noted on monitor. I reviewed patient's most recent labs: WBC 14.3; Hemoglobin 10.6;  ; Cr 0.36;  ICP<15; EVD output:139 mL in the last 24 hours. Tmax 100. 4    Assessment/Plan     Principal Problem:    SAH (subarachnoid hemorrhage) (HCC)  Active Problems:    Acute respiratory failure with hypoxia (HCC)    Secondary hypertension    Acute pulmonary edema (HCC)    Seizure (HCC)    Hypokalemia    Hypotension due to hypovolemia    Hypophosphatemia    Anemia    Pneumonia of both lungs due to methicillin susceptible Staphylococcus aureus (MSSA) (HCC)    Aneurysm of ascending aorta without rupture (HCC)    Hyperglycemia      Plan IR  -Blood pressure parameters per neuro- keep 140-200-to mitigate vasospasm  -Continue Nimodipine  -TCD's (poor windows) therefore rely on neuro assessment for vasospasm watch  - If suspect vasospasm consider CTA and CT perfusion  -Continue ASA 81 mg daily 2nd to FU MRI brain (02/04) which showed small L MCA infarcts  - Routine/follow-up CT on Saturday (02/10).  - Neuro checks per ICU protocol  - Neurosurgery EVD placement-  managed by neurosurgery  - Maintain normoglycemia, normothermia, euvolemia and eunatremia  - Vascular Neurology and Neurosurgery following  - Outpatient follow up in approximately 4 weeks from IR intervention (approx 03/0//24) . Our office will call after hospital discharge to schedule      -Thank you for allowing Interventional Radiology team to participate in the patients care, if any additional care or requests are needed in the future please do not hesitate call or place IR order   [phone]              ROS-able to answer some ROS questions  Review of Systems  Physical Exam   Review of Systems   Unable to perform ROS: Patient unresponsive      Vitals:    02/10/24 1700   BP:    Pulse: (!) 117   Resp: (!) 34   SpO2: 96%        Physical Exam  Vitals and nursing note reviewed.   Constitutional:       General: She is sleeping.      Comments: Unresponsive to voice and physical stimuli   HENT:      Head:      Comments: EVD    Eyes:      Comments: Left ptosis   CNIII palsy    Cardiovascular:      Rate and Rhythm: Regular rhythm. Tachycardia present.      Pulses:           Dorsalis pedis pulses are 2+ on the right side and 2+ on the left side.      Comments:       Pulmonary:      Effort: No respiratory distress.      Breath sounds: Normal air entry.   Chest:   Breasts:     Breasts are symmetrical.   Abdominal:      Palpations: Abdomen is soft.   Genitourinary:     Comments: Cuellar cath to down drain- clear yellow urine  Skin:     General: Skin is warm and dry.      Capillary Refill: Capillary refill takes less than 2 seconds.   Neurological:      Mental Status: She is lethargic.      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     Psychiatric:      Comments: Non verbal             Labs    Recent Labs     02/08/24  0430 02/09/24  0353 02/10/24  0404   WBC 15.6* 14.9* 14.3*   RBC 4.24  4.27 4.06*   HEMOGLOBIN 11.1* 11.2* 10.6*   HEMATOCRIT 33.6* 33.1* 31.6*   MCV 79.2* 77.5* 77.8*   MCH 26.2* 26.2* 26.1*   MCHC 33.0 33.8 33.5   RDW 38.9 38.0 38.4   PLATELETCT 381 425 443   MPV 9.8 9.7 9.7       Recent Labs     02/08/24  0430 02/08/24  1220 02/09/24  0353 02/09/24  1300 02/10/24  0404 02/10/24  1009 02/10/24  1545   SODIUM 132*   < > 132*   < > 133* 131* 134*   POTASSIUM 3.8  --  3.9  --  3.6  --   --    CHLORIDE 97  --  97  --  99  --   --    CO2 23  --  22  --  24  --   --    GLUCOSE 120*  --  133*  --  135*  --   --    BUN 12  --  12  --  15  --   --    CREATININE 0.36*  --  0.29*  --  0.36*  --   --    CALCIUM 9.3  --  9.0  --  8.7  --   --     < > = values in this interval not displayed.       Recent Labs     02/08/24  0430 02/09/24  0353 02/10/24  0404   CREATININE 0.36* 0.29* 0.36*       DX-ABDOMEN FOR TUBE PLACEMENT   Final Result      NG tube tip projects at the peripyloric region.      DX-ABDOMEN FOR TUBE PLACEMENT   Final Result      NG tube tip projects at the peripyloric region.      DX-ABDOMEN FOR TUBE PLACEMENT   Final Result      Feeding tube in place with tip at the distal stomach/pylorus.      [id number] VIEW   Final Result         1.  Nonspecific bowel gas pattern in the upper abdomen.   2.  Nasogastric tube tip terminates overlying the expected location of the pylorus or first duodenal segment.   3.  Hazy interstitial pulmonary edema and/or infiltrates   4.  Cardiomegaly      DX-ABDOMEN FOR TUBE PLACEMENT   Final Result      Enteric sump tube in situ with its tip at the level of the distal gastric antrum or pylorus.      DX-ABDOMEN FOR TUBE PLACEMENT   Final Result         1.  Nonspecific bowel gas pattern in the upper abdomen.   2.  Nasogastric tube tip terminates overlying the expected location of the pylorus or first duodenal segment.   3.  Hazy interstitial pulmonary edema and/or infiltrates, similar to prior study.   4.  Cardiomegaly      DX-CHEST-PORTABLE (1 VIEW)    Final Result      1.  Supportive tubing as described above.   2.  No other significant change from prior exam.         MR-BRAIN-W/O   Final Result      1.  Multifocal areas of acute infarcts in the left cerebral hemisphere.   2.  Small area of acute infarct in the right basal ganglia adjacent to the tip of the ventriculostomy catheter.   3.  Cortical infarct in the right medial parietal lobe.   4.  Mild amount of subdural hemorrhages surrounding the bilateral cerebellar hemispheres and right cerebellum.   5.  Subarachnoid hemorrhage.   6.  There is no hydrocephalus.   7.  There are changes secondary to the previous endovascular repair left internal carotid aneurysm.      CT-CTA NECK WITH & W/O-POST PROCESSING   Final Result      No focal high-grade stenosis, dissection or occlusion of the cervical carotid or vertebral arteries.      CT-CEREBRAL PERFUSION ANALYSIS   Final Result      1.  Cerebral blood flow less than 30% likely representing completed infarct = 0 mL.      2.  T Max more than 6 seconds likely representing combination of completed infarct and ischemia = 0 mL.      3.  Mismatched volume likely representing ischemic brain/penumbra = None      4.  Please note that the cerebral perfusion was performed on the limited brain tissue around the basal ganglia region. Infarct/ischemia outside the CT perfusion sections can be missed in this study.      CT-CTA HEAD WITH & W/O-POST PROCESS   Final Result      1.  Prior LEFT middle cranial fossa aneurysm coiling.  Artifact limits exam.   2.  No abrupt large vessel cut off.   3.  Segmental narrowing of LEFT middle cerebral artery, vasospasm versus artifact.   4.  Ventriculostomy catheter in similar position.   5.  Evolving RIGHT caudate infarct.   6.  Stable intracranial hemorrhage.         DX-ABDOMEN FOR TUBE PLACEMENT   Final Result      Orogastric tube tip now at the mid stomach.      DX-ABDOMEN FOR TUBE PLACEMENT   Final Result      Orogastric tube tip at the  proximal stomach with side port just above the GE junction.      US-INTRACRANIAL ARTERY LIMITED   Final Result      DX-CHEST-LIMITED (1 VIEW)   Final Result         1.  Stable chest with perihilar and lower lung zone interstitial and minimal airspace opacities most consistent with pulmonary edema.   2.  No new abnormalities.      EC-ECHOCARDIOGRAM COMPLETE W/O CONT   Final Result      US-INTRACRANIAL ARTERY LIMITED   Final Result      DX-CHEST-PORTABLE (1 VIEW)   Final Result      1.  Lines and tubes appear appropriately located      2.  Improvement of pulmonary edema/airspace process      US-INTRACRANIAL ARTERY COMP   Final Result      CT-HEAD W/O   Final Result         1. Slightly decreased subarachnoid hemorrhage overlying the cerebral hemispheres.   2. Otherwise, stable as above.         DX-CHEST-FOR LINE PLACEMENT Perform procedure in: Patient's Room   Final Result      1.  Moderate interstitial pulmonary edema.   2.   Right-sided central venous catheter terminates with the tip projecting over the expected region of the mid to distal superior vena cava.   3.  Endotracheal tube terminates in satisfactory position at the level of the aortic arch.   4.  Enteric feeding tube terminates in the left upper quadrant projecting over the expected location of the stomach.      CT-STEALTH HEAD W/O   Final Result      1.  Interval placement of a right transparietal ventriculostomy catheter which terminates with the tip near the third ventricle. There is minimally decreased caliber of the ventricular system and compared to previous exam.   2.  Findings of intracranial hemorrhage appear similar to the previous exam. No definite new acute intracranial hemorrhage is identified.   3.  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         DX-CHEST-PORTABLE (1 VIEW)   Final Result      CT-HEAD W/O   Final Result      1. Interval aneurysm coiling of left posterior communicating artery aneurysm.   2.  "Possible increased subarachnoid hemorrhage.   3. Intraventricular hemorrhage. There is developing mild hydrocephalus with mildly increased ventricles.   4. Small right convexity subdural hematoma measuring 5 mm in thickness.      These findings were discussed with STEPHANIE EASON on 1/31/2024 3:14 PM.      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   This aneurysm was embolized to occlusion as described above. Final angiographic images demonstrate only minimal filling of the neck of the aneurysm. The patient will be followed up in   the neuro interventional clinic and brought back for a follow-up angiogram in 6 months.      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      I, Stephanie Eason was physically present and participated during the entire procedure of the IR-EMBOLIZATION.          INR   Date Value Ref Range Status   01/31/2024 1.06 0.87 - 1.13 Final     Comment:     INR - Non-therapeutic Reference Range: 0.87-1.13  INR - Therapeutic Reference Range: 2.0-4.0       No results found for: \"POCINR\"     Intake/Output Summary (Last 24 hours) at 2/1/2024 1746  Last data filed at 2/1/2024 1600  Gross per 24 hour   Intake 3089.64 ml   Output 3143 ml   Net -53.36 ml      Labs not explicitly included in this progress note were reviewed by the author. Radiology/imaging not explicitly included in this progress note was reviewed by the author.     I have performed a physical exam and reviewed and updated ROS and Plan today (2/10/2024).      38 minutes in directly providing and coordinating care and extensive data review.  No time overlap and excludes procedures.  "

## 2024-02-12 ENCOUNTER — APPOINTMENT (OUTPATIENT)
Dept: RADIOLOGY | Facility: MEDICAL CENTER | Age: 64
DRG: 020 | End: 2024-02-12
Payer: COMMERCIAL

## 2024-02-12 LAB
ALBUMIN SERPL BCP-MCNC: 3.3 G/DL (ref 3.2–4.9)
ALBUMIN/GLOB SERPL: 1 G/DL
ALP SERPL-CCNC: 189 U/L (ref 30–99)
ALT SERPL-CCNC: 25 U/L (ref 2–50)
ANION GAP SERPL CALC-SCNC: 12 MMOL/L (ref 7–16)
AST SERPL-CCNC: 31 U/L (ref 12–45)
BASOPHILS # BLD AUTO: 0.5 % (ref 0–1.8)
BASOPHILS # BLD: 0.08 K/UL (ref 0–0.12)
BILIRUB SERPL-MCNC: 0.4 MG/DL (ref 0.1–1.5)
BUN SERPL-MCNC: 13 MG/DL (ref 8–22)
CALCIUM ALBUM COR SERPL-MCNC: 9.6 MG/DL (ref 8.5–10.5)
CALCIUM SERPL-MCNC: 9 MG/DL (ref 8.5–10.5)
CHLORIDE SERPL-SCNC: 101 MMOL/L (ref 96–112)
CO2 SERPL-SCNC: 24 MMOL/L (ref 20–33)
CREAT SERPL-MCNC: 0.36 MG/DL (ref 0.5–1.4)
CRP SERPL HS-MCNC: 7.03 MG/DL (ref 0–0.75)
EOSINOPHIL # BLD AUTO: 0.3 K/UL (ref 0–0.51)
EOSINOPHIL NFR BLD: 1.7 % (ref 0–6.9)
ERYTHROCYTE [DISTWIDTH] IN BLOOD BY AUTOMATED COUNT: 38.9 FL (ref 35.9–50)
GFR SERPLBLD CREATININE-BSD FMLA CKD-EPI: 114 ML/MIN/1.73 M 2
GLOBULIN SER CALC-MCNC: 3.4 G/DL (ref 1.9–3.5)
GLUCOSE SERPL-MCNC: 125 MG/DL (ref 65–99)
HCT VFR BLD AUTO: 30.3 % (ref 37–47)
HGB BLD-MCNC: 10.1 G/DL (ref 12–16)
IMM GRANULOCYTES # BLD AUTO: 0.12 K/UL (ref 0–0.11)
IMM GRANULOCYTES NFR BLD AUTO: 0.7 % (ref 0–0.9)
LYMPHOCYTES # BLD AUTO: 1.35 K/UL (ref 1–4.8)
LYMPHOCYTES NFR BLD: 7.8 % (ref 22–41)
MAGNESIUM SERPL-MCNC: 2.1 MG/DL (ref 1.5–2.5)
MCH RBC QN AUTO: 26.2 PG (ref 27–33)
MCHC RBC AUTO-ENTMCNC: 33.3 G/DL (ref 32.2–35.5)
MCV RBC AUTO: 78.7 FL (ref 81.4–97.8)
MONOCYTES # BLD AUTO: 1.34 K/UL (ref 0–0.85)
MONOCYTES NFR BLD AUTO: 7.7 % (ref 0–13.4)
NEUTROPHILS # BLD AUTO: 14.22 K/UL (ref 1.82–7.42)
NEUTROPHILS NFR BLD: 81.6 % (ref 44–72)
NRBC # BLD AUTO: 0 K/UL
NRBC BLD-RTO: 0 /100 WBC (ref 0–0.2)
PHOSPHATE SERPL-MCNC: 3.4 MG/DL (ref 2.5–4.5)
PLATELET # BLD AUTO: 460 K/UL (ref 164–446)
PMV BLD AUTO: 9.5 FL (ref 9–12.9)
POTASSIUM SERPL-SCNC: 3.6 MMOL/L (ref 3.6–5.5)
PREALB SERPL-MCNC: 14.6 MG/DL (ref 18–38)
PROT SERPL-MCNC: 6.7 G/DL (ref 6–8.2)
RBC # BLD AUTO: 3.85 M/UL (ref 4.2–5.4)
SODIUM SERPL-SCNC: 136 MMOL/L (ref 135–145)
SODIUM SERPL-SCNC: 136 MMOL/L (ref 135–145)
SODIUM SERPL-SCNC: 137 MMOL/L (ref 135–145)
WBC # BLD AUTO: 17.4 K/UL (ref 4.8–10.8)

## 2024-02-12 PROCEDURE — 700102 HCHG RX REV CODE 250 W/ 637 OVERRIDE(OP): Performed by: INTERNAL MEDICINE

## 2024-02-12 PROCEDURE — A9270 NON-COVERED ITEM OR SERVICE: HCPCS | Performed by: INTERNAL MEDICINE

## 2024-02-12 PROCEDURE — 97530 THERAPEUTIC ACTIVITIES: CPT

## 2024-02-12 PROCEDURE — 700111 HCHG RX REV CODE 636 W/ 250 OVERRIDE (IP): Mod: JZ | Performed by: INTERNAL MEDICINE

## 2024-02-12 PROCEDURE — A9270 NON-COVERED ITEM OR SERVICE: HCPCS | Performed by: NURSE PRACTITIONER

## 2024-02-12 PROCEDURE — 700102 HCHG RX REV CODE 250 W/ 637 OVERRIDE(OP): Performed by: NURSE PRACTITIONER

## 2024-02-12 PROCEDURE — 83735 ASSAY OF MAGNESIUM: CPT

## 2024-02-12 PROCEDURE — 700105 HCHG RX REV CODE 258: Performed by: NURSE PRACTITIONER

## 2024-02-12 PROCEDURE — 70450 CT HEAD/BRAIN W/O DYE: CPT

## 2024-02-12 PROCEDURE — 86140 C-REACTIVE PROTEIN: CPT

## 2024-02-12 PROCEDURE — 84295 ASSAY OF SERUM SODIUM: CPT

## 2024-02-12 PROCEDURE — 92526 ORAL FUNCTION THERAPY: CPT

## 2024-02-12 PROCEDURE — 80053 COMPREHEN METABOLIC PANEL: CPT

## 2024-02-12 PROCEDURE — A9270 NON-COVERED ITEM OR SERVICE: HCPCS | Performed by: EMERGENCY MEDICINE

## 2024-02-12 PROCEDURE — 770022 HCHG ROOM/CARE - ICU (200)

## 2024-02-12 PROCEDURE — 84100 ASSAY OF PHOSPHORUS: CPT

## 2024-02-12 PROCEDURE — 84134 ASSAY OF PREALBUMIN: CPT

## 2024-02-12 PROCEDURE — 700102 HCHG RX REV CODE 250 W/ 637 OVERRIDE(OP): Performed by: EMERGENCY MEDICINE

## 2024-02-12 PROCEDURE — 99291 CRITICAL CARE FIRST HOUR: CPT | Performed by: EMERGENCY MEDICINE

## 2024-02-12 PROCEDURE — 700101 HCHG RX REV CODE 250: Performed by: INTERNAL MEDICINE

## 2024-02-12 PROCEDURE — 700105 HCHG RX REV CODE 258: Performed by: INTERNAL MEDICINE

## 2024-02-12 PROCEDURE — 85025 COMPLETE CBC W/AUTO DIFF WBC: CPT

## 2024-02-12 PROCEDURE — 99232 SBSQ HOSP IP/OBS MODERATE 35: CPT | Performed by: PSYCHIATRY & NEUROLOGY

## 2024-02-12 RX ORDER — ASPIRIN 81 MG/1
81 TABLET, CHEWABLE ORAL DAILY
Status: DISCONTINUED | OUTPATIENT
Start: 2024-02-13 | End: 2024-02-21

## 2024-02-12 RX ORDER — SODIUM CHLORIDE 9 MG/ML
1000 INJECTION, SOLUTION INTRAVENOUS ONCE
Status: COMPLETED | OUTPATIENT
Start: 2024-02-12 | End: 2024-02-13

## 2024-02-12 RX ORDER — SODIUM CHLORIDE 1 G/1
2 TABLET ORAL EVERY 8 HOURS
Status: DISCONTINUED | OUTPATIENT
Start: 2024-02-12 | End: 2024-02-17

## 2024-02-12 RX ADMIN — NIMODIPINE 60 MG: 60 SOLUTION ORAL at 02:06

## 2024-02-12 RX ADMIN — SODIUM CHLORIDE: 9 INJECTION, SOLUTION INTRAVENOUS at 04:19

## 2024-02-12 RX ADMIN — ENOXAPARIN SODIUM 40 MG: 100 INJECTION SUBCUTANEOUS at 17:43

## 2024-02-12 RX ADMIN — ATORVASTATIN CALCIUM 40 MG: 40 TABLET, FILM COATED ORAL at 17:44

## 2024-02-12 RX ADMIN — ACETAMINOPHEN 650 MG: 325 TABLET, FILM COATED ORAL at 04:33

## 2024-02-12 RX ADMIN — LEVETIRACETAM 500 MG: 500 TABLET, FILM COATED ORAL at 05:19

## 2024-02-12 RX ADMIN — POTASSIUM BICARBONATE 50 MEQ: 978 TABLET, EFFERVESCENT ORAL at 10:35

## 2024-02-12 RX ADMIN — SODIUM CHLORIDE 2 G: 1 TABLET ORAL at 05:18

## 2024-02-12 RX ADMIN — SODIUM CHLORIDE: 9 INJECTION, SOLUTION INTRAVENOUS at 23:15

## 2024-02-12 RX ADMIN — SODIUM CHLORIDE 2 G: 1 TABLET ORAL at 22:20

## 2024-02-12 RX ADMIN — LEVETIRACETAM 500 MG: 500 TABLET, FILM COATED ORAL at 17:44

## 2024-02-12 RX ADMIN — SODIUM CHLORIDE 1000 ML: 9 INJECTION, SOLUTION INTRAVENOUS at 23:14

## 2024-02-12 RX ADMIN — ACETAMINOPHEN 650 MG: 325 TABLET, FILM COATED ORAL at 19:39

## 2024-02-12 RX ADMIN — NIMODIPINE 60 MG: 60 SOLUTION ORAL at 17:43

## 2024-02-12 RX ADMIN — MICONAZOLE NITRATE: 20 CREAM TOPICAL at 17:44

## 2024-02-12 RX ADMIN — NIMODIPINE 60 MG: 60 SOLUTION ORAL at 22:20

## 2024-02-12 RX ADMIN — NIMODIPINE 60 MG: 60 SOLUTION ORAL at 14:33

## 2024-02-12 RX ADMIN — MICONAZOLE NITRATE: 20 CREAM TOPICAL at 05:18

## 2024-02-12 RX ADMIN — ASPIRIN 81 MG 81 MG: 81 TABLET ORAL at 05:19

## 2024-02-12 RX ADMIN — NIMODIPINE 60 MG: 60 SOLUTION ORAL at 05:18

## 2024-02-12 RX ADMIN — NIMODIPINE 60 MG: 60 SOLUTION ORAL at 10:35

## 2024-02-12 RX ADMIN — SODIUM CHLORIDE 2 G: 1 TABLET ORAL at 14:33

## 2024-02-12 RX ADMIN — NYSTATIN: 100000 POWDER TOPICAL at 05:18

## 2024-02-12 RX ADMIN — NYSTATIN: 100000 POWDER TOPICAL at 17:45

## 2024-02-12 RX ADMIN — LIDOCAINE 2 PATCH: 4 PATCH TOPICAL at 17:44

## 2024-02-12 ASSESSMENT — ENCOUNTER SYMPTOMS
VOMITING: 0
SENSORY CHANGE: 0
WEAKNESS: 0
TINGLING: 0
PALPITATIONS: 0
SPEECH CHANGE: 0
FEVER: 0
SHORTNESS OF BREATH: 0
NAUSEA: 0
HEADACHES: 0
CHILLS: 0

## 2024-02-12 ASSESSMENT — COGNITIVE AND FUNCTIONAL STATUS - GENERAL
EATING MEALS: TOTAL
MOVING FROM LYING ON BACK TO SITTING ON SIDE OF FLAT BED: UNABLE
WALKING IN HOSPITAL ROOM: TOTAL
MOBILITY SCORE: 6
MOVING TO AND FROM BED TO CHAIR: UNABLE
TOILETING: TOTAL
TURNING FROM BACK TO SIDE WHILE IN FLAT BAD: UNABLE
DRESSING REGULAR UPPER BODY CLOTHING: A LOT
DAILY ACTIVITIY SCORE: 8
CLIMB 3 TO 5 STEPS WITH RAILING: TOTAL
PERSONAL GROOMING: A LOT
SUGGESTED CMS G CODE MODIFIER MOBILITY: CN
HELP NEEDED FOR BATHING: TOTAL
DRESSING REGULAR LOWER BODY CLOTHING: TOTAL
SUGGESTED CMS G CODE MODIFIER DAILY ACTIVITY: CM
STANDING UP FROM CHAIR USING ARMS: TOTAL

## 2024-02-12 ASSESSMENT — PAIN DESCRIPTION - PAIN TYPE
TYPE: ACUTE PAIN

## 2024-02-12 ASSESSMENT — FIBROSIS 4 INDEX: FIB4 SCORE: 0.87

## 2024-02-12 NOTE — PROGRESS NOTES
Neurology Progress Note  Neurohospitalist Service, [facility]    Referring Physician: Emil Pederson M.D.      Interval History: No acute events overnight.  Stable exam.  EVD clamp trial for last 24 hours.  Repeat head CT this AM without hydrocephalus.    Review of systems: In addition to what is detailed in the HPI and/or updated in the interval history, all other systems reviewed and are negative.    Past Medical History, Past Surgical History and Social History reviewed and unchanged from prior    Medications:    Current Facility-Administered Medications:     sodium chloride (Salt) tablet 2 g, 2 g, Enteral Tube, Q8HRS, Oriana HAINES Latona, 2 g at 02/12/24 0518    lidocaine (Asperflex) 4 % patch 2 Patch, 2 Patch, Transdermal, Q24HR, Dayana Babcock M.D., 2 Patch at 02/11/24 1629    enoxaparin (Lovenox) inj 40 mg, 40 mg, Subcutaneous, DAILY AT 1800, Dayana Babcock M.D., 40 mg at 02/11/24 1714    nystatin (Mycostatin) powder, , Topical, BID, Dayana aBbcock M.D., Given at 02/12/24 0518    acetaminophen (Tylenol) tablet 650 mg, 650 mg, Enteral Tube, Q4HRS PRN, 650 mg at 02/12/24 0433 **OR** acetaminophen (Tylenol) suppository 650 mg, 650 mg, Rectal, Q4HRS PRN, Dayana Babcock M.D.    atorvastatin (Lipitor) tablet 40 mg, 40 mg, Enteral Tube, Q EVENING, Dayana Babcock M.D., 40 mg at 02/11/24 1714    miconazole (Micotin) 2 % cream, , Topical, BID, Dayana Babcock M.D., Given at 02/12/24 0518    aspirin (Asa) chewable tab 81 mg, 81 mg, Enteral Tube, DAILY, YESSENIA GarzaP.RJenniferN., 81 mg at 02/12/24 0519    NS infusion, , Intravenous, Continuous, Jeremy M Gonda, M.D., Last Rate: 83 mL/hr at 02/12/24 0419, New Bag at 02/12/24 0419    enalaprilat (Vasotec) injection 1.25 mg 1 mL, 1.25 mg, Intravenous, Q6HRS PRN, Jeremy M Gonda, M.D.    hydrALAZINE (Apresoline) injection 10-20 mg, 10-20 mg, Intravenous, Q4HRS PRN, Jeremy M Gonda, M.D.    labetalol (Normodyne/Trandate) injection 10-20 mg,  "10-20 mg, Intravenous, Q4HRS PRN, Jeremy M Gonda, M.D.    niMODipine (Nymalize) oral syringe 60 mg, 60 mg, Enteral Tube, Q4HRS, Jeremy M Gonda, M.D., 60 mg at 02/12/24 0518    levETIRAcetam (Keppra) tablet 500 mg, 500 mg, Enteral Tube, BID, Jeremy M Gonda, M.D., 500 mg at 02/12/24 0519    Pharmacy Consult: Enteral tube insertion - review meds/change route/product selection, 1 Each, Other, PHARMACY TO DOSE, Jeremy M Gonda, M.D.    Respiratory Therapy Consult, , Nebulization, Continuous RT, Jeremy M Gonda, M.D.    senna-docusate (Pericolace Or Senokot S) 8.6-50 MG per tablet 2 Tablet, 2 Tablet, Enteral Tube, BID, 2 Tablet at 02/03/24 0514 **AND** polyethylene glycol/lytes (Miralax) Packet 1 Packet, 1 Packet, Enteral Tube, QDAY PRN **AND** magnesium hydroxide (Milk Of Magnesia) suspension 30 mL, 30 mL, Enteral Tube, QDAY PRN **AND** bisacodyl (Dulcolax) suppository 10 mg, 10 mg, Rectal, QDAY PRN, Jeremy M Gonda, M.D. MD Alert...ICU Electrolyte Replacement per Pharmacy, , Other, PHARMACY TO DOSE, Jeremy M Gonda, M.D.    ondansetron (Zofran ODT) dispertab 4 mg, 4 mg, Enteral Tube, Q4HRS PRN, 4 mg at 01/31/24 2248 **OR** ondansetron (Zofran) syringe/vial injection 4 mg, 4 mg, Intravenous, Q4HRS PRN, Jeremy M Gonda, M.D., 4 mg at 02/02/24 1758    LORazepam (Ativan) injection 2 mg, 2 mg, Intravenous, Q5 MIN PRN, Jeremy M Gonda, M.D.    prochlorperazine (Compazine) injection 10 mg, 10 mg, Intravenous, Q6HRS PRN, David Mclaughlin Jr., D.O., 10 mg at 01/31/24 0388    Physical Examination:   /89   Pulse 92   Resp (!) 24   Ht 1.575 m (5' 2.01\")   Wt 71 kg (156 lb 8.4 oz)   SpO2 96%   Breastfeeding No   BMI 28.62 kg/m²       General: Patient is awake and in no acute distress  Neck: There is normal range of motion  CV: Regular rate   Extremities:  Warm, dry, and intact, without peripheral lower extremity edema    NEUROLOGICAL EXAM:   EVD clamped, ICP 12-20, no output last 24 hours    Mental status: Awake, alert and " fully oriented, follows commands  Speech and language: Speech is clear and fluent. The patient is able to name and repeat.  Cranial nerve exam:  Visual fields are full. There is no nystagmus. Extraocular muscles are intact. Face is symmetric. 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  Sensory exam:  Reacts to tactile in all 4 extremities, there is no neglect to double stim  Coordination: No ataxia on bilateral FTN testing    Objective Data:    Labs:  Lab Results   Component Value Date/Time    PROTHROMBTM 13.9 01/31/2024 03:25 PM    INR 1.06 01/31/2024 03:25 PM      Lab Results   Component Value Date/Time    WBC 17.4 (H) 02/12/2024 02:15 AM    RBC 3.85 (L) 02/12/2024 02:15 AM    HEMOGLOBIN 10.1 (L) 02/12/2024 02:15 AM    HEMATOCRIT 30.3 (L) 02/12/2024 02:15 AM    MCV 78.7 (L) 02/12/2024 02:15 AM    MCH 26.2 (L) 02/12/2024 02:15 AM    MCHC 33.3 02/12/2024 02:15 AM    MPV 9.5 02/12/2024 02:15 AM    NEUTSPOLYS 81.60 (H) 02/12/2024 02:15 AM    LYMPHOCYTES 7.80 (L) 02/12/2024 02:15 AM    MONOCYTES 7.70 02/12/2024 02:15 AM    EOSINOPHILS 1.70 02/12/2024 02:15 AM    BASOPHILS 0.50 02/12/2024 02:15 AM      Lab Results   Component Value Date/Time    SODIUM 137 02/12/2024 02:15 AM    POTASSIUM 3.6 02/12/2024 02:15 AM    CHLORIDE 101 02/12/2024 02:15 AM    CO2 24 02/12/2024 02:15 AM    GLUCOSE 125 (H) 02/12/2024 02:15 AM    BUN 13 02/12/2024 02:15 AM    CREATININE 0.36 (L) 02/12/2024 02:15 AM      Lab Results   Component Value Date/Time    CHOLSTRLTOT 165 01/31/2024 03:25 PM     (H) 01/31/2024 03:25 PM    HDL 36 (A) 01/31/2024 03:25 PM    TRIGLYCERIDE 79 01/31/2024 03:25 PM       Lab Results   Component Value Date/Time    ALKPHOSPHAT 189 (H) 02/12/2024 02:15 AM    ASTSGOT 31 02/12/2024 02:15 AM    ALTSGPT 25 02/12/2024 02:15 AM    TBILIRUBIN 0.4 02/12/2024 02:15 AM        Imaging/Testing:    I interpreted and/or reviewed the patient's  neuroimaging    CT-HEAD W/O   Final Result         1.  Hyperdensity along the bilateral tentorium, compatible with layering subarachnoid hemorrhages, decreased since prior study.   2.  Atherosclerosis.         DX-ABDOMEN FOR TUBE PLACEMENT   Final Result         1.  Nonspecific bowel gas pattern in the upper abdomen.   2.  Nasogastric tube tip terminates overlying the expected location of the antrum or pylorus, somewhat withdrawn since prior study.   3.  Hazy interstitial pulmonary edema and/or infiltrates, stable   4.  Cardiomegaly      DX-ABDOMEN FOR TUBE PLACEMENT   Final Result         1.  Nonspecific bowel gas pattern in the upper abdomen.   2.  Nasogastric tube tip terminates overlying the expected location of the pylorus or first duodenal segment.   3.  Hazy interstitial pulmonary edema and/or infiltrates   4.  Cardiomegaly      DX-CHEST-PORTABLE (1 VIEW)   Final Result         Findings on chest radiograph appear stable since the prior radiograph.  No new abnormalities are identified.      DX-ABDOMEN FOR TUBE PLACEMENT   Final Result      Enteric tube tip projects over the gastric antrum or proximal duodenum, similar to prior      DX-ABDOMEN FOR TUBE PLACEMENT   Final Result      NG tube tip projects at the peripyloric region.      DX-ABDOMEN FOR TUBE PLACEMENT   Final Result      NG tube tip projects at the peripyloric region.      DX-ABDOMEN FOR TUBE PLACEMENT   Final Result      Feeding tube in place with tip at the distal stomach/pylorus.      LO-AMNLDOQ-1 VIEW   Final Result         1.  Nonspecific bowel gas pattern in the upper abdomen.   2.  Nasogastric tube tip terminates overlying the expected location of the pylorus or first duodenal segment.   3.  Hazy interstitial pulmonary edema and/or infiltrates   4.  Cardiomegaly      DX-ABDOMEN FOR TUBE PLACEMENT   Final Result      Enteric sump tube in situ with its tip at the level of the distal gastric antrum or pylorus.      DX-ABDOMEN FOR TUBE PLACEMENT    Final Result         1.  Nonspecific bowel gas pattern in the upper abdomen.   2.  Nasogastric tube tip terminates overlying the expected location of the pylorus or first duodenal segment.   3.  Hazy interstitial pulmonary edema and/or infiltrates, similar to prior study.   4.  Cardiomegaly      DX-CHEST-PORTABLE (1 VIEW)   Final Result      1.  Supportive tubing as described above.   2.  No other significant change from prior exam.         MR-BRAIN-W/O   Final Result      1.  Multifocal areas of acute infarcts in the left cerebral hemisphere.   2.  Small area of acute infarct in the right basal ganglia adjacent to the tip of the ventriculostomy catheter.   3.  Cortical infarct in the right medial parietal lobe.   4.  Mild amount of subdural hemorrhages surrounding the bilateral cerebellar hemispheres and right cerebellum.   5.  Subarachnoid hemorrhage.   6.  There is no hydrocephalus.   7.  There are changes secondary to the previous endovascular repair left internal carotid aneurysm.      CT-CTA NECK WITH & W/O-POST PROCESSING   Final Result      No focal high-grade stenosis, dissection or occlusion of the cervical carotid or vertebral arteries.      CT-CEREBRAL PERFUSION ANALYSIS   Final Result      1.  Cerebral blood flow less than 30% likely representing completed infarct = 0 mL.      2.  T Max more than 6 seconds likely representing combination of completed infarct and ischemia = 0 mL.      3.  Mismatched volume likely representing ischemic brain/penumbra = None      4.  Please note that the cerebral perfusion was performed on the limited brain tissue around the basal ganglia region. Infarct/ischemia outside the CT perfusion sections can be missed in this study.      CT-CTA HEAD WITH & W/O-POST PROCESS   Final Result      1.  Prior LEFT middle cranial fossa aneurysm coiling.  Artifact limits exam.   2.  No abrupt large vessel cut off.   3.  Segmental narrowing of LEFT middle cerebral artery, vasospasm versus  artifact.   4.  Ventriculostomy catheter in similar position.   5.  Evolving RIGHT caudate infarct.   6.  Stable intracranial hemorrhage.         DX-ABDOMEN FOR TUBE PLACEMENT   Final Result      Orogastric tube tip now at the mid stomach.      DX-ABDOMEN FOR TUBE PLACEMENT   Final Result      Orogastric tube tip at the proximal stomach with side port just above the GE junction.      US-INTRACRANIAL ARTERY LIMITED   Final Result      DX-CHEST-LIMITED (1 VIEW)   Final Result         1.  Stable chest with perihilar and lower lung zone interstitial and minimal airspace opacities most consistent with pulmonary edema.   2.  No new abnormalities.      EC-ECHOCARDIOGRAM COMPLETE W/O CONT   Final Result      US-INTRACRANIAL ARTERY LIMITED   Final Result      DX-CHEST-PORTABLE (1 VIEW)   Final Result      1.  Lines and tubes appear appropriately located      2.  Improvement of pulmonary edema/airspace process      US-INTRACRANIAL ARTERY COMP   Final Result      CT-HEAD W/O   Final Result         1. Slightly decreased subarachnoid hemorrhage overlying the cerebral hemispheres.   2. Otherwise, stable as above.         DX-CHEST-FOR LINE PLACEMENT Perform procedure in: Patient's Room   Final Result      1.  Moderate interstitial pulmonary edema.   2.   Right-sided central venous catheter terminates with the tip projecting over the expected region of the mid to distal superior vena cava.   3.  Endotracheal tube terminates in satisfactory position at the level of the aortic arch.   4.  Enteric feeding tube terminates in the left upper quadrant projecting over the expected location of the stomach.      CT-STEALTH HEAD W/O   Final Result      1.  Interval placement of a right transparietal ventriculostomy catheter which terminates with the tip near the third ventricle. There is minimally decreased caliber of the ventricular system and compared to previous exam.   2.  Findings of intracranial hemorrhage appear similar to the previous  exam. No definite new acute intracranial hemorrhage is identified.   3.  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         DX-CHEST-PORTABLE (1 VIEW)   Final Result      CT-HEAD W/O   Final Result      1. Interval aneurysm coiling of left posterior communicating artery aneurysm.   2. Possible increased subarachnoid hemorrhage.   3. Intraventricular hemorrhage. There is developing mild hydrocephalus with mildly increased ventricles.   4. Small right convexity subdural hematoma measuring 5 mm in thickness.      These findings were discussed with STEPHANIE EASON on 1/31/2024 3:14 PM.      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   This aneurysm was embolized to occlusion as described above. Final angiographic images demonstrate only minimal filling of the neck of the aneurysm. The patient will be followed up in   the neuro interventional clinic and brought back for a follow-up angiogram in 6 months.      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      I, Stephanie Eason was physically present and participated during the entire procedure of the IR-EMBOLIZATION.          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  She is PBD#12, POD#12 from successful endovascular coiling.  No window for TCDs.  MRI with multifocal infarcts.  EVD clamp trial underway with stable hydrocephalus.  Anticipate EVD removal shortly.  Will continue with keppra given clinical seizure at admission.  Supportive therapies including serial neuro exams, mild hypertensive response,  and maintaining euvolemia and eunatremia for next couple days while in vasospasm window.    Problem list:   Hunt Potts 4, modified Swenson grade 3 SAH   Ruptured cerebral aneurysm    Plan:   - continue neurochecks/NIHSS q2h    - EVD clamped, head CT stable, anticipate EVD removal by NSG   - maintain SBP goal 140-200 while in vasospasm window   - TCDs discontinued due to lack of windows, attain repeat CTA head/CTP for focal neurologic changes   - continue keppra 500mg BID- this will be 3 months of therapy given seizure event    - continue lovenox SQ for DVT chemoppx   - may discontinue ASA therapy- stroke etiology invariably related to SAH and periprocedural events   - continue nimodipine 60mg q4h while admitted of for 21 days   - maintain normothermia, net even I/O, FSBS    - normal Na goal 135-145, continue salt tabs, 3% PRN   - PT/OT/SLP    The evaluation of the patient, and recommended management, was discussed with Dr. Pederson, ICU attending. I have performed a physical exam and reviewed and updated ROS and Plan today (2/12/2024).     Jose Raul Tracy MD  Neurohospitalist, Acute Care Services

## 2024-02-12 NOTE — DISCHARGE PLANNING
Case Management Discharge Planning    Admission Date: 1/31/2024  GMLOS: 10.2  ALOS: 12    6-Clicks ADL Score: 8  6-Clicks Mobility Score: 6  PT and/or OT Eval ordered: Yes  Post-acute Referrals Ordered: No  Post-acute Choice Obtained: No  Has referral(s) been sent to post-acute provider:  Yes    Anticipated Discharge Dispo: Discharge Disposition: Disch to  rehab facility or distinct part unit (62)    Action(s) Taken: Updated Provider/Nurse on Discharge Plan and Referral(s) sent, Updated PMR MDRicky) to re evaluate     Escalations Completed: None    Medically Clear: No    Next Steps: Follow up Anticipate acceptance to a Post Acute rehab within the coming days    Barriers to Discharge: PMR to re evaluate Pt., Medical clearance, Pending Placement, and Pending PT Evaluation,    Is the patient up for discharge tomorrow: No

## 2024-02-12 NOTE — THERAPY
"Physical Therapy   Daily Treatment     Patient Name: Fay Turner  Age:  63 y.o., Sex:  female  Medical Record #: [id number]  Today's Date: 2/12/2024     Precautions  Precautions: Fall Risk;Swallow Precautions  Comments: EVD removed    Assessment    Pt received in bed and agreeable to PT session. Pt demonstrated improved cognition compared to last PT session and was speaking in short phrases appropriately. Pt required max A for bed mobility and was able to initiate at times to assist. Once in sitting, intermittent tactile cues required for getting to midline. Pt not moving LE on command or accepting weight with STS attempts. Will continue to follow for acute PT.    Plan    Treatment Plan Status: Continue Current Treatment Plan  Type of Treatment: Equipment, Bed Mobility, Manual Therapy, Neuro Re-Education / Balance, Stair Training, Self Care / Home Evaluation, Therapeutic Activities, Therapeutic Exercise  Treatment Frequency: 3 Times per Week  Treatment Duration: Until Therapy Goals Met    DC Equipment Recommendations: Unable to determine at this time  Discharge Recommendations: Recommend post-acute placement for additional physical therapy services prior to discharge home    Subjective    \"Do I look sexy?\"     Objective       02/12/24 0941   Precautions   Precautions Fall Risk;Swallow Precautions   Comments EVD removed   Vitals   Vitals Comments /86 in sitting. HR 70s at rest, 100s in sitting.   Pain 0 - 10 Group   Therapist Pain Assessment Post Activity Pain Same as Prior to Activity;Nurse Notified;0   Cognition    Speech/ Communication Delayed Responses   Level of Consciousness Alert   Ability To Follow Commands 1 Step   New Learning Impaired   Attention Impaired   Initiation Impaired   Comments Pt speaking in short phrases this session. Pt able to follow some commands, does more spontaneously than she does on command. Not moving LE   Other Treatments   Other Treatments Provided Co-tx with OT due to pt " complexity and need for skilled assist, working towards separate goals during session.   Balance   Sitting Balance (Static) Fair -   Sitting Balance (Dynamic) Poor +   Standing Balance (Static) Dependent   Standing Balance (Dynamic) Dependent   Weight Shift Sitting Fair   Weight Shift Standing Absent   Skilled Intervention Verbal Cuing;Facilitation;Tactile Cuing   Comments Improved sitting balance from prior, intermittent tactile cues. Pt still not accepting weight through LE in standing   Bed Mobility    Supine to Sit Maximal Assist   Sit to Supine Maximal Assist   Scooting Maximal Assist   Rolling Maximal Assist to Rt.;Maximum Assist to Lt.   Skilled Intervention Verbal Cuing;Tactile Cuing;Sequencing;Facilitation   Functional Mobility   Sit to Stand Total Assist   Mobility EOB, STS x1   Skilled Intervention Verbal Cuing;Sequencing;Facilitation   Comments 2P assist for standing attempts   How much difficulty does the patient currently have...   Turning over in bed (including adjusting bedclothes, sheets and blankets)? 1   Sitting down on and standing up from a chair with arms (e.g., wheelchair, bedside commode, etc.) 1   Moving from lying on back to sitting on the side of the bed? 1   How much help from another person does the patient currently need...   Moving to and from a bed to a chair (including a wheelchair)? 1   Need to walk in a hospital room? 1   Climbing 3-5 steps with a railing? 1   6 clicks Mobility Score 6   Short Term Goals    Short Term Goal # 1 Pt will perform supine<>sit from flat HOB/no railing with supervision within 6 visits to ensure independent mobility at home.   Goal Outcome # 1 goal not met   Short Term Goal # 2 Pt will perform sit<>stand with FWW and supervision within 6 visits to ensure independent mobility.   Goal Outcome # 2 Goal not met   Short Term Goal # 3 Pt will ambulate x 150ft with FWW and supervision within 6 visits to ensure independent mobility at home.   Goal Outcome # 3 Goal  not met   Physical Therapy Treatment Plan   Physical Therapy Treatment Plan Continue Current Treatment Plan   Anticipated Discharge Equipment and Recommendations   DC Equipment Recommendations Unable to determine at this time   Discharge Recommendations Recommend post-acute placement for additional physical therapy services prior to discharge home   Interdisciplinary Plan of Care Collaboration   IDT Collaboration with  Nursing;Family / Caregiver;Occupational Therapist   Patient Position at End of Therapy In Bed;Wrist Restraints Applied;Family / Friend in Room;Call Light within Reach   Collaboration Comments RN updated

## 2024-02-12 NOTE — THERAPY
Speech Language Pathology   Daily Treatment     Patient Name: Fay Truner  AGE:  63 y.o., SEX:  female  Medical Record #: [id number]  Date of Service: 2/12/2024    Precautions: Fall Risk, Swallow Precautions    CXR 2/11/24:   Findings on chest radiograph appear stable since the prior radiograph. No new abnormalities are identified.      Subjective  Pt cleared by RN for dysphagia treatment. Per RN, pt w/ limited PO intake- he discussed w/ RD. Potentially reducing nocturnal TF to help w/ increased PO intake. Pt was received awake/alert w/ family at bedside. Pt w/ increased verbal output conversing w/ SLP and RN on this date.     Assessment  Pt only willing to trial TN0 (coffee mixed w/ protein shake) on this date. RN removed restraints to allow for self-feeding (okay per RN to leave off w/ family present). Pt w/ adequate oral acceptance/containment and labial seal for straw sips. Single swallow completed per bolus. Pt endorsing notable odynophagia on PO trials- per RN, pt pulled NGT last night w/ replacement. Throat clear x2 appreciated following trials of TN0. No increased WOB, cough, or change in vocal quality (remained dry). Query NGT placement w/ impact on swallow function vs. Irritation/soreness 2/2 replacement.     Clinical Impressions  Pt presenting w/ new reports of odynophagia and throat clearing on PO. Query NGT placement w/ impact on swallow function vs. Irritation/soreness 2/2 replacement. Will re-assess tomorrow to determine need for repeat instrumentation. Recommend continuation of MM5/TN0 diet w/ 1:1 supv during meals in the interim. Should s/s of aspiration be appreciated on PO or decline in respiratory status, please hold and contact SLP. Pt, family, and RN all in agreement. SLP following.     Recommendations  Treatment Completed: Dysphagia Treatment  Diet Consistency: MM5/TN0  Instrumentation: Instrumental swallow study pending clinical progress  Medication: Crush with applesauce, as appropriate,  Crush with pudding/puree, as appropriate, As tolerated  Supervision: 1:1 feeding with constant supervision, Direct supervision during meals, Encourage self-feeding  Positioning: Fully upright and midline during oral intake, Meals sitting upright in a chair, as tolerated  Risk Management : Small bites/sips, Slow rate of intake, Reduce environmental distractions  Oral Care: Q6h    SLP Treatment Plan  Treatment Plan: Dysphagia Treatment, Cognitive Treatment, Patient/Family/Caregiver Training (motor speech)  SLP Frequency: 4x Per Week  Estimated Duration: Until Therapy Goals Met    Anticipated Discharge Needs  Discharge Recommendations: Recommend post-acute placement for additional speech therapy services prior to discharge home  Therapy Recommendations Upon DC: Dysphagia Training, Comprehension Training, Expression Training, Reading Training, Writing Training, Cognitive-Linguistic Training, Community Re-Integration, Patient / Family / Caregiver Education, Other (See Comments) (motor speech)    Patient / Family Goals  Patient / Family Goal #1: nods yes to ice chips  Goal #1 Outcome: Goal met  Short Term Goals  Short Term Goal # 1: Pt will participate in an instrumental swallow study via FEES to determine airway protection/swallow efficiency and guide dysphagia management.  Goal Outcome # 1: Goal met, new goal added  Short Term Goal # 1 B : Pt will consume a MM5/TN0 diet with 1:1 supv without any overt s/s of aspiration or decline in respiratory status  Goal Outcome  # 1 B: Progressing slower than expected  Short Term Goal # 2: With mod-max cues, pt will be AAOx4 during >65% of opportunities.  Goal Outcome # 2 : Progressing slower than expected  Short Term Goal # 3: With max cues, pt will recall salient/functional information after a 5 min delay during >65% of opportunities.  Goal Outcome  # 3: Progressing slower than expected  Short Term Goal # 4: With min-mod cues, pt will participate in additional cognitive testing to  further guide POC.  Goal Outcome  # 4: Progressing slower than expected  Short Term Goal # 5: Pt will utilize 'clear speech' techniques to increase intelligibility at the conversational level to >90%.  Goal Outcome  # 5: Other (see comments) (not targeted on this date)    Anai Dumont, SLP

## 2024-02-12 NOTE — PROGRESS NOTES
Neuro Interventional Radiology   Progress Note     Author: Allison Weber DNP Date & Time created: 2/11/2024  4:05 PM   Date of admission  1/31/2024  Note to reader: this note follows the APSO format rather than the historical SOAP format. Assessment and plan located at the top of the note for ease of use.    Chief Complaint  63 y.o. female admitted 1/31/2024 with subarachnoid hemorrhage.    HPI  Fay France is a 62 yo female with PMH significant for HTN who presented to OSH for sudden onset of new eyelid droop, and generalized headache without significant head trauma. Pt seized in OSH CT scan, was loaded with Keppra and transferred for higher level of care. 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 Carpio Potts 4; Modified Swenson scale 3.  01/31/24 MAURICE Eason completed a cerebral aneurysm with coil embolization of large left PCOMM aneurysm.    Interval History IR:  02/01/24: RIGHT groin access site soft, non-tender, without ecchymosis. Dressing clean, dry, intact. Pedal pulses +2 bilaterally with feet pink, and warm, cap refill <3 sec. Pt is intubated and ventilated with EVD. She is not following commands, but moves right foot to stimuli. I reviewed the most recent labs including WBC 19.6, Hgb 14.2, Cr 0.61. Coordinated post IR procedure care with hospital nursing staff.     02/05/24:  PBD/post Pcom aneurysm embolization with coil #5.  Pt extubated yesterday (2/4). A stat CTA and CT perfusion done early am on 2/4 (increased lethargy) no obvious vasospasm noted. TCD's with poor windows therefor no baseline Tcd's obtained.  I reviewed FU MRI brain(02/04) which showed small L MCA infarcts-Vasopressors infusing and  keeping -200 to mitigate vasospasm. Anterior EVD in place at 10 cm H20 above tragus with good waveform noted on monitor-R groin site soft, nontender without edema, bleeding, small scab with small  ecchymosis. I  reviewed today's labs: WBC 13.7; hemoglobin 10.5;  ; Cr 0.40; respiratory culture- +MSSA in sputum-continuing Rocephin today is day 5 of 5.  I's/O's-last 24 hours+ 551/since admission +1260; ICP 2-10 (good ICP waveform noted on monitor).      02/06/24:  PBD/post Pcom aneurysm embolization with coil #6. TCD's with poor windows, recommend following neuro assessment. I reviewed FU MRI brain(02/04) which showed small L MCA infarcts-Goal -200 to mitigate vasospasm. Anterior EVD in place at 10 cm H20 above tragus with good waveform noted on monitor-R groin site soft, nontender without edema, bleeding, small scab with small  ecchymosis. I reviewed today's labs: WBC 17; hemoglobin 10.8;  ; Cr 0.33; respiratory culture- +MSSA in sputum -completed Rocephin.  I's/O's-last 24 hours -193 /since admission +1037; ICP 5-11 (good ICP waveform noted on monitor). I started ASA therapy 2nd to multiple infarcts noted on MRI.  ASA therapy discussed and approved by Fer Helms and  Dr. Eason.     02/07/24: SAH, Carpio Potts 4;Modified Swenson 3.  PBD/post Pcom aneurysm embolization with coil #7. TCD's discontinued 2nd to poor windows, recommend following neuro assessment. -Goal -200 to mitigate vasospasm. Anterior EVD in place at 10 cm H20 above tragus with good waveform noted on monitor-ecchymosis. I reviewed today's labs: WBC 15.6; Hemoglobin 11.1 ; Cr 0.36; .  I's/O's-last 24 hours -190 /since admission +876; ICP 3-10 (good ICP waveform noted on monitor).     02/08/24: SAH, Carpio Potts 4;Modified Swenson 3.  PBD/post Pcom aneurysm embolization with coil #8. TCD's with poor windows, recommend following neuro assessment. -Goal -200 to mitigate vasospasm. Anterior EVD open, raised today to 20 cm H20 above tragus with good waveform noted on monitor-ecchymosis. I reviewed today's labs: WBC 15.1; Hemoglobin 10.5;  ; Cr 0.31;  I's/O's-last 24 hours --1091/since admission -214 ICP 4-11/CPP > 100  (good ICP waveform noted on monitor).  Tmax 100.6    02/09/24: SAH, Carpio Potts 4;Modified Swenson 3. PBD/post Pcom aneurysm embolization with coil #9. No longer following TCD's 2nd to poor windows.   Follow neuro assessment. -Goal -200 to mitigate vasospasm. Anterior EVD open, @15 cm H20 above tragus with good waveform noted on monitor. I reviewed today's labs: WBC 14.9; Hemoglobin 11.2;  ; Cr 0.29;  I's/O's-last 24 hours -110 ml; output since admission - 675 ml.  ICP 4-11/CPP ; EVD output -139 mL in the last 24 hours (good ICP waveform noted on monitor). Tmax 100. 9    2/10/24: SAH, Carpio Potts 4; Modified Swenson 3. PBD #10 /post Pcom aneurysm embolization with 10 coils (1/31/24). No longer following TCD's 2nd to poor windows.   Follow neuro assessment. -Goal -200 to mitigate vasospasm. Anterior EVD open, @ 20 cm H20 above tragus with good waveform noted on monitor. I reviewed patient's most recent labs: WBC 14.3; Hemoglobin 10.6;  ; Cr 0.36;  ICP<15; EVD output:139 mL in the last 24 hours. Tmax 100. 4    2/11/24: SAH, Carpio Potts 4; Modified Swenson 3. PBD #11 /post Pcom aneurysm embolization with 10 coils (1/31/24). Pt is verbally responsive with appropriate answers in Spansh, purposeful and following some commands.  - EVD clamped 2/11/24.   - No longer following TCD's 2nd to poor windows.   Follow neuro assessment.   - Goal -200 to mitigate vasospasm.   - I reviewed patient's most recent labs: WBC 17.5; Hemoglobin 11.0;  ; Cr 0.36; Tmax 99.5    Assessment/Plan     Principal Problem:    SAH (subarachnoid hemorrhage) (HCC)  Active Problems:    Acute respiratory failure with hypoxia (HCC)    Secondary hypertension    Acute pulmonary edema (HCC)    Seizure (HCC)    Hypokalemia    Hypotension due to hypovolemia    Hypophosphatemia    Anemia    Pneumonia of both lungs due to methicillin susceptible Staphylococcus aureus (MSSA) (HCC)    Aneurysm of ascending aorta without rupture  (HCC)    Hyperglycemia      Plan IR  -Blood pressure parameters per neuro- keep 140-200-to mitigate vasospasm  -Continue Nimodipine per Vascular Neurology  -TCD's (poor windows) therefore rely on neuro assessment for vasospasm watch  - If suspect vasospasm consider CTA and CT perfusion  -Continue ASA 81 mg daily 2nd to FU MRI brain (02/04) which showed small L MCA infarcts  - Neuro checks per ICU protocol  - Pending CT head w/o 2/11/24  - Neurosurgery EVD placement- managed by neurosurgery  - Maintain normoglycemia, normothermia, euvolemia and eunatremia  - Vascular Neurology and Neurosurgery following  - Outpatient follow up in approximately 4 weeks from IR intervention (approx 03/0//24) . Our office will call after hospital discharge to schedule      -Thank you for allowing Interventional Radiology team to participate in the patients care, if any additional care or requests are needed in the future please do not hesitate call or place IR order   [phone]              ROS-able to answer some ROS questions  Review of Systems  Physical Exam   Review of Systems   Constitutional:  Negative for chills and fever.   Respiratory:  Negative for shortness of breath.    Cardiovascular:  Negative for chest pain.   Gastrointestinal:  Negative for abdominal pain.   Musculoskeletal:  Negative for myalgias.   Neurological:  Negative for headaches.      Vitals:    02/11/24 1533   BP:    Pulse: (!) 103   Resp: (!) 30   SpO2: 95%        Physical Exam  Vitals and nursing note reviewed.   Constitutional:       General: She is sleeping.      Appearance: She is ill-appearing.   HENT:      Head:      Comments: EVD    Eyes:      Comments: Left ptosis   CNIII palsy    Cardiovascular:      Rate and Rhythm: Regular rhythm. Tachycardia present.      Pulses: Normal pulses.           Dorsalis pedis pulses are 2+ on the right side and 2+ on the left side.      Comments:       Pulmonary:      Effort: No respiratory distress.      Breath sounds:  Normal air entry.   Chest:   Breasts:     Breasts are symmetrical.   Abdominal:      Palpations: Abdomen is soft.      Tenderness: There is no abdominal tenderness.   Genitourinary:     Comments: Cuellar cath to down drain- clear yellow urine  Musculoskeletal:      Comments: Minimal spontaneous movement   Skin:     General: Skin is warm and dry.      Capillary Refill: Capillary refill takes less than 2 seconds.      Coloration: Skin is pale.   Neurological:      Mental Status: She is lethargic.      Comments: -Minimal movement in right upper extremity  -Movement to physical stimuli in bilateral lower extremities  - Answering questions appropriately  - Following commands  .     Psychiatric:      Comments: Verbally appropriate             Labs    Recent Labs     02/09/24  0353 02/10/24  0404 02/11/24  0402   WBC 14.9* 14.3* 17.5*   RBC 4.27 4.06* 4.23   HEMOGLOBIN 11.2* 10.6* 11.0*   HEMATOCRIT 33.1* 31.6* 32.8*   MCV 77.5* 77.8* 77.5*   MCH 26.2* 26.1* 26.0*   MCHC 33.8 33.5 33.5   RDW 38.0 38.4 38.0   PLATELETCT 425 443 498*   MPV 9.7 9.7 9.4       Recent Labs     02/09/24  0353 02/09/24  1300 02/10/24  0404 02/10/24  1009 02/10/24  2355 02/11/24  0402 02/11/24  1039   SODIUM 132*   < > 133*   < > 133* 133* 131*   POTASSIUM 3.9  --  3.6  --   --  4.0  --    CHLORIDE 97  --  99  --   --  98  --    CO2 22 --  24  --   --  25  --    GLUCOSE 133*  --  135*  --   --  150*  --    BUN 12  --  15  --   --  13  --    CREATININE 0.29*  --  0.36*  --   --  0.33*  --    CALCIUM 9.0  --  8.7  --   --  9.3  --     < > = values in this interval not displayed.       Recent Labs     02/09/24  0353 02/10/24  0404 02/11/24  0402   CREATININE 0.29* 0.36* 0.33*       DX-CHEST-PORTABLE (1 VIEW)   Final Result         Findings on chest radiograph appear stable since the prior radiograph.  No new abnormalities are identified.      DX-ABDOMEN FOR TUBE PLACEMENT   Final Result      Enteric tube tip projects over the gastric antrum or proximal  duodenum, similar to prior      DX-ABDOMEN FOR TUBE PLACEMENT   Final Result      NG tube tip projects at the peripyloric region.      DX-ABDOMEN FOR TUBE PLACEMENT   Final Result      NG tube tip projects at the peripyloric region.      DX-ABDOMEN FOR TUBE PLACEMENT   Final Result      Feeding tube in place with tip at the distal stomach/pylorus.      [id number] VIEW   Final Result         1.  Nonspecific bowel gas pattern in the upper abdomen.   2.  Nasogastric tube tip terminates overlying the expected location of the pylorus or first duodenal segment.   3.  Hazy interstitial pulmonary edema and/or infiltrates   4.  Cardiomegaly      DX-ABDOMEN FOR TUBE PLACEMENT   Final Result      Enteric sump tube in situ with its tip at the level of the distal gastric antrum or pylorus.      DX-ABDOMEN FOR TUBE PLACEMENT   Final Result         1.  Nonspecific bowel gas pattern in the upper abdomen.   2.  Nasogastric tube tip terminates overlying the expected location of the pylorus or first duodenal segment.   3.  Hazy interstitial pulmonary edema and/or infiltrates, similar to prior study.   4.  Cardiomegaly      DX-CHEST-PORTABLE (1 VIEW)   Final Result      1.  Supportive tubing as described above.   2.  No other significant change from prior exam.         MR-BRAIN-W/O   Final Result      1.  Multifocal areas of acute infarcts in the left cerebral hemisphere.   2.  Small area of acute infarct in the right basal ganglia adjacent to the tip of the ventriculostomy catheter.   3.  Cortical infarct in the right medial parietal lobe.   4.  Mild amount of subdural hemorrhages surrounding the bilateral cerebellar hemispheres and right cerebellum.   5.  Subarachnoid hemorrhage.   6.  There is no hydrocephalus.   7.  There are changes secondary to the previous endovascular repair left internal carotid aneurysm.      CT-CTA NECK WITH & W/O-POST PROCESSING   Final Result      No focal high-grade stenosis, dissection or occlusion of  the cervical carotid or vertebral arteries.      CT-CEREBRAL PERFUSION ANALYSIS   Final Result      1.  Cerebral blood flow less than 30% likely representing completed infarct = 0 mL.      2.  T Max more than 6 seconds likely representing combination of completed infarct and ischemia = 0 mL.      3.  Mismatched volume likely representing ischemic brain/penumbra = None      4.  Please note that the cerebral perfusion was performed on the limited brain tissue around the basal ganglia region. Infarct/ischemia outside the CT perfusion sections can be missed in this study.      CT-CTA HEAD WITH & W/O-POST PROCESS   Final Result      1.  Prior LEFT middle cranial fossa aneurysm coiling.  Artifact limits exam.   2.  No abrupt large vessel cut off.   3.  Segmental narrowing of LEFT middle cerebral artery, vasospasm versus artifact.   4.  Ventriculostomy catheter in similar position.   5.  Evolving RIGHT caudate infarct.   6.  Stable intracranial hemorrhage.         DX-ABDOMEN FOR TUBE PLACEMENT   Final Result      Orogastric tube tip now at the mid stomach.      DX-ABDOMEN FOR TUBE PLACEMENT   Final Result      Orogastric tube tip at the proximal stomach with side port just above the GE junction.      US-INTRACRANIAL ARTERY LIMITED   Final Result      DX-CHEST-LIMITED (1 VIEW)   Final Result         1.  Stable chest with perihilar and lower lung zone interstitial and minimal airspace opacities most consistent with pulmonary edema.   2.  No new abnormalities.      EC-ECHOCARDIOGRAM COMPLETE W/O CONT   Final Result      US-INTRACRANIAL ARTERY LIMITED   Final Result      DX-CHEST-PORTABLE (1 VIEW)   Final Result      1.  Lines and tubes appear appropriately located      2.  Improvement of pulmonary edema/airspace process      US-INTRACRANIAL ARTERY COMP   Final Result      CT-HEAD W/O   Final Result         1. Slightly decreased subarachnoid hemorrhage overlying the cerebral hemispheres.   2. Otherwise, stable as above.          DX-CHEST-FOR LINE PLACEMENT Perform procedure in: Patient's Room   Final Result      1.  Moderate interstitial pulmonary edema.   2.   Right-sided central venous catheter terminates with the tip projecting over the expected region of the mid to distal superior vena cava.   3.  Endotracheal tube terminates in satisfactory position at the level of the aortic arch.   4.  Enteric feeding tube terminates in the left upper quadrant projecting over the expected location of the stomach.      CT-STEALTH HEAD W/O   Final Result      1.  Interval placement of a right transparietal ventriculostomy catheter which terminates with the tip near the third ventricle. There is minimally decreased caliber of the ventricular system and compared to previous exam.   2.  Findings of intracranial hemorrhage appear similar to the previous exam. No definite new acute intracranial hemorrhage is identified.   3.  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         DX-CHEST-PORTABLE (1 VIEW)   Final Result      CT-HEAD W/O   Final Result      1. Interval aneurysm coiling of left posterior communicating artery aneurysm.   2. Possible increased subarachnoid hemorrhage.   3. Intraventricular hemorrhage. There is developing mild hydrocephalus with mildly increased ventricles.   4. Small right convexity subdural hematoma measuring 5 mm in thickness.      These findings were discussed with EMILY BYRD on 1/31/2024 3:14 PM.      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   This aneurysm was embolized to occlusion as described above. Final angiographic images demonstrate only minimal filling of the neck of the  "aneurysm. The patient will be followed up in   the neuro interventional clinic and brought back for a follow-up angiogram in 6 months.      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      I, Stephanie Eason was physically present and participated during the entire procedure of the IR-EMBOLIZATION.          INR   Date Value Ref Range Status   01/31/2024 1.06 0.87 - 1.13 Final     Comment:     INR - Non-therapeutic Reference Range: 0.87-1.13  INR - Therapeutic Reference Range: 2.0-4.0       No results found for: \"POCINR\"     Intake/Output Summary (Last 24 hours) at 2/1/2024 1746  Last data filed at 2/1/2024 1600  Gross per 24 hour   Intake 3089.64 ml   Output 3143 ml   Net -53.36 ml      Labs not explicitly included in this progress note were reviewed by the author. Radiology/imaging not explicitly included in this progress note was reviewed by the author.     I have performed a physical exam and reviewed and updated ROS and Plan today (2/11/2024).      39 minutes in directly providing and coordinating care and extensive data review.  No time overlap and excludes procedures.  "

## 2024-02-12 NOTE — PROGRESS NOTES
Neurosurgery Progress Note    Subjective:  63 year old presented with HH4 ruptured left Pcomm aneurysm, post coil day 4.   Overnight on  had worsening exam with flaccid RUE, decreased mentation, more lethargy. CTA showed left MCA vasospasm.      EVD clamped at 20 since 2/10, tolerating well  Drainage clear overall, slightly blood tinged.  Head CT  AM shows no hydrocephalus or new acute findings    Off sedation     Exam:  GCS: [id number]  Oriented to name and place today   Voice strong, when she is cooperating   Right eye open spontaneously, pupil 2mm and reactive   LUE ptosis noted, pupil 4mm and nonreactive   Following commands x4 (with encouragement)   EVD site clean       BP  Min: 129/73  Max: 179/105  Pulse  Av  Min: 88  Max: 122  Resp  Av.4  Min: 17  Max: 44  Monitored Temp 2  Av.3 °C (99.1 °F)  Min: 36.1 °C (97 °F)  Max: 37.7 °C (99.9 °F)  SpO2  Av.1 %  Min: 91 %  Max: 100 %    ICP  Avg: 15.3 MM HG  Min: 8 MM HG  Max: 20 MM HG    Recent Labs     02/10/24  0404 24  0402 24  0215   WBC 14.3* 17.5* 17.4*   RBC 4.06* 4.23 3.85*   HEMOGLOBIN 10.6* 11.0* 10.1*   HEMATOCRIT 31.6* 32.8* 30.3*   MCV 77.8* 77.5* 78.7*   MCH 26.1* 26.0* 26.2*   MCHC 33.5 33.5 33.3   RDW 38.4 38.0 38.9   PLATELETCT 443 498* 460*   MPV 9.7 9.4 9.5       Recent Labs     02/10/24  0404 02/10/24  1009 24  0402 24  1039 24  1809 24  0215   SODIUM 133*   < > 133* 131* 131* 137   POTASSIUM 3.6  --  4.0  --   --  3.6   CHLORIDE 99  --  98  --   --  101   CO2 24  --  25  --   --  24   GLUCOSE 135*  --  150*  --   --  125*   BUN 15  --  13  --   --  13   CREATININE 0.36*  --  0.33*  --   --  0.36*   CALCIUM 8.7  --  9.3  --   --  9.0    < > = values in this interval not displayed.                     Intake/Output                         24 - 2459 24 - 24 Total  Total                 Intake    I.V.  981.1   859.4 1840.5  --  -- --    Volume (mL) (NS infusion) 981.1 859.4 1840.5 -- -- --    Other  --  150 150  --  -- --    Medications (PO/Enteral Liquids) -- 150 150 -- -- --    NG/GT  --  470 470  --  -- --    Intake (mL) ([REMOVED] Enteral Tube 02/11/24 Nasogastric Right nare 02/11/24 2120) -- 0 0 -- -- --    Intake (mL) (Enteral Tube 02/11/24 Nasogastric 14 Fr. Right nare) -- 470 470 -- -- --    Enteral  90  90 180  --  -- --    Free Water / Tube Flush 90 90 180 -- -- --    Total Intake 1071.1 1569.4 2640.5 -- -- --       Output    Urine  1425  1270 2695  --  -- --    Output (mL) (Urethral Catheter Non-latex;Temperature probe) 1425 1270 2695 -- -- --    Stool  --  -- --  --  -- --    Number of Times Stooled -- 0 x 0 x -- -- --    Total Output 1425 1270 2695 -- -- --       Net I/O     -353.9 299.4 -54.5 -- -- --              Intake/Output Summary (Last 24 hours) at 2/12/2024 0958  Last data filed at 2/12/2024 0600  Gross per 24 hour   Intake 2444.57 ml   Output 2345 ml   Net 99.57 ml               sodium chloride  2 g Q8HRS    potassium bicarbonate  50 mEq Once    lidocaine  2 Patch Q24HR    enoxaparin (LOVENOX) injection  40 mg DAILY AT 1800    nystatin   BID    acetaminophen  650 mg Q4HRS PRN    Or    acetaminophen  650 mg Q4HRS PRN    atorvastatin  40 mg Q EVENING    miconazole   BID    NS   Continuous    enalaprilat  1.25 mg Q6HRS PRN    hydrALAZINE  10-20 mg Q4HRS PRN    labetalol  10-20 mg Q4HRS PRN    niMODipine  60 mg Q4HRS    levETIRAcetam  500 mg BID    Pharmacy  1 Each PHARMACY TO DOSE    Respiratory Therapy Consult   Continuous RT    senna-docusate  2 Tablet BID    And    polyethylene glycol/lytes  1 Packet QDAY PRN    And    magnesium hydroxide  30 mL QDAY PRN    And    bisacodyl  10 mg QDAY PRN    MD Alert...Adult ICU Electrolyte Replacement per Pharmacy   PHARMACY TO DOSE    ondansetron  4 mg Q4HRS PRN    Or    ondansetron  4 mg Q4HRS PRN    LORazepam  2 mg Q5 MIN PRN    prochlorperazine  10 mg Q6HRS PRN        Assessment and Plan:  Hospital day #13  Post coil day 12  Head CT 2/12 stable   EVD removed at bedside today, no active drainage noted   Appreciate neurology input, will continue Q2 neuro checks through vasospasm window.   Appreciate intensivist care   Ok for oob, PT/ OT as able       Chemical prophylactic DVT therapy: OK from NS perspective  Start date/time: on MAR

## 2024-02-12 NOTE — CARE PLAN
The patient is Stable - Low risk of patient condition declining or worsening    Shift Goals  Clinical Goals: sbp 140-200; q2 neuro  Patient Goals: padmini  Family Goals: padmini    Progress made toward(s) clinical / shift goals:    Problem: Safety - Medical Restraint  Goal: Remains free of injury from restraints (Restraint for Interference with Medical Device)  Outcome: Progressing     Problem: Skin Integrity  Goal: Skin integrity is maintained or improved  Outcome: Progressing     Problem: Fall Risk  Goal: Patient will remain free from falls  Outcome: Progressing     Problem: Pain - Standard  Goal: Alleviation of pain or a reduction in pain to the patient’s comfort goal  Outcome: Progressing       Patient is not progressing towards the following goals:      Problem: Safety - Medical Restraint  Goal: Free from restraint(s) (Restraint for Interference with Medical Device)  Outcome: Not Progressing

## 2024-02-12 NOTE — DIETARY
Nutrition Services: Brief Update    Pt continues with nocturnal TF of Glucerna/Diabetisource AC at 60 ml/hr x 12 hours meeting ~50% of estimated needs. RN reports pt having suboptimal PO intake, describes with heavy encouragement from family eating ~25% at best. Breakfast untouched. 2nd EVD pulled today.     Discussed during rounds, we can hold TF through tonight to assess PO intake without tubefeed but I would like to keep feeding tube in place as pt is not showing adequate PO intake at this time. Per SLP eval today, continue minced and moist diet with thin liquids. Continue Ensure Plus with meals.     RD following.

## 2024-02-12 NOTE — DOCUMENTATION QUERY
"                                                                         [facility]                                                                       Query Response Note      PATIENT:               HAL RAVI  ACCT #:                  [id number]  MRN:                     [id number]  :                      1960  ADMIT DATE:       2024 11:51 AM  DISCH DATE:          RESPONDING  PROVIDER #:        [id number]           QUERY TEXT:          Note: If you agree with a diagnosis listed above, please remember to include it in your concurrent daily documentation and onto the Discharge Summary.        The patient's clinical indicators include:  63 year old female admitted with a SAH.     Gonda \"Neurosurgery was consulted as well and a repeat head CT showed mild developing hydrocephalus with planned EVD placement at bedside. \"     Keyvani \"brain CT following coil embolization which revealed slight increase in subarachnoid hemorrhage with developing hydrocephalus.  Plan for EVD placement by neurosurgery.\"     Gonda \"Planned EVD placement for developing hydrocephalus and ICP monitoring\"     Op Report Fer \"Obstructive hydrocephalus.\"     CT \"Intraventricular hemorrhage. There is developing mild hydrocephalus with mildly increased ventricles.\"    Risk factors: SAH  Treatment: labs, CT,  Right frontal external ventricular drain     If you have any questions, please contact:  Negrita Royal RN CDI [facility]  [email or web address]  Negrita Royal [address] [employer]  Options provided:   -- Hydrocephalus with brain/cerebral compression treated with a frontal external ventricular drain.   -- Hydrocephalus with brain/cerebral compression   -- Hydrocephalus without brain/cerebral compression treated with a  frontal external ventricular drain   -- Other explanation, please specify      Query created by: Negrita Royal on 2024 8:39 AM    RESPONSE TEXT:    Hydrocephalus with brain/cerebral " compression treated with a frontal external ventricular drain.          Electronically signed by:  SELENA OROPEZA III, MD 2/12/2024 10:35 AM

## 2024-02-12 NOTE — PROGRESS NOTES
PMR asked to reevaluate patient for IPR. Case reviewed. She is still requiring Max-Total assist and is on 21 day vasospasm watch, so she will be admitted until at least 2/21. PMR will hold off on reassessing patient until she is closer to discharge. Also patient is a prominence product and [facility] is not in network for her. Her in network rehab hospital is [facility]. Please reach out via Voalte if medical management is requested, otherwise continue excellent current medical care and therapy.     Neil Bello,  MS  [facility] - Physical Medicine and Rehabilitation

## 2024-02-12 NOTE — PROGRESS NOTES
Radiology Progress Note   Author: HARVEY Montes Date & Time created: 2/12/2024  8:11 AM   Date of admission  1/31/2024  Note to reader: this note follows the APSO format rather than the historical SOAP format. Assessment and plan located at the top of the note for ease of use.    Chief Complaint  63 y.o. female admitted 1/31/2024 with subarachnoid hemorrhage        HPI  Fay France is a 62 yo female with PMH significant for HTN who presented to OSH for sudden onset of new eyelid droop and generalized headache without significant head trauma. Pt seized in OSH CT scan, was loaded with Keppra and transferred for higher level of care. 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 Carpio Potts 4; Modified Swenson scale 3. 01/31/24 MAURICE Eason completed a cerebral aneurysm with coil embolization of large left PCOMM aneurysm.      Interval History:   02/01/24: RIGHT groin access site soft, non-tender, without ecchymosis. Dressing clean, dry, intact. Pedal pulses +2 bilaterally with feet pink, and warm, cap refill <3 sec. Pt is intubated and ventilated with EVD. She is not following commands, but moves right foot to stimuli. I reviewed the most recent labs including WBC 19.6, Hgb 14.2, Cr 0.61. Coordinated post IR procedure care with hospital nursing staff.      02/05/24:  PBD/post Pcom aneurysm embolization with coil #5.  Pt extubated yesterday (2/4). A stat CTA and CT perfusion done early am on 2/4 (increased lethargy) no obvious vasospasm noted. TCD's with poor windows therefor no baseline Tcd's obtained.  I reviewed FU MRI brain(02/04) which showed small L MCA infarcts-Vasopressors infusing and  keeping -200 to mitigate vasospasm. Anterior EVD in place at 10 cm H20 above tragus with good waveform noted on monitor-R groin site soft, nontender without edema, bleeding, small scab with small  ecchymosis. I reviewed  today's labs: WBC 13.7; hemoglobin 10.5;  ; Cr 0.40; respiratory culture- +MSSA in sputum-continuing Rocephin today is day 5 of 5.  I's/O's-last 24 hours+ 551/since admission +1260; ICP 2-10 (good ICP waveform noted on monitor).       02/06/24:  PBD/post Pcom aneurysm embolization with coil #6. TCD's with poor windows, recommend following neuro assessment. I reviewed FU MRI brain(02/04) which showed small L MCA infarcts-Goal -200 to mitigate vasospasm. Anterior EVD in place at 10 cm H20 above tragus with good waveform noted on monitor-R groin site soft, nontender without edema, bleeding, small scab with small  ecchymosis. I reviewed today's labs: WBC 17; hemoglobin 10.8;  ; Cr 0.33; respiratory culture- +MSSA in sputum -completed Rocephin.  I's/O's-last 24 hours -193 /since admission +1037; ICP 5-11 (good ICP waveform noted on monitor). I started ASA therapy 2nd to multiple infarcts noted on MRI.  ASA therapy discussed and approved by Fer Helms and  Dr. Eason.      02/07/24: SAH, Carpio Potts 4;Modified Swenson 3.  PBD/post Pcom aneurysm embolization with coil #7. TCD's discontinued 2nd to poor windows, recommend following neuro assessment. -Goal -200 to mitigate vasospasm. Anterior EVD in place at 10 cm H20 above tragus with good waveform noted on monitor-ecchymosis. I reviewed today's labs: WBC 15.6; Hemoglobin 11.1 ; Cr 0.36; .  I's/O's-last 24 hours -190 /since admission +876; ICP 3-10 (good ICP waveform noted on monitor).      02/08/24: SAH, Carpio Potts 4;Modified Swenson 3.  PBD/post Pcom aneurysm embolization with coil #8. TCD's with poor windows, recommend following neuro assessment. -Goal -200 to mitigate vasospasm. Anterior EVD open, raised today to 20 cm H20 above tragus with good waveform noted on monitor-ecchymosis. I reviewed today's labs: WBC 15.1; Hemoglobin 10.5;  ; Cr 0.31;  I's/O's-last 24 hours --1091/since admission -214 ICP 4-11/CPP > 100 (good  ICP waveform noted on monitor).  Tmax 100.6     02/09/24: SAH, Carpio Potts 4;Modified Swenson 3. PBD/post Pcom aneurysm embolization with coil #9. No longer following TCD's 2nd to poor windows.   Follow neuro assessment. -Goal -200 to mitigate vasospasm. Anterior EVD open, @15 cm H20 above tragus with good waveform noted on monitor. I reviewed today's labs: WBC 14.9; Hemoglobin 11.2;  ; Cr 0.29;  I's/O's-last 24 hours -110 ml; output since admission - 675 ml.  ICP 4-11/CPP ; EVD output -139 mL in the last 24 hours (good ICP waveform noted on monitor). Tmax 100. 9     2/10/24: SAH, Carpio Potts 4; Modified Swenson 3. PBD #10 /post Pcom aneurysm embolization with 10 coils (1/31/24). No longer following TCD's 2nd to poor windows.   Follow neuro assessment. -Goal -200 to mitigate vasospasm. Anterior EVD open, @ 20 cm H20 above tragus with good waveform noted on monitor. I reviewed patient's most recent labs: WBC 14.3; Hemoglobin 10.6;  ; Cr 0.36;  ICP<15; EVD output:139 mL in the last 24 hours. Tmax 100. 4     2/11/24: SAH, Carpio Potts 4; Modified Swenson 3. PBD #11 /post Pcom aneurysm embolization with 10 coils (1/31/24). Pt is verbally responsive with appropriate answers in Spans[place], purposeful and following some commands.  - EVD clamped 2/11/24.   - No longer following TCD's 2nd to poor windows.   Follow neuro assessment.   - Goal -200 to mitigate vasospasm.   - I reviewed patient's most recent labs: WBC 17.5; Hemoglobin 11.0;  ; Cr 0.36; Tmax 99.5    02/12/24 - PBD#12. No acute events overnight. Neuro unchanged per bedside RN. No longer following TCD's 2nd to poor windows; q 2 neuro exams in place.  Labs reviewed; , WBC 17.4. Repeat CT this morning shows decreased hyperdensity along the bilateral tentorium.  Family at bedside; education provided.  Patient discussed with bedside RN.    Assessment/Plan     Principal Problem:    SAH (subarachnoid hemorrhage) (HCC)  Active  Problems:    Acute respiratory failure with hypoxia (HCC)    Secondary hypertension    Acute pulmonary edema (HCC)    Seizure (HCC)    Hypokalemia    Hypotension due to hypovolemia    Hypophosphatemia    Anemia    Pneumonia of both lungs due to methicillin susceptible Staphylococcus aureus (MSSA) (HCC)    Aneurysm of ascending aorta without rupture (HCC)    Hyperglycemia      Plan IR  - -200 per neuro recs  - Continue Nimodipine   - TCD's (poor windows) therefore rely on neuro assessment for vasospasm watch  - If suspect vasospasm consider CTA and CT perfusion  - Continue ASA 81 mg daily 2nd to FU MRI brain (02/04) which showed small L MCA infarcts  - Continue Q2 neuro checks   - EVD managed by neurosurgey  - Neurology and Neurosurgery following  -     -Thank you for allowing Interventional Radiology team to participate in the patients care, if any additional care or requests are needed in the future please do not hesitate to call or place IR order   [phone]           Review of Systems  Physical Exam   Review of Systems   Constitutional:  Negative for chills and fever.   Respiratory:  Negative for shortness of breath.    Cardiovascular:  Negative for chest pain and palpitations.   Gastrointestinal:  Negative for nausea and vomiting.   Neurological:  Negative for tingling, sensory change, speech change, weakness and headaches.      Vitals:    02/12/24 0700   BP: 131/89   Pulse: 92   Resp: (!) 24   SpO2: 96%        Physical Exam  Constitutional:       Appearance: Normal appearance.   HENT:      Head:      Comments: EVD clamped  Cardiovascular:      Rate and Rhythm: Normal rate.      Pulses: Normal pulses.   Abdominal:      Palpations: Abdomen is soft.   Skin:     General: Skin is warm and dry.   Neurological:      General: No focal deficit present.      Mental Status: She is alert and oriented to person, place, and time.      Comments: A/O x 4; following commands; sleepy following PT/OT intervention  L ptosis,  L CNIII palsy  antigravity strength in all 4 extremities with RUE and RLE downward drift.  Sensory intact   Psychiatric:         Mood and Affect: Mood normal.         Behavior: Behavior normal.             Labs    Recent Labs     02/10/24  0404 02/11/24  0402 02/12/24  0215   WBC 14.3* 17.5* 17.4*   RBC 4.06* 4.23 3.85*   HEMOGLOBIN 10.6* 11.0* 10.1*   HEMATOCRIT 31.6* 32.8* 30.3*   MCV 77.8* 77.5* 78.7*   MCH 26.1* 26.0* 26.2*   MCHC 33.5 33.5 33.3   RDW 38.4 38.0 38.9   PLATELETCT 443 498* 460*   MPV 9.7 9.4 9.5     Recent Labs     02/10/24  0404 02/10/24  1009 02/11/24  0402 02/11/24  1039 02/11/24  1809 02/12/24 0215   SODIUM 133*   < > 133* 131* 131* 137   POTASSIUM 3.6  --  4.0  --   --  3.6   CHLORIDE 99  --  98  --   --  101   CO2 24  --  25  --   --  24   GLUCOSE 135*  --  150*  --   --  125*   BUN 15  --  13  --   --  13   CREATININE 0.36*  --  0.33*  --   --  0.36*   CALCIUM 8.7  --  9.3  --   --  9.0    < > = values in this interval not displayed.     Recent Labs     02/10/24  0404 02/11/24  0402 02/12/24  0215   ALBUMIN  --   --  3.3   TBILIRUBIN  --   --  0.4   ALKPHOSPHAT  --   --  189*   TOTPROTEIN  --   --  6.7   ALTSGPT  --   --  25   ASTSGOT  --   --  31   CREATININE 0.36* 0.33* 0.36*     CT-HEAD W/O   Final Result         1.  Hyperdensity along the bilateral tentorium, compatible with layering subarachnoid hemorrhages, decreased since prior study.   2.  Atherosclerosis.         DX-ABDOMEN FOR TUBE PLACEMENT   Final Result         1.  Nonspecific bowel gas pattern in the upper abdomen.   2.  Nasogastric tube tip terminates overlying the expected location of the antrum or pylorus, somewhat withdrawn since prior study.   3.  Hazy interstitial pulmonary edema and/or infiltrates, stable   4.  Cardiomegaly      DX-ABDOMEN FOR TUBE PLACEMENT   Final Result         1.  Nonspecific bowel gas pattern in the upper abdomen.   2.  Nasogastric tube tip terminates overlying the expected location of the pylorus  or first duodenal segment.   3.  Hazy interstitial pulmonary edema and/or infiltrates   4.  Cardiomegaly      DX-CHEST-PORTABLE (1 VIEW)   Final Result         Findings on chest radiograph appear stable since the prior radiograph.  No new abnormalities are identified.      DX-ABDOMEN FOR TUBE PLACEMENT   Final Result      Enteric tube tip projects over the gastric antrum or proximal duodenum, similar to prior      DX-ABDOMEN FOR TUBE PLACEMENT   Final Result      NG tube tip projects at the peripyloric region.      DX-ABDOMEN FOR TUBE PLACEMENT   Final Result      NG tube tip projects at the peripyloric region.      DX-ABDOMEN FOR TUBE PLACEMENT   Final Result      Feeding tube in place with tip at the distal stomach/pylorus.      [id number] VIEW   Final Result         1.  Nonspecific bowel gas pattern in the upper abdomen.   2.  Nasogastric tube tip terminates overlying the expected location of the pylorus or first duodenal segment.   3.  Hazy interstitial pulmonary edema and/or infiltrates   4.  Cardiomegaly      DX-ABDOMEN FOR TUBE PLACEMENT   Final Result      Enteric sump tube in situ with its tip at the level of the distal gastric antrum or pylorus.      DX-ABDOMEN FOR TUBE PLACEMENT   Final Result         1.  Nonspecific bowel gas pattern in the upper abdomen.   2.  Nasogastric tube tip terminates overlying the expected location of the pylorus or first duodenal segment.   3.  Hazy interstitial pulmonary edema and/or infiltrates, similar to prior study.   4.  Cardiomegaly      DX-CHEST-PORTABLE (1 VIEW)   Final Result      1.  Supportive tubing as described above.   2.  No other significant change from prior exam.         MR-BRAIN-W/O   Final Result      1.  Multifocal areas of acute infarcts in the left cerebral hemisphere.   2.  Small area of acute infarct in the right basal ganglia adjacent to the tip of the ventriculostomy catheter.   3.  Cortical infarct in the right medial parietal lobe.   4.  Mild  amount of subdural hemorrhages surrounding the bilateral cerebellar hemispheres and right cerebellum.   5.  Subarachnoid hemorrhage.   6.  There is no hydrocephalus.   7.  There are changes secondary to the previous endovascular repair left internal carotid aneurysm.      CT-CTA NECK WITH & W/O-POST PROCESSING   Final Result      No focal high-grade stenosis, dissection or occlusion of the cervical carotid or vertebral arteries.      CT-CEREBRAL PERFUSION ANALYSIS   Final Result      1.  Cerebral blood flow less than 30% likely representing completed infarct = 0 mL.      2.  T Max more than 6 seconds likely representing combination of completed infarct and ischemia = 0 mL.      3.  Mismatched volume likely representing ischemic brain/penumbra = None      4.  Please note that the cerebral perfusion was performed on the limited brain tissue around the basal ganglia region. Infarct/ischemia outside the CT perfusion sections can be missed in this study.      CT-CTA HEAD WITH & W/O-POST PROCESS   Final Result      1.  Prior LEFT middle cranial fossa aneurysm coiling.  Artifact limits exam.   2.  No abrupt large vessel cut off.   3.  Segmental narrowing of LEFT middle cerebral artery, vasospasm versus artifact.   4.  Ventriculostomy catheter in similar position.   5.  Evolving RIGHT caudate infarct.   6.  Stable intracranial hemorrhage.         DX-ABDOMEN FOR TUBE PLACEMENT   Final Result      Orogastric tube tip now at the mid stomach.      DX-ABDOMEN FOR TUBE PLACEMENT   Final Result      Orogastric tube tip at the proximal stomach with side port just above the GE junction.      US-INTRACRANIAL ARTERY LIMITED   Final Result      DX-CHEST-LIMITED (1 VIEW)   Final Result         1.  Stable chest with perihilar and lower lung zone interstitial and minimal airspace opacities most consistent with pulmonary edema.   2.  No new abnormalities.      EC-ECHOCARDIOGRAM COMPLETE W/O CONT   Final Result      US-INTRACRANIAL ARTERY  LIMITED   Final Result      DX-CHEST-PORTABLE (1 VIEW)   Final Result      1.  Lines and tubes appear appropriately located      2.  Improvement of pulmonary edema/airspace process      US-INTRACRANIAL ARTERY COMP   Final Result      CT-HEAD W/O   Final Result         1. Slightly decreased subarachnoid hemorrhage overlying the cerebral hemispheres.   2. Otherwise, stable as above.         DX-CHEST-FOR LINE PLACEMENT Perform procedure in: Patient's Room   Final Result      1.  Moderate interstitial pulmonary edema.   2.   Right-sided central venous catheter terminates with the tip projecting over the expected region of the mid to distal superior vena cava.   3.  Endotracheal tube terminates in satisfactory position at the level of the aortic arch.   4.  Enteric feeding tube terminates in the left upper quadrant projecting over the expected location of the stomach.      CT-STEALTH HEAD W/O   Final Result      1.  Interval placement of a right transparietal ventriculostomy catheter which terminates with the tip near the third ventricle. There is minimally decreased caliber of the ventricular system and compared to previous exam.   2.  Findings of intracranial hemorrhage appear similar to the previous exam. No definite new acute intracranial hemorrhage is identified.   3.  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         DX-CHEST-PORTABLE (1 VIEW)   Final Result      CT-HEAD W/O   Final Result      1. Interval aneurysm coiling of left posterior communicating artery aneurysm.   2. Possible increased subarachnoid hemorrhage.   3. Intraventricular hemorrhage. There is developing mild hydrocephalus with mildly increased ventricles.   4. Small right convexity subdural hematoma measuring 5 mm in thickness.      These findings were discussed with EMILY BYRD on 1/31/2024 3:14 PM.      IR-EMBOLIZATION   Final Result   Impression:      63-year-old patient with sudden onset of worst  "headache of life followed by obtundation underwent cerebral angiography which demonstrated a large  13 x 11 x 11 mm aneurysm arising from the dorsal wall of the left ICA incorporating a small left posterior    communicating artery at its neck.   This aneurysm was embolized to occlusion as described above. Final angiographic images demonstrate only minimal filling of the neck of the aneurysm. The patient will be followed up in   the neuro interventional clinic and brought back for a follow-up angiogram in 6 months.      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      I, Stephanie Eason was physically present and participated during the entire procedure of the IR-EMBOLIZATION.        INR   Date Value Ref Range Status   01/31/2024 1.06 0.87 - 1.13 Final     Comment:     INR - Non-therapeutic Reference Range: 0.87-1.13  INR - Therapeutic Reference Range: 2.0-4.0       No results found for: \"POCINR\"     Intake/Output Summary (Last 24 hours) at 2/12/2024 0811  Last data filed at 2/12/2024 0600  Gross per 24 hour   Intake 2444.57 ml   Output 2345 ml   Net 99.57 ml      Labs not explicitly included in this progress note were reviewed by the author. Radiology/imaging not explicitly included in this progress note was reviewed by the author.     I have performed a physical exam and reviewed and updated ROS and Plan today (2/12/2024).     30 minutes in directly providing and coordinating care and extensive data review.  No time overlap and excludes procedures.  "
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
## 2024-02-12 NOTE — CARE PLAN
The patient is Watcher - Medium risk of patient condition declining or worsening    Shift Goals  Clinical Goals: -200, q2h neuro check, CT in AM  Patient Goals: ALEJANDRA  Family Goals: updates as needed    Progress made toward(s) clinical / shift goals:    Problem: Knowledge Deficit - Standard  Goal: Patient and family/care givers will demonstrate understanding of plan of care, disease process/condition, diagnostic tests and medications  Outcome: Progressing     Problem: Safety - Medical Restraint  Goal: Remains free of injury from restraints (Restraint for Interference with Medical Device)  Outcome: Progressing     Problem: Skin Integrity  Goal: Skin integrity is maintained or improved  Outcome: Progressing     Problem: Fall Risk  Goal: Patient will remain free from falls  Outcome: Progressing     Problem: Pain - Standard  Goal: Alleviation of pain or a reduction in pain to the patient’s comfort goal  Outcome: Progressing     Problem: Hemodynamics  Goal: Patient's hemodynamics, fluid balance and neurologic status will be stable or improve  Outcome: Progressing     Problem: Neuro Status  Goal: Neuro status will remain stable or improve  Outcome: Progressing       Patient is not progressing towards the following goals:

## 2024-02-12 NOTE — THERAPY
Occupational Therapy  Daily Treatment     Patient Name: Fay Turner  Age:  63 y.o., Sex:  female  Medical Record #: [id number]  Today's Date: 2/12/2024     Precautions  Precautions: Fall Risk, Swallow Precautions  Comments: EVD removed    Assessment    Pt currently limited by decreased functional mobility, activity tolerance, cognition, sensation, strength, AROM, coordination, balance, and pain which are affecting pt's ability to complete ADLs/IADLs at baseline. Pt would benefit from OT services in the acute care setting to maximize functional recovery.      Plan    Treatment Plan Status: (P) Continue Current Treatment Plan  Type of Treatment: Self Care / Activities of Daily Living, Therapeutic Activity, Neuro Re-Education / Balance  Treatment Frequency: 3 Times per Week  Treatment Duration: Until Therapy Goals Met       Discharge Recommendations: (P) Recommend post-acute placement for additional occupational therapy services prior to discharge home       02/12/24 0931   Cognition    Speech/ Communication Delayed Responses   Level of Consciousness Alert   New Learning Impaired  (intermitantly following 1 step instructions)   Attention Impaired   Initiation Impaired   Comments pt now speaking with short phrases   Activities of Daily Living   Grooming Maximal Assist   Upper Body Dressing Maximal Assist   Lower Body Dressing Total Assist   Toileting Total Assist   Functional Mobility   Sit to Stand Total Assist   Bed, Chair, Wheelchair Transfer Unable to Participate   Short Term Goals   Short Term Goal # 1 mod A with G/H sitting EOB   Goal Outcome # 1 Progressing slower than expected   Short Term Goal # 2 mod A with UB dressing   Goal Outcome # 2 Progressing slower than expected   Short Term Goal # 3 mod A with ADL txfs   Goal Outcome # 3 Progressing slower than expected   Occupational Therapy Treatment Plan    O.T. Treatment Plan Continue Current Treatment Plan   Anticipated Discharge Equipment and Recommendations    Discharge Recommendations Recommend post-acute placement for additional occupational therapy services prior to discharge home

## 2024-02-13 ENCOUNTER — APPOINTMENT (OUTPATIENT)
Dept: RADIOLOGY | Facility: MEDICAL CENTER | Age: 64
DRG: 020 | End: 2024-02-13
Attending: EMERGENCY MEDICINE
Payer: COMMERCIAL

## 2024-02-13 ENCOUNTER — APPOINTMENT (OUTPATIENT)
Dept: RADIOLOGY | Facility: MEDICAL CENTER | Age: 64
DRG: 020 | End: 2024-02-13
Attending: NURSE PRACTITIONER
Payer: COMMERCIAL

## 2024-02-13 PROBLEM — E86.1 HYPOTENSION DUE TO HYPOVOLEMIA: Status: RESOLVED | Noted: 2024-02-01 | Resolved: 2024-02-13

## 2024-02-13 PROBLEM — I95.89 HYPOTENSION DUE TO HYPOVOLEMIA: Status: RESOLVED | Noted: 2024-02-01 | Resolved: 2024-02-13

## 2024-02-13 LAB
ANION GAP SERPL CALC-SCNC: 9 MMOL/L (ref 7–16)
BASOPHILS # BLD AUTO: 0.3 % (ref 0–1.8)
BASOPHILS # BLD: 0.04 K/UL (ref 0–0.12)
BUN SERPL-MCNC: 11 MG/DL (ref 8–22)
CALCIUM SERPL-MCNC: 8.5 MG/DL (ref 8.5–10.5)
CHLORIDE SERPL-SCNC: 101 MMOL/L (ref 96–112)
CO2 SERPL-SCNC: 26 MMOL/L (ref 20–33)
CREAT SERPL-MCNC: 0.31 MG/DL (ref 0.5–1.4)
EOSINOPHIL # BLD AUTO: 0.34 K/UL (ref 0–0.51)
EOSINOPHIL NFR BLD: 2.2 % (ref 0–6.9)
ERYTHROCYTE [DISTWIDTH] IN BLOOD BY AUTOMATED COUNT: 39.8 FL (ref 35.9–50)
GFR SERPLBLD CREATININE-BSD FMLA CKD-EPI: 118 ML/MIN/1.73 M 2
GLUCOSE SERPL-MCNC: 131 MG/DL (ref 65–99)
HCT VFR BLD AUTO: 30.9 % (ref 37–47)
HGB BLD-MCNC: 10.2 G/DL (ref 12–16)
IMM GRANULOCYTES # BLD AUTO: 0.07 K/UL (ref 0–0.11)
IMM GRANULOCYTES NFR BLD AUTO: 0.5 % (ref 0–0.9)
LYMPHOCYTES # BLD AUTO: 1.26 K/UL (ref 1–4.8)
LYMPHOCYTES NFR BLD: 8.2 % (ref 22–41)
MAGNESIUM SERPL-MCNC: 2 MG/DL (ref 1.5–2.5)
MCH RBC QN AUTO: 26.2 PG (ref 27–33)
MCHC RBC AUTO-ENTMCNC: 33 G/DL (ref 32.2–35.5)
MCV RBC AUTO: 79.4 FL (ref 81.4–97.8)
MONOCYTES # BLD AUTO: 0.93 K/UL (ref 0–0.85)
MONOCYTES NFR BLD AUTO: 6.1 % (ref 0–13.4)
NEUTROPHILS # BLD AUTO: 12.66 K/UL (ref 1.82–7.42)
NEUTROPHILS NFR BLD: 82.7 % (ref 44–72)
NRBC # BLD AUTO: 0 K/UL
NRBC BLD-RTO: 0 /100 WBC (ref 0–0.2)
PHOSPHATE SERPL-MCNC: 2.7 MG/DL (ref 2.5–4.5)
PLATELET # BLD AUTO: 490 K/UL (ref 164–446)
PMV BLD AUTO: 9.4 FL (ref 9–12.9)
POTASSIUM SERPL-SCNC: 4 MMOL/L (ref 3.6–5.5)
RBC # BLD AUTO: 3.89 M/UL (ref 4.2–5.4)
SODIUM SERPL-SCNC: 133 MMOL/L (ref 135–145)
SODIUM SERPL-SCNC: 136 MMOL/L (ref 135–145)
SODIUM SERPL-SCNC: 136 MMOL/L (ref 135–145)
WBC # BLD AUTO: 15.3 K/UL (ref 4.8–10.8)

## 2024-02-13 PROCEDURE — 700102 HCHG RX REV CODE 250 W/ 637 OVERRIDE(OP): Performed by: INTERNAL MEDICINE

## 2024-02-13 PROCEDURE — A9270 NON-COVERED ITEM OR SERVICE: HCPCS | Performed by: NURSE PRACTITIONER

## 2024-02-13 PROCEDURE — 70496 CT ANGIOGRAPHY HEAD: CPT

## 2024-02-13 PROCEDURE — 84295 ASSAY OF SERUM SODIUM: CPT

## 2024-02-13 PROCEDURE — 0042T CT-CEREBRAL PERFUSION ANALYSIS: CPT

## 2024-02-13 PROCEDURE — 83735 ASSAY OF MAGNESIUM: CPT

## 2024-02-13 PROCEDURE — 700111 HCHG RX REV CODE 636 W/ 250 OVERRIDE (IP): Performed by: PHYSICIAN ASSISTANT

## 2024-02-13 PROCEDURE — 99291 CRITICAL CARE FIRST HOUR: CPT | Performed by: EMERGENCY MEDICINE

## 2024-02-13 PROCEDURE — 80048 BASIC METABOLIC PNL TOTAL CA: CPT

## 2024-02-13 PROCEDURE — 700105 HCHG RX REV CODE 258: Performed by: PHYSICIAN ASSISTANT

## 2024-02-13 PROCEDURE — 700111 HCHG RX REV CODE 636 W/ 250 OVERRIDE (IP): Mod: JZ | Performed by: INTERNAL MEDICINE

## 2024-02-13 PROCEDURE — 700117 HCHG RX CONTRAST REV CODE 255: Performed by: EMERGENCY MEDICINE

## 2024-02-13 PROCEDURE — A9270 NON-COVERED ITEM OR SERVICE: HCPCS | Performed by: PSYCHIATRY & NEUROLOGY

## 2024-02-13 PROCEDURE — 770022 HCHG ROOM/CARE - ICU (200)

## 2024-02-13 PROCEDURE — 99232 SBSQ HOSP IP/OBS MODERATE 35: CPT | Performed by: PSYCHIATRY & NEUROLOGY

## 2024-02-13 PROCEDURE — 700105 HCHG RX REV CODE 258: Performed by: INTERNAL MEDICINE

## 2024-02-13 PROCEDURE — 84100 ASSAY OF PHOSPHORUS: CPT

## 2024-02-13 PROCEDURE — 700101 HCHG RX REV CODE 250: Performed by: INTERNAL MEDICINE

## 2024-02-13 PROCEDURE — A9270 NON-COVERED ITEM OR SERVICE: HCPCS | Performed by: INTERNAL MEDICINE

## 2024-02-13 PROCEDURE — 92526 ORAL FUNCTION THERAPY: CPT

## 2024-02-13 PROCEDURE — 700102 HCHG RX REV CODE 250 W/ 637 OVERRIDE(OP): Performed by: NURSE PRACTITIONER

## 2024-02-13 PROCEDURE — 700102 HCHG RX REV CODE 250 W/ 637 OVERRIDE(OP): Performed by: PSYCHIATRY & NEUROLOGY

## 2024-02-13 PROCEDURE — 85025 COMPLETE CBC W/AUTO DIFF WBC: CPT

## 2024-02-13 RX ORDER — LIDOCAINE HYDROCHLORIDE 10 MG/ML
INJECTION, SOLUTION INFILTRATION; PERINEURAL
Status: DISCONTINUED
Start: 2024-02-13 | End: 2024-02-13

## 2024-02-13 RX ADMIN — NIMODIPINE 60 MG: 60 SOLUTION ORAL at 23:30

## 2024-02-13 RX ADMIN — LEVETIRACETAM 500 MG: 500 TABLET, FILM COATED ORAL at 18:05

## 2024-02-13 RX ADMIN — ACETAMINOPHEN 650 MG: 325 TABLET, FILM COATED ORAL at 10:37

## 2024-02-13 RX ADMIN — IOHEXOL 60 ML: 350 INJECTION, SOLUTION INTRAVENOUS at 10:30

## 2024-02-13 RX ADMIN — SODIUM CHLORIDE 2 G: 1 TABLET ORAL at 05:17

## 2024-02-13 RX ADMIN — LIDOCAINE 2 PATCH: 4 PATCH TOPICAL at 18:05

## 2024-02-13 RX ADMIN — MICONAZOLE NITRATE: 20 CREAM TOPICAL at 05:16

## 2024-02-13 RX ADMIN — SODIUM CHLORIDE 2 G: 1 TABLET ORAL at 14:17

## 2024-02-13 RX ADMIN — ENOXAPARIN SODIUM 40 MG: 100 INJECTION SUBCUTANEOUS at 18:05

## 2024-02-13 RX ADMIN — NYSTATIN: 100000 POWDER TOPICAL at 18:00

## 2024-02-13 RX ADMIN — NIMODIPINE 60 MG: 60 SOLUTION ORAL at 02:21

## 2024-02-13 RX ADMIN — SODIUM CHLORIDE 2 G: 1 TABLET ORAL at 23:31

## 2024-02-13 RX ADMIN — ATORVASTATIN CALCIUM 40 MG: 40 TABLET, FILM COATED ORAL at 18:05

## 2024-02-13 RX ADMIN — LEVETIRACETAM 500 MG: 500 TABLET, FILM COATED ORAL at 05:17

## 2024-02-13 RX ADMIN — MICONAZOLE NITRATE: 20 CREAM TOPICAL at 18:00

## 2024-02-13 RX ADMIN — NIMODIPINE 60 MG: 60 SOLUTION ORAL at 18:05

## 2024-02-13 RX ADMIN — NIMODIPINE 60 MG: 60 SOLUTION ORAL at 09:03

## 2024-02-13 RX ADMIN — NYSTATIN: 100000 POWDER TOPICAL at 05:16

## 2024-02-13 RX ADMIN — NIMODIPINE 60 MG: 60 SOLUTION ORAL at 14:17

## 2024-02-13 RX ADMIN — ASPIRIN 81 MG 81 MG: 81 TABLET ORAL at 05:17

## 2024-02-13 RX ADMIN — IOHEXOL 40 ML: 350 INJECTION, SOLUTION INTRAVENOUS at 10:30

## 2024-02-13 RX ADMIN — SODIUM CHLORIDE: 9 INJECTION, SOLUTION INTRAVENOUS at 13:15

## 2024-02-13 RX ADMIN — NIMODIPINE 60 MG: 60 SOLUTION ORAL at 05:17

## 2024-02-13 RX ADMIN — SODIUM CHLORIDE: 9 INJECTION, SOLUTION INTRAVENOUS at 23:35

## 2024-02-13 RX ADMIN — SODIUM CHLORIDE 750 MG: 9 INJECTION, SOLUTION INTRAVENOUS at 22:28

## 2024-02-13 ASSESSMENT — ENCOUNTER SYMPTOMS
TINGLING: 0
SENSORY CHANGE: 0
FEVER: 0
PALPITATIONS: 0
SPEECH CHANGE: 0
VOMITING: 0
NAUSEA: 0
WEAKNESS: 0
SHORTNESS OF BREATH: 0
CHILLS: 0
HEADACHES: 0

## 2024-02-13 ASSESSMENT — PAIN DESCRIPTION - PAIN TYPE
TYPE: ACUTE PAIN;SURGICAL PAIN
TYPE: ACUTE PAIN
TYPE: ACUTE PAIN
TYPE: ACUTE PAIN;SURGICAL PAIN
TYPE: ACUTE PAIN
TYPE: ACUTE PAIN;SURGICAL PAIN
TYPE: ACUTE PAIN

## 2024-02-13 NOTE — CARE PLAN
The patient is Stable - Low risk of patient condition declining or worsening    Shift Goals  Clinical Goals: Q2 Neuro, -200  Patient Goals: ALEJANDRA  Family Goals: updates    Progress made toward(s) clinical / shift goals:    Problem: Knowledge Deficit - Standard  Goal: Patient and family/care givers will demonstrate understanding of plan of care, disease process/condition, diagnostic tests and medications  Outcome: Progressing     Problem: Safety - Medical Restraint  Goal: Remains free of injury from restraints (Restraint for Interference with Medical Device)  Outcome: Progressing     Problem: Skin Integrity  Goal: Skin integrity is maintained or improved  Outcome: Progressing     Problem: Fall Risk  Goal: Patient will remain free from falls  Outcome: Progressing     Problem: Pain - Standard  Goal: Alleviation of pain or a reduction in pain to the patient’s comfort goal  Outcome: Progressing     Problem: Hemodynamics  Goal: Patient's hemodynamics, fluid balance and neurologic status will be stable or improve  Outcome: Progressing     Problem: Neuro Status  Goal: Neuro status will remain stable or improve  Outcome: Progressing       Patient is not progressing towards the following goals:      Problem: Safety - Medical Restraint  Goal: Free from restraint(s) (Restraint for Interference with Medical Device)  Outcome: Not Progressing
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
## 2024-02-13 NOTE — PROGRESS NOTES
Radiology Progress Note   Author: HARVEY Montes Date & Time created: 2/13/2024  8:30 AM   Date of admission  1/31/2024  Note to reader: this note follows the APSO format rather than the historical SOAP format. Assessment and plan located at the top of the note for ease of use.    Chief Complaint  63 y.o. female admitted 1/31/2024 with subarachnoid hemorrhage        HPI  Fay France is a 62 yo female with PMH significant for HTN who presented to OSH for sudden onset of new eyelid droop and generalized headache without significant head trauma. Pt seized in OSH CT scan, was loaded with Keppra and transferred for higher level of care. 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 Carpio Potts 4; Modified Swenson scale 3. 01/31/24 MAURICE Eason completed a cerebral aneurysm with coil embolization of large left PCOMM aneurysm.      Interval History:   02/01/24: RIGHT groin access site soft, non-tender, without ecchymosis. Dressing clean, dry, intact. Pedal pulses +2 bilaterally with feet pink, and warm, cap refill <3 sec. Pt is intubated and ventilated with EVD. She is not following commands, but moves right foot to stimuli. I reviewed the most recent labs including WBC 19.6, Hgb 14.2, Cr 0.61. Coordinated post IR procedure care with hospital nursing staff.      02/05/24:  PBD/post Pcom aneurysm embolization with coil #5.  Pt extubated yesterday (2/4). A stat CTA and CT perfusion done early am on 2/4 (increased lethargy) no obvious vasospasm noted. TCD's with poor windows therefor no baseline Tcd's obtained.  I reviewed FU MRI brain(02/04) which showed small L MCA infarcts-Vasopressors infusing and  keeping -200 to mitigate vasospasm. Anterior EVD in place at 10 cm H20 above tragus with good waveform noted on monitor-R groin site soft, nontender without edema, bleeding, small scab with small  ecchymosis. I reviewed  today's labs: WBC 13.7; hemoglobin 10.5;  ; Cr 0.40; respiratory culture- +MSSA in sputum-continuing Rocephin today is day 5 of 5.  I's/O's-last 24 hours+ 551/since admission +1260; ICP 2-10 (good ICP waveform noted on monitor).       02/06/24:  PBD/post Pcom aneurysm embolization with coil #6. TCD's with poor windows, recommend following neuro assessment. I reviewed FU MRI brain(02/04) which showed small L MCA infarcts-Goal -200 to mitigate vasospasm. Anterior EVD in place at 10 cm H20 above tragus with good waveform noted on monitor-R groin site soft, nontender without edema, bleeding, small scab with small  ecchymosis. I reviewed today's labs: WBC 17; hemoglobin 10.8;  ; Cr 0.33; respiratory culture- +MSSA in sputum -completed Rocephin.  I's/O's-last 24 hours -193 /since admission +1037; ICP 5-11 (good ICP waveform noted on monitor). I started ASA therapy 2nd to multiple infarcts noted on MRI.  ASA therapy discussed and approved by Fer Helms and  Dr. Eason.      02/07/24: SAH, Carpio Potts 4;Modified Swenson 3.  PBD/post Pcom aneurysm embolization with coil #7. TCD's discontinued 2nd to poor windows, recommend following neuro assessment. -Goal -200 to mitigate vasospasm. Anterior EVD in place at 10 cm H20 above tragus with good waveform noted on monitor-ecchymosis. I reviewed today's labs: WBC 15.6; Hemoglobin 11.1 ; Cr 0.36; .  I's/O's-last 24 hours -190 /since admission +876; ICP 3-10 (good ICP waveform noted on monitor).      02/08/24: SAH, Carpio Potts 4;Modified Swenson 3.  PBD/post Pcom aneurysm embolization with coil #8. TCD's with poor windows, recommend following neuro assessment. -Goal -200 to mitigate vasospasm. Anterior EVD open, raised today to 20 cm H20 above tragus with good waveform noted on monitor-ecchymosis. I reviewed today's labs: WBC 15.1; Hemoglobin 10.5;  ; Cr 0.31;  I's/O's-last 24 hours --1091/since admission -214 ICP 4-11/CPP > 100 (good  ICP waveform noted on monitor).  Tmax 100.6     02/09/24: SAH, Carpio Potts 4;Modified Swenson 3. PBD/post Pcom aneurysm embolization with coil #9. No longer following TCD's 2nd to poor windows.   Follow neuro assessment. -Goal -200 to mitigate vasospasm. Anterior EVD open, @15 cm H20 above tragus with good waveform noted on monitor. I reviewed today's labs: WBC 14.9; Hemoglobin 11.2;  ; Cr 0.29;  I's/O's-last 24 hours -110 ml; output since admission - 675 ml.  ICP 4-11/CPP ; EVD output -139 mL in the last 24 hours (good ICP waveform noted on monitor). Tmax 100. 9     2/10/24: SAH, Carpio Potts 4; Modified Swenson 3. PBD #10 /post Pcom aneurysm embolization with 10 coils (1/31/24). No longer following TCD's 2nd to poor windows.   Follow neuro assessment. -Goal -200 to mitigate vasospasm. Anterior EVD open, @ 20 cm H20 above tragus with good waveform noted on monitor. I reviewed patient's most recent labs: WBC 14.3; Hemoglobin 10.6;  ; Cr 0.36;  ICP<15; EVD output:139 mL in the last 24 hours. Tmax 100. 4     2/11/24: SAH, Carpio Potts 4; Modified Swenson 3. PBD #11 /post Pcom aneurysm embolization with 10 coils (1/31/24). Pt is verbally responsive with appropriate answers in Spansh, purposeful and following some commands.  - EVD clamped 2/11/24.   - No longer following TCD's 2nd to poor windows.   Follow neuro assessment.   - Goal -200 to mitigate vasospasm.   - I reviewed patient's most recent labs: WBC 17.5; Hemoglobin 11.0;  ; Cr 0.36; Tmax 99.5    02/12/24 - PBD#12. No acute events overnight. Neuro unchanged per bedside RN. No longer following TCD's 2nd to poor windows; q 2 neuro exams in place.  Labs reviewed; , WBC 17.4. Repeat CT this morning shows decreased hyperdensity along the bilateral tentorium.  Family at bedside; education provided.  Patient discussed with bedside RN.    02/13/24 - PBD#13. No acute events overnight. EVD removed yesterday. Pt more alert today for  my assessment; however, neurologist and intensivist report she is more lethargic and less verbal. CTA and CT perfusion study ordered. Pt squeezing my hand bilaterally and moving all extremities; following some commands. Nurse at bedside administering thick liquid medication eliciting cough. Q 2 neuro exams in place. Labs reviewed; , WBC 15.3. Patient discussed with bedside RN.    Assessment/Plan     Principal Problem:    SAH (subarachnoid hemorrhage) (HCC)  Active Problems:    Acute respiratory failure with hypoxia (HCC)    Secondary hypertension    Acute pulmonary edema (HCC)    Seizure (HCC)    Hypokalemia    Hypotension due to hypovolemia    Hypophosphatemia    Anemia    Pneumonia of both lungs due to methicillin susceptible Staphylococcus aureus (MSSA) (HCC)    Aneurysm of ascending aorta without rupture (HCC)    Hyperglycemia      Plan IR  - -200 per neuro recs  - Continue Nimodipine   - TCD's (poor windows) therefore rely on neuro assessment for vasospasm watch  - If suspect vasospasm consider CTA and CT perfusion  - Continue ASA 81 mg daily 2nd to FU MRI brain (02/04) which showed small L MCA infarcts  - Continue Q2 neuro checks   - EVD managed by neurosurgey  - Neurology and Neurosurgery following  -     -Thank you for allowing Interventional Radiology team to participate in the patients care, if any additional care or requests are needed in the future please do not hesitate to call or place IR order   [phone]           Review of Systems  Physical Exam   Review of Systems   Constitutional:  Negative for chills and fever.   Respiratory:  Negative for shortness of breath.    Cardiovascular:  Negative for chest pain and palpitations.   Gastrointestinal:  Negative for nausea and vomiting.   Neurological:  Negative for tingling, sensory change, speech change, weakness and headaches.      Vitals:    02/13/24 0600   BP: (!) 145/80   Pulse: (!) 114   Resp: (!) 32   SpO2: 93%        Physical  Exam  Constitutional:       Appearance: Normal appearance.   Cardiovascular:      Rate and Rhythm: Tachycardia present.      Pulses: Normal pulses.   Abdominal:      Palpations: Abdomen is soft.   Skin:     General: Skin is warm and dry.   Neurological:      General: No focal deficit present.      Mental Status: She is alert and oriented to person, place, and time.      Comments: A/O x 4; following commands; more alert today  L ptosis, L CNIII palsy  antigravity strength in all 4 extremities   Sensory intact   Psychiatric:         Mood and Affect: Mood normal.         Behavior: Behavior normal.             Labs    Recent Labs     02/11/24 0402 02/12/24 0215 02/13/24 0225   WBC 17.5* 17.4* 15.3*   RBC 4.23 3.85* 3.89*   HEMOGLOBIN 11.0* 10.1* 10.2*   HEMATOCRIT 32.8* 30.3* 30.9*   MCV 77.5* 78.7* 79.4*   MCH 26.0* 26.2* 26.2*   MCHC 33.5 33.3 33.0   RDW 38.0 38.9 39.8   PLATELETCT 498* 460* 490*   MPV 9.4 9.5 9.4       Recent Labs     02/11/24  0402 02/11/24  1039 02/12/24 0215 02/12/24  1054 02/12/24  1803 02/13/24 0225   SODIUM 133*   < > 137 136 136 136   POTASSIUM 4.0  --  3.6  --   --  4.0   CHLORIDE 98  --  101  --   --  101   CO2 25  --  24  --   --  26   GLUCOSE 150*  --  125*  --   --  131*   BUN 13  --  13  --   --  11   CREATININE 0.33*  --  0.36*  --   --  0.31*   CALCIUM 9.3  --  9.0  --   --  8.5    < > = values in this interval not displayed.       Recent Labs     02/11/24 0402 02/12/24 0215 02/13/24 0225   ALBUMIN  --  3.3  --    TBILIRUBIN  --  0.4  --    ALKPHOSPHAT  --  189*  --    TOTPROTEIN  --  6.7  --    ALTSGPT  --  25  --    ASTSGOT  --  31  --    CREATININE 0.33* 0.36* 0.31*       CT-HEAD W/O   Final Result         1.  Hyperdensity along the bilateral tentorium, compatible with layering subarachnoid hemorrhages, decreased since prior study.   2.  Atherosclerosis.         DX-ABDOMEN FOR TUBE PLACEMENT   Final Result         1.  Nonspecific bowel gas pattern in the upper abdomen.   2.   Nasogastric tube tip terminates overlying the expected location of the antrum or pylorus, somewhat withdrawn since prior study.   3.  Hazy interstitial pulmonary edema and/or infiltrates, stable   4.  Cardiomegaly      DX-ABDOMEN FOR TUBE PLACEMENT   Final Result         1.  Nonspecific bowel gas pattern in the upper abdomen.   2.  Nasogastric tube tip terminates overlying the expected location of the pylorus or first duodenal segment.   3.  Hazy interstitial pulmonary edema and/or infiltrates   4.  Cardiomegaly      DX-CHEST-PORTABLE (1 VIEW)   Final Result         Findings on chest radiograph appear stable since the prior radiograph.  No new abnormalities are identified.      DX-ABDOMEN FOR TUBE PLACEMENT   Final Result      Enteric tube tip projects over the gastric antrum or proximal duodenum, similar to prior      DX-ABDOMEN FOR TUBE PLACEMENT   Final Result      NG tube tip projects at the peripyloric region.      DX-ABDOMEN FOR TUBE PLACEMENT   Final Result      NG tube tip projects at the peripyloric region.      DX-ABDOMEN FOR TUBE PLACEMENT   Final Result      Feeding tube in place with tip at the distal stomach/pylorus.      [id number] VIEW   Final Result         1.  Nonspecific bowel gas pattern in the upper abdomen.   2.  Nasogastric tube tip terminates overlying the expected location of the pylorus or first duodenal segment.   3.  Hazy interstitial pulmonary edema and/or infiltrates   4.  Cardiomegaly      DX-ABDOMEN FOR TUBE PLACEMENT   Final Result      Enteric sump tube in situ with its tip at the level of the distal gastric antrum or pylorus.      DX-ABDOMEN FOR TUBE PLACEMENT   Final Result         1.  Nonspecific bowel gas pattern in the upper abdomen.   2.  Nasogastric tube tip terminates overlying the expected location of the pylorus or first duodenal segment.   3.  Hazy interstitial pulmonary edema and/or infiltrates, similar to prior study.   4.  Cardiomegaly      DX-CHEST-PORTABLE (1 VIEW)    Final Result      1.  Supportive tubing as described above.   2.  No other significant change from prior exam.         MR-BRAIN-W/O   Final Result      1.  Multifocal areas of acute infarcts in the left cerebral hemisphere.   2.  Small area of acute infarct in the right basal ganglia adjacent to the tip of the ventriculostomy catheter.   3.  Cortical infarct in the right medial parietal lobe.   4.  Mild amount of subdural hemorrhages surrounding the bilateral cerebellar hemispheres and right cerebellum.   5.  Subarachnoid hemorrhage.   6.  There is no hydrocephalus.   7.  There are changes secondary to the previous endovascular repair left internal carotid aneurysm.      CT-CTA NECK WITH & W/O-POST PROCESSING   Final Result      No focal high-grade stenosis, dissection or occlusion of the cervical carotid or vertebral arteries.      CT-CEREBRAL PERFUSION ANALYSIS   Final Result      1.  Cerebral blood flow less than 30% likely representing completed infarct = 0 mL.      2.  T Max more than 6 seconds likely representing combination of completed infarct and ischemia = 0 mL.      3.  Mismatched volume likely representing ischemic brain/penumbra = None      4.  Please note that the cerebral perfusion was performed on the limited brain tissue around the basal ganglia region. Infarct/ischemia outside the CT perfusion sections can be missed in this study.      CT-CTA HEAD WITH & W/O-POST PROCESS   Final Result      1.  Prior LEFT middle cranial fossa aneurysm coiling.  Artifact limits exam.   2.  No abrupt large vessel cut off.   3.  Segmental narrowing of LEFT middle cerebral artery, vasospasm versus artifact.   4.  Ventriculostomy catheter in similar position.   5.  Evolving RIGHT caudate infarct.   6.  Stable intracranial hemorrhage.         DX-ABDOMEN FOR TUBE PLACEMENT   Final Result      Orogastric tube tip now at the mid stomach.      DX-ABDOMEN FOR TUBE PLACEMENT   Final Result      Orogastric tube tip at the  proximal stomach with side port just above the GE junction.      US-INTRACRANIAL ARTERY LIMITED   Final Result      DX-CHEST-LIMITED (1 VIEW)   Final Result         1.  Stable chest with perihilar and lower lung zone interstitial and minimal airspace opacities most consistent with pulmonary edema.   2.  No new abnormalities.      EC-ECHOCARDIOGRAM COMPLETE W/O CONT   Final Result      US-INTRACRANIAL ARTERY LIMITED   Final Result      DX-CHEST-PORTABLE (1 VIEW)   Final Result      1.  Lines and tubes appear appropriately located      2.  Improvement of pulmonary edema/airspace process      US-INTRACRANIAL ARTERY COMP   Final Result      CT-HEAD W/O   Final Result         1. Slightly decreased subarachnoid hemorrhage overlying the cerebral hemispheres.   2. Otherwise, stable as above.         DX-CHEST-FOR LINE PLACEMENT Perform procedure in: Patient's Room   Final Result      1.  Moderate interstitial pulmonary edema.   2.   Right-sided central venous catheter terminates with the tip projecting over the expected region of the mid to distal superior vena cava.   3.  Endotracheal tube terminates in satisfactory position at the level of the aortic arch.   4.  Enteric feeding tube terminates in the left upper quadrant projecting over the expected location of the stomach.      CT-STEALTH HEAD W/O   Final Result      1.  Interval placement of a right transparietal ventriculostomy catheter which terminates with the tip near the third ventricle. There is minimally decreased caliber of the ventricular system and compared to previous exam.   2.  Findings of intracranial hemorrhage appear similar to the previous exam. No definite new acute intracranial hemorrhage is identified.   3.  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         DX-CHEST-PORTABLE (1 VIEW)   Final Result      CT-HEAD W/O   Final Result      1. Interval aneurysm coiling of left posterior communicating artery aneurysm.   2.  "Possible increased subarachnoid hemorrhage.   3. Intraventricular hemorrhage. There is developing mild hydrocephalus with mildly increased ventricles.   4. Small right convexity subdural hematoma measuring 5 mm in thickness.      These findings were discussed with STEPHANIE EASON on 1/31/2024 3:14 PM.      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   This aneurysm was embolized to occlusion as described above. Final angiographic images demonstrate only minimal filling of the neck of the aneurysm. The patient will be followed up in   the neuro interventional clinic and brought back for a follow-up angiogram in 6 months.      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      I, Stephanie Eason was physically present and participated during the entire procedure of the IR-EMBOLIZATION.      CT-CTA HEAD WITH & W/O-POST PROCESS    (Results Pending)   CT-CEREBRAL PERFUSION ANALYSIS    (Results Pending)     INR   Date Value Ref Range Status   01/31/2024 1.06 0.87 - 1.13 Final     Comment:     INR - Non-therapeutic Reference Range: 0.87-1.13  INR - Therapeutic Reference Range: 2.0-4.0       No results found for: \"POCINR\"     Intake/Output Summary (Last 24 hours) at 2/12/2024 0811  Last data filed at 2/12/2024 0600  Gross per 24 hour   Intake 2444.57 ml   Output 2345 ml   Net 99.57 ml      Labs not explicitly included in this progress note were reviewed by the author. Radiology/imaging not explicitly included in this progress note was reviewed by the author.     I have performed a physical exam and reviewed and updated ROS and Plan today (2/13/2024).     30 minutes in " directly providing and coordinating care and extensive data review.  No time overlap and excludes procedures.

## 2024-02-13 NOTE — PROGRESS NOTES
Neurosurgery Progress Note    Subjective:  63 year old presented with HH4 ruptured left Pcomm aneurysm, post coil day 4.   Overnight on  had worsening exam with flaccid RUE, decreased mentation, more lethargy. CTA showed left MCA vasospasm.      EVD removed yesterday, site clean.     Exam:  GCS: [id number]  Oriented to name and place today   Voice strong, when she is cooperating   Right eye open spontaneously, pupil 2mm and reactive   LUE ptosis noted, pupil 4mm and nonreactive   Following commands x4 (with encouragement)   EVD site clean       BP  Min: 116/69  Max: 161/83  Pulse  Av.5  Min: 95  Max: 121  Resp  Av.9  Min: 15  Max: 36  Monitored Temp 2  Av.5 °C (99.5 °F)  Min: 36.9 °C (98.4 °F)  Max: 38.2 °C (100.8 °F)  SpO2  Av.5 %  Min: 93 %  Max: 99 %    ICP  Av MM HG  Min: 20 MM HG  Max: 20 MM HG    Recent Labs     24  0402 24  0215 24   WBC 17.5* 17.4* 15.3*   RBC 4.23 3.85* 3.89*   HEMOGLOBIN 11.0* 10.1* 10.2*   HEMATOCRIT 32.8* 30.3* 30.9*   MCV 77.5* 78.7* 79.4*   MCH 26.0* 26.2* 26.2*   MCHC 33.5 33.3 33.0   RDW 38.0 38.9 39.8   PLATELETCT 498* 460* 490*   MPV 9.4 9.5 9.4       Recent Labs     24  0402 24  1039 24  0215 24  1054 24  1803 24   SODIUM 133*   < > 137 136 136 136   POTASSIUM 4.0  --  3.6  --   --  4.0   CHLORIDE 98  --  101  --   --  101   CO2   --  24  --   --  26   GLUCOSE 150*  --  125*  --   --  131*   BUN 13  --  13  --   --  11   CREATININE 0.33*  --  0.36*  --   --  0.31*   CALCIUM 9.3  --  9.0  --   --  8.5    < > = values in this interval not displayed.                     Intake/Output                         24 - 24 - [id number] Total [id number] Total                 Intake    P.O.  120  -- 120  --  -- --    P.O. 120 -- 120 -- -- --    I.V.  766.8  1979.1 2745.9  --  -- --    Volume (mL) (NS infusion) 766.8 979.1 1745.9 --  -- --    Volume (mL) (NS (Bolus) 0.9 % infusion 1,000 mL) -- 1000 1000 -- -- --    Other  --  220 220  --  -- --    Medications (PO/Enteral Liquids) -- 220 220 -- -- --    NG/GT  240  600 840  --  -- --    Intake (mL) (Enteral Tube 02/11/24 Nasogastric 14 Fr. Right nare) 240 600 840 -- -- --    Enteral  60  120 180  --  -- --    Free Water / Tube Flush 60 120 180 -- -- --    Total Intake 1186.8 2919.1 4105.9 -- -- --       Output    Urine  1175  1900 3075  --  -- --    Output (mL) (Urethral Catheter Non-latex;Temperature probe) 1175 1900 3075 -- -- --    Stool  --  -- --  --  -- --    Number of Times Stooled 2 x 1 x 3 x -- -- --    Emesis/NG output  0  -- 0  --  -- --    Output (mL) (Enteral Tube 02/11/24 Nasogastric 14 Fr. Right nare) 0 -- 0 -- -- --    Total Output 1175 1900 3075 -- -- --       Net I/O     11.8 1019.1 1030.9 -- -- --              Intake/Output Summary (Last 24 hours) at 2/13/2024 0930  Last data filed at 2/13/2024 0600  Gross per 24 hour   Intake 3720.25 ml   Output 2875 ml   Net 845.25 ml               sodium chloride  2 g Q8HRS    aspirin  81 mg DAILY    lidocaine  2 Patch Q24HR    enoxaparin (LOVENOX) injection  40 mg DAILY AT 1800    nystatin   BID    acetaminophen  650 mg Q4HRS PRN    Or    acetaminophen  650 mg Q4HRS PRN    atorvastatin  40 mg Q EVENING    miconazole   BID    NS   Continuous    enalaprilat  1.25 mg Q6HRS PRN    hydrALAZINE  10-20 mg Q4HRS PRN    labetalol  10-20 mg Q4HRS PRN    niMODipine  60 mg Q4HRS    levETIRAcetam  500 mg BID    Pharmacy  1 Each PHARMACY TO DOSE    Respiratory Therapy Consult   Continuous RT    senna-docusate  2 Tablet BID    And    polyethylene glycol/lytes  1 Packet QDAY PRN    And    magnesium hydroxide  30 mL QDAY PRN    And    bisacodyl  10 mg QDAY PRN    MD Alert...Adult ICU Electrolyte Replacement per Pharmacy   PHARMACY TO DOSE    ondansetron  4 mg Q4HRS PRN    Or    ondansetron  4 mg Q4HRS PRN    LORazepam  2 mg Q5 MIN PRN    prochlorperazine   10 mg Q6HRS PRN       Assessment and Plan:  Hospital day #14  Post coil day 13  Head CT 2/12 stable   Appreciate neurology input, will continue Q2 neuro checks through vasospasm window.   Appreciate intensivist care   Ok for oob, PT/ OT as able   Neurosurgery to sign off today, please call with questions [phone]      Chemical prophylactic DVT therapy: OK from NS perspective  Start date/time: on MAR

## 2024-02-13 NOTE — DIETARY
"Nutrition support weekly update:  Day 13 of admit.  Fay Turner is a 63 y.o. female with admitting DX of SAH.      Tube feeding initiated on 2/1. Current TF via NG tube is Glucerna 1.2 with goal rate 60 ml/hr x 12 hours nocturnal to provide 864 kcals, 43 gm protein, and 579 ml free water per day.     Assessment:  Weight 2/12: 71 kg via bed scale. Weight is stable.   Height: 5'2\" (157.5 cm), BMI: 28.62 (overweight)    Calculation/Equation: MSJ x 1.1 = 1470 kcals  Calories/day: 1500 - 1800 (21 - 25 kcals/kg)  Grams Protein/day: 71 - 85 (1.0 - 1.2 gm/kg)    Evaluation:   Pt is receiving TF at goal rate via nocturnal TF x 12 hours while pt working on increasing PO intake, did get nocturnal TF.   Per rounds discussion, pt coughing on medication this am, plan for NPO per SLP and repeat FEES, SLP very busy today. We discussed increasing TF to 24 hours continuous as may not be able to do FEES today.   Pt is on 1 L NC.   EVD removed 2/12.   Skin: R head incision, no edema noted.   Labs: Na 133, glucose 131, Creat 0.31  Meds: lipitor, keppra, senna, salt tabs, anti-emetics prn, bowel protocol  Last BM: 2/13  CHO-controlled formula remains appropriate for blood sugar control while in ICU.     Malnutrition risk: Pt is adequately nourished.    Recommendations/Plan:  Change TF to 24 hour continuous with Glucerna 1.2 and goal rate 55 ml/hr to provide 1584 kcals, 79 gm protein, and 1063 ml free water per day.   Fluids per MD.   Diet per SLP. Will monitor for FEES results.     RD following.                    "

## 2024-02-13 NOTE — THERAPY
"Speech Language Pathology   Daily Treatment     Patient Name: Fay Turner  AGE:  63 y.o., SEX:  female  Medical Record #: [id number]  Date of Service: 2/13/2024    Precautions: Fall Risk, Swallow Precautions, Nasogastric Tube  Comments: EVD removed     HPI: Pt is a 63 y.o. F w/ PMHx of HTN who was transferred from OSH d/t AMS w/ rapidly progressive neurological changes/obtundation. CT head revealed diffuse subarachnoid hemorrhage w/ evidence of large L P-Comm aneurysm on CTA. Pt now s/p successful coil embolization of the L P-comm aneurysm and EVD placement x2 on 1/31. Pt intubated at OSH on 1/31, extubated 2/4. On 2/4, pt noted to have L MCA vasospasms. EVD removed 2/12.  Per RN/ Dr. Tracy note per EMR, \"More lethargic, less verbal this AM. Unclear if delirium versus evolving symptomatic spasm. Will attain CTA/P now. \"     Subjective  Pt seen on this date for dysphagia management. Pt was received awake/alert w/ RN in room attempting med pass, NGT in situ. Per RN, after meds plans to head to CT. Per RN, pt choking on meds this am. See above for additional information- concern for neuro changes.     Assessment  RN fed trials of LQ3 (crushed in applesauce and fed via oral sponge) completed. Pt w/ adequate oral acceptance. No anterior loss appreciated on this date. Prolonged A/P transport suspected, however, presumed to be complete as no significant oral bolus residue was appreciated in the oral cavity. Pt w/ immediate weak cough and throat clearing on all trials concerning for airway invasion. Pt w/ notable facial grimace and reports of odynophagia on all trials.     Clinical Impressions  Pt w/ suspected neuro changes (head CT pending), s/s of aspiration across PO trials, and odynophagia on this date. D/t such, SLP unable to recommend a safe oral diet. Would recommend making pt NPO/NGT w/ ice chips only to help aid in secretion management, provide moisture to the oral cavity, and prevent atrophy of the muscles necessary " for deglutition. Repeat FEES warranted prior to re-initiation of an oral diet. RN and MD updated to same. Repeat FEES to be completed pending pt availability/scheduling. Thank you.     -Update: d/t scheduling, unable to complete FEES on this date. Plan for 2/14. Thank you.     Recommendations  Treatment Completed: Dysphagia Treatment  Diet Consistency: NPO/TF-okay for minimal ice chips following oral care w/ 1:1 supv  Instrumentation: FEES  Medication: Non Oral  Supervision: 1:1 feeding with constant supervision (ice chips only)  Oral Care: Q4h    SLP Treatment Plan  Treatment Plan: Dysphagia Treatment, Cognitive Treatment, Patient/Family/Caregiver Training (motor speech)  SLP Frequency: 4x Per Week  Estimated Duration: Until Therapy Goals Met    Anticipated Discharge Needs  Discharge Recommendations: Recommend post-acute placement for additional speech therapy services prior to discharge home  Therapy Recommendations Upon DC: Dysphagia Training, Comprehension Training, Expression Training, Reading Training, Writing Training, Community Re-Integration, Patient / Family / Caregiver Education, Other (See Comments) (motor speech)    Patient / Family Goals  Patient / Family Goal #1: nods yes to ice chips  Goal #1 Outcome: Goal met  Short Term Goals  Short Term Goal # 1: NEW 2/13: Pt will participate in an repeat instrumental swallow study via FEES to determine airway protection/swallow efficiency and guide dysphagia management.  Goal Outcome # 1: Goal met, new goal added  Short Term Goal # 1 B : Pt will consume a MM5/TN0 diet with 1:1 supv without any overt s/s of aspiration or decline in respiratory status  Goal Outcome  # 1 B: Goal not met  Short Term Goal # 2: With mod-max cues, pt will be AAOx4 during >65% of opportunities.  Goal Outcome # 2 : Other (see comments) (not targeted this session)  Short Term Goal # 3: With max cues, pt will recall salient/functional information after a 5 min delay during >65% of  opportunities.  Goal Outcome  # 3: Other (see comments) (not targeted this session)  Short Term Goal # 4: With min-mod cues, pt will participate in additional cognitive testing to further guide POC.  Goal Outcome  # 4: Other (see comments) (not targeted this session)  Short Term Goal # 5: Pt will utilize 'clear speech' techniques to increase intelligibility at the conversational level to >90%.  Goal Outcome  # 5: Other (see comments) (not targeted this session)    Anai Dumont, SLP

## 2024-02-13 NOTE — PROGRESS NOTES
Neurology Progress Note  Neurohospitalist Service, [facility]    Referring Physician: Emil Pederson M.D.      Interval History: No acute events overnight.   EVD removed.  More lethargic, less verbal this AM.    Review of systems: In addition to what is detailed in the HPI and/or updated in the interval history, all other systems reviewed and are negative.    Past Medical History, Past Surgical History and Social History reviewed and unchanged from prior    Medications:    Current Facility-Administered Medications:     sodium chloride (Salt) tablet 2 g, 2 g, Enteral Tube, Q8HRS, Oriana REYNOSO. Latona, 2 g at 02/13/24 0517    aspirin (Asa) chewable tab 81 mg, 81 mg, Enteral Tube, DAILY, Jose Raul Tracy M.D., 81 mg at 02/13/24 0517    lidocaine (Asperflex) 4 % patch 2 Patch, 2 Patch, Transdermal, Q24HR, Dayana Babcock M.D., 2 Patch at 02/12/24 1744    enoxaparin (Lovenox) inj 40 mg, 40 mg, Subcutaneous, DAILY AT 1800, Dayana Babcock M.D., 40 mg at 02/12/24 1743    nystatin (Mycostatin) powder, , Topical, BID, Dayana Babcock M.D., Given at 02/13/24 0516    acetaminophen (Tylenol) tablet 650 mg, 650 mg, Enteral Tube, Q4HRS PRN, 650 mg at 02/12/24 1939 **OR** acetaminophen (Tylenol) suppository 650 mg, 650 mg, Rectal, Q4HRS PRN, Dayana Babcock M.D.    atorvastatin (Lipitor) tablet 40 mg, 40 mg, Enteral Tube, Q EVENING, Dayana Babcock M.D., 40 mg at 02/12/24 1744    miconazole (Micotin) 2 % cream, , Topical, BID, Dayana Babcock M.D., Given at 02/13/24 0516    NS infusion, , Intravenous, Continuous, Jeremy M Gonda, M.D., Last Rate: 83 mL/hr at 02/12/24 2315, New Bag at 02/12/24 2315    enalaprilat (Vasotec) injection 1.25 mg 1 mL, 1.25 mg, Intravenous, Q6HRS PRN, Jeremy M Gonda, M.D.    hydrALAZINE (Apresoline) injection 10-20 mg, 10-20 mg, Intravenous, Q4HRS PRN, Jeremy M Gonda, M.D.    labetalol (Normodyne/Trandate) injection 10-20 mg, 10-20 mg, Intravenous, Q4HRS PRN, Sni MINOR  "Gonda, M.D.    niMODipine (Nymalize) oral syringe 60 mg, 60 mg, Enteral Tube, Q4HRS, Jeremy M Gonda, M.D., 60 mg at 02/13/24 0517    levETIRAcetam (Keppra) tablet 500 mg, 500 mg, Enteral Tube, BID, Jeremy M Gonda, M.D., 500 mg at 02/13/24 0517    Pharmacy Consult: Enteral tube insertion - review meds/change route/product selection, 1 Each, Other, PHARMACY TO DOSE, Jeremy M Gonda, M.D.    Respiratory Therapy Consult, , Nebulization, Continuous RT, Jeremy M Gonda, M.D.    senna-docusate (Pericolace Or Senokot S) 8.6-50 MG per tablet 2 Tablet, 2 Tablet, Enteral Tube, BID, 2 Tablet at 02/03/24 0514 **AND** polyethylene glycol/lytes (Miralax) Packet 1 Packet, 1 Packet, Enteral Tube, QDAY PRN **AND** magnesium hydroxide (Milk Of Magnesia) suspension 30 mL, 30 mL, Enteral Tube, QDAY PRN **AND** bisacodyl (Dulcolax) suppository 10 mg, 10 mg, Rectal, QDAY PRN, Jeremy M Gonda, M.D.    MD Alert...ICU Electrolyte Replacement per Pharmacy, , Other, PHARMACY TO DOSE, Jeremy M Gonda, M.D.    ondansetron (Zofran ODT) dispertab 4 mg, 4 mg, Enteral Tube, Q4HRS PRN, 4 mg at 01/31/24 2248 **OR** ondansetron (Zofran) syringe/vial injection 4 mg, 4 mg, Intravenous, Q4HRS PRN, Jeremy M Gonda, M.D., 4 mg at 02/02/24 1758    LORazepam (Ativan) injection 2 mg, 2 mg, Intravenous, Q5 MIN PRN, Jeremy M Gonda, M.D.    prochlorperazine (Compazine) injection 10 mg, 10 mg, Intravenous, Q6HRS PRN, David Mclaughlin Jr., D.O., 10 mg at 01/31/24 0340    Physical Examination:   BP (!) 145/80   Pulse (!) 114   Resp (!) 32   Ht 1.575 m (5' 2.01\")   Wt 71 kg (156 lb 8.4 oz)   SpO2 93%   Breastfeeding No   BMI 28.62 kg/m²       General: Patient is lethargic  Neck: There is normal range of motion  CV: Regular rate   Extremities:  Warm, dry, and intact, without peripheral lower extremity edema    NEUROLOGICAL EXAM:     Mental status: Lethargic, unable to maintain eye opening  Speech and language: Speech is clear.  There is speech paucity.  Intermittent " command following.  Cranial nerve exam:  Visual fields are full. There is no nystagmus. Extraocular muscles are intact. Face is symmetric. 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  Sensory exam:  Reacts to tactile in all 4 extremities, there is no neglect to double stim  Coordination: No ataxia on bilateral FTN testing    Objective Data:    Labs:  Lab Results   Component Value Date/Time    PROTHROMBTM 13.9 01/31/2024 03:25 PM    INR 1.06 01/31/2024 03:25 PM      Lab Results   Component Value Date/Time    WBC 15.3 (H) 02/13/2024 02:25 AM    RBC 3.89 (L) 02/13/2024 02:25 AM    HEMOGLOBIN 10.2 (L) 02/13/2024 02:25 AM    HEMATOCRIT 30.9 (L) 02/13/2024 02:25 AM    MCV 79.4 (L) 02/13/2024 02:25 AM    MCH 26.2 (L) 02/13/2024 02:25 AM    MCHC 33.0 02/13/2024 02:25 AM    MPV 9.4 02/13/2024 02:25 AM    NEUTSPOLYS 82.70 (H) 02/13/2024 02:25 AM    LYMPHOCYTES 8.20 (L) 02/13/2024 02:25 AM    MONOCYTES 6.10 02/13/2024 02:25 AM    EOSINOPHILS 2.20 02/13/2024 02:25 AM    BASOPHILS 0.30 02/13/2024 02:25 AM      Lab Results   Component Value Date/Time    SODIUM 136 02/13/2024 02:25 AM    POTASSIUM 4.0 02/13/2024 02:25 AM    CHLORIDE 101 02/13/2024 02:25 AM    CO2 26 02/13/2024 02:25 AM    GLUCOSE 131 (H) 02/13/2024 02:25 AM    BUN 11 02/13/2024 02:25 AM    CREATININE 0.31 (L) 02/13/2024 02:25 AM      Lab Results   Component Value Date/Time    CHOLSTRLTOT 165 01/31/2024 03:25 PM     (H) 01/31/2024 03:25 PM    HDL 36 (A) 01/31/2024 03:25 PM    TRIGLYCERIDE 79 01/31/2024 03:25 PM       Lab Results   Component Value Date/Time    ALKPHOSPHAT 189 (H) 02/12/2024 02:15 AM    ASTSGOT 31 02/12/2024 02:15 AM    ALTSGPT 25 02/12/2024 02:15 AM    TBILIRUBIN 0.4 02/12/2024 02:15 AM        Imaging/Testing:    I interpreted and/or reviewed the patient's neuroimaging    CT-HEAD W/O   Final Result         1.  Hyperdensity along the bilateral tentorium, compatible with  layering subarachnoid hemorrhages, decreased since prior study.   2.  Atherosclerosis.         DX-ABDOMEN FOR TUBE PLACEMENT   Final Result         1.  Nonspecific bowel gas pattern in the upper abdomen.   2.  Nasogastric tube tip terminates overlying the expected location of the antrum or pylorus, somewhat withdrawn since prior study.   3.  Hazy interstitial pulmonary edema and/or infiltrates, stable   4.  Cardiomegaly      DX-ABDOMEN FOR TUBE PLACEMENT   Final Result         1.  Nonspecific bowel gas pattern in the upper abdomen.   2.  Nasogastric tube tip terminates overlying the expected location of the pylorus or first duodenal segment.   3.  Hazy interstitial pulmonary edema and/or infiltrates   4.  Cardiomegaly      DX-CHEST-PORTABLE (1 VIEW)   Final Result         Findings on chest radiograph appear stable since the prior radiograph.  No new abnormalities are identified.      DX-ABDOMEN FOR TUBE PLACEMENT   Final Result      Enteric tube tip projects over the gastric antrum or proximal duodenum, similar to prior      DX-ABDOMEN FOR TUBE PLACEMENT   Final Result      NG tube tip projects at the peripyloric region.      DX-ABDOMEN FOR TUBE PLACEMENT   Final Result      NG tube tip projects at the peripyloric region.      DX-ABDOMEN FOR TUBE PLACEMENT   Final Result      Feeding tube in place with tip at the distal stomach/pylorus.      [id number] VIEW   Final Result         1.  Nonspecific bowel gas pattern in the upper abdomen.   2.  Nasogastric tube tip terminates overlying the expected location of the pylorus or first duodenal segment.   3.  Hazy interstitial pulmonary edema and/or infiltrates   4.  Cardiomegaly      DX-ABDOMEN FOR TUBE PLACEMENT   Final Result      Enteric sump tube in situ with its tip at the level of the distal gastric antrum or pylorus.      DX-ABDOMEN FOR TUBE PLACEMENT   Final Result         1.  Nonspecific bowel gas pattern in the upper abdomen.   2.  Nasogastric tube tip  terminates overlying the expected location of the pylorus or first duodenal segment.   3.  Hazy interstitial pulmonary edema and/or infiltrates, similar to prior study.   4.  Cardiomegaly      DX-CHEST-PORTABLE (1 VIEW)   Final Result      1.  Supportive tubing as described above.   2.  No other significant change from prior exam.         MR-BRAIN-W/O   Final Result      1.  Multifocal areas of acute infarcts in the left cerebral hemisphere.   2.  Small area of acute infarct in the right basal ganglia adjacent to the tip of the ventriculostomy catheter.   3.  Cortical infarct in the right medial parietal lobe.   4.  Mild amount of subdural hemorrhages surrounding the bilateral cerebellar hemispheres and right cerebellum.   5.  Subarachnoid hemorrhage.   6.  There is no hydrocephalus.   7.  There are changes secondary to the previous endovascular repair left internal carotid aneurysm.      CT-CTA NECK WITH & W/O-POST PROCESSING   Final Result      No focal high-grade stenosis, dissection or occlusion of the cervical carotid or vertebral arteries.      CT-CEREBRAL PERFUSION ANALYSIS   Final Result      1.  Cerebral blood flow less than 30% likely representing completed infarct = 0 mL.      2.  T Max more than 6 seconds likely representing combination of completed infarct and ischemia = 0 mL.      3.  Mismatched volume likely representing ischemic brain/penumbra = None      4.  Please note that the cerebral perfusion was performed on the limited brain tissue around the basal ganglia region. Infarct/ischemia outside the CT perfusion sections can be missed in this study.      CT-CTA HEAD WITH & W/O-POST PROCESS   Final Result      1.  Prior LEFT middle cranial fossa aneurysm coiling.  Artifact limits exam.   2.  No abrupt large vessel cut off.   3.  Segmental narrowing of LEFT middle cerebral artery, vasospasm versus artifact.   4.  Ventriculostomy catheter in similar position.   5.  Evolving RIGHT caudate infarct.   6.   Stable intracranial hemorrhage.         DX-ABDOMEN FOR TUBE PLACEMENT   Final Result      Orogastric tube tip now at the mid stomach.      DX-ABDOMEN FOR TUBE PLACEMENT   Final Result      Orogastric tube tip at the proximal stomach with side port just above the GE junction.      US-INTRACRANIAL ARTERY LIMITED   Final Result      DX-CHEST-LIMITED (1 VIEW)   Final Result         1.  Stable chest with perihilar and lower lung zone interstitial and minimal airspace opacities most consistent with pulmonary edema.   2.  No new abnormalities.      EC-ECHOCARDIOGRAM COMPLETE W/O CONT   Final Result      US-INTRACRANIAL ARTERY LIMITED   Final Result      DX-CHEST-PORTABLE (1 VIEW)   Final Result      1.  Lines and tubes appear appropriately located      2.  Improvement of pulmonary edema/airspace process      US-INTRACRANIAL ARTERY COMP   Final Result      CT-HEAD W/O   Final Result         1. Slightly decreased subarachnoid hemorrhage overlying the cerebral hemispheres.   2. Otherwise, stable as above.         DX-CHEST-FOR LINE PLACEMENT Perform procedure in: Patient's Room   Final Result      1.  Moderate interstitial pulmonary edema.   2.   Right-sided central venous catheter terminates with the tip projecting over the expected region of the mid to distal superior vena cava.   3.  Endotracheal tube terminates in satisfactory position at the level of the aortic arch.   4.  Enteric feeding tube terminates in the left upper quadrant projecting over the expected location of the stomach.      CT-STEALTH HEAD W/O   Final Result      1.  Interval placement of a right transparietal ventriculostomy catheter which terminates with the tip near the third ventricle. There is minimally decreased caliber of the ventricular system and compared to previous exam.   2.  Findings of intracranial hemorrhage appear similar to the previous exam. No definite new acute intracranial hemorrhage is identified.   3.  Status post endovascular repair  of a left posterior commuting artery aneurysm.         DX-CHEST-PORTABLE (1 VIEW)   Final Result      CT-HEAD W/O   Final Result      1. Interval aneurysm coiling of left posterior communicating artery aneurysm.   2. Possible increased subarachnoid hemorrhage.   3. Intraventricular hemorrhage. There is developing mild hydrocephalus with mildly increased ventricles.   4. Small right convexity subdural hematoma measuring 5 mm in thickness.      These findings were discussed with STEPHANIE EASON on 1/31/2024 3:14 PM.      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   This aneurysm was embolized to occlusion as described above. Final angiographic images demonstrate only minimal filling of the neck of the aneurysm. The patient will be followed up in   the neuro interventional clinic and brought back for a follow-up angiogram in 6 months.      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      I, Stephanie Eason was physically present and participated during the entire procedure of the IR-EMBOLIZATION.          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  She is PBD#13, POD#13 from successful endovascular coiling.  No window for TCDs.  MRI with multifocal infarcts.  EVD removed on 2/12.  This AM exam with possible aphasia.  Unclear if delirium versus evolving symptomatic spasm.  Will attain CTA/P now.  Will continue with keppra given clinical seizure at admission.  Additional supportive therapies including serial neuro exams, mild hypertensive response, and maintaining euvolemia and eunatremia  for next couple days while in vasospasm window.    Problem list:   Hunt Potts 4, modified Swenson grade 3 SAH   Ruptured cerebral aneurysm    Plan:   - continue neurochecks/NIHSS q2h    - attain CTA head, CT perfusion   - maintain SBP goal 140-200 while in vasospasm window   - TCDs discontinued due to lack of windows   - continue keppra 500mg BID- this will be 3 months of therapy given seizure event    - continue lovenox SQ for DVT chemoppx   - continue ASA 81mg daily given large coil-vessel lumen interface and high risk for coil-lumen thrombosis   - continue nimodipine 60mg q4h while admitted of for 21 days   - maintain normothermia, net even I/O, FSBS    - normal Na goal 135-145, continue salt tabs, 3% PRN   - PT/OT/SLP    The evaluation of the patient, and recommended management, was discussed with Dr. Pederson, ICU attending. I have performed a physical exam and reviewed and updated ROS and Plan today (2/13/2024).     Jose Raul Tracy MD  Neurohospitalist, Acute Care Services

## 2024-02-14 PROBLEM — I15.9 SECONDARY HYPERTENSION: Status: RESOLVED | Noted: 2024-01-31 | Resolved: 2024-02-14

## 2024-02-14 PROBLEM — J81.0 ACUTE PULMONARY EDEMA (HCC): Status: RESOLVED | Noted: 2024-01-31 | Resolved: 2024-02-14

## 2024-02-14 LAB
ANION GAP SERPL CALC-SCNC: 9 MMOL/L (ref 7–16)
BASOPHILS # BLD AUTO: 0.4 % (ref 0–1.8)
BASOPHILS # BLD: 0.06 K/UL (ref 0–0.12)
BUN SERPL-MCNC: 12 MG/DL (ref 8–22)
CALCIUM SERPL-MCNC: 9.4 MG/DL (ref 8.5–10.5)
CHLORIDE SERPL-SCNC: 101 MMOL/L (ref 96–112)
CO2 SERPL-SCNC: 26 MMOL/L (ref 20–33)
CREAT SERPL-MCNC: 0.33 MG/DL (ref 0.5–1.4)
EOSINOPHIL # BLD AUTO: 0.18 K/UL (ref 0–0.51)
EOSINOPHIL NFR BLD: 1.3 % (ref 0–6.9)
ERYTHROCYTE [DISTWIDTH] IN BLOOD BY AUTOMATED COUNT: 39.1 FL (ref 35.9–50)
GFR SERPLBLD CREATININE-BSD FMLA CKD-EPI: 117 ML/MIN/1.73 M 2
GLUCOSE SERPL-MCNC: 141 MG/DL (ref 65–99)
HCT VFR BLD AUTO: 31.3 % (ref 37–47)
HGB BLD-MCNC: 10.4 G/DL (ref 12–16)
IMM GRANULOCYTES # BLD AUTO: 0.06 K/UL (ref 0–0.11)
IMM GRANULOCYTES NFR BLD AUTO: 0.4 % (ref 0–0.9)
LYMPHOCYTES # BLD AUTO: 1 K/UL (ref 1–4.8)
LYMPHOCYTES NFR BLD: 7.5 % (ref 22–41)
MAGNESIUM SERPL-MCNC: 2 MG/DL (ref 1.5–2.5)
MCH RBC QN AUTO: 26.2 PG (ref 27–33)
MCHC RBC AUTO-ENTMCNC: 33.2 G/DL (ref 32.2–35.5)
MCV RBC AUTO: 78.8 FL (ref 81.4–97.8)
MONOCYTES # BLD AUTO: 0.9 K/UL (ref 0–0.85)
MONOCYTES NFR BLD AUTO: 6.7 % (ref 0–13.4)
NEUTROPHILS # BLD AUTO: 11.17 K/UL (ref 1.82–7.42)
NEUTROPHILS NFR BLD: 83.7 % (ref 44–72)
NRBC # BLD AUTO: 0 K/UL
NRBC BLD-RTO: 0 /100 WBC (ref 0–0.2)
PHOSPHATE SERPL-MCNC: 3.6 MG/DL (ref 2.5–4.5)
PLATELET # BLD AUTO: 516 K/UL (ref 164–446)
PMV BLD AUTO: 9.5 FL (ref 9–12.9)
POTASSIUM SERPL-SCNC: 3.5 MMOL/L (ref 3.6–5.5)
RBC # BLD AUTO: 3.97 M/UL (ref 4.2–5.4)
SODIUM SERPL-SCNC: 135 MMOL/L (ref 135–145)
SODIUM SERPL-SCNC: 136 MMOL/L (ref 135–145)
WBC # BLD AUTO: 13.4 K/UL (ref 4.8–10.8)

## 2024-02-14 PROCEDURE — 99233 SBSQ HOSP IP/OBS HIGH 50: CPT | Performed by: PSYCHIATRY & NEUROLOGY

## 2024-02-14 PROCEDURE — A9270 NON-COVERED ITEM OR SERVICE: HCPCS | Performed by: INTERNAL MEDICINE

## 2024-02-14 PROCEDURE — 700105 HCHG RX REV CODE 258: Performed by: EMERGENCY MEDICINE

## 2024-02-14 PROCEDURE — A9270 NON-COVERED ITEM OR SERVICE: HCPCS | Performed by: NURSE PRACTITIONER

## 2024-02-14 PROCEDURE — A9270 NON-COVERED ITEM OR SERVICE: HCPCS | Performed by: EMERGENCY MEDICINE

## 2024-02-14 PROCEDURE — 700102 HCHG RX REV CODE 250 W/ 637 OVERRIDE(OP): Performed by: INTERNAL MEDICINE

## 2024-02-14 PROCEDURE — 700102 HCHG RX REV CODE 250 W/ 637 OVERRIDE(OP): Performed by: EMERGENCY MEDICINE

## 2024-02-14 PROCEDURE — 700105 HCHG RX REV CODE 258: Performed by: PHYSICIAN ASSISTANT

## 2024-02-14 PROCEDURE — 80048 BASIC METABOLIC PNL TOTAL CA: CPT

## 2024-02-14 PROCEDURE — 700111 HCHG RX REV CODE 636 W/ 250 OVERRIDE (IP): Performed by: PHYSICIAN ASSISTANT

## 2024-02-14 PROCEDURE — 84295 ASSAY OF SERUM SODIUM: CPT

## 2024-02-14 PROCEDURE — 700102 HCHG RX REV CODE 250 W/ 637 OVERRIDE(OP): Performed by: NURSE PRACTITIONER

## 2024-02-14 PROCEDURE — 85025 COMPLETE CBC W/AUTO DIFF WBC: CPT

## 2024-02-14 PROCEDURE — 700105 HCHG RX REV CODE 258: Performed by: INTERNAL MEDICINE

## 2024-02-14 PROCEDURE — 700102 HCHG RX REV CODE 250 W/ 637 OVERRIDE(OP): Performed by: PSYCHIATRY & NEUROLOGY

## 2024-02-14 PROCEDURE — 700111 HCHG RX REV CODE 636 W/ 250 OVERRIDE (IP): Mod: JZ | Performed by: INTERNAL MEDICINE

## 2024-02-14 PROCEDURE — 99291 CRITICAL CARE FIRST HOUR: CPT | Performed by: EMERGENCY MEDICINE

## 2024-02-14 PROCEDURE — 83735 ASSAY OF MAGNESIUM: CPT

## 2024-02-14 PROCEDURE — 84100 ASSAY OF PHOSPHORUS: CPT

## 2024-02-14 PROCEDURE — A9270 NON-COVERED ITEM OR SERVICE: HCPCS | Performed by: PSYCHIATRY & NEUROLOGY

## 2024-02-14 PROCEDURE — 92612 ENDOSCOPY SWALLOW (FEES) VID: CPT

## 2024-02-14 PROCEDURE — 770022 HCHG ROOM/CARE - ICU (200)

## 2024-02-14 RX ORDER — SODIUM CHLORIDE, SODIUM GLUCONATE, SODIUM ACETATE, POTASSIUM CHLORIDE AND MAGNESIUM CHLORIDE 526; 502; 368; 37; 30 MG/100ML; MG/100ML; MG/100ML; MG/100ML; MG/100ML
500 INJECTION, SOLUTION INTRAVENOUS ONCE
Status: COMPLETED | OUTPATIENT
Start: 2024-02-14 | End: 2024-02-14

## 2024-02-14 RX ADMIN — SODIUM CHLORIDE 2 G: 1 TABLET ORAL at 14:43

## 2024-02-14 RX ADMIN — NYSTATIN: 100000 POWDER TOPICAL at 06:13

## 2024-02-14 RX ADMIN — ENOXAPARIN SODIUM 40 MG: 100 INJECTION SUBCUTANEOUS at 17:53

## 2024-02-14 RX ADMIN — SODIUM CHLORIDE: 9 INJECTION, SOLUTION INTRAVENOUS at 14:48

## 2024-02-14 RX ADMIN — MICONAZOLE NITRATE: 20 CREAM TOPICAL at 06:14

## 2024-02-14 RX ADMIN — SODIUM CHLORIDE 750 MG: 9 INJECTION, SOLUTION INTRAVENOUS at 22:08

## 2024-02-14 RX ADMIN — NYSTATIN: 100000 POWDER TOPICAL at 17:54

## 2024-02-14 RX ADMIN — SODIUM CHLORIDE 750 MG: 9 INJECTION, SOLUTION INTRAVENOUS at 06:21

## 2024-02-14 RX ADMIN — NIMODIPINE 60 MG: 60 SOLUTION ORAL at 02:55

## 2024-02-14 RX ADMIN — NIMODIPINE 60 MG: 60 SOLUTION ORAL at 22:05

## 2024-02-14 RX ADMIN — NIMODIPINE 60 MG: 60 SOLUTION ORAL at 17:54

## 2024-02-14 RX ADMIN — ATORVASTATIN CALCIUM 40 MG: 40 TABLET, FILM COATED ORAL at 17:54

## 2024-02-14 RX ADMIN — ASPIRIN 81 MG 81 MG: 81 TABLET ORAL at 06:00

## 2024-02-14 RX ADMIN — SODIUM CHLORIDE 750 MG: 9 INJECTION, SOLUTION INTRAVENOUS at 14:49

## 2024-02-14 RX ADMIN — SODIUM CHLORIDE, SODIUM GLUCONATE, SODIUM ACETATE, POTASSIUM CHLORIDE AND MAGNESIUM CHLORIDE 500 ML: 526; 502; 368; 37; 30 INJECTION, SOLUTION INTRAVENOUS at 14:52

## 2024-02-14 RX ADMIN — LEVETIRACETAM 500 MG: 500 TABLET, FILM COATED ORAL at 17:54

## 2024-02-14 RX ADMIN — NIMODIPINE 60 MG: 60 SOLUTION ORAL at 06:12

## 2024-02-14 RX ADMIN — SODIUM CHLORIDE 2 G: 1 TABLET ORAL at 22:05

## 2024-02-14 RX ADMIN — NIMODIPINE 60 MG: 60 SOLUTION ORAL at 14:43

## 2024-02-14 RX ADMIN — SODIUM CHLORIDE 2 G: 1 TABLET ORAL at 06:13

## 2024-02-14 RX ADMIN — MICONAZOLE NITRATE: 20 CREAM TOPICAL at 17:54

## 2024-02-14 RX ADMIN — POTASSIUM BICARBONATE 50 MEQ: 978 TABLET, EFFERVESCENT ORAL at 08:03

## 2024-02-14 RX ADMIN — LEVETIRACETAM 500 MG: 500 TABLET, FILM COATED ORAL at 06:13

## 2024-02-14 RX ADMIN — NIMODIPINE 60 MG: 60 SOLUTION ORAL at 10:30

## 2024-02-14 ASSESSMENT — ENCOUNTER SYMPTOMS
TINGLING: 0
SENSORY CHANGE: 0
HEADACHES: 0
NAUSEA: 0
PALPITATIONS: 0
SHORTNESS OF BREATH: 0
CHILLS: 0
FEVER: 0
VOMITING: 0
WEAKNESS: 0
SPEECH CHANGE: 0

## 2024-02-14 ASSESSMENT — PAIN DESCRIPTION - PAIN TYPE
TYPE: ACUTE PAIN

## 2024-02-14 NOTE — THERAPY
"Speech Language Pathology   Flexible Endoscopic Evaluation of Swallowing (FEES)        Patient Name: Fay Turner  AGE:  63 y.o., SEX:  female  Medical Record #: [id number]  Date of Service: 2/14/2024    History of Present Illness  Pt is a 63 y.o. F w/ PMHx of HTN who was transferred from OSH d/t AMS w/ rapidly progressive neurological changes/obtundation. CT head revealed diffuse subarachnoid hemorrhage w/ evidence of large L P-Comm aneurysm on CTA. Pt now s/p successful coil embolization of the L P-comm aneurysm and EVD placement x2 on 1/31. Pt intubated at OSH on 1/31, extubated 2/4. On 2/4, pt noted to have L MCA vasospasms. EVD removed 2/12.  Per RN/ Dr. Tracy note per EMR on 2/13, \"More lethargic, less verbal this AM. Unclear if delirium versus evolving symptomatic spasm. Will attain CTA/P now. \" CTA/P on 2/13 demonstrated possible distal L MCA spasm versus artifact.     Dx Chest 2/11  Findings on chest radiograph appear stable since the prior radiograph.  No new abnormalities are identified.     MRI Brain 2/4:  1.  Multifocal areas of acute infarcts in the left cerebral hemisphere.  2.  Small area of acute infarct in the right basal ganglia adjacent to the tip of the ventriculostomy catheter.  3.  Cortical infarct in the right medial parietal lobe.  4.  Mild amount of subdural hemorrhages surrounding the bilateral cerebellar hemispheres and right cerebellum.  5.  Subarachnoid hemorrhage.  6.  There is no hydrocephalus.  7.  There are changes secondary to the previous endovascular repair left internal carotid aneurysm.    Hx of ST per EMR  -FEES completed on 2/6/24 demonstrating a mild oral phase dysphagia. SLP recommending MM5/TN0 diet.        Pertinent Information  Current Method of Nutrition: NGT  Patient Behaviors: Confused, Restless   Dentition: Natural dentition, Multiple carries   Feeding Tube: NGT intact to right nare       Factor(s) Affecting Performance: Impaired endurance     Discussed the risks, " benefits, and alternatives of the FEES procedure. Patient/family acknowledged and agreed to proceed.    Assessment  Flexible Endoscopic Evaluation of Swallowing (FEES) completed at bedside today. Pt was received awake/alert w/ b/l wrist restraints applied. Pt w/ fluid restriction; However, cleared by RN for have TN0 on FEES. The endoscope was passed transnasally via R nare to evaluate the anatomy and physiology of swallowing. Pt tolerated the procedure without apparent distress.     Anatomic Findings: Pt w/ deviated septum (seen on previous FEES). Did not impact ability to pass scope. Of note, b/l dried/bloody secretions in the nasal cavity. RN aware- increasing humidification on nasal cannula. Moderate edema (increased since prior study) of LPWs, PPW, epiglottis, arytenoids, and ventricular folds. Erythema and yellow hue of vocal folds, ventricular folds, and trachea appear to have resolved. B/l space occupying lesions on the posterior 1/3 of TVF. Findings consistent with intubation history and multiple insertions of NGT.      Vocal Fold Motion: B/l movement of TVF. Reduced ROM of arytenoids suspect 2/2 edema, however, still able to approximate at midline.      Secretion Management: Adequate    PO Trials: Ice Chips, Thin Liquid, Mildly Thick Liquid, Liquidised, Pudding, Soft & Bite Sized  Consistency PAS Score Timing Residue Comments   Thin Liquid 1 N/A Vallecular Residue: None (0%)  Pyriform Sinus Residue: Trace (1%-5%) Tsp- PAS 1  Cup- PAS 1  Straw- PAS 1   Mildly Thick 1 N/A Vallecular Residue: None (0%)  Pyriform Sinus Residue: Trace (1%-5%) Cup - PAS 1   Liquidised 1 N/A Vallecular Residue: Mild (5%-25%)  Pyriform Sinus Residue: Trace (1%-5%)    Pudding 3 During Swallow (inferred) Vallecular Residue: Mild (5%-25%)  Pyriform Sinus Residue: Trace (1%-5%) PAS 3, 1    Soft & Bite Sized 1 N/A Vallecular Residue: Moderate (25%-50%)  Pyriform Sinus Residue: Moderate (25%-50%)      Penetration-Aspiration Scale  (PAS)  1     No contrast enters airway  2     Contrast enters the airway, remains above the vocal folds, and is ejected from the airway (not seen in the airway at the end of the swallow).  3     Contrast enters the airway, remains above the vocal folds, and is not ejected from the airway (is seen in the airway after the swallow).  4     Contrast enters the airway, contacts the vocal folds, and is ejected from the airway.  5     Contrast enters the airway, contacts the vocal folds, and is not ejected from the airway  6     Contrast enters the airway, crosses the plane of the vocal folds, and is ejected from the airway.  7     Contrast enters the airway, crosses the plane of the vocal folds, and is not ejected from the airway despite effort.  8     Contrast enters the airway, crosses the plane of the vocal folds, is not ejected from the airway and there is no response to aspiration.    Oral phase:  Pt w/ adequate oral acceptance. Anterior loss of TN0 x1 (cup) appreciated suspect 2/2 tech feeding. Adequate labial seal and generation of intra-oral pressure for straw sips. Presumed complete A/P transport as no significant oral bolus residue was appreciated upon visual inspection. No pocketing appreciated on this date. Mastication, while coordinated, appeared ineffective as single peach was visualized split in half/un-mashed in the vallecula prior to swallow initiation.     Pharyngeal phase:  -Pt with reduced LVC resulting in single instance shallow penetration of PU4 during the swallow (inferred). A majority of the bolus was cleared w/ spontaneous second swallow, however, trace amounts remained. Eventually cleared w/ use of cued 2nd swallow and liquid wash.   -Pt with reduced BoT retraction resulting in mild (LQ3, PU4) to moderate (SB6) vallecular residue. Pt independently cleared with spontaneous swallow.   -Pt with reduced pharyngeal shortening resulting in trace (TN0, MT2, LQ3, PU4) to moderate (SB6) pyriform sinus  residue. Pt independently cleared with spontaneous swallow.   -Pt with reduced pharyngeal contraction, suspect compounded by NGT, resulting in trace (TN0, MT2, LQ3) PPW residue. Pt independently cleared with spontaneous swallow.     Compensatory Strategies:  -Use of cued 2nd swallow was somewhat effective in clearing majority of shallow penetration on PU4.   -Use of liquid was effective in clearing remainder of penetration on PU4.      Comments:   Pt throat clearing/coughing on trials irrespective of penetration/aspiration events. Suspect exacerbated by presence of endoscope.        Severity Rating:  Severity Rating: HONORIO  HONORIO: Mild    Clinical Impressions  The pt presents with a mild oropharyngeal dysphagia likely acute 2/2 SAH and multifocal infarcts. Swallow efficiency is slightly impaired while safety remains grossly intact. Risk for aspiration remains low; However, can be elevated w/ AMS/lethargy. Risk for malnutrition/dehydration is low. Would recommend a modified diet of PU4/TN0 w/ 1:1 feeding at this time d/t such. Pt appears to be a good candidate for behavioral and exercise-based swallowing rehabilitation. Consider training the following exercises based on pt specific pathophysiology: Effortful swallow, Mendelson. Suspect advancement of solids can be made at bedside as appropriate. SLP to follow.     Recommendations  Diet Consistency: PU4/TN0  - Consider removal of NGT w/ appropriate oral intake.    Medication: Crush with applesauce, as appropriate, Crush with pudding/puree, as appropriate  Supervision: 1:1 feeding with constant supervision, Direct supervision during meals, Encourage self-feeding  Positioning: Fully upright and midline during oral intake, Meals sitting upright in a chair, as tolerated  Strategies: Small bites/sips, Slow rate of intake, Reduce environmental distractions, Alternate bites and sips  Oral Care: Q6h  Additional Instrumentation: None     SLP Treatment Plan  Treatment Plan:  Dysphagia Treatment, Cognitive Treatment, Patient/Family/Caregiver Training (motor speech)  SLP Frequency: 4x Per Week  Estimated Duration: Until Therapy Goals Met    Anticipated Discharge Needs  Discharge Recommendations: Recommend post-acute placement for additional speech therapy services prior to discharge home   Therapy Recommendations Upon DC: Dysphagia Training, Comprehension Training, Expression Training, Reading Training, Writing Training, Cognitive-Linguistic Training, Community Re-Integration, Patient / Family / Caregiver Education, Other (See Comments) (motor speech)     Patient / Family Goals  Patient / Family Goal #1: nods yes to ice chips  Goal #1 Outcome: Goal met  Short Term Goal # 1: NEW 2/13: Pt will participate in an repeat instrumental swallow study via FEES to determine airway protection/swallow efficiency and guide dysphagia management.  Goal Outcome # 1: Goal met  Short Term Goal # 1 B : New 2/14: Pt will consume a PU4/TN0 diet with 1:1 supv without any overt s/s of aspiration or decline in respiratory status  Goal Outcome  # 1 B: Goal not met  Short Term Goal # 2: With mod-max cues, pt will be AAOx4 during >65% of opportunities. (not targeted this session)  Goal Outcome # 2 : Other (see comments)  Short Term Goal # 3: With max cues, pt will recall salient/functional information after a 5 min delay during >65% of opportunities.  Goal Outcome  # 3: Other (see comments) (not targeted this session)  Short Term Goal # 4: With min-mod cues, pt will participate in additional cognitive testing to further guide POC.  Goal Outcome  # 4: Other (see comments) (not targeted this session)  Short Term Goal # 5: Pt will utilize 'clear speech' techniques to increase intelligibility at the conversational level to >90%.  Goal Outcome  # 5: Other (see comments) (not targeted this session)    Anai Dumont, SLP  Supervised by Mei Mcfarlane, CCC-SLP

## 2024-02-14 NOTE — CARE PLAN
The patient is Watcher - Medium risk of patient condition declining or worsening    Shift Goals  Clinical Goals: Q 2 neuros, -200, hemodynamic stability  Patient Goals: ALEJANDRA  Family Goals: Remain updated as needed    Progress made toward(s) clinical / shift goals:      Problem: Safety - Medical Restraint  Goal: Remains free of injury from restraints (Restraint for Interference with Medical Device)  Outcome: Progressing     Problem: Skin Integrity  Goal: Skin integrity is maintained or improved  Outcome: Progressing  Note: Preventative measures in place:  Q 2 hour turns, pillows in place for support and cushioning, 2 RN skin assessments, ensuring medical devices/tubing are not under patient, repositioning medical equipment q 2 hours, mepilex in place, pressure redistribution mattress/low air loss mattress, mobility as tolerated, skin assessed under bony prominences and high pressure point areas, z-mohini pillow, TAPS system, grey foam under silicone O2 tubing, moisturizer.     Problem: Pain - Standard  Goal: Alleviation of pain or a reduction in pain to the patient’s comfort goal  Outcome: Progressing  Note: Educated patient on the use of 1-10 pain scale and use of pain descriptors. Administered pain medication when needed per MAR. Non-pharmacological methods for pain control in place such as rest, repositioning, and enforcing a calm and conductive environment.     Problem: Hemodynamics  Goal: Patient's hemodynamics, fluid balance and neurologic status will be stable or improve  Outcome: Progressing  Note: Cardiac monitoring, vital signs, daily weights, monitor i/o, manage IV fluids and infusions

## 2024-02-14 NOTE — CARE PLAN
The patient is Watcher - Medium risk of patient condition declining or worsening    Shift Goals  Clinical Goals: q2h neuro, sbp 140-200  Patient Goals: padmini  Family Goals: padmini    Progress made toward(s) clinical / shift goals:        Problem: Skin Integrity  Goal: Skin integrity is maintained or improved  Outcome: Progressing     Problem: Fall Risk  Goal: Patient will remain free from falls  Outcome: Progressing     Problem: Pain - Standard  Goal: Alleviation of pain or a reduction in pain to the patient’s comfort goal  Outcome: Progressing     Problem: Hemodynamics  Goal: Patient's hemodynamics, fluid balance and neurologic status will be stable or improve  Outcome: Progressing     Problem: Neuro Status  Goal: Neuro status will remain stable or improve  Outcome: Progressing       Patient is not progressing towards the following goals:      Problem: Knowledge Deficit - Standard  Goal: Patient and family/care givers will demonstrate understanding of plan of care, disease process/condition, diagnostic tests and medications  Outcome: Not Progressing     Problem: Safety - Medical Restraint  Goal: Remains free of injury from restraints (Restraint for Interference with Medical Device)  Outcome: Not Progressing  Goal: Free from restraint(s) (Restraint for Interference with Medical Device)  Outcome: Not Progressing

## 2024-02-14 NOTE — PROGRESS NOTES
Neurology Progress Note  Neurohospitalist Service, [facility]iences    Referring Physician: Emil Pederson M.D.    No chief complaint on file.      HPI: Refer to initial documented Neurology H&P, as detailed in the patient's chart.    Interval History 2/14: No new events overnight.    Review of systems: In addition to what is detailed in the HPI and/or updated in the interval history, all other systems reviewed and are negative.    Past Medical History:    has no past medical history on file.    FHx:  family history is not on file.    SHx:       Medications:    Current Facility-Administered Medications:     cefUROxime (Zinacef) 750 mg in  mL IVPB, 750 mg, Intravenous, Q8HRS, Graeme Xie P.A.-C., Stopped at 02/14/24 0651    sodium chloride (Salt) tablet 2 g, 2 g, Enteral Tube, Q8HRS, Oriana HAINES Latona, 2 g at 02/14/24 0613    aspirin (Asa) chewable tab 81 mg, 81 mg, Enteral Tube, DAILY, Jose Raul Tracy M.D., 81 mg at 02/14/24 0600    lidocaine (Asperflex) 4 % patch 2 Patch, 2 Patch, Transdermal, Q24HR, Dayana Babcock M.D., 2 Patch at 02/13/24 1805    enoxaparin (Lovenox) inj 40 mg, 40 mg, Subcutaneous, DAILY AT 1800, Dayana Babcock M.D., 40 mg at 02/13/24 1805    nystatin (Mycostatin) powder, , Topical, BID, Dayana Babcock M.D., Given at 02/14/24 0613    acetaminophen (Tylenol) tablet 650 mg, 650 mg, Enteral Tube, Q4HRS PRN, 650 mg at 02/13/24 1037 **OR** acetaminophen (Tylenol) suppository 650 mg, 650 mg, Rectal, Q4HRS PRN, Dayana Babcock M.D.    atorvastatin (Lipitor) tablet 40 mg, 40 mg, Enteral Tube, Q EVENING, Dayana Babcock M.D., 40 mg at 02/13/24 1805    miconazole (Micotin) 2 % cream, , Topical, BID, Dayana Babcock M.D., Given at 02/14/24 0614    NS infusion, , Intravenous, Continuous, Jeremy M Gonda, M.D., Last Rate: 83 mL/hr at 02/13/24 2335, New Bag at 02/13/24 2335    enalaprilat (Vasotec) injection 1.25 mg 1 mL, 1.25 mg, Intravenous, Q6HRS PRN, Sin MINOR  Gonda, M.D.    hydrALAZINE (Apresoline) injection 10-20 mg, 10-20 mg, Intravenous, Q4HRS PRN, Jeremy M Gonda, M.D.    labetalol (Normodyne/Trandate) injection 10-20 mg, 10-20 mg, Intravenous, Q4HRS PRN, Jeremy M Gonda, M.D.    niMODipine (Nymalize) oral syringe 60 mg, 60 mg, Enteral Tube, Q4HRS, Jeremy M Gonda, M.D., 60 mg at 02/14/24 0612    levETIRAcetam (Keppra) tablet 500 mg, 500 mg, Enteral Tube, BID, Jeremy M Gonda, M.D., 500 mg at 02/14/24 0613    Pharmacy Consult: Enteral tube insertion - review meds/change route/product selection, 1 Each, Other, PHARMACY TO DOSE, Jeremy M Gonda, M.D.    Respiratory Therapy Consult, , Nebulization, Continuous RT, Jeremy M Gonda, M.D.    senna-docusate (Pericolace Or Senokot S) 8.6-50 MG per tablet 2 Tablet, 2 Tablet, Enteral Tube, BID, 2 Tablet at 02/03/24 0514 **AND** polyethylene glycol/lytes (Miralax) Packet 1 Packet, 1 Packet, Enteral Tube, QDAY PRN **AND** magnesium hydroxide (Milk Of Magnesia) suspension 30 mL, 30 mL, Enteral Tube, QDAY PRN **AND** bisacodyl (Dulcolax) suppository 10 mg, 10 mg, Rectal, QDAY PRN, Jeremy M Gonda, M.D. MD Alert...ICU Electrolyte Replacement per Pharmacy, , Other, PHARMACY TO DOSE, Jeremy M Gonda, M.D.    ondansetron (Zofran ODT) dispertab 4 mg, 4 mg, Enteral Tube, Q4HRS PRN, 4 mg at 01/31/24 7378 **OR** ondansetron (Zofran) syringe/vial injection 4 mg, 4 mg, Intravenous, Q4HRS PRN, Jeremy M Gonda, M.D., 4 mg at 02/02/24 1758    LORazepam (Ativan) injection 2 mg, 2 mg, Intravenous, Q5 MIN PRN, Jeremy M Gonda, M.D.    prochlorperazine (Compazine) injection 10 mg, 10 mg, Intravenous, Q6HRS PRN, David Mclaughlin Jr., D.OJennifer, 10 mg at 01/31/24 1858    Physical Examination:     Vitals:    02/14/24 0500 02/14/24 0600 02/14/24 0700 02/14/24 0800   BP: (!) 152/82 (!) 166/104 (!) 144/77 138/82   Pulse: (!) 107 (!) 111 99 (!) 115   Resp: (!) 23 (!) 22 (!) 26 (!) 27   TempSrc:  Bladder Bladder    SpO2: 96% 95% 95% 98%   Weight:       Height:                NEUROLOGICAL EXAM:     Patient is a lot drowsy arousable to voice.  Tracks me in the room.  Paucity of speech.  She does follow commands.  Able to tell me her name and she knows that she is in [place] today.  Appears to be slightly better compared yesterday's exam.  Right pupil normal reactive.  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  Unchanged  Briefly antigravity strength in all 4.  Reactive tactile touch in all 4.    Objective Data:    Labs:  Lab Results   Component Value Date/Time    PROTHROMBTM 13.9 01/31/2024 03:25 PM    INR 1.06 01/31/2024 03:25 PM      Lab Results   Component Value Date/Time    WBC 13.4 (H) 02/14/2024 03:40 AM    RBC 3.97 (L) 02/14/2024 03:40 AM    HEMOGLOBIN 10.4 (L) 02/14/2024 03:40 AM    HEMATOCRIT 31.3 (L) 02/14/2024 03:40 AM    MCV 78.8 (L) 02/14/2024 03:40 AM    MCH 26.2 (L) 02/14/2024 03:40 AM    MCHC 33.2 02/14/2024 03:40 AM    MPV 9.5 02/14/2024 03:40 AM    NEUTSPOLYS 83.70 (H) 02/14/2024 03:40 AM    LYMPHOCYTES 7.50 (L) 02/14/2024 03:40 AM    MONOCYTES 6.70 02/14/2024 03:40 AM    EOSINOPHILS 1.30 02/14/2024 03:40 AM    BASOPHILS 0.40 02/14/2024 03:40 AM      Lab Results   Component Value Date/Time    SODIUM 136 02/14/2024 03:40 AM    POTASSIUM 3.5 (L) 02/14/2024 03:40 AM    CHLORIDE 101 02/14/2024 03:40 AM    CO2 26 02/14/2024 03:40 AM    GLUCOSE 141 (H) 02/14/2024 03:40 AM    BUN 12 02/14/2024 03:40 AM    CREATININE 0.33 (L) 02/14/2024 03:40 AM      Lab Results   Component Value Date/Time    CHOLSTRLTOT 165 01/31/2024 03:25 PM     (H) 01/31/2024 03:25 PM    HDL 36 (A) 01/31/2024 03:25 PM    TRIGLYCERIDE 79 01/31/2024 03:25 PM       Lab Results   Component Value Date/Time    ALKPHOSPHAT 189 (H) 02/12/2024 02:15 AM    ASTSGOT 31 02/12/2024 02:15 AM    ALTSGPT 25 02/12/2024 02:15 AM    TBILIRUBIN 0.4 02/12/2024 02:15 AM        Imaging/Testing:  DX-ABDOMEN FOR TUBE PLACEMENT   Final Result         1.  Nonspecific bowel gas pattern in the upper abdomen.   2.  Dobbhoff  tube with tip terminating overlying the expected location of the first or second duodenal segment.   3.  Cardiomegaly      CT-CEREBRAL PERFUSION ANALYSIS   Final Result      1.  Cerebral blood flow less than 30% likely representing completed infarct = 0 mL.      2.  T Max more than 6 seconds likely representing combination of completed infarct and ischemia = 0 mL.      3.  Mismatched volume likely representing ischemic brain/penumbra = None      4.  Please note that the cerebral perfusion was performed on the limited brain tissue around the basal ganglia region. Infarct/ischemia outside the CT perfusion sections can be missed in this study.      CT-CTA HEAD WITH & W/O-POST PROCESS   Final Result      1.  Prior LEFT middle cranial fossa aneurysm coiling.  Artifact limits exam.   2.  No abrupt large vessel cut off.   3.  Segmental narrowing of LEFT middle cerebral artery, primarily M1 segment, vasospasm versus artifact.   4.  Removal of ventriculostomy catheter. No hydrocephalus.   5.  Evolving RIGHT caudate infarct.   6.  Slightly improved intracranial hemorrhage.   7.  4 mm right carotid terminus aneurysm.      CT-HEAD W/O   Final Result         1.  Hyperdensity along the bilateral tentorium, compatible with layering subarachnoid hemorrhages, decreased since prior study.   2.  Atherosclerosis.         DX-ABDOMEN FOR TUBE PLACEMENT   Final Result         1.  Nonspecific bowel gas pattern in the upper abdomen.   2.  Nasogastric tube tip terminates overlying the expected location of the antrum or pylorus, somewhat withdrawn since prior study.   3.  Hazy interstitial pulmonary edema and/or infiltrates, stable   4.  Cardiomegaly      DX-ABDOMEN FOR TUBE PLACEMENT   Final Result         1.  Nonspecific bowel gas pattern in the upper abdomen.   2.  Nasogastric tube tip terminates overlying the expected location of the pylorus or first duodenal segment.   3.  Hazy interstitial pulmonary edema and/or infiltrates   4.   Cardiomegaly      DX-CHEST-PORTABLE (1 VIEW)   Final Result         Findings on chest radiograph appear stable since the prior radiograph.  No new abnormalities are identified.      DX-ABDOMEN FOR TUBE PLACEMENT   Final Result      Enteric tube tip projects over the gastric antrum or proximal duodenum, similar to prior      DX-ABDOMEN FOR TUBE PLACEMENT   Final Result      NG tube tip projects at the peripyloric region.      DX-ABDOMEN FOR TUBE PLACEMENT   Final Result      NG tube tip projects at the peripyloric region.      DX-ABDOMEN FOR TUBE PLACEMENT   Final Result      Feeding tube in place with tip at the distal stomach/pylorus.      VG-ISFYOKK-5 VIEW   Final Result         1.  Nonspecific bowel gas pattern in the upper abdomen.   2.  Nasogastric tube tip terminates overlying the expected location of the pylorus or first duodenal segment.   3.  Hazy interstitial pulmonary edema and/or infiltrates   4.  Cardiomegaly      DX-ABDOMEN FOR TUBE PLACEMENT   Final Result      Enteric sump tube in situ with its tip at the level of the distal gastric antrum or pylorus.      DX-ABDOMEN FOR TUBE PLACEMENT   Final Result         1.  Nonspecific bowel gas pattern in the upper abdomen.   2.  Nasogastric tube tip terminates overlying the expected location of the pylorus or first duodenal segment.   3.  Hazy interstitial pulmonary edema and/or infiltrates, similar to prior study.   4.  Cardiomegaly      DX-CHEST-PORTABLE (1 VIEW)   Final Result      1.  Supportive tubing as described above.   2.  No other significant change from prior exam.         MR-BRAIN-W/O   Final Result      1.  Multifocal areas of acute infarcts in the left cerebral hemisphere.   2.  Small area of acute infarct in the right basal ganglia adjacent to the tip of the ventriculostomy catheter.   3.  Cortical infarct in the right medial parietal lobe.   4.  Mild amount of subdural hemorrhages surrounding the bilateral cerebellar hemispheres and right  cerebellum.   5.  Subarachnoid hemorrhage.   6.  There is no hydrocephalus.   7.  There are changes secondary to the previous endovascular repair left internal carotid aneurysm.      CT-CTA NECK WITH & W/O-POST PROCESSING   Final Result      No focal high-grade stenosis, dissection or occlusion of the cervical carotid or vertebral arteries.      CT-CEREBRAL PERFUSION ANALYSIS   Final Result      1.  Cerebral blood flow less than 30% likely representing completed infarct = 0 mL.      2.  T Max more than 6 seconds likely representing combination of completed infarct and ischemia = 0 mL.      3.  Mismatched volume likely representing ischemic brain/penumbra = None      4.  Please note that the cerebral perfusion was performed on the limited brain tissue around the basal ganglia region. Infarct/ischemia outside the CT perfusion sections can be missed in this study.      CT-CTA HEAD WITH & W/O-POST PROCESS   Final Result      1.  Prior LEFT middle cranial fossa aneurysm coiling.  Artifact limits exam.   2.  No abrupt large vessel cut off.   3.  Segmental narrowing of LEFT middle cerebral artery, vasospasm versus artifact.   4.  Ventriculostomy catheter in similar position.   5.  Evolving RIGHT caudate infarct.   6.  Stable intracranial hemorrhage.         DX-ABDOMEN FOR TUBE PLACEMENT   Final Result      Orogastric tube tip now at the mid stomach.      DX-ABDOMEN FOR TUBE PLACEMENT   Final Result      Orogastric tube tip at the proximal stomach with side port just above the GE junction.      US-INTRACRANIAL ARTERY LIMITED   Final Result      DX-CHEST-LIMITED (1 VIEW)   Final Result         1.  Stable chest with perihilar and lower lung zone interstitial and minimal airspace opacities most consistent with pulmonary edema.   2.  No new abnormalities.      EC-ECHOCARDIOGRAM COMPLETE W/O CONT   Final Result      US-INTRACRANIAL ARTERY LIMITED   Final Result      DX-CHEST-PORTABLE (1 VIEW)   Final Result      1.  Lines and  tubes appear appropriately located      2.  Improvement of pulmonary edema/airspace process      US-INTRACRANIAL ARTERY COMP   Final Result      CT-HEAD W/O   Final Result         1. Slightly decreased subarachnoid hemorrhage overlying the cerebral hemispheres.   2. Otherwise, stable as above.         DX-CHEST-FOR LINE PLACEMENT Perform procedure in: Patient's Room   Final Result      1.  Moderate interstitial pulmonary edema.   2.   Right-sided central venous catheter terminates with the tip projecting over the expected region of the mid to distal superior vena cava.   3.  Endotracheal tube terminates in satisfactory position at the level of the aortic arch.   4.  Enteric feeding tube terminates in the left upper quadrant projecting over the expected location of the stomach.      CT-STEALTH HEAD W/O   Final Result      1.  Interval placement of a right transparietal ventriculostomy catheter which terminates with the tip near the third ventricle. There is minimally decreased caliber of the ventricular system and compared to previous exam.   2.  Findings of intracranial hemorrhage appear similar to the previous exam. No definite new acute intracranial hemorrhage is identified.   3.  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         DX-CHEST-PORTABLE (1 VIEW)   Final Result      CT-HEAD W/O   Final Result      1. Interval aneurysm coiling of left posterior communicating artery aneurysm.   2. Possible increased subarachnoid hemorrhage.   3. Intraventricular hemorrhage. There is developing mild hydrocephalus with mildly increased ventricles.   4. Small right convexity subdural hematoma measuring 5 mm in thickness.      These findings were discussed with EMILY BYRD on 1/31/2024 3:14 PM.      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   This aneurysm was embolized to occlusion as described above. Final angiographic images demonstrate only minimal filling of the neck of the aneurysm. The patient will be followed up in   the neuro interventional clinic and brought back for a follow-up angiogram in 6 months.      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      Stephanie COATES was physically present and participated during the entire procedure of the IR-EMBOLIZATION.            Assessment and Plan:    63-year-old female presenting with a Carpio Potts 4 modified Swenson grade 3.  Secondary from a left P-comm aneurysm status post coiling.  Post bleed 8/13.  Postop day #13.  No windows or TCD's.  MRI brain showed multifocal infarcts.  EVD was removed on Febres 12.  CTA/P was done yesterday showed possible distal left MCA spasm versus artifact.  Today examination appears to be improved.  Will continue current management.    Plan:  1.  Every 2 hours neurochecks.  2.  Blood pressure parameters between 140-200 systolic  3.  Continue Keppra 500 mg twice daily given the questionable seizure activity on arrival here.  4.  Continue Lovenox for DVT prophylaxis.  5.  Continue aspirin 81 mg daily  6.  Continue nimodipine 60 mg every 4 hours  7.  Maintain normothermia that evens I's and O's.  8.  Maintain normal sodium levels.  Continue salt tabs.      Spent over 59 minutes reviewing the notes, imaging, examined the patient and going to care plan the primary team.    This chart was partially generated using voice recognition technology and sound alike word replacement may be present, best efforts were made to make the chart accurate.    Jose Raul Tenorio MD  Board Certified Neurology, ABPN  (t) [phone]
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
## 2024-02-14 NOTE — PROGRESS NOTES
Called regarding some leakage from EVD site today that is not stopping.   Area cleaned with iodine. 5cc 1% lidocaine injected with 25g needle. 2 2.0 silk sutures placed to EVD site.   Site cleaned again with iodine.   Patient tolerated well; no complications.   Exam appears unchanged.   No CT unless neuro changes.   Zinacef for 48 hours   Call with questions or concerns.
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
## 2024-02-14 NOTE — PROGRESS NOTES
Radiology Progress Note   Author: HARVEY Montes Date & Time created: 2/14/2024  9:00 AM   Date of admission  1/31/2024  Note to reader: this note follows the APSO format rather than the historical SOAP format. Assessment and plan located at the top of the note for ease of use.    Chief Complaint  63 y.o. female admitted 1/31/2024 with subarachnoid hemorrhage        HPI  Fay France is a 62 yo female with PMH significant for HTN who presented to OSH for sudden onset of new eyelid droop and generalized headache without significant head trauma. Pt seized in OSH CT scan, was loaded with Keppra and transferred for higher level of care. 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 Carpio Potts 4; Modified Swenson scale 3. 01/31/24 MAURICE Eason completed a cerebral aneurysm with coil embolization of large left PCOMM aneurysm.      Interval History:   02/01/24: RIGHT groin access site soft, non-tender, without ecchymosis. Dressing clean, dry, intact. Pedal pulses +2 bilaterally with feet pink, and warm, cap refill <3 sec. Pt is intubated and ventilated with EVD. She is not following commands, but moves right foot to stimuli. I reviewed the most recent labs including WBC 19.6, Hgb 14.2, Cr 0.61. Coordinated post IR procedure care with hospital nursing staff.      02/05/24:  PBD/post Pcom aneurysm embolization with coil #5.  Pt extubated yesterday (2/4). A stat CTA and CT perfusion done early am on 2/4 (increased lethargy) no obvious vasospasm noted. TCD's with poor windows therefor no baseline Tcd's obtained.  I reviewed FU MRI brain(02/04) which showed small L MCA infarcts-Vasopressors infusing and  keeping -200 to mitigate vasospasm. Anterior EVD in place at 10 cm H20 above tragus with good waveform noted on monitor-R groin site soft, nontender without edema, bleeding, small scab with small  ecchymosis. I reviewed  today's labs: WBC 13.7; hemoglobin 10.5;  ; Cr 0.40; respiratory culture- +MSSA in sputum-continuing Rocephin today is day 5 of 5.  I's/O's-last 24 hours+ 551/since admission +1260; ICP 2-10 (good ICP waveform noted on monitor).       02/06/24:  PBD/post Pcom aneurysm embolization with coil #6. TCD's with poor windows, recommend following neuro assessment. I reviewed FU MRI brain(02/04) which showed small L MCA infarcts-Goal -200 to mitigate vasospasm. Anterior EVD in place at 10 cm H20 above tragus with good waveform noted on monitor-R groin site soft, nontender without edema, bleeding, small scab with small  ecchymosis. I reviewed today's labs: WBC 17; hemoglobin 10.8;  ; Cr 0.33; respiratory culture- +MSSA in sputum -completed Rocephin.  I's/O's-last 24 hours -193 /since admission +1037; ICP 5-11 (good ICP waveform noted on monitor). I started ASA therapy 2nd to multiple infarcts noted on MRI.  ASA therapy discussed and approved by Fer Helms and  Dr. Eason.      02/07/24: SAH, Carpio Potts 4;Modified Swenson 3.  PBD/post Pcom aneurysm embolization with coil #7. TCD's discontinued 2nd to poor windows, recommend following neuro assessment. -Goal -200 to mitigate vasospasm. Anterior EVD in place at 10 cm H20 above tragus with good waveform noted on monitor-ecchymosis. I reviewed today's labs: WBC 15.6; Hemoglobin 11.1 ; Cr 0.36; .  I's/O's-last 24 hours -190 /since admission +876; ICP 3-10 (good ICP waveform noted on monitor).      02/08/24: SAH, Carpio Potts 4;Modified Swenson 3.  PBD/post Pcom aneurysm embolization with coil #8. TCD's with poor windows, recommend following neuro assessment. -Goal -200 to mitigate vasospasm. Anterior EVD open, raised today to 20 cm H20 above tragus with good waveform noted on monitor-ecchymosis. I reviewed today's labs: WBC 15.1; Hemoglobin 10.5;  ; Cr 0.31;  I's/O's-last 24 hours --1091/since admission -214 ICP 4-11/CPP > 100 (good  ICP waveform noted on monitor).  Tmax 100.6     02/09/24: SAH, Carpio Potts 4;Modified Swenson 3. PBD/post Pcom aneurysm embolization with coil #9. No longer following TCD's 2nd to poor windows.   Follow neuro assessment. -Goal -200 to mitigate vasospasm. Anterior EVD open, @15 cm H20 above tragus with good waveform noted on monitor. I reviewed today's labs: WBC 14.9; Hemoglobin 11.2;  ; Cr 0.29;  I's/O's-last 24 hours -110 ml; output since admission - 675 ml.  ICP 4-11/CPP ; EVD output -139 mL in the last 24 hours (good ICP waveform noted on monitor). Tmax 100. 9     2/10/24: SAH, Carpio Potts 4; Modified Swenson 3. PBD #10 /post Pcom aneurysm embolization with 10 coils (1/31/24). No longer following TCD's 2nd to poor windows.   Follow neuro assessment. -Goal -200 to mitigate vasospasm. Anterior EVD open, @ 20 cm H20 above tragus with good waveform noted on monitor. I reviewed patient's most recent labs: WBC 14.3; Hemoglobin 10.6;  ; Cr 0.36;  ICP<15; EVD output:139 mL in the last 24 hours. Tmax 100. 4     2/11/24: SAH, Carpio Potts 4; Modified Swenson 3. PBD #11 /post Pcom aneurysm embolization with 10 coils (1/31/24). Pt is verbally responsive with appropriate answers in Spans[place], purposeful and following some commands.  - EVD clamped 2/11/24.   - No longer following TCD's 2nd to poor windows.   Follow neuro assessment.   - Goal -200 to mitigate vasospasm.   - I reviewed patient's most recent labs: WBC 17.5; Hemoglobin 11.0;  ; Cr 0.36; Tmax 99.5    02/12/24 - PBD#12. No acute events overnight. Neuro unchanged per bedside RN. No longer following TCD's 2nd to poor windows; q 2 neuro exams in place.  Labs reviewed; , WBC 17.4. Repeat CT this morning shows decreased hyperdensity along the bilateral tentorium.  Family at bedside; education provided.  Patient discussed with bedside RN.    02/13/24 - PBD#13. No acute events overnight. EVD removed yesterday. Pt more alert today for  my assessment; however, neurologist and intensivist report she is more lethargic and less verbal. CTA and CT perfusion study ordered. Pt squeezing my hand bilaterally and moving all extremities; following some commands. Nurse at bedside administering thick liquid medication eliciting cough. Q 2 neuro exams in place. Labs reviewed; , WBC 15.3. Patient discussed with bedside RN.    02/14/24 - PBD#14. No acute events overnight. CTA yesterday shows possible distal left MCA spasm; CT perfusion without mismatch.  Patient awake and interactive today.  Q 2 neuro exams remain in place. Labs reviewed; , WBC 13.5. Patient discussed with bedside RN.  NSG signed off.    Assessment/Plan     Principal Problem:    SAH (subarachnoid hemorrhage) (HCC)  Active Problems:    Acute respiratory failure with hypoxia (HCC)    Secondary hypertension    Acute pulmonary edema (HCC)    Seizure (HCC)    Hypokalemia    Hypophosphatemia    Anemia    Pneumonia of both lungs due to methicillin susceptible Staphylococcus aureus (MSSA) (HCC)    Aneurysm of ascending aorta without rupture (HCC)    Hyperglycemia      Plan IR  - PBD#14  - -200 per neuro recs  - Continue Nimodipine   - TCD's (poor windows) therefore rely on neuro assessment for vasospasm watch  - If suspect vasospasm consider CTA and CT perfusion  - Continue ASA 81 mg daily   - Continue Q2 neuro checks   - Neurology following  -     -Thank you for allowing Interventional Radiology team to participate in the patients care, if any additional care or requests are needed in the future please do not hesitate to call or place IR order   [phone]           Review of Systems  Physical Exam   Review of Systems   Constitutional:  Negative for chills and fever.   Respiratory:  Negative for shortness of breath.    Cardiovascular:  Negative for chest pain and palpitations.   Gastrointestinal:  Negative for nausea and vomiting.   Neurological:  Negative for tingling, sensory change,  speech change, weakness and headaches.      Vitals:    02/14/24 0900   BP: (!) 154/81   Pulse: (!) 108   Resp: (!) 29   SpO2: 98%        Physical Exam  Constitutional:       Appearance: Normal appearance.   Cardiovascular:      Rate and Rhythm: Tachycardia present.      Pulses: Normal pulses.   Abdominal:      Palpations: Abdomen is soft.   Skin:     General: Skin is warm and dry.   Neurological:      General: No focal deficit present.      Mental Status: She is alert and oriented to person, place, and time.      Comments: A/O x 4; following commands  L ptosis, L CNIII palsy,   antigravity strength in all 4 extremities   Sensory intact   Psychiatric:         Mood and Affect: Mood normal.         Behavior: Behavior normal.             Labs    Recent Labs     02/12/24 0215 02/13/24 0225 02/14/24  0340   WBC 17.4* 15.3* 13.4*   RBC 3.85* 3.89* 3.97*   HEMOGLOBIN 10.1* 10.2* 10.4*   HEMATOCRIT 30.3* 30.9* 31.3*   MCV 78.7* 79.4* 78.8*   MCH 26.2* 26.2* 26.2*   MCHC 33.3 33.0 33.2   RDW 38.9 39.8 39.1   PLATELETCT 460* 490* 516*   MPV 9.5 9.4 9.5     Recent Labs     02/12/24 0215 02/12/24  1054 02/13/24 0225 02/13/24  1035 02/13/24  1830 02/14/24  0340   SODIUM 137   < > 136 133* 136 136   POTASSIUM 3.6  --  4.0  --   --  3.5*   CHLORIDE 101  --  101  --   --  101   CO2 24  --  26  --   --  26   GLUCOSE 125*  --  131*  --   --  141*   BUN 13  --  11  --   --  12   CREATININE 0.36*  --  0.31*  --   --  0.33*   CALCIUM 9.0  --  8.5  --   --  9.4    < > = values in this interval not displayed.     Recent Labs     02/12/24 0215 02/13/24 0225 02/14/24  0340   ALBUMIN 3.3  --   --    TBILIRUBIN 0.4  --   --    ALKPHOSPHAT 189*  --   --    TOTPROTEIN 6.7  --   --    ALTSGPT 25  --   --    ASTSGOT 31  --   --    CREATININE 0.36* 0.31* 0.33*     DX-ABDOMEN FOR TUBE PLACEMENT   Final Result         1.  Nonspecific bowel gas pattern in the upper abdomen.   2.  Dobbhoff tube with tip terminating overlying the expected location  of the first or second duodenal segment.   3.  Cardiomegaly      CT-CEREBRAL PERFUSION ANALYSIS   Final Result      1.  Cerebral blood flow less than 30% likely representing completed infarct = 0 mL.      2.  T Max more than 6 seconds likely representing combination of completed infarct and ischemia = 0 mL.      3.  Mismatched volume likely representing ischemic brain/penumbra = None      4.  Please note that the cerebral perfusion was performed on the limited brain tissue around the basal ganglia region. Infarct/ischemia outside the CT perfusion sections can be missed in this study.      CT-CTA HEAD WITH & W/O-POST PROCESS   Final Result      1.  Prior LEFT middle cranial fossa aneurysm coiling.  Artifact limits exam.   2.  No abrupt large vessel cut off.   3.  Segmental narrowing of LEFT middle cerebral artery, primarily M1 segment, vasospasm versus artifact.   4.  Removal of ventriculostomy catheter. No hydrocephalus.   5.  Evolving RIGHT caudate infarct.   6.  Slightly improved intracranial hemorrhage.   7.  4 mm right carotid terminus aneurysm.      CT-HEAD W/O   Final Result         1.  Hyperdensity along the bilateral tentorium, compatible with layering subarachnoid hemorrhages, decreased since prior study.   2.  Atherosclerosis.         DX-ABDOMEN FOR TUBE PLACEMENT   Final Result         1.  Nonspecific bowel gas pattern in the upper abdomen.   2.  Nasogastric tube tip terminates overlying the expected location of the antrum or pylorus, somewhat withdrawn since prior study.   3.  Hazy interstitial pulmonary edema and/or infiltrates, stable   4.  Cardiomegaly      DX-ABDOMEN FOR TUBE PLACEMENT   Final Result         1.  Nonspecific bowel gas pattern in the upper abdomen.   2.  Nasogastric tube tip terminates overlying the expected location of the pylorus or first duodenal segment.   3.  Hazy interstitial pulmonary edema and/or infiltrates   4.  Cardiomegaly      DX-CHEST-PORTABLE (1 VIEW)   Final Result          Findings on chest radiograph appear stable since the prior radiograph.  No new abnormalities are identified.      DX-ABDOMEN FOR TUBE PLACEMENT   Final Result      Enteric tube tip projects over the gastric antrum or proximal duodenum, similar to prior      DX-ABDOMEN FOR TUBE PLACEMENT   Final Result      NG tube tip projects at the peripyloric region.      DX-ABDOMEN FOR TUBE PLACEMENT   Final Result      NG tube tip projects at the peripyloric region.      DX-ABDOMEN FOR TUBE PLACEMENT   Final Result      Feeding tube in place with tip at the distal stomach/pylorus.      [id number] VIEW   Final Result         1.  Nonspecific bowel gas pattern in the upper abdomen.   2.  Nasogastric tube tip terminates overlying the expected location of the pylorus or first duodenal segment.   3.  Hazy interstitial pulmonary edema and/or infiltrates   4.  Cardiomegaly      DX-ABDOMEN FOR TUBE PLACEMENT   Final Result      Enteric sump tube in situ with its tip at the level of the distal gastric antrum or pylorus.      DX-ABDOMEN FOR TUBE PLACEMENT   Final Result         1.  Nonspecific bowel gas pattern in the upper abdomen.   2.  Nasogastric tube tip terminates overlying the expected location of the pylorus or first duodenal segment.   3.  Hazy interstitial pulmonary edema and/or infiltrates, similar to prior study.   4.  Cardiomegaly      DX-CHEST-PORTABLE (1 VIEW)   Final Result      1.  Supportive tubing as described above.   2.  No other significant change from prior exam.         MR-BRAIN-W/O   Final Result      1.  Multifocal areas of acute infarcts in the left cerebral hemisphere.   2.  Small area of acute infarct in the right basal ganglia adjacent to the tip of the ventriculostomy catheter.   3.  Cortical infarct in the right medial parietal lobe.   4.  Mild amount of subdural hemorrhages surrounding the bilateral cerebellar hemispheres and right cerebellum.   5.  Subarachnoid hemorrhage.   6.  There is no  hydrocephalus.   7.  There are changes secondary to the previous endovascular repair left internal carotid aneurysm.      CT-CTA NECK WITH & W/O-POST PROCESSING   Final Result      No focal high-grade stenosis, dissection or occlusion of the cervical carotid or vertebral arteries.      CT-CEREBRAL PERFUSION ANALYSIS   Final Result      1.  Cerebral blood flow less than 30% likely representing completed infarct = 0 mL.      2.  T Max more than 6 seconds likely representing combination of completed infarct and ischemia = 0 mL.      3.  Mismatched volume likely representing ischemic brain/penumbra = None      4.  Please note that the cerebral perfusion was performed on the limited brain tissue around the basal ganglia region. Infarct/ischemia outside the CT perfusion sections can be missed in this study.      CT-CTA HEAD WITH & W/O-POST PROCESS   Final Result      1.  Prior LEFT middle cranial fossa aneurysm coiling.  Artifact limits exam.   2.  No abrupt large vessel cut off.   3.  Segmental narrowing of LEFT middle cerebral artery, vasospasm versus artifact.   4.  Ventriculostomy catheter in similar position.   5.  Evolving RIGHT caudate infarct.   6.  Stable intracranial hemorrhage.         DX-ABDOMEN FOR TUBE PLACEMENT   Final Result      Orogastric tube tip now at the mid stomach.      DX-ABDOMEN FOR TUBE PLACEMENT   Final Result      Orogastric tube tip at the proximal stomach with side port just above the GE junction.      US-INTRACRANIAL ARTERY LIMITED   Final Result      DX-CHEST-LIMITED (1 VIEW)   Final Result         1.  Stable chest with perihilar and lower lung zone interstitial and minimal airspace opacities most consistent with pulmonary edema.   2.  No new abnormalities.      EC-ECHOCARDIOGRAM COMPLETE W/O CONT   Final Result      US-INTRACRANIAL ARTERY LIMITED   Final Result      DX-CHEST-PORTABLE (1 VIEW)   Final Result      1.  Lines and tubes appear appropriately located      2.  Improvement of  pulmonary edema/airspace process      US-INTRACRANIAL ARTERY COMP   Final Result      CT-HEAD W/O   Final Result         1. Slightly decreased subarachnoid hemorrhage overlying the cerebral hemispheres.   2. Otherwise, stable as above.         DX-CHEST-FOR LINE PLACEMENT Perform procedure in: Patient's Room   Final Result      1.  Moderate interstitial pulmonary edema.   2.   Right-sided central venous catheter terminates with the tip projecting over the expected region of the mid to distal superior vena cava.   3.  Endotracheal tube terminates in satisfactory position at the level of the aortic arch.   4.  Enteric feeding tube terminates in the left upper quadrant projecting over the expected location of the stomach.      CT-STEALTH HEAD W/O   Final Result      1.  Interval placement of a right transparietal ventriculostomy catheter which terminates with the tip near the third ventricle. There is minimally decreased caliber of the ventricular system and compared to previous exam.   2.  Findings of intracranial hemorrhage appear similar to the previous exam. No definite new acute intracranial hemorrhage is identified.   3.  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         DX-CHEST-PORTABLE (1 VIEW)   Final Result      CT-HEAD W/O   Final Result      1. Interval aneurysm coiling of left posterior communicating artery aneurysm.   2. Possible increased subarachnoid hemorrhage.   3. Intraventricular hemorrhage. There is developing mild hydrocephalus with mildly increased ventricles.   4. Small right convexity subdural hematoma measuring 5 mm in thickness.      These findings were discussed with EMILY BYRD on 1/31/2024 3:14 PM.      IR-EMBOLIZATION   Final Result   Impression:      63-year-old patient with sudden onset of worst headache of life followed by obtundation underwent cerebral angiography which demonstrated a large  13 x 11 x 11 mm aneurysm arising from the dorsal  "wall of the left ICA incorporating a small left posterior    communicating artery at its neck.   This aneurysm was embolized to occlusion as described above. Final angiographic images demonstrate only minimal filling of the neck of the aneurysm. The patient will be followed up in   the neuro interventional clinic and brought back for a follow-up angiogram in 6 months.      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      I, Stephanie Eason was physically present and participated during the entire procedure of the IR-EMBOLIZATION.        INR   Date Value Ref Range Status   01/31/2024 1.06 0.87 - 1.13 Final     Comment:     INR - Non-therapeutic Reference Range: 0.87-1.13  INR - Therapeutic Reference Range: 2.0-4.0       No results found for: \"POCINR\"     Intake/Output Summary (Last 24 hours) at 2/12/2024 0811  Last data filed at 2/12/2024 0600  Gross per 24 hour   Intake 2444.57 ml   Output 2345 ml   Net 99.57 ml      Labs not explicitly included in this progress note were reviewed by the author. Radiology/imaging not explicitly included in this progress note was reviewed by the author.     I have performed a physical exam and reviewed and updated ROS and Plan today (2/14/2024).     30 minutes in directly providing and coordinating care and extensive data review.  No time overlap and excludes procedures.  "

## 2024-02-15 LAB
ANION GAP SERPL CALC-SCNC: 11 MMOL/L (ref 7–16)
BASOPHILS # BLD AUTO: 0.4 % (ref 0–1.8)
BASOPHILS # BLD: 0.05 K/UL (ref 0–0.12)
BUN SERPL-MCNC: 10 MG/DL (ref 8–22)
CALCIUM SERPL-MCNC: 8.7 MG/DL (ref 8.5–10.5)
CHLORIDE SERPL-SCNC: 102 MMOL/L (ref 96–112)
CO2 SERPL-SCNC: 24 MMOL/L (ref 20–33)
CREAT SERPL-MCNC: 0.31 MG/DL (ref 0.5–1.4)
EOSINOPHIL # BLD AUTO: 0.31 K/UL (ref 0–0.51)
EOSINOPHIL NFR BLD: 2.6 % (ref 0–6.9)
ERYTHROCYTE [DISTWIDTH] IN BLOOD BY AUTOMATED COUNT: 40 FL (ref 35.9–50)
GFR SERPLBLD CREATININE-BSD FMLA CKD-EPI: 118 ML/MIN/1.73 M 2
GLUCOSE SERPL-MCNC: 132 MG/DL (ref 65–99)
HCT VFR BLD AUTO: 31.6 % (ref 37–47)
HGB BLD-MCNC: 10.4 G/DL (ref 12–16)
IMM GRANULOCYTES # BLD AUTO: 0.05 K/UL (ref 0–0.11)
IMM GRANULOCYTES NFR BLD AUTO: 0.4 % (ref 0–0.9)
LYMPHOCYTES # BLD AUTO: 1.08 K/UL (ref 1–4.8)
LYMPHOCYTES NFR BLD: 9 % (ref 22–41)
MAGNESIUM SERPL-MCNC: 2 MG/DL (ref 1.5–2.5)
MCH RBC QN AUTO: 26.2 PG (ref 27–33)
MCHC RBC AUTO-ENTMCNC: 32.9 G/DL (ref 32.2–35.5)
MCV RBC AUTO: 79.6 FL (ref 81.4–97.8)
MONOCYTES # BLD AUTO: 0.75 K/UL (ref 0–0.85)
MONOCYTES NFR BLD AUTO: 6.2 % (ref 0–13.4)
NEUTROPHILS # BLD AUTO: 9.8 K/UL (ref 1.82–7.42)
NEUTROPHILS NFR BLD: 81.4 % (ref 44–72)
NRBC # BLD AUTO: 0 K/UL
NRBC BLD-RTO: 0 /100 WBC (ref 0–0.2)
PHOSPHATE SERPL-MCNC: 2.8 MG/DL (ref 2.5–4.5)
PLATELET # BLD AUTO: 515 K/UL (ref 164–446)
PMV BLD AUTO: 9.3 FL (ref 9–12.9)
POTASSIUM SERPL-SCNC: 3.8 MMOL/L (ref 3.6–5.5)
RBC # BLD AUTO: 3.97 M/UL (ref 4.2–5.4)
SODIUM SERPL-SCNC: 135 MMOL/L (ref 135–145)
SODIUM SERPL-SCNC: 137 MMOL/L (ref 135–145)
WBC # BLD AUTO: 12 K/UL (ref 4.8–10.8)

## 2024-02-15 PROCEDURE — 84295 ASSAY OF SERUM SODIUM: CPT

## 2024-02-15 PROCEDURE — 700111 HCHG RX REV CODE 636 W/ 250 OVERRIDE (IP): Performed by: PHYSICIAN ASSISTANT

## 2024-02-15 PROCEDURE — 85025 COMPLETE CBC W/AUTO DIFF WBC: CPT

## 2024-02-15 PROCEDURE — A9270 NON-COVERED ITEM OR SERVICE: HCPCS | Performed by: NURSE PRACTITIONER

## 2024-02-15 PROCEDURE — 97110 THERAPEUTIC EXERCISES: CPT

## 2024-02-15 PROCEDURE — A9270 NON-COVERED ITEM OR SERVICE: HCPCS | Performed by: PSYCHIATRY & NEUROLOGY

## 2024-02-15 PROCEDURE — 770022 HCHG ROOM/CARE - ICU (200)

## 2024-02-15 PROCEDURE — 700111 HCHG RX REV CODE 636 W/ 250 OVERRIDE (IP): Mod: JZ | Performed by: INTERNAL MEDICINE

## 2024-02-15 PROCEDURE — 700102 HCHG RX REV CODE 250 W/ 637 OVERRIDE(OP): Performed by: PSYCHIATRY & NEUROLOGY

## 2024-02-15 PROCEDURE — 83735 ASSAY OF MAGNESIUM: CPT

## 2024-02-15 PROCEDURE — 84100 ASSAY OF PHOSPHORUS: CPT

## 2024-02-15 PROCEDURE — 99291 CRITICAL CARE FIRST HOUR: CPT | Performed by: EMERGENCY MEDICINE

## 2024-02-15 PROCEDURE — 97530 THERAPEUTIC ACTIVITIES: CPT

## 2024-02-15 PROCEDURE — A9270 NON-COVERED ITEM OR SERVICE: HCPCS | Performed by: EMERGENCY MEDICINE

## 2024-02-15 PROCEDURE — A9270 NON-COVERED ITEM OR SERVICE: HCPCS | Performed by: INTERNAL MEDICINE

## 2024-02-15 PROCEDURE — 700102 HCHG RX REV CODE 250 W/ 637 OVERRIDE(OP): Performed by: NURSE PRACTITIONER

## 2024-02-15 PROCEDURE — 700102 HCHG RX REV CODE 250 W/ 637 OVERRIDE(OP): Performed by: EMERGENCY MEDICINE

## 2024-02-15 PROCEDURE — 700102 HCHG RX REV CODE 250 W/ 637 OVERRIDE(OP): Performed by: INTERNAL MEDICINE

## 2024-02-15 PROCEDURE — 80048 BASIC METABOLIC PNL TOTAL CA: CPT

## 2024-02-15 PROCEDURE — 92526 ORAL FUNCTION THERAPY: CPT

## 2024-02-15 PROCEDURE — 700105 HCHG RX REV CODE 258: Performed by: INTERNAL MEDICINE

## 2024-02-15 PROCEDURE — 700105 HCHG RX REV CODE 258: Performed by: PHYSICIAN ASSISTANT

## 2024-02-15 PROCEDURE — 99232 SBSQ HOSP IP/OBS MODERATE 35: CPT | Performed by: PSYCHIATRY & NEUROLOGY

## 2024-02-15 RX ADMIN — NIMODIPINE 60 MG: 60 SOLUTION ORAL at 05:51

## 2024-02-15 RX ADMIN — MICONAZOLE NITRATE: 20 CREAM TOPICAL at 17:57

## 2024-02-15 RX ADMIN — NYSTATIN: 100000 POWDER TOPICAL at 05:51

## 2024-02-15 RX ADMIN — NYSTATIN: 100000 POWDER TOPICAL at 17:57

## 2024-02-15 RX ADMIN — NIMODIPINE 60 MG: 60 SOLUTION ORAL at 22:12

## 2024-02-15 RX ADMIN — ASPIRIN 81 MG 81 MG: 81 TABLET ORAL at 05:51

## 2024-02-15 RX ADMIN — SODIUM CHLORIDE 2 G: 1 TABLET ORAL at 22:12

## 2024-02-15 RX ADMIN — NIMODIPINE 60 MG: 60 SOLUTION ORAL at 02:02

## 2024-02-15 RX ADMIN — NIMODIPINE 60 MG: 60 SOLUTION ORAL at 14:37

## 2024-02-15 RX ADMIN — ATORVASTATIN CALCIUM 40 MG: 40 TABLET, FILM COATED ORAL at 17:56

## 2024-02-15 RX ADMIN — SODIUM CHLORIDE: 9 INJECTION, SOLUTION INTRAVENOUS at 22:19

## 2024-02-15 RX ADMIN — NIMODIPINE 60 MG: 60 SOLUTION ORAL at 09:37

## 2024-02-15 RX ADMIN — LEVETIRACETAM 500 MG: 500 TABLET, FILM COATED ORAL at 05:51

## 2024-02-15 RX ADMIN — SODIUM CHLORIDE 750 MG: 9 INJECTION, SOLUTION INTRAVENOUS at 06:02

## 2024-02-15 RX ADMIN — MICONAZOLE NITRATE: 20 CREAM TOPICAL at 05:51

## 2024-02-15 RX ADMIN — SODIUM CHLORIDE 2 G: 1 TABLET ORAL at 05:51

## 2024-02-15 RX ADMIN — SODIUM CHLORIDE: 9 INJECTION, SOLUTION INTRAVENOUS at 07:59

## 2024-02-15 RX ADMIN — SODIUM CHLORIDE 750 MG: 9 INJECTION, SOLUTION INTRAVENOUS at 14:45

## 2024-02-15 RX ADMIN — POTASSIUM BICARBONATE 25 MEQ: 978 TABLET, EFFERVESCENT ORAL at 09:37

## 2024-02-15 RX ADMIN — LEVETIRACETAM 500 MG: 500 TABLET, FILM COATED ORAL at 17:56

## 2024-02-15 RX ADMIN — ENOXAPARIN SODIUM 40 MG: 100 INJECTION SUBCUTANEOUS at 17:57

## 2024-02-15 RX ADMIN — SODIUM CHLORIDE 2 G: 1 TABLET ORAL at 14:37

## 2024-02-15 RX ADMIN — NIMODIPINE 60 MG: 60 SOLUTION ORAL at 17:57

## 2024-02-15 ASSESSMENT — COGNITIVE AND FUNCTIONAL STATUS - GENERAL
DRESSING REGULAR UPPER BODY CLOTHING: A LOT
DAILY ACTIVITIY SCORE: 8
TURNING FROM BACK TO SIDE WHILE IN FLAT BAD: UNABLE
CLIMB 3 TO 5 STEPS WITH RAILING: TOTAL
HELP NEEDED FOR BATHING: TOTAL
PERSONAL GROOMING: A LOT
DRESSING REGULAR LOWER BODY CLOTHING: TOTAL
WALKING IN HOSPITAL ROOM: TOTAL
SUGGESTED CMS G CODE MODIFIER DAILY ACTIVITY: CM
EATING MEALS: TOTAL
MOVING FROM LYING ON BACK TO SITTING ON SIDE OF FLAT BED: UNABLE
TOILETING: TOTAL
STANDING UP FROM CHAIR USING ARMS: TOTAL
SUGGESTED CMS G CODE MODIFIER MOBILITY: CN
MOVING TO AND FROM BED TO CHAIR: UNABLE
MOBILITY SCORE: 6

## 2024-02-15 ASSESSMENT — ENCOUNTER SYMPTOMS
CHILLS: 0
SENSORY CHANGE: 0
VOMITING: 0
TINGLING: 0
FEVER: 0
HEADACHES: 0
PALPITATIONS: 0
NAUSEA: 0
SHORTNESS OF BREATH: 0

## 2024-02-15 ASSESSMENT — GAIT ASSESSMENTS: GAIT LEVEL OF ASSIST: UNABLE TO PARTICIPATE

## 2024-02-15 NOTE — PROGRESS NOTES
Radiology Progress Note   Author: HARVEY Montes Date & Time created: 2/15/2024  9:00 AM   Date of admission  1/31/2024  Note to reader: this note follows the APSO format rather than the historical SOAP format. Assessment and plan located at the top of the note for ease of use.    Chief Complaint  63 y.o. female admitted 1/31/2024 with subarachnoid hemorrhage        HPI  Fya France is a 64 yo female with PMH significant for HTN who presented to OSH for sudden onset of new eyelid droop and generalized headache without significant head trauma. Pt seized in OSH CT scan, was loaded with Keppra and transferred for higher level of care. 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 Carpio Potts 4; Modified Swenson scale 3. 01/31/24 MAURICE Eason completed a cerebral aneurysm with coil embolization of large left PCOMM aneurysm.      Interval History:   02/01/24: RIGHT groin access site soft, non-tender, without ecchymosis. Dressing clean, dry, intact. Pedal pulses +2 bilaterally with feet pink, and warm, cap refill <3 sec. Pt is intubated and ventilated with EVD. She is not following commands, but moves right foot to stimuli. I reviewed the most recent labs including WBC 19.6, Hgb 14.2, Cr 0.61. Coordinated post IR procedure care with hospital nursing staff.      02/05/24:  PBD/post Pcom aneurysm embolization with coil #5.  Pt extubated yesterday (2/4). A stat CTA and CT perfusion done early am on 2/4 (increased lethargy) no obvious vasospasm noted. TCD's with poor windows therefor no baseline Tcd's obtained.  I reviewed FU MRI brain(02/04) which showed small L MCA infarcts-Vasopressors infusing and  keeping -200 to mitigate vasospasm. Anterior EVD in place at 10 cm H20 above tragus with good waveform noted on monitor-R groin site soft, nontender without edema, bleeding, small scab with small  ecchymosis. I reviewed  today's labs: WBC 13.7; hemoglobin 10.5;  ; Cr 0.40; respiratory culture- +MSSA in sputum-continuing Rocephin today is day 5 of 5.  I's/O's-last 24 hours+ 551/since admission +1260; ICP 2-10 (good ICP waveform noted on monitor).       02/06/24:  PBD/post Pcom aneurysm embolization with coil #6. TCD's with poor windows, recommend following neuro assessment. I reviewed FU MRI brain(02/04) which showed small L MCA infarcts-Goal -200 to mitigate vasospasm. Anterior EVD in place at 10 cm H20 above tragus with good waveform noted on monitor-R groin site soft, nontender without edema, bleeding, small scab with small  ecchymosis. I reviewed today's labs: WBC 17; hemoglobin 10.8;  ; Cr 0.33; respiratory culture- +MSSA in sputum -completed Rocephin.  I's/O's-last 24 hours -193 /since admission +1037; ICP 5-11 (good ICP waveform noted on monitor). I started ASA therapy 2nd to multiple infarcts noted on MRI.  ASA therapy discussed and approved by Fer Helms and  Dr. Eason.      02/07/24: SAH, Carpio Potts 4;Modified Swenson 3.  PBD/post Pcom aneurysm embolization with coil #7. TCD's discontinued 2nd to poor windows, recommend following neuro assessment. -Goal -200 to mitigate vasospasm. Anterior EVD in place at 10 cm H20 above tragus with good waveform noted on monitor-ecchymosis. I reviewed today's labs: WBC 15.6; Hemoglobin 11.1 ; Cr 0.36; .  I's/O's-last 24 hours -190 /since admission +876; ICP 3-10 (good ICP waveform noted on monitor).      02/08/24: SAH, Carpio Potts 4;Modified Swenson 3.  PBD/post Pcom aneurysm embolization with coil #8. TCD's with poor windows, recommend following neuro assessment. -Goal -200 to mitigate vasospasm. Anterior EVD open, raised today to 20 cm H20 above tragus with good waveform noted on monitor-ecchymosis. I reviewed today's labs: WBC 15.1; Hemoglobin 10.5;  ; Cr 0.31;  I's/O's-last 24 hours --1091/since admission -214 ICP 4-11/CPP > 100 (good  ICP waveform noted on monitor).  Tmax 100.6     02/09/24: SAH, Carpio Potts 4;Modified Swenson 3. PBD/post Pcom aneurysm embolization with coil #9. No longer following TCD's 2nd to poor windows.   Follow neuro assessment. -Goal -200 to mitigate vasospasm. Anterior EVD open, @15 cm H20 above tragus with good waveform noted on monitor. I reviewed today's labs: WBC 14.9; Hemoglobin 11.2;  ; Cr 0.29;  I's/O's-last 24 hours -110 ml; output since admission - 675 ml.  ICP 4-11/CPP ; EVD output -139 mL in the last 24 hours (good ICP waveform noted on monitor). Tmax 100. 9     2/10/24: SAH, Carpio Potts 4; Modified Swenson 3. PBD #10 /post Pcom aneurysm embolization with 10 coils (1/31/24). No longer following TCD's 2nd to poor windows.   Follow neuro assessment. -Goal -200 to mitigate vasospasm. Anterior EVD open, @ 20 cm H20 above tragus with good waveform noted on monitor. I reviewed patient's most recent labs: WBC 14.3; Hemoglobin 10.6;  ; Cr 0.36;  ICP<15; EVD output:139 mL in the last 24 hours. Tmax 100. 4     2/11/24: SAH, Carpio Potts 4; Modified Swenson 3. PBD #11 /post Pcom aneurysm embolization with 10 coils (1/31/24). Pt is verbally responsive with appropriate answers in Spans[place], purposeful and following some commands.  - EVD clamped 2/11/24.   - No longer following TCD's 2nd to poor windows.   Follow neuro assessment.   - Goal -200 to mitigate vasospasm.   - I reviewed patient's most recent labs: WBC 17.5; Hemoglobin 11.0;  ; Cr 0.36; Tmax 99.5    02/12/24 - PBD#12. No acute events overnight. Neuro unchanged per bedside RN. No longer following TCD's 2nd to poor windows; q 2 neuro exams in place.  Labs reviewed; , WBC 17.4. Repeat CT this morning shows decreased hyperdensity along the bilateral tentorium.  Family at bedside; education provided.  Patient discussed with bedside RN.    02/13/24 - PBD#13. No acute events overnight. EVD removed yesterday. Pt more alert today for  my assessment; however, neurologist and intensivist report she is more lethargic and less verbal. CTA and CT perfusion study ordered. Pt squeezing my hand bilaterally and moving all extremities; following some commands. Nurse at bedside administering thick liquid medication eliciting cough. Q 2 neuro exams in place. Labs reviewed; , WBC 15.3. Patient discussed with bedside RN.    02/14/24 - PBD#14. No acute events overnight. CTA yesterday shows possible distal left MCA spasm; CT perfusion without mismatch.  Patient awake and interactive today.  Q 2 neuro exams remain in place. Labs reviewed; , WBC 13.5. Patient discussed with bedside RN.  NSG signed off.    02/15/24 - PBD#15. No acute events overnight. Pt's level of alertness waxes and wanes. Currently very alert and following commands but only oriented to self.A/O x 4 earlier per nursing staff. Moves all extremities equally. Labs reviewed; , WBC 12.0. Patient discussed with intensivist.     Assessment/Plan     Principal Problem:    SAH (subarachnoid hemorrhage) (HCC)  Active Problems:    Acute respiratory failure with hypoxia (HCC)    Seizure (HCC)    Hypokalemia    Hypophosphatemia    Anemia    Pneumonia of both lungs due to methicillin susceptible Staphylococcus aureus (MSSA) (HCC)    Aneurysm of ascending aorta without rupture (HCC)    Hyperglycemia      Plan IR  - PBD#15  - -200 per neuro recs  - Continue Nimodipine   - TCD's (poor windows) therefore rely on neuro assessment for vasospasm watch  - If suspect vasospasm consider CTA and CT perfusion  - Continue ASA 81 mg daily   - Continue Q2 neuro checks   - Neurology following  -     -Thank you for allowing Interventional Radiology team to participate in the patients care, if any additional care or requests are needed in the future please do not hesitate to call or place IR order   [phone]           Review of Systems  Physical Exam   Review of Systems   Constitutional:  Negative for  chills and fever.   Respiratory:  Negative for shortness of breath.    Cardiovascular:  Negative for chest pain and palpitations.   Gastrointestinal:  Negative for nausea and vomiting.   Neurological:  Negative for tingling, sensory change and headaches.      Vitals:    02/15/24 0900   BP: (!) 162/94   Pulse: (!) 102   Resp: 14   SpO2: 94%        Physical Exam  Constitutional:       Appearance: Normal appearance.   Cardiovascular:      Rate and Rhythm: Tachycardia present.      Pulses: Normal pulses.   Abdominal:      Palpations: Abdomen is soft.   Skin:     General: Skin is warm and dry.   Neurological:      General: No focal deficit present.      Mental Status: She is alert and oriented to person, place, and time.      Comments: A/O x 1; following commands  L ptosis, L CNIII palsy,   antigravity strength in all 4 extremities   Sensory intact   Psychiatric:         Mood and Affect: Mood normal.         Behavior: Behavior normal.             Labs    Recent Labs     02/13/24  0225 02/14/24  0340 02/15/24  0547   WBC 15.3* 13.4* 12.0*   RBC 3.89* 3.97* 3.97*   HEMOGLOBIN 10.2* 10.4* 10.4*   HEMATOCRIT 30.9* 31.3* 31.6*   MCV 79.4* 78.8* 79.6*   MCH 26.2* 26.2* 26.2*   MCHC 33.0 33.2 32.9   RDW 39.8 39.1 40.0   PLATELETCT 490* 516* 515*   MPV 9.4 9.5 9.3     Recent Labs     02/13/24 0225 02/13/24  1035 02/14/24 0340 02/14/24  1810 02/15/24  0547   SODIUM 136   < > 136 135 137   POTASSIUM 4.0  --  3.5*  --  3.8   CHLORIDE 101  --  101  --  102   CO2 26  --  26  --  24   GLUCOSE 131*  --  141*  --  132*   BUN 11  --  12  --  10   CREATININE 0.31*  --  0.33*  --  0.31*   CALCIUM 8.5  --  9.4  --  8.7    < > = values in this interval not displayed.     Recent Labs     02/13/24 0225 02/14/24  0340 02/15/24  0547   CREATININE 0.31* 0.33* 0.31*     DX-ABDOMEN FOR TUBE PLACEMENT   Final Result         1.  Nonspecific bowel gas pattern in the upper abdomen.   2.  Dobbhoff tube with tip terminating overlying the expected  location of the first or second duodenal segment.   3.  Cardiomegaly      CT-CEREBRAL PERFUSION ANALYSIS   Final Result      1.  Cerebral blood flow less than 30% likely representing completed infarct = 0 mL.      2.  T Max more than 6 seconds likely representing combination of completed infarct and ischemia = 0 mL.      3.  Mismatched volume likely representing ischemic brain/penumbra = None      4.  Please note that the cerebral perfusion was performed on the limited brain tissue around the basal ganglia region. Infarct/ischemia outside the CT perfusion sections can be missed in this study.      CT-CTA HEAD WITH & W/O-POST PROCESS   Final Result      1.  Prior LEFT middle cranial fossa aneurysm coiling.  Artifact limits exam.   2.  No abrupt large vessel cut off.   3.  Segmental narrowing of LEFT middle cerebral artery, primarily M1 segment, vasospasm versus artifact.   4.  Removal of ventriculostomy catheter. No hydrocephalus.   5.  Evolving RIGHT caudate infarct.   6.  Slightly improved intracranial hemorrhage.   7.  4 mm right carotid terminus aneurysm.      CT-HEAD W/O   Final Result         1.  Hyperdensity along the bilateral tentorium, compatible with layering subarachnoid hemorrhages, decreased since prior study.   2.  Atherosclerosis.         DX-ABDOMEN FOR TUBE PLACEMENT   Final Result         1.  Nonspecific bowel gas pattern in the upper abdomen.   2.  Nasogastric tube tip terminates overlying the expected location of the antrum or pylorus, somewhat withdrawn since prior study.   3.  Hazy interstitial pulmonary edema and/or infiltrates, stable   4.  Cardiomegaly      DX-ABDOMEN FOR TUBE PLACEMENT   Final Result         1.  Nonspecific bowel gas pattern in the upper abdomen.   2.  Nasogastric tube tip terminates overlying the expected location of the pylorus or first duodenal segment.   3.  Hazy interstitial pulmonary edema and/or infiltrates   4.  Cardiomegaly      DX-CHEST-PORTABLE (1 VIEW)   Final  Result         Findings on chest radiograph appear stable since the prior radiograph.  No new abnormalities are identified.      DX-ABDOMEN FOR TUBE PLACEMENT   Final Result      Enteric tube tip projects over the gastric antrum or proximal duodenum, similar to prior      DX-ABDOMEN FOR TUBE PLACEMENT   Final Result      NG tube tip projects at the peripyloric region.      DX-ABDOMEN FOR TUBE PLACEMENT   Final Result      NG tube tip projects at the peripyloric region.      DX-ABDOMEN FOR TUBE PLACEMENT   Final Result      Feeding tube in place with tip at the distal stomach/pylorus.      [id number] VIEW   Final Result         1.  Nonspecific bowel gas pattern in the upper abdomen.   2.  Nasogastric tube tip terminates overlying the expected location of the pylorus or first duodenal segment.   3.  Hazy interstitial pulmonary edema and/or infiltrates   4.  Cardiomegaly      DX-ABDOMEN FOR TUBE PLACEMENT   Final Result      Enteric sump tube in situ with its tip at the level of the distal gastric antrum or pylorus.      DX-ABDOMEN FOR TUBE PLACEMENT   Final Result         1.  Nonspecific bowel gas pattern in the upper abdomen.   2.  Nasogastric tube tip terminates overlying the expected location of the pylorus or first duodenal segment.   3.  Hazy interstitial pulmonary edema and/or infiltrates, similar to prior study.   4.  Cardiomegaly      DX-CHEST-PORTABLE (1 VIEW)   Final Result      1.  Supportive tubing as described above.   2.  No other significant change from prior exam.         MR-BRAIN-W/O   Final Result      1.  Multifocal areas of acute infarcts in the left cerebral hemisphere.   2.  Small area of acute infarct in the right basal ganglia adjacent to the tip of the ventriculostomy catheter.   3.  Cortical infarct in the right medial parietal lobe.   4.  Mild amount of subdural hemorrhages surrounding the bilateral cerebellar hemispheres and right cerebellum.   5.  Subarachnoid hemorrhage.   6.  There is  no hydrocephalus.   7.  There are changes secondary to the previous endovascular repair left internal carotid aneurysm.      CT-CTA NECK WITH & W/O-POST PROCESSING   Final Result      No focal high-grade stenosis, dissection or occlusion of the cervical carotid or vertebral arteries.      CT-CEREBRAL PERFUSION ANALYSIS   Final Result      1.  Cerebral blood flow less than 30% likely representing completed infarct = 0 mL.      2.  T Max more than 6 seconds likely representing combination of completed infarct and ischemia = 0 mL.      3.  Mismatched volume likely representing ischemic brain/penumbra = None      4.  Please note that the cerebral perfusion was performed on the limited brain tissue around the basal ganglia region. Infarct/ischemia outside the CT perfusion sections can be missed in this study.      CT-CTA HEAD WITH & W/O-POST PROCESS   Final Result      1.  Prior LEFT middle cranial fossa aneurysm coiling.  Artifact limits exam.   2.  No abrupt large vessel cut off.   3.  Segmental narrowing of LEFT middle cerebral artery, vasospasm versus artifact.   4.  Ventriculostomy catheter in similar position.   5.  Evolving RIGHT caudate infarct.   6.  Stable intracranial hemorrhage.         DX-ABDOMEN FOR TUBE PLACEMENT   Final Result      Orogastric tube tip now at the mid stomach.      DX-ABDOMEN FOR TUBE PLACEMENT   Final Result      Orogastric tube tip at the proximal stomach with side port just above the GE junction.      US-INTRACRANIAL ARTERY LIMITED   Final Result      DX-CHEST-LIMITED (1 VIEW)   Final Result         1.  Stable chest with perihilar and lower lung zone interstitial and minimal airspace opacities most consistent with pulmonary edema.   2.  No new abnormalities.      EC-ECHOCARDIOGRAM COMPLETE W/O CONT   Final Result      US-INTRACRANIAL ARTERY LIMITED   Final Result      DX-CHEST-PORTABLE (1 VIEW)   Final Result      1.  Lines and tubes appear appropriately located      2.  Improvement of  pulmonary edema/airspace process      US-INTRACRANIAL ARTERY COMP   Final Result      CT-HEAD W/O   Final Result         1. Slightly decreased subarachnoid hemorrhage overlying the cerebral hemispheres.   2. Otherwise, stable as above.         DX-CHEST-FOR LINE PLACEMENT Perform procedure in: Patient's Room   Final Result      1.  Moderate interstitial pulmonary edema.   2.   Right-sided central venous catheter terminates with the tip projecting over the expected region of the mid to distal superior vena cava.   3.  Endotracheal tube terminates in satisfactory position at the level of the aortic arch.   4.  Enteric feeding tube terminates in the left upper quadrant projecting over the expected location of the stomach.      CT-STEALTH HEAD W/O   Final Result      1.  Interval placement of a right transparietal ventriculostomy catheter which terminates with the tip near the third ventricle. There is minimally decreased caliber of the ventricular system and compared to previous exam.   2.  Findings of intracranial hemorrhage appear similar to the previous exam. No definite new acute intracranial hemorrhage is identified.   3.  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         DX-CHEST-PORTABLE (1 VIEW)   Final Result      CT-HEAD W/O   Final Result      1. Interval aneurysm coiling of left posterior communicating artery aneurysm.   2. Possible increased subarachnoid hemorrhage.   3. Intraventricular hemorrhage. There is developing mild hydrocephalus with mildly increased ventricles.   4. Small right convexity subdural hematoma measuring 5 mm in thickness.      These findings were discussed with EMILY BYRD on 1/31/2024 3:14 PM.      IR-EMBOLIZATION   Final Result   Impression:      63-year-old patient with sudden onset of worst headache of life followed by obtundation underwent cerebral angiography which demonstrated a large  13 x 11 x 11 mm aneurysm arising from the dorsal  "wall of the left ICA incorporating a small left posterior    communicating artery at its neck.   This aneurysm was embolized to occlusion as described above. Final angiographic images demonstrate only minimal filling of the neck of the aneurysm. The patient will be followed up in   the neuro interventional clinic and brought back for a follow-up angiogram in 6 months.      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      I, Stephanie Eason was physically present and participated during the entire procedure of the IR-EMBOLIZATION.        INR   Date Value Ref Range Status   01/31/2024 1.06 0.87 - 1.13 Final     Comment:     INR - Non-therapeutic Reference Range: 0.87-1.13  INR - Therapeutic Reference Range: 2.0-4.0       No results found for: \"POCINR\"     Intake/Output Summary (Last 24 hours) at 2/12/2024 0811  Last data filed at 2/12/2024 0600  Gross per 24 hour   Intake 2444.57 ml   Output 2345 ml   Net 99.57 ml      Labs not explicitly included in this progress note were reviewed by the author. Radiology/imaging not explicitly included in this progress note was reviewed by the author.     I have performed a physical exam and reviewed and updated ROS and Plan today (2/15/2024).     30 minutes in directly providing and coordinating care and extensive data review.  No time overlap and excludes procedures.  "

## 2024-02-15 NOTE — THERAPY
Physical Therapy   Daily Treatment     Patient Name: Fay Turner  Age:  63 y.o., Sex:  female  Medical Record #: [id number]  Today's Date: 2/15/2024     Precautions  Precautions: Fall Risk;Swallow Precautions;Nasogastric Tube  Comments: EVD removed    Assessment    Pt more alert, eating with son at bedside; left ptosis remains with light sensitivity if aided open; moving LE spontaneously in bed but not to command or exercise EOB; trunk balance improving; no weight through legs with standing and total assist for transfer; remains disoriented but pleasant; will follow.     Plan    Treatment Plan Status: Continue Current Treatment Plan  Type of Treatment: Equipment, Bed Mobility, Manual Therapy, Neuro Re-Education / Balance, Stair Training, Self Care / Home Evaluation, Therapeutic Activities, Therapeutic Exercise  Treatment Frequency: 3 Times per Week  Treatment Duration: Until Therapy Goals Met    DC Equipment Recommendations: Unable to determine at this time  Discharge Recommendations: Recommend post-acute placement for additional physical therapy services prior to discharge home      Abridged Subjective/Objective     02/15/24 1110   Cognition    Cognition / Consciousness X   Level of Consciousness Alert   Ability To Follow Commands 1 Step  (with UE not function)   New Learning Impaired   Attention Impaired   Initiation Impaired   Comments moves all extremities sponatneous in bed but not to command at EOB; son at bedside; reporting she has been waning in orientation; making jokes thorughout session; left eyelid remains closed when assisted open pupil remains abducted and fixed with light sensitivity   Strength Lower Body   Lower Body Strength  X   Comments kicking against gravity in bed and with sock donning but once EOB no movement in legs, patellar reflexes intact   Sensation Lower Body   Lower Extremity Sensation   X   Comments withdraws bilaterally to light touch;   Sitting Lower Body Exercises   Sitting Lower  Body Exercises Yes   Comments PROM long arc quads x 5 reps pre mobility attempting to follow commnad   Balance   Sitting Balance (Static) Fair -   Sitting Balance (Dynamic) Poor +   Standing Balance (Static) Trace   Standing Balance (Dynamic) Dependent   Weight Shift Sitting Poor   Weight Shift Standing Absent   Skilled Intervention Sequencing;Postural Facilitation;Tactile Cuing;Verbal Cuing   Comments B UE support in sitting/standing; able to hold self at EOB with/without UE then does not use legs functionally with stands   Bed Mobility    Supine to Sit Moderate Assist   Sit to Supine   (NT, up to cardiac chair)   Gait Analysis   Gait Level Of Assist Unable to Participate   Functional Mobility   Sit to Stand Total Assist  (x 3 reps)   Bed, Chair, Wheelchair Transfer Total Assist   Short Term Goals    Short Term Goal # 1 Pt will perform supine<>sit from flat HOB/no railing with supervision within 6 visits to ensure independent mobility at home.   Goal Outcome # 1 goal not met   Short Term Goal # 2 Pt will perform sit<>stand with FWW and supervision within 6 visits to ensure independent mobility.   Goal Outcome # 2 Goal not met   Short Term Goal # 3 Pt will ambulate x 150ft with FWW and supervision within 6 visits to ensure independent mobility at home.   Goal Outcome # 3 Goal not met   Short Term Goal # 4 Pt will follow 100% of function based command to demosntrate improved participation in PT sessions within 6 visits.
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
## 2024-02-15 NOTE — CARE PLAN
The patient is Watcher - Medium risk of patient condition declining or worsening    Shift Goals  Clinical Goals: stable neuro, stable hemodynamics, Q12 Na  Patient Goals: unable to assess  Family Goals: updates    Progress made toward(s) clinical / shift goals:    Problem: Knowledge Deficit - Standard  Goal: Patient and family/care givers will demonstrate understanding of plan of care, disease process/condition, diagnostic tests and medications  Outcome: Progressing     Problem: Safety - Medical Restraint  Goal: Remains free of injury from restraints (Restraint for Interference with Medical Device)  Outcome: Progressing  Restraint education provided. Q2h restraint monitoring performed.     Problem: Skin Integrity  Goal: Skin integrity is maintained or improved  Outcome: Progressing  Q2h turns performed and pillows in place for support and positioning. Wound care performed.      Problem: Hemodynamics  Goal: Patient's hemodynamics, fluid balance and neurologic status will be stable or improve  Outcome: Progressing     Problem: Neuro Status  Goal: Neuro status will remain stable or improve  Outcome: Progressing   Q2h neuro exams performed and have remained stable.      Patient is not progressing towards the following goals: N/A

## 2024-02-15 NOTE — CARE PLAN
The patient is Watcher - Medium risk of patient condition declining or worsening    Shift Goals  Clinical Goals: [id number], q12 Na, -200  Patient Goals: padmini  Family Goals: updates    Progress made toward(s) clinical / shift goals:        Problem: Knowledge Deficit - Standard  Goal: Patient and family/care givers will demonstrate understanding of plan of care, disease process/condition, diagnostic tests and medications  Outcome: Progressing     Problem: Skin Integrity  Goal: Skin integrity is maintained or improved  Outcome: Progressing     Problem: Fall Risk  Goal: Patient will remain free from falls  Outcome: Progressing     Problem: Pain - Standard  Goal: Alleviation of pain or a reduction in pain to the patient’s comfort goal  Outcome: Progressing     Problem: Hemodynamics  Goal: Patient's hemodynamics, fluid balance and neurologic status will be stable or improve  Outcome: Progressing     Problem: Neuro Status  Goal: Neuro status will remain stable or improve  Outcome: Progressing       Patient is not progressing towards the following goals:      Problem: Safety - Medical Restraint  Goal: Remains free of injury from restraints (Restraint for Interference with Medical Device)  Outcome: Not Progressing  Goal: Free from restraint(s) (Restraint for Interference with Medical Device)  Outcome: Not Progressing

## 2024-02-15 NOTE — DISCHARGE PLANNING
"Case Management Discharge Planning    Chart reviewed and case discussed in ICU rounds:  POD#15 s/p cerebral angiogram and  intracranial aneurysm embolization, POD#15 s/p right frontal external ventricular drain, POD#15 s/p right frontal external ventricular drain under [facility].   Patient extubated on 2/3/2024, EVD removed on 2/12/2024.     Oriented x 2-3 (person, place, situation), poor PO intake.  +O2 1L nc/PO diet & tube feeds (NGT to be removed with adequate oral intake)/Q2H neurochecks/Q12H sodium checks/Nimodipine through 2/21/24.   NSGY signed off, Neurology following.     Per Dr. Blelo's/Physiatrist @ [facility] re-evaluation on 2/12/2024:  Patient still requiring max-total assist and on 21 day vasopasm watch and would not be admitted until 2/21/2024.   They will hold off on reassessing patient until she is closer to discharge.  Also  [facility] is [facility] with [facility]; [facility] is the in-network IRF.     SNF referrals to be submitted when appropriate/based on clinical progression.   IRF referral to [facility] submitted on 2/12/2024 declined due to \"acuity too high\"; CM to re-submit based on clinical progression.      Accepted by [facility]; However, per conversation with [facility] liaison, Olive, not certain insurance will approve.  "

## 2024-02-15 NOTE — THERAPY
Occupational Therapy  Daily Treatment     Patient Name: Fay Turner  Age:  63 y.o., Sex:  female  Medical Record #: [id number]  Today's Date: 2/15/2024     Precautions  Precautions: Fall Risk, Swallow Precautions, Nasogastric Tube  Comments: EVD removed    Assessment    Pt currently limited by decreased functional mobility, activity tolerance, cognition, sensation, strength, AROM, coordination, balance, and pain which are affecting pt's ability to complete ADLs/IADLs at baseline. Pt would benefit from OT services in the acute care setting to maximize functional recovery.      Plan    Treatment Plan Status: (P) Continue Current Treatment Plan  Type of Treatment: Self Care / Activities of Daily Living, Therapeutic Activity, Neuro Re-Education / Balance  Treatment Frequency: 3 Times per Week  Treatment Duration: Until Therapy Goals Met       Discharge Recommendations: (P) Recommend post-acute placement for additional occupational therapy services prior to discharge home       02/15/24 1108   Activities of Daily Living   Grooming Maximal Assist   Upper Body Dressing Maximal Assist   Lower Body Dressing Total Assist   Toileting Total Assist   Functional Mobility   Sit to Stand Total Assist   Bed, Chair, Wheelchair Transfer Total Assist   Short Term Goals   Short Term Goal # 1 mod A with G/H sitting EOB   Goal Outcome # 1 Progressing slower than expected   Short Term Goal # 2 mod A with UB dressing   Goal Outcome # 2 Progressing slower than expected   Short Term Goal # 3 mod A with ADL txfs   Goal Outcome # 3 Progressing slower than expected   Occupational Therapy Treatment Plan    O.T. Treatment Plan Continue Current Treatment Plan   Anticipated Discharge Equipment and Recommendations   Discharge Recommendations Recommend post-acute placement for additional occupational therapy services prior to discharge home

## 2024-02-15 NOTE — THERAPY
Speech Language Pathology   Daily Treatment     Patient Name: Fay Turner  AGE:  63 y.o., SEX:  female  Medical Record #: [id number]  Date of Service: 2/15/2024      Precautions:  Precautions: Fall Risk, Swallow Precautions, Nasogastric Tube         Subjective  RN cleared pt to work with SLP, reporting pt's PO intake has been limited, still has continuous TF.  Patient was sleeping on arrival, verbally agreed to participate though had a difficult time maintaining arousal.      Assessment  Patient required 1:1 feeding A. She was given pureed solids via tsp and thin liquids via straw, as well as one tx trial of minced/moist solid. Bolus manipulation and AP transit was presumed slow with pt needing occasional verbal cues to attend to the bolus. Mastication of minced/moist solids was also slow/reduced. Pharyngeal swallow response was prompt w/ thin liquids. Throat clearing occurred following intake of thins via straw when alternated b/w pudding bites. Increased throat clearing occurred with successive bites of pudding as pt became more sleepy. PO trials discontinued to inadequate KENDRA.       Clinical Impressions  Progression to full oral intake or potential diet upgrade is limited by AMS/lethargy and reduced appetite. SLP will continue to follow closely to address dysphagia and cognitive-linguistic/motor speech goals as alertness improves.     Recommendations  Treatment Completed: Dysphagia Treatment       Dysphagia Treatment  Diet Consistency: Pureed solids, Thin liquids  Instrumentation: None indicated at this time  Medication: Crush with applesauce, as appropriate, Crush with pudding/puree, as appropriate  Supervision: 1:1 feeding with constant supervision  Positioning: Fully upright and midline during oral intake, Meals sitting upright in a chair, as tolerated  Risk Management : Small bites/sips, Alternate bites and sips, Slow rate of intake, Reduce environmental distractions  Oral Care: Q6h    SLP Treatment  Plan  Treatment Plan: Dysphagia Treatment, Cognitive Treatment, Patient/Family/Caregiver Training (Motor speech)  SLP Frequency: 4x Per Week  Estimated Duration: Until Therapy Goals Met      Anticipated Discharge Needs  Discharge Recommendations: Recommend post-acute placement for additional speech therapy services prior to discharge home  Therapy Recommendations Upon DC: Dysphagia Training, Comprehension Training, Expression Training, Reading Training, Writing Training, Cognitive-Linguistic Training, Patient / Family / Caregiver Education      Patient / Family Goals  Patient / Family Goal #1: nods yes to ice chips  Goal #1 Outcome: Goal met  Short Term Goals  Short Term Goal # 1 B : New 2/14: Pt will consume a PU4/TN0 diet with 1:1 supv without any overt s/s of aspiration or decline in respiratory status  Goal Outcome  # 1 B: Progressing slower than expected  Short Term Goal # 2: With mod-max cues, pt will be AAOx4 during >65% of opportunities. (not targeted this session)  Goal Outcome # 2 : Other (see comments)  Short Term Goal # 3: With max cues, pt will recall salient/functional information after a 5 min delay during >65% of opportunities.  Goal Outcome  # 3: Other (see comments) (not targeted this session)  Short Term Goal # 4: With min-mod cues, pt will participate in additional cognitive testing to further guide POC.  Goal Outcome  # 4: Other (see comments) (not targeted this session)  Short Term Goal # 5: Pt will utilize 'clear speech' techniques to increase intelligibility at the conversational level to >90%.  Goal Outcome  # 5: Other (see comments) (not targeted this session)      Aissatou Bueno MS,CCC-SLP

## 2024-02-15 NOTE — PROGRESS NOTES
Neurology Progress Note  Neurohospitalist Service, [facility]    Referring Physician: Emil Pederson M.D.    No chief complaint on file.      HPI: Refer to initial documented Neurology H&P, as detailed in the patient's chart.    Interval History 2/15: No new events overnight.    Review of systems: In addition to what is detailed in the HPI and/or updated in the interval history, all other systems reviewed and are negative.    Past Medical History:    has no past medical history on file.    FHx:  family history is not on file.    SHx:       Medications:    Current Facility-Administered Medications:     cefUROxime (Zinacef) 750 mg in  mL IVPB, 750 mg, Intravenous, Q8HRS, Graeme Xie P.A.-C., Stopped at 02/15/24 0632    sodium chloride (Salt) tablet 2 g, 2 g, Enteral Tube, Q8HRS, Oriana HAINES Latona, 2 g at 02/15/24 0551    aspirin (Asa) chewable tab 81 mg, 81 mg, Enteral Tube, DAILY, Jose Raul Tracy M.D., 81 mg at 02/15/24 0551    lidocaine (Asperflex) 4 % patch 2 Patch, 2 Patch, Transdermal, Q24HR, Dayana Babcock M.D., 2 Patch at 02/13/24 1805    enoxaparin (Lovenox) inj 40 mg, 40 mg, Subcutaneous, DAILY AT 1800, Dayana Babcock M.D., 40 mg at 02/14/24 1753    nystatin (Mycostatin) powder, , Topical, BID, Dayana Babcock M.D., Given at 02/15/24 0551    acetaminophen (Tylenol) tablet 650 mg, 650 mg, Enteral Tube, Q4HRS PRN, 650 mg at 02/13/24 1037 **OR** acetaminophen (Tylenol) suppository 650 mg, 650 mg, Rectal, Q4HRS PRN, Dayana Babcock M.D.    atorvastatin (Lipitor) tablet 40 mg, 40 mg, Enteral Tube, Q EVENING, Dayana Babcock M.D., 40 mg at 02/14/24 1754    miconazole (Micotin) 2 % cream, , Topical, BID, Dayana Babcock M.D., Given at 02/15/24 0551    NS infusion, , Intravenous, Continuous, Jeremy M Gonda, M.D., Last Rate: 83 mL/hr at 02/15/24 0759, New Bag at 02/15/24 0759    enalaprilat (Vasotec) injection 1.25 mg 1 mL, 1.25 mg, Intravenous, Q6HRS PRN, Sin MINOR  Gonda, M.D.    hydrALAZINE (Apresoline) injection 10-20 mg, 10-20 mg, Intravenous, Q4HRS PRN, Jeremy M Gonda, M.D.    labetalol (Normodyne/Trandate) injection 10-20 mg, 10-20 mg, Intravenous, Q4HRS PRN, Jeremy M Gonda, M.D.    niMODipine (Nymalize) oral syringe 60 mg, 60 mg, Enteral Tube, Q4HRS, Jeremy M Gonda, M.D., 60 mg at 02/15/24 0551    levETIRAcetam (Keppra) tablet 500 mg, 500 mg, Enteral Tube, BID, Jeremy M Gonda, M.D., 500 mg at 02/15/24 0551    Pharmacy Consult: Enteral tube insertion - review meds/change route/product selection, 1 Each, Other, PHARMACY TO DOSE, Jeremy M Gonda, M.D.    Respiratory Therapy Consult, , Nebulization, Continuous RT, Jeremy M Gonda, M.D.    senna-docusate (Pericolace Or Senokot S) 8.6-50 MG per tablet 2 Tablet, 2 Tablet, Enteral Tube, BID, 2 Tablet at 02/03/24 0514 **AND** polyethylene glycol/lytes (Miralax) Packet 1 Packet, 1 Packet, Enteral Tube, QDAY PRN **AND** magnesium hydroxide (Milk Of Magnesia) suspension 30 mL, 30 mL, Enteral Tube, QDAY PRN **AND** bisacodyl (Dulcolax) suppository 10 mg, 10 mg, Rectal, QDAY PRN, Jeremy M Gonda, M.D. MD Alert...ICU Electrolyte Replacement per Pharmacy, , Other, PHARMACY TO DOSE, Jeremy M Gonda, M.D.    ondansetron (Zofran ODT) dispertab 4 mg, 4 mg, Enteral Tube, Q4HRS PRN, 4 mg at 01/31/24 0952 **OR** ondansetron (Zofran) syringe/vial injection 4 mg, 4 mg, Intravenous, Q4HRS PRN, Jeremy M Gonda, M.D., 4 mg at 02/02/24 1758    LORazepam (Ativan) injection 2 mg, 2 mg, Intravenous, Q5 MIN PRN, Jeremy M Gonda, M.D.    prochlorperazine (Compazine) injection 10 mg, 10 mg, Intravenous, Q6HRS PRN, David Mclaughlin Jr., D.OJennifer, 10 mg at 01/31/24 1858    Physical Examination:     Vitals:    02/15/24 0300 02/15/24 0400 02/15/24 0500 02/15/24 0600   BP: (!) 149/90 (!) 166/94 (!) 159/98 (!) 168/96   Pulse: (!) 105 (!) 115 (!) 112 (!) 111   Resp: 20 20 19 20   TempSrc:  Bladder  Bladder   SpO2: 98% 96% 97% 97%   Weight:       Height:                NEUROLOGICAL EXAM:     Patient is a lot drowsy arousable to voice.  Tracks me in the room.  Paucity of speech.  She does follow commands.  Able to tell me her name and she knows that she is in [facility] today.  Able to take sips of water today.  Right pupil normal reactive.  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  Unchanged  Briefly antigravity strength in all 4.  Reactive tactile touch in all 4.    Objective Data:    Labs:  Lab Results   Component Value Date/Time    PROTHROMBTM 13.9 01/31/2024 03:25 PM    INR 1.06 01/31/2024 03:25 PM      Lab Results   Component Value Date/Time    WBC 12.0 (H) 02/15/2024 05:47 AM    RBC 3.97 (L) 02/15/2024 05:47 AM    HEMOGLOBIN 10.4 (L) 02/15/2024 05:47 AM    HEMATOCRIT 31.6 (L) 02/15/2024 05:47 AM    MCV 79.6 (L) 02/15/2024 05:47 AM    MCH 26.2 (L) 02/15/2024 05:47 AM    MCHC 32.9 02/15/2024 05:47 AM    MPV 9.3 02/15/2024 05:47 AM    NEUTSPOLYS 81.40 (H) 02/15/2024 05:47 AM    LYMPHOCYTES 9.00 (L) 02/15/2024 05:47 AM    MONOCYTES 6.20 02/15/2024 05:47 AM    EOSINOPHILS 2.60 02/15/2024 05:47 AM    BASOPHILS 0.40 02/15/2024 05:47 AM      Lab Results   Component Value Date/Time    SODIUM 137 02/15/2024 05:47 AM    POTASSIUM 3.8 02/15/2024 05:47 AM    CHLORIDE 102 02/15/2024 05:47 AM    CO2 24 02/15/2024 05:47 AM    GLUCOSE 132 (H) 02/15/2024 05:47 AM    BUN 10 02/15/2024 05:47 AM    CREATININE 0.31 (L) 02/15/2024 05:47 AM      Lab Results   Component Value Date/Time    CHOLSTRLTOT 165 01/31/2024 03:25 PM     (H) 01/31/2024 03:25 PM    HDL 36 (A) 01/31/2024 03:25 PM    TRIGLYCERIDE 79 01/31/2024 03:25 PM       Lab Results   Component Value Date/Time    ALKPHOSPHAT 189 (H) 02/12/2024 02:15 AM    ASTSGOT 31 02/12/2024 02:15 AM    ALTSGPT 25 02/12/2024 02:15 AM    TBILIRUBIN 0.4 02/12/2024 02:15 AM        Imaging/Testing:  DX-ABDOMEN FOR TUBE PLACEMENT   Final Result         1.  Nonspecific bowel gas pattern in the upper abdomen.   2.  Dobbhoff tube with tip terminating  overlying the expected location of the first or second duodenal segment.   3.  Cardiomegaly      CT-CEREBRAL PERFUSION ANALYSIS   Final Result      1.  Cerebral blood flow less than 30% likely representing completed infarct = 0 mL.      2.  T Max more than 6 seconds likely representing combination of completed infarct and ischemia = 0 mL.      3.  Mismatched volume likely representing ischemic brain/penumbra = None      4.  Please note that the cerebral perfusion was performed on the limited brain tissue around the basal ganglia region. Infarct/ischemia outside the CT perfusion sections can be missed in this study.      CT-CTA HEAD WITH & W/O-POST PROCESS   Final Result      1.  Prior LEFT middle cranial fossa aneurysm coiling.  Artifact limits exam.   2.  No abrupt large vessel cut off.   3.  Segmental narrowing of LEFT middle cerebral artery, primarily M1 segment, vasospasm versus artifact.   4.  Removal of ventriculostomy catheter. No hydrocephalus.   5.  Evolving RIGHT caudate infarct.   6.  Slightly improved intracranial hemorrhage.   7.  4 mm right carotid terminus aneurysm.      CT-HEAD W/O   Final Result         1.  Hyperdensity along the bilateral tentorium, compatible with layering subarachnoid hemorrhages, decreased since prior study.   2.  Atherosclerosis.         DX-ABDOMEN FOR TUBE PLACEMENT   Final Result         1.  Nonspecific bowel gas pattern in the upper abdomen.   2.  Nasogastric tube tip terminates overlying the expected location of the antrum or pylorus, somewhat withdrawn since prior study.   3.  Hazy interstitial pulmonary edema and/or infiltrates, stable   4.  Cardiomegaly      DX-ABDOMEN FOR TUBE PLACEMENT   Final Result         1.  Nonspecific bowel gas pattern in the upper abdomen.   2.  Nasogastric tube tip terminates overlying the expected location of the pylorus or first duodenal segment.   3.  Hazy interstitial pulmonary edema and/or infiltrates   4.  Cardiomegaly       DX-CHEST-PORTABLE (1 VIEW)   Final Result         Findings on chest radiograph appear stable since the prior radiograph.  No new abnormalities are identified.      DX-ABDOMEN FOR TUBE PLACEMENT   Final Result      Enteric tube tip projects over the gastric antrum or proximal duodenum, similar to prior      DX-ABDOMEN FOR TUBE PLACEMENT   Final Result      NG tube tip projects at the peripyloric region.      DX-ABDOMEN FOR TUBE PLACEMENT   Final Result      NG tube tip projects at the peripyloric region.      DX-ABDOMEN FOR TUBE PLACEMENT   Final Result      Feeding tube in place with tip at the distal stomach/pylorus.      XG-OBDZVIR-0 VIEW   Final Result         1.  Nonspecific bowel gas pattern in the upper abdomen.   2.  Nasogastric tube tip terminates overlying the expected location of the pylorus or first duodenal segment.   3.  Hazy interstitial pulmonary edema and/or infiltrates   4.  Cardiomegaly      DX-ABDOMEN FOR TUBE PLACEMENT   Final Result      Enteric sump tube in situ with its tip at the level of the distal gastric antrum or pylorus.      DX-ABDOMEN FOR TUBE PLACEMENT   Final Result         1.  Nonspecific bowel gas pattern in the upper abdomen.   2.  Nasogastric tube tip terminates overlying the expected location of the pylorus or first duodenal segment.   3.  Hazy interstitial pulmonary edema and/or infiltrates, similar to prior study.   4.  Cardiomegaly      DX-CHEST-PORTABLE (1 VIEW)   Final Result      1.  Supportive tubing as described above.   2.  No other significant change from prior exam.         MR-BRAIN-W/O   Final Result      1.  Multifocal areas of acute infarcts in the left cerebral hemisphere.   2.  Small area of acute infarct in the right basal ganglia adjacent to the tip of the ventriculostomy catheter.   3.  Cortical infarct in the right medial parietal lobe.   4.  Mild amount of subdural hemorrhages surrounding the bilateral cerebellar hemispheres and right cerebellum.   5.   Subarachnoid hemorrhage.   6.  There is no hydrocephalus.   7.  There are changes secondary to the previous endovascular repair left internal carotid aneurysm.      CT-CTA NECK WITH & W/O-POST PROCESSING   Final Result      No focal high-grade stenosis, dissection or occlusion of the cervical carotid or vertebral arteries.      CT-CEREBRAL PERFUSION ANALYSIS   Final Result      1.  Cerebral blood flow less than 30% likely representing completed infarct = 0 mL.      2.  T Max more than 6 seconds likely representing combination of completed infarct and ischemia = 0 mL.      3.  Mismatched volume likely representing ischemic brain/penumbra = None      4.  Please note that the cerebral perfusion was performed on the limited brain tissue around the basal ganglia region. Infarct/ischemia outside the CT perfusion sections can be missed in this study.      CT-CTA HEAD WITH & W/O-POST PROCESS   Final Result      1.  Prior LEFT middle cranial fossa aneurysm coiling.  Artifact limits exam.   2.  No abrupt large vessel cut off.   3.  Segmental narrowing of LEFT middle cerebral artery, vasospasm versus artifact.   4.  Ventriculostomy catheter in similar position.   5.  Evolving RIGHT caudate infarct.   6.  Stable intracranial hemorrhage.         DX-ABDOMEN FOR TUBE PLACEMENT   Final Result      Orogastric tube tip now at the mid stomach.      DX-ABDOMEN FOR TUBE PLACEMENT   Final Result      Orogastric tube tip at the proximal stomach with side port just above the GE junction.      US-INTRACRANIAL ARTERY LIMITED   Final Result      DX-CHEST-LIMITED (1 VIEW)   Final Result         1.  Stable chest with perihilar and lower lung zone interstitial and minimal airspace opacities most consistent with pulmonary edema.   2.  No new abnormalities.      EC-ECHOCARDIOGRAM COMPLETE W/O CONT   Final Result      US-INTRACRANIAL ARTERY LIMITED   Final Result      DX-CHEST-PORTABLE (1 VIEW)   Final Result      1.  Lines and tubes appear  appropriately located      2.  Improvement of pulmonary edema/airspace process      US-INTRACRANIAL ARTERY COMP   Final Result      CT-HEAD W/O   Final Result         1. Slightly decreased subarachnoid hemorrhage overlying the cerebral hemispheres.   2. Otherwise, stable as above.         DX-CHEST-FOR LINE PLACEMENT Perform procedure in: Patient's Room   Final Result      1.  Moderate interstitial pulmonary edema.   2.   Right-sided central venous catheter terminates with the tip projecting over the expected region of the mid to distal superior vena cava.   3.  Endotracheal tube terminates in satisfactory position at the level of the aortic arch.   4.  Enteric feeding tube terminates in the left upper quadrant projecting over the expected location of the stomach.      CT-STEALTH HEAD W/O   Final Result      1.  Interval placement of a right transparietal ventriculostomy catheter which terminates with the tip near the third ventricle. There is minimally decreased caliber of the ventricular system and compared to previous exam.   2.  Findings of intracranial hemorrhage appear similar to the previous exam. No definite new acute intracranial hemorrhage is identified.   3.  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         DX-CHEST-PORTABLE (1 VIEW)   Final Result      CT-HEAD W/O   Final Result      1. Interval aneurysm coiling of left posterior communicating artery aneurysm.   2. Possible increased subarachnoid hemorrhage.   3. Intraventricular hemorrhage. There is developing mild hydrocephalus with mildly increased ventricles.   4. Small right convexity subdural hematoma measuring 5 mm in thickness.      These findings were discussed with EMILY BYRD on 1/31/2024 3:14 PM.      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   This aneurysm was embolized to occlusion as described above. Final angiographic images demonstrate only minimal filling of the neck of the aneurysm. The patient will be followed up in   the neuro interventional clinic and brought back for a follow-up angiogram in 6 months.      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      Stephanie COATES was physically present and participated during the entire procedure of the IR-EMBOLIZATION.            Assessment and Plan:    63-year-old female presenting with a Carpio Potts 4 modified Swenson grade 3.  Secondary from a left P-comm aneurysm status post coiling.  Post bleed day number 13.  Postop day #13.  No windows or TCD's.  MRI brain showed multifocal infarcts.  EVD was removed on Febres 12.  CTA/P was done yesterday showed possible distal left MCA spasm versus artifact.  Today examination appears to be improved.  Will continue current management.    Update 2/15  Post bleed and post coiling day #14.  Continue with vasospasm watch.  Maintain normal sodium levels.  Routine slightly elevated blood pressures.  Do not have TCD's to follow due to lack of windows.  Current blood pressures are within parameters.  Sodium is within parameters.  Net even I's and O's for the past couple days.    Plan:  1.  Every 2 hours neurochecks.  2.  Blood pressure parameters between 140-200 systolic  3.  Continue Keppra 500 mg twice daily given the questionable seizure activity on arrival here.  4.  Continue Lovenox for DVT prophylaxis.  5.  Continue aspirin 81 mg daily  6.  Continue nimodipine 60 mg every 4 hours  7.  Maintain normothermia that evens I's and O's.  8.  Maintain normal sodium levels.  Continue salt tabs.          This chart was partially generated using voice recognition technology  and sound alike word replacement may be present, best efforts were made to make the chart accurate.    Jose Raul Tenorio MD  Board Certified Neurology, ABPN  t) [phone]

## 2024-02-16 LAB
ANION GAP SERPL CALC-SCNC: 11 MMOL/L (ref 7–16)
BASOPHILS # BLD AUTO: 0.7 % (ref 0–1.8)
BASOPHILS # BLD: 0.09 K/UL (ref 0–0.12)
BUN SERPL-MCNC: 11 MG/DL (ref 8–22)
CALCIUM SERPL-MCNC: 9.9 MG/DL (ref 8.5–10.5)
CHLORIDE SERPL-SCNC: 99 MMOL/L (ref 96–112)
CO2 SERPL-SCNC: 26 MMOL/L (ref 20–33)
CREAT SERPL-MCNC: 0.32 MG/DL (ref 0.5–1.4)
EOSINOPHIL # BLD AUTO: 0.44 K/UL (ref 0–0.51)
EOSINOPHIL NFR BLD: 3.4 % (ref 0–6.9)
ERYTHROCYTE [DISTWIDTH] IN BLOOD BY AUTOMATED COUNT: 39.2 FL (ref 35.9–50)
GFR SERPLBLD CREATININE-BSD FMLA CKD-EPI: 118 ML/MIN/1.73 M 2
GLUCOSE SERPL-MCNC: 136 MG/DL (ref 65–99)
HCT VFR BLD AUTO: 34.9 % (ref 37–47)
HGB BLD-MCNC: 11.5 G/DL (ref 12–16)
IMM GRANULOCYTES # BLD AUTO: 0.08 K/UL (ref 0–0.11)
IMM GRANULOCYTES NFR BLD AUTO: 0.6 % (ref 0–0.9)
LYMPHOCYTES # BLD AUTO: 1.22 K/UL (ref 1–4.8)
LYMPHOCYTES NFR BLD: 9.5 % (ref 22–41)
MAGNESIUM SERPL-MCNC: 2.3 MG/DL (ref 1.5–2.5)
MCH RBC QN AUTO: 26 PG (ref 27–33)
MCHC RBC AUTO-ENTMCNC: 33 G/DL (ref 32.2–35.5)
MCV RBC AUTO: 79 FL (ref 81.4–97.8)
MONOCYTES # BLD AUTO: 0.7 K/UL (ref 0–0.85)
MONOCYTES NFR BLD AUTO: 5.5 % (ref 0–13.4)
NEUTROPHILS # BLD AUTO: 10.3 K/UL (ref 1.82–7.42)
NEUTROPHILS NFR BLD: 80.3 % (ref 44–72)
NRBC # BLD AUTO: 0 K/UL
NRBC BLD-RTO: 0 /100 WBC (ref 0–0.2)
PHOSPHATE SERPL-MCNC: 3 MG/DL (ref 2.5–4.5)
PLATELET # BLD AUTO: 580 K/UL (ref 164–446)
PMV BLD AUTO: 9.3 FL (ref 9–12.9)
POTASSIUM SERPL-SCNC: 4 MMOL/L (ref 3.6–5.5)
RBC # BLD AUTO: 4.42 M/UL (ref 4.2–5.4)
SODIUM SERPL-SCNC: 136 MMOL/L (ref 135–145)
WBC # BLD AUTO: 12.8 K/UL (ref 4.8–10.8)

## 2024-02-16 PROCEDURE — A9270 NON-COVERED ITEM OR SERVICE: HCPCS | Performed by: INTERNAL MEDICINE

## 2024-02-16 PROCEDURE — A9270 NON-COVERED ITEM OR SERVICE: HCPCS | Performed by: PSYCHIATRY & NEUROLOGY

## 2024-02-16 PROCEDURE — 700102 HCHG RX REV CODE 250 W/ 637 OVERRIDE(OP): Performed by: EMERGENCY MEDICINE

## 2024-02-16 PROCEDURE — 700102 HCHG RX REV CODE 250 W/ 637 OVERRIDE(OP): Performed by: NURSE PRACTITIONER

## 2024-02-16 PROCEDURE — 83735 ASSAY OF MAGNESIUM: CPT

## 2024-02-16 PROCEDURE — A9270 NON-COVERED ITEM OR SERVICE: HCPCS | Performed by: EMERGENCY MEDICINE

## 2024-02-16 PROCEDURE — 99291 CRITICAL CARE FIRST HOUR: CPT | Performed by: EMERGENCY MEDICINE

## 2024-02-16 PROCEDURE — 770022 HCHG ROOM/CARE - ICU (200)

## 2024-02-16 PROCEDURE — A9270 NON-COVERED ITEM OR SERVICE: HCPCS | Performed by: NURSE PRACTITIONER

## 2024-02-16 PROCEDURE — 85025 COMPLETE CBC W/AUTO DIFF WBC: CPT

## 2024-02-16 PROCEDURE — 84100 ASSAY OF PHOSPHORUS: CPT

## 2024-02-16 PROCEDURE — 99232 SBSQ HOSP IP/OBS MODERATE 35: CPT | Performed by: PSYCHIATRY & NEUROLOGY

## 2024-02-16 PROCEDURE — 700102 HCHG RX REV CODE 250 W/ 637 OVERRIDE(OP): Performed by: INTERNAL MEDICINE

## 2024-02-16 PROCEDURE — 80048 BASIC METABOLIC PNL TOTAL CA: CPT

## 2024-02-16 PROCEDURE — 700102 HCHG RX REV CODE 250 W/ 637 OVERRIDE(OP): Performed by: PSYCHIATRY & NEUROLOGY

## 2024-02-16 PROCEDURE — 700101 HCHG RX REV CODE 250: Performed by: INTERNAL MEDICINE

## 2024-02-16 PROCEDURE — 700111 HCHG RX REV CODE 636 W/ 250 OVERRIDE (IP): Mod: JZ | Performed by: INTERNAL MEDICINE

## 2024-02-16 PROCEDURE — 92526 ORAL FUNCTION THERAPY: CPT

## 2024-02-16 RX ORDER — LOPERAMIDE HYDROCHLORIDE 2 MG/1
2 CAPSULE ORAL ONCE
Status: COMPLETED | OUTPATIENT
Start: 2024-02-16 | End: 2024-02-16

## 2024-02-16 RX ADMIN — SODIUM CHLORIDE 2 G: 1 TABLET ORAL at 22:52

## 2024-02-16 RX ADMIN — ACETAMINOPHEN 650 MG: 325 TABLET, FILM COATED ORAL at 05:43

## 2024-02-16 RX ADMIN — NIMODIPINE 60 MG: 60 SOLUTION ORAL at 02:02

## 2024-02-16 RX ADMIN — ACETAMINOPHEN 650 MG: 325 TABLET, FILM COATED ORAL at 17:52

## 2024-02-16 RX ADMIN — ENOXAPARIN SODIUM 40 MG: 100 INJECTION SUBCUTANEOUS at 17:52

## 2024-02-16 RX ADMIN — ACETAMINOPHEN 650 MG: 325 TABLET, FILM COATED ORAL at 10:14

## 2024-02-16 RX ADMIN — SODIUM CHLORIDE 2 G: 1 TABLET ORAL at 05:41

## 2024-02-16 RX ADMIN — ATORVASTATIN CALCIUM 40 MG: 40 TABLET, FILM COATED ORAL at 18:00

## 2024-02-16 RX ADMIN — NYSTATIN: 100000 POWDER TOPICAL at 05:41

## 2024-02-16 RX ADMIN — LEVETIRACETAM 500 MG: 500 TABLET, FILM COATED ORAL at 17:53

## 2024-02-16 RX ADMIN — NIMODIPINE 60 MG: 60 SOLUTION ORAL at 22:52

## 2024-02-16 RX ADMIN — LIDOCAINE 2 PATCH: 4 PATCH TOPICAL at 16:45

## 2024-02-16 RX ADMIN — MICONAZOLE NITRATE: 20 CREAM TOPICAL at 05:42

## 2024-02-16 RX ADMIN — NIMODIPINE 60 MG: 60 SOLUTION ORAL at 10:14

## 2024-02-16 RX ADMIN — NYSTATIN: 100000 POWDER TOPICAL at 17:52

## 2024-02-16 RX ADMIN — LOPERAMIDE HYDROCHLORIDE 2 MG: 2 CAPSULE ORAL at 17:52

## 2024-02-16 RX ADMIN — ASPIRIN 81 MG 81 MG: 81 TABLET ORAL at 05:41

## 2024-02-16 RX ADMIN — SODIUM CHLORIDE 2 G: 1 TABLET ORAL at 13:58

## 2024-02-16 RX ADMIN — ACETAMINOPHEN 650 MG: 325 TABLET, FILM COATED ORAL at 13:59

## 2024-02-16 RX ADMIN — NIMODIPINE 60 MG: 60 SOLUTION ORAL at 18:00

## 2024-02-16 RX ADMIN — NIMODIPINE 60 MG: 60 SOLUTION ORAL at 13:59

## 2024-02-16 RX ADMIN — MICONAZOLE NITRATE: 20 CREAM TOPICAL at 18:00

## 2024-02-16 RX ADMIN — NIMODIPINE 60 MG: 60 SOLUTION ORAL at 05:41

## 2024-02-16 RX ADMIN — LEVETIRACETAM 500 MG: 500 TABLET, FILM COATED ORAL at 05:41

## 2024-02-16 ASSESSMENT — ENCOUNTER SYMPTOMS
CHILLS: 0
SENSORY CHANGE: 0
PALPITATIONS: 0
NAUSEA: 0
SHORTNESS OF BREATH: 0
FEVER: 0
VOMITING: 0
HEADACHES: 0
TINGLING: 0

## 2024-02-16 ASSESSMENT — FIBROSIS 4 INDEX: FIB4 SCORE: 0.66

## 2024-02-16 NOTE — PROGRESS NOTES
Neurology Progress Note  Neurohospitalist Service, [facility] Neurosciences    Referring Physician: Emil Pederson M.D.      Interval History: No acute events overnight.   Stable exam.     Review of systems: In addition to what is detailed in the HPI and/or updated in the interval history, all other systems reviewed and are negative.    Past Medical History, Past Surgical History and Social History reviewed and unchanged from prior    Medications:    Current Facility-Administered Medications:     sodium chloride (Salt) tablet 2 g, 2 g, Enteral Tube, Q8HRS, Oriana HAINES Latona, 2 g at 02/16/24 0541    aspirin (Asa) chewable tab 81 mg, 81 mg, Enteral Tube, DAILY, Jose Raul Tracy M.D., 81 mg at 02/16/24 0541    lidocaine (Asperflex) 4 % patch 2 Patch, 2 Patch, Transdermal, Q24HR, Dayana Babcock M.D., 2 Patch at 02/13/24 1805    enoxaparin (Lovenox) inj 40 mg, 40 mg, Subcutaneous, DAILY AT 1800, Dayana Babcock M.D., 40 mg at 02/15/24 1757    nystatin (Mycostatin) powder, , Topical, BID, Dayana Babcock M.D., Given at 02/16/24 0541    acetaminophen (Tylenol) tablet 650 mg, 650 mg, Enteral Tube, Q4HRS PRN, 650 mg at 02/16/24 0543 **OR** acetaminophen (Tylenol) suppository 650 mg, 650 mg, Rectal, Q4HRS PRN, Dayana Babcock M.D.    atorvastatin (Lipitor) tablet 40 mg, 40 mg, Enteral Tube, Q EVENING, Dayana Babcock M.D., 40 mg at 02/15/24 1756    miconazole (Micotin) 2 % cream, , Topical, BID, Dayana Babcock M.D., Given at 02/16/24 0542    NS infusion, , Intravenous, Continuous, Jeremy M Gonda, M.D., Last Rate: 83 mL/hr at 02/15/24 2219, New Bag at 02/15/24 2219    enalaprilat (Vasotec) injection 1.25 mg 1 mL, 1.25 mg, Intravenous, Q6HRS PRN, Jeremy M Gonda, M.D.    hydrALAZINE (Apresoline) injection 10-20 mg, 10-20 mg, Intravenous, Q4HRS PRN, Jeremy M Gonda, M.D.    labetalol (Normodyne/Trandate) injection 10-20 mg, 10-20 mg, Intravenous, Q4HRS PRN, Jeremy M Gonda, M.D.    niMODipine (Nymalize) oral  "syringe 60 mg, 60 mg, Enteral Tube, Q4HRS, Jeremy M Gonda, M.D., 60 mg at 02/16/24 0541    levETIRAcetam (Keppra) tablet 500 mg, 500 mg, Enteral Tube, BID, Jeremy M Gonda, M.D., 500 mg at 02/16/24 0541    Pharmacy Consult: Enteral tube insertion - review meds/change route/product selection, 1 Each, Other, PHARMACY TO DOSE, Jeremy M Gonda, M.D.    Respiratory Therapy Consult, , Nebulization, Continuous RT, Jeremy M Gonda, M.D.    senna-docusate (Pericolace Or Senokot S) 8.6-50 MG per tablet 2 Tablet, 2 Tablet, Enteral Tube, BID, 2 Tablet at 02/03/24 0514 **AND** polyethylene glycol/lytes (Miralax) Packet 1 Packet, 1 Packet, Enteral Tube, QDAY PRN **AND** magnesium hydroxide (Milk Of Magnesia) suspension 30 mL, 30 mL, Enteral Tube, QDAY PRN **AND** bisacodyl (Dulcolax) suppository 10 mg, 10 mg, Rectal, QDAY PRN, Jeremy M Gonda, M.D. MD Alert...ICU Electrolyte Replacement per Pharmacy, , Other, PHARMACY TO DOSE, Jeremy M Gonda, M.D.    ondansetron (Zofran ODT) dispertab 4 mg, 4 mg, Enteral Tube, Q4HRS PRN, 4 mg at 01/31/24 2248 **OR** ondansetron (Zofran) syringe/vial injection 4 mg, 4 mg, Intravenous, Q4HRS PRN, Jeremy M Gonda, M.D., 4 mg at 02/02/24 1758    LORazepam (Ativan) injection 2 mg, 2 mg, Intravenous, Q5 MIN PRN, Jeremy M Gonda, M.D.    prochlorperazine (Compazine) injection 10 mg, 10 mg, Intravenous, Q6HRS PRN, David Mclaughlin Jr., D.O., 10 mg at 01/31/24 1858    Physical Examination:   BP (!) 175/97   Pulse 98   Resp (!) 24   Ht 1.575 m (5' 2.01\")   Wt 71 kg (156 lb 8.4 oz)   SpO2 90%   Breastfeeding No   BMI 28.62 kg/m²       General: Patient is sleepy, arouses easily  Neck: There is normal range of motion  CV: Regular rate   Extremities:  Warm, dry, and intact, without peripheral lower extremity edema    NEUROLOGICAL EXAM:     Mental status: Sleepy, arouses easily  Speech and language: Speech is clear.  Fluent and follows commands  Cranial nerve exam:  Visual fields are full. There is no " nystagmus. Extraocular muscles are intact. Face is symmetric. L ptosis  Motor exam: There is antigravity strength in all 4 extremities observed.  Does not sustain due to inattention.  Sensory exam:  Reacts to tactile in all 4 extremities, there is no neglect to double stim  Coordination: No ataxia on elicited movements    Objective Data:    Labs:  Lab Results   Component Value Date/Time    PROTHROMBTM 13.9 01/31/2024 03:25 PM    INR 1.06 01/31/2024 03:25 PM      Lab Results   Component Value Date/Time    WBC 12.8 (H) 02/16/2024 06:07 AM    RBC 4.42 02/16/2024 06:07 AM    HEMOGLOBIN 11.5 (L) 02/16/2024 06:07 AM    HEMATOCRIT 34.9 (L) 02/16/2024 06:07 AM    MCV 79.0 (L) 02/16/2024 06:07 AM    MCH 26.0 (L) 02/16/2024 06:07 AM    MCHC 33.0 02/16/2024 06:07 AM    MPV 9.3 02/16/2024 06:07 AM    NEUTSPOLYS 80.30 (H) 02/16/2024 06:07 AM    LYMPHOCYTES 9.50 (L) 02/16/2024 06:07 AM    MONOCYTES 5.50 02/16/2024 06:07 AM    EOSINOPHILS 3.40 02/16/2024 06:07 AM    BASOPHILS 0.70 02/16/2024 06:07 AM      Lab Results   Component Value Date/Time    SODIUM 136 02/16/2024 06:07 AM    POTASSIUM 4.0 02/16/2024 06:07 AM    CHLORIDE 99 02/16/2024 06:07 AM    CO2 26 02/16/2024 06:07 AM    GLUCOSE 136 (H) 02/16/2024 06:07 AM    BUN 11 02/16/2024 06:07 AM    CREATININE 0.32 (L) 02/16/2024 06:07 AM      Lab Results   Component Value Date/Time    CHOLSTRLTOT 165 01/31/2024 03:25 PM     (H) 01/31/2024 03:25 PM    HDL 36 (A) 01/31/2024 03:25 PM    TRIGLYCERIDE 79 01/31/2024 03:25 PM       Lab Results   Component Value Date/Time    ALKPHOSPHAT 189 (H) 02/12/2024 02:15 AM    ASTSGOT 31 02/12/2024 02:15 AM    ALTSGPT 25 02/12/2024 02:15 AM    TBILIRUBIN 0.4 02/12/2024 02:15 AM        Imaging/Testing:    I interpreted and/or reviewed the patient's neuroimaging    DX-ABDOMEN FOR TUBE PLACEMENT   Final Result         1.  Nonspecific bowel gas pattern in the upper abdomen.   2.  Dobbhoff tube with tip terminating overlying the expected location  of the first or second duodenal segment.   3.  Cardiomegaly      CT-CEREBRAL PERFUSION ANALYSIS   Final Result      1.  Cerebral blood flow less than 30% likely representing completed infarct = 0 mL.      2.  T Max more than 6 seconds likely representing combination of completed infarct and ischemia = 0 mL.      3.  Mismatched volume likely representing ischemic brain/penumbra = None      4.  Please note that the cerebral perfusion was performed on the limited brain tissue around the basal ganglia region. Infarct/ischemia outside the CT perfusion sections can be missed in this study.      CT-CTA HEAD WITH & W/O-POST PROCESS   Final Result      1.  Prior LEFT middle cranial fossa aneurysm coiling.  Artifact limits exam.   2.  No abrupt large vessel cut off.   3.  Segmental narrowing of LEFT middle cerebral artery, primarily M1 segment, vasospasm versus artifact.   4.  Removal of ventriculostomy catheter. No hydrocephalus.   5.  Evolving RIGHT caudate infarct.   6.  Slightly improved intracranial hemorrhage.   7.  4 mm right carotid terminus aneurysm.      CT-HEAD W/O   Final Result         1.  Hyperdensity along the bilateral tentorium, compatible with layering subarachnoid hemorrhages, decreased since prior study.   2.  Atherosclerosis.         DX-ABDOMEN FOR TUBE PLACEMENT   Final Result         1.  Nonspecific bowel gas pattern in the upper abdomen.   2.  Nasogastric tube tip terminates overlying the expected location of the antrum or pylorus, somewhat withdrawn since prior study.   3.  Hazy interstitial pulmonary edema and/or infiltrates, stable   4.  Cardiomegaly      DX-ABDOMEN FOR TUBE PLACEMENT   Final Result         1.  Nonspecific bowel gas pattern in the upper abdomen.   2.  Nasogastric tube tip terminates overlying the expected location of the pylorus or first duodenal segment.   3.  Hazy interstitial pulmonary edema and/or infiltrates   4.  Cardiomegaly      DX-CHEST-PORTABLE (1 VIEW)   Final Result          Findings on chest radiograph appear stable since the prior radiograph.  No new abnormalities are identified.      DX-ABDOMEN FOR TUBE PLACEMENT   Final Result      Enteric tube tip projects over the gastric antrum or proximal duodenum, similar to prior      DX-ABDOMEN FOR TUBE PLACEMENT   Final Result      NG tube tip projects at the peripyloric region.      DX-ABDOMEN FOR TUBE PLACEMENT   Final Result      NG tube tip projects at the peripyloric region.      DX-ABDOMEN FOR TUBE PLACEMENT   Final Result      Feeding tube in place with tip at the distal stomach/pylorus.      [id number] VIEW   Final Result         1.  Nonspecific bowel gas pattern in the upper abdomen.   2.  Nasogastric tube tip terminates overlying the expected location of the pylorus or first duodenal segment.   3.  Hazy interstitial pulmonary edema and/or infiltrates   4.  Cardiomegaly      DX-ABDOMEN FOR TUBE PLACEMENT   Final Result      Enteric sump tube in situ with its tip at the level of the distal gastric antrum or pylorus.      DX-ABDOMEN FOR TUBE PLACEMENT   Final Result         1.  Nonspecific bowel gas pattern in the upper abdomen.   2.  Nasogastric tube tip terminates overlying the expected location of the pylorus or first duodenal segment.   3.  Hazy interstitial pulmonary edema and/or infiltrates, similar to prior study.   4.  Cardiomegaly      DX-CHEST-PORTABLE (1 VIEW)   Final Result      1.  Supportive tubing as described above.   2.  No other significant change from prior exam.         MR-BRAIN-W/O   Final Result      1.  Multifocal areas of acute infarcts in the left cerebral hemisphere.   2.  Small area of acute infarct in the right basal ganglia adjacent to the tip of the ventriculostomy catheter.   3.  Cortical infarct in the right medial parietal lobe.   4.  Mild amount of subdural hemorrhages surrounding the bilateral cerebellar hemispheres and right cerebellum.   5.  Subarachnoid hemorrhage.   6.  There is no  hydrocephalus.   7.  There are changes secondary to the previous endovascular repair left internal carotid aneurysm.      CT-CTA NECK WITH & W/O-POST PROCESSING   Final Result      No focal high-grade stenosis, dissection or occlusion of the cervical carotid or vertebral arteries.      CT-CEREBRAL PERFUSION ANALYSIS   Final Result      1.  Cerebral blood flow less than 30% likely representing completed infarct = 0 mL.      2.  T Max more than 6 seconds likely representing combination of completed infarct and ischemia = 0 mL.      3.  Mismatched volume likely representing ischemic brain/penumbra = None      4.  Please note that the cerebral perfusion was performed on the limited brain tissue around the basal ganglia region. Infarct/ischemia outside the CT perfusion sections can be missed in this study.      CT-CTA HEAD WITH & W/O-POST PROCESS   Final Result      1.  Prior LEFT middle cranial fossa aneurysm coiling.  Artifact limits exam.   2.  No abrupt large vessel cut off.   3.  Segmental narrowing of LEFT middle cerebral artery, vasospasm versus artifact.   4.  Ventriculostomy catheter in similar position.   5.  Evolving RIGHT caudate infarct.   6.  Stable intracranial hemorrhage.         DX-ABDOMEN FOR TUBE PLACEMENT   Final Result      Orogastric tube tip now at the mid stomach.      DX-ABDOMEN FOR TUBE PLACEMENT   Final Result      Orogastric tube tip at the proximal stomach with side port just above the GE junction.      US-INTRACRANIAL ARTERY LIMITED   Final Result      DX-CHEST-LIMITED (1 VIEW)   Final Result         1.  Stable chest with perihilar and lower lung zone interstitial and minimal airspace opacities most consistent with pulmonary edema.   2.  No new abnormalities.      EC-ECHOCARDIOGRAM COMPLETE W/O CONT   Final Result      US-INTRACRANIAL ARTERY LIMITED   Final Result      DX-CHEST-PORTABLE (1 VIEW)   Final Result      1.  Lines and tubes appear appropriately located      2.  Improvement of  pulmonary edema/airspace process      US-INTRACRANIAL ARTERY COMP   Final Result      CT-HEAD W/O   Final Result         1. Slightly decreased subarachnoid hemorrhage overlying the cerebral hemispheres.   2. Otherwise, stable as above.         DX-CHEST-FOR LINE PLACEMENT Perform procedure in: Patient's Room   Final Result      1.  Moderate interstitial pulmonary edema.   2.   Right-sided central venous catheter terminates with the tip projecting over the expected region of the mid to distal superior vena cava.   3.  Endotracheal tube terminates in satisfactory position at the level of the aortic arch.   4.  Enteric feeding tube terminates in the left upper quadrant projecting over the expected location of the stomach.      CT-STEALTH HEAD W/O   Final Result      1.  Interval placement of a right transparietal ventriculostomy catheter which terminates with the tip near the third ventricle. There is minimally decreased caliber of the ventricular system and compared to previous exam.   2.  Findings of intracranial hemorrhage appear similar to the previous exam. No definite new acute intracranial hemorrhage is identified.   3.  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         DX-CHEST-PORTABLE (1 VIEW)   Final Result      CT-HEAD W/O   Final Result      1. Interval aneurysm coiling of left posterior communicating artery aneurysm.   2. Possible increased subarachnoid hemorrhage.   3. Intraventricular hemorrhage. There is developing mild hydrocephalus with mildly increased ventricles.   4. Small right convexity subdural hematoma measuring 5 mm in thickness.      These findings were discussed with EMILY BYRD on 1/31/2024 3:14 PM.      IR-EMBOLIZATION   Final Result   Impression:      63-year-old patient with sudden onset of worst headache of life followed by obtundation underwent cerebral angiography which demonstrated a large  13 x 11 x 11 mm aneurysm arising from the dorsal  wall of the left ICA incorporating a small left posterior    communicating artery at its neck.   This aneurysm was embolized to occlusion as described above. Final angiographic images demonstrate only minimal filling of the neck of the aneurysm. The patient will be followed up in   the neuro interventional clinic and brought back for a follow-up angiogram in 6 months.      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      Stephanie COATES was physically present and participated during the entire procedure of the IR-EMBOLIZATION.          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  She is PBD#16, POD#16 from successful endovascular coiling.  No window for TCDs.  MRI with multifocal infarcts.  EVD removed on 2/12.  This AM exam with possible aphasia.  Unclear if delirium versus evolving symptomatic spasm.  CTA/P on 2/13 with no flow limiting spasm.  Will continue with keppra given clinical seizure at admission.  Additional supportive therapies including serial neuro exams, mild hypertensive response, and maintaining euvolemia and eunatremia for additional couple days while in vasospasm window.    Problem list:   Hunt Potts 4, modified Swenson grade 3 SAH   Ruptured cerebral aneurysm    Plan:   - continue neurochecks/NIHSS q2h    - maintain SBP goal 140-200 while in vasospasm window   - TCDs discontinued due to lack of windows   - continue keppra 500mg BID- this will be 3 months of therapy given seizure event    - continue lovenox SQ for DVT chemoppx   - continue ASA 81mg daily given large coil-vessel lumen interface and high risk for coil-lumen thrombosis   - continue nimodipine 60mg q4h while admitted of for 21 days   - maintain normothermia, net even I/O, FSBS    - normal Na goal 135-145, continue salt tabs, 3% PRN   - PT/OT/SLP    The evaluation of the patient, and  recommended management, was discussed with Dr. Pederson, ICU attending. I have performed a physical exam and reviewed and updated ROS and Plan today (2/16/2024).     Jose Raul Tracy MD  Vascular Neurology

## 2024-02-16 NOTE — DIETARY
Nutrition Update:    Day 16 of admit.  Fay Turner is a 63 y.o. female with admitting DX of SAH (subarachnoid hemorrhage) (HCC) [I60.9].  Patient being followed to optimize nutrition.    Current Diet: Level 4 - pureed, Level 0 - thin liquids, 1:1 supervision by nursing, deliver to nursing station. A&O x 2 today per EMR. Continues w/ TF Glucerna @ goal rate 55 mL/hr. PO intake per ADLs has been <25-50% x 4 meals, 3 of which were documented at <25%. PO intake remains very poor. Given pt's prior level of intake per RD note 2/14 and increased nutrition needs in setting of acute illness/surgery recovery, pt could benefit from ongoing TF meeting 100% of nutrition needs until improvement in PO intake.    Pt with ongoing diarrhea, type 7 with x3 BMs thus far today per flowsheets. Banatrol started at q8hrs on 2/14. RD to increase to q4hrs to max daily dose of 6 packets/day to aid with diarrhea. Updated RN via voalte message.    Problem: Nutritional:  Goal: Achieve adequate nutritional intake  Description: TF to continue to meet 100% of pt's estimated nutrition needs. RD to adjust TF regimen as indicated for PO diet.  Outcome: Progressing    RD continues to follow.

## 2024-02-16 NOTE — PROGRESS NOTES
Radiology Progress Note   Author: HARVEY Montes Date & Time created: 2/16/2024  9:00 AM   Date of admission  1/31/2024  Note to reader: this note follows the APSO format rather than the historical SOAP format. Assessment and plan located at the top of the note for ease of use.    Chief Complaint  63 y.o. female admitted 1/31/2024 with subarachnoid hemorrhage        HPI  Fay France is a 62 yo female with PMH significant for HTN who presented to OSH for sudden onset of new eyelid droop and generalized headache without significant head trauma. Pt seized in OSH CT scan, was loaded with Keppra and transferred for higher level of care. 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 Carpio Potts 4; Modified Swenson scale 3. 01/31/24 MAURICE Eason completed a cerebral aneurysm with coil embolization of large left PCOMM aneurysm.      Interval History:   02/01/24: RIGHT groin access site soft, non-tender, without ecchymosis. Dressing clean, dry, intact. Pedal pulses +2 bilaterally with feet pink, and warm, cap refill <3 sec. Pt is intubated and ventilated with EVD. She is not following commands, but moves right foot to stimuli. I reviewed the most recent labs including WBC 19.6, Hgb 14.2, Cr 0.61. Coordinated post IR procedure care with hospital nursing staff.      02/05/24:  PBD/post Pcom aneurysm embolization with coil #5.  Pt extubated yesterday (2/4). A stat CTA and CT perfusion done early am on 2/4 (increased lethargy) no obvious vasospasm noted. TCD's with poor windows therefor no baseline Tcd's obtained.  I reviewed FU MRI brain(02/04) which showed small L MCA infarcts-Vasopressors infusing and  keeping -200 to mitigate vasospasm. Anterior EVD in place at 10 cm H20 above tragus with good waveform noted on monitor-R groin site soft, nontender without edema, bleeding, small scab with small  ecchymosis. I reviewed  today's labs: WBC 13.7; hemoglobin 10.5;  ; Cr 0.40; respiratory culture- +MSSA in sputum-continuing Rocephin today is day 5 of 5.  I's/O's-last 24 hours+ 551/since admission +1260; ICP 2-10 (good ICP waveform noted on monitor).       02/06/24:  PBD/post Pcom aneurysm embolization with coil #6. TCD's with poor windows, recommend following neuro assessment. I reviewed FU MRI brain(02/04) which showed small L MCA infarcts-Goal -200 to mitigate vasospasm. Anterior EVD in place at 10 cm H20 above tragus with good waveform noted on monitor-R groin site soft, nontender without edema, bleeding, small scab with small  ecchymosis. I reviewed today's labs: WBC 17; hemoglobin 10.8;  ; Cr 0.33; respiratory culture- +MSSA in sputum -completed Rocephin.  I's/O's-last 24 hours -193 /since admission +1037; ICP 5-11 (good ICP waveform noted on monitor). I started ASA therapy 2nd to multiple infarcts noted on MRI.  ASA therapy discussed and approved by Fer Helms and  Dr. Eason.      02/07/24: SAH, Carpio Potts 4;Modified Swenson 3.  PBD/post Pcom aneurysm embolization with coil #7. TCD's discontinued 2nd to poor windows, recommend following neuro assessment. -Goal -200 to mitigate vasospasm. Anterior EVD in place at 10 cm H20 above tragus with good waveform noted on monitor-ecchymosis. I reviewed today's labs: WBC 15.6; Hemoglobin 11.1 ; Cr 0.36; .  I's/O's-last 24 hours -190 /since admission +876; ICP 3-10 (good ICP waveform noted on monitor).      02/08/24: SAH, Carpio Potts 4;Modified Swenson 3.  PBD/post Pcom aneurysm embolization with coil #8. TCD's with poor windows, recommend following neuro assessment. -Goal -200 to mitigate vasospasm. Anterior EVD open, raised today to 20 cm H20 above tragus with good waveform noted on monitor-ecchymosis. I reviewed today's labs: WBC 15.1; Hemoglobin 10.5;  ; Cr 0.31;  I's/O's-last 24 hours --1091/since admission -214 ICP 4-11/CPP > 100 (good  ICP waveform noted on monitor).  Tmax 100.6     02/09/24: SAH, Carpio Potts 4;Modified Swenson 3. PBD/post Pcom aneurysm embolization with coil #9. No longer following TCD's 2nd to poor windows.   Follow neuro assessment. -Goal -200 to mitigate vasospasm. Anterior EVD open, @15 cm H20 above tragus with good waveform noted on monitor. I reviewed today's labs: WBC 14.9; Hemoglobin 11.2;  ; Cr 0.29;  I's/O's-last 24 hours -110 ml; output since admission - 675 ml.  ICP 4-11/CPP ; EVD output -139 mL in the last 24 hours (good ICP waveform noted on monitor). Tmax 100. 9     2/10/24: SAH, Carpio Potts 4; Modified Swenson 3. PBD #10 /post Pcom aneurysm embolization with 10 coils (1/31/24). No longer following TCD's 2nd to poor windows.   Follow neuro assessment. -Goal -200 to mitigate vasospasm. Anterior EVD open, @ 20 cm H20 above tragus with good waveform noted on monitor. I reviewed patient's most recent labs: WBC 14.3; Hemoglobin 10.6;  ; Cr 0.36;  ICP<15; EVD output:139 mL in the last 24 hours. Tmax 100. 4     2/11/24: SAH, Carpio Potts 4; Modified Swenson 3. PBD #11 /post Pcom aneurysm embolization with 10 coils (1/31/24). Pt is verbally responsive with appropriate answers in Spansh, purposeful and following some commands.  - EVD clamped 2/11/24.   - No longer following TCD's 2nd to poor windows.   Follow neuro assessment.   - Goal -200 to mitigate vasospasm.   - I reviewed patient's most recent labs: WBC 17.5; Hemoglobin 11.0;  ; Cr 0.36; Tmax 99.5    02/12/24 - PBD#12. No acute events overnight. Neuro unchanged per bedside RN. No longer following TCD's 2nd to poor windows; q 2 neuro exams in place.  Labs reviewed; , WBC 17.4. Repeat CT this morning shows decreased hyperdensity along the bilateral tentorium.  Family at bedside; education provided.  Patient discussed with bedside RN.    02/13/24 - PBD#13. No acute events overnight. EVD removed yesterday. Pt more alert today for  my assessment; however, neurologist and intensivist report she is more lethargic and less verbal. CTA and CT perfusion study ordered. Pt squeezing my hand bilaterally and moving all extremities; following some commands. Nurse at bedside administering thick liquid medication eliciting cough. Q 2 neuro exams in place. Labs reviewed; , WBC 15.3. Patient discussed with bedside RN.    02/14/24 - PBD#14. No acute events overnight. CTA yesterday shows possible distal left MCA spasm; CT perfusion without mismatch.  Patient awake and interactive today.  Q 2 neuro exams remain in place. Labs reviewed; , WBC 13.5. Patient discussed with bedside RN.  NSG signed off.    02/15/24 - PBD#15. No acute events overnight. Pt's level of alertness waxes and wanes. Currently very alert and following commands but only oriented to self.A/O x 4 earlier per nursing staff. Moves all extremities equally. Labs reviewed; , WBC 12.0. Patient discussed with intensivist.     02/16/24 -  PBD#16. No acute events overnight. Pt's level of alertness waxes and wanes. Currently sleepy but easily arousable and following commands.  Moves all extremities equally. Labs reviewed; , WBC 12.8. Patient discussed with intensivist and neurologist.    Assessment/Plan     Principal Problem:    SAH (subarachnoid hemorrhage) (HCC)  Active Problems:    Acute respiratory failure with hypoxia (HCC)    Seizure (HCC)    Hypokalemia    Hypophosphatemia    Anemia    Pneumonia of both lungs due to methicillin susceptible Staphylococcus aureus (MSSA) (HCC)    Aneurysm of ascending aorta without rupture (HCC)    Hyperglycemia      Plan IR  - PBD#16  - -200 per neuro recs  - Continue Nimodipine   - TCD's (poor windows) therefore rely on neuro assessment for vasospasm watch  - If suspect vasospasm consider CTA and CT perfusion  - Continue ASA 81 mg daily   - Continue Q2 neuro checks   - Neurology following  -     -Thank you for allowing Interventional  Radiology team to participate in the patients care, if any additional care or requests are needed in the future please do not hesitate to call or place IR order   [phone]           Review of Systems  Physical Exam   Review of Systems   Constitutional:  Negative for chills and fever.   Respiratory:  Negative for shortness of breath.    Cardiovascular:  Negative for chest pain and palpitations.   Gastrointestinal:  Negative for nausea and vomiting.   Neurological:  Negative for tingling, sensory change and headaches.      Vitals:    02/16/24 1200   BP: 136/80   Pulse: (!) 104   Resp: (!) 28   SpO2: 93%        Physical Exam  Constitutional:       Appearance: Normal appearance.   Cardiovascular:      Rate and Rhythm: Tachycardia present.      Pulses: Normal pulses.   Abdominal:      Palpations: Abdomen is soft.   Skin:     General: Skin is warm and dry.   Neurological:      General: No focal deficit present.      Mental Status: She is alert and oriented to person, place, and time.      Comments: A/O x 1; following commands  L ptosis, L CNIII palsy,   antigravity strength in all 4 extremities   Sensory intact   Psychiatric:         Mood and Affect: Mood normal.         Behavior: Behavior normal.             Labs    Recent Labs     02/14/24  0340 02/15/24  0547 02/16/24  0607   WBC 13.4* 12.0* 12.8*   RBC 3.97* 3.97* 4.42   HEMOGLOBIN 10.4* 10.4* 11.5*   HEMATOCRIT 31.3* 31.6* 34.9*   MCV 78.8* 79.6* 79.0*   MCH 26.2* 26.2* 26.0*   MCHC 33.2 32.9 33.0   RDW 39.1 40.0 39.2   PLATELETCT 516* 515* 580*   MPV 9.5 9.3 9.3     Recent Labs     02/14/24  0340 02/14/24  1810 02/15/24  0547 02/15/24  1823 02/16/24  0607   SODIUM 136   < > 137 135 136   POTASSIUM 3.5*  --  3.8  --  4.0   CHLORIDE 101  --  102  --  99   CO2 26  --  24  --  26   GLUCOSE 141*  --  132*  --  136*   BUN 12  --  10  --  11   CREATININE 0.33*  --  0.31*  --  0.32*   CALCIUM 9.4  --  8.7  --  9.9    < > = values in this interval not displayed.     Recent  Labs     02/14/24  0340 02/15/24  0547 02/16/24  0607   CREATININE 0.33* 0.31* 0.32*     DX-ABDOMEN FOR TUBE PLACEMENT   Final Result         1.  Nonspecific bowel gas pattern in the upper abdomen.   2.  Dobbhoff tube with tip terminating overlying the expected location of the first or second duodenal segment.   3.  Cardiomegaly      CT-CEREBRAL PERFUSION ANALYSIS   Final Result      1.  Cerebral blood flow less than 30% likely representing completed infarct = 0 mL.      2.  T Max more than 6 seconds likely representing combination of completed infarct and ischemia = 0 mL.      3.  Mismatched volume likely representing ischemic brain/penumbra = None      4.  Please note that the cerebral perfusion was performed on the limited brain tissue around the basal ganglia region. Infarct/ischemia outside the CT perfusion sections can be missed in this study.      CT-CTA HEAD WITH & W/O-POST PROCESS   Final Result      1.  Prior LEFT middle cranial fossa aneurysm coiling.  Artifact limits exam.   2.  No abrupt large vessel cut off.   3.  Segmental narrowing of LEFT middle cerebral artery, primarily M1 segment, vasospasm versus artifact.   4.  Removal of ventriculostomy catheter. No hydrocephalus.   5.  Evolving RIGHT caudate infarct.   6.  Slightly improved intracranial hemorrhage.   7.  4 mm right carotid terminus aneurysm.      CT-HEAD W/O   Final Result         1.  Hyperdensity along the bilateral tentorium, compatible with layering subarachnoid hemorrhages, decreased since prior study.   2.  Atherosclerosis.         DX-ABDOMEN FOR TUBE PLACEMENT   Final Result         1.  Nonspecific bowel gas pattern in the upper abdomen.   2.  Nasogastric tube tip terminates overlying the expected location of the antrum or pylorus, somewhat withdrawn since prior study.   3.  Hazy interstitial pulmonary edema and/or infiltrates, stable   4.  Cardiomegaly      DX-ABDOMEN FOR TUBE PLACEMENT   Final Result         1.  Nonspecific bowel gas  pattern in the upper abdomen.   2.  Nasogastric tube tip terminates overlying the expected location of the pylorus or first duodenal segment.   3.  Hazy interstitial pulmonary edema and/or infiltrates   4.  Cardiomegaly      DX-CHEST-PORTABLE (1 VIEW)   Final Result         Findings on chest radiograph appear stable since the prior radiograph.  No new abnormalities are identified.      DX-ABDOMEN FOR TUBE PLACEMENT   Final Result      Enteric tube tip projects over the gastric antrum or proximal duodenum, similar to prior      DX-ABDOMEN FOR TUBE PLACEMENT   Final Result      NG tube tip projects at the peripyloric region.      DX-ABDOMEN FOR TUBE PLACEMENT   Final Result      NG tube tip projects at the peripyloric region.      DX-ABDOMEN FOR TUBE PLACEMENT   Final Result      Feeding tube in place with tip at the distal stomach/pylorus.      [id number] VIEW   Final Result         1.  Nonspecific bowel gas pattern in the upper abdomen.   2.  Nasogastric tube tip terminates overlying the expected location of the pylorus or first duodenal segment.   3.  Hazy interstitial pulmonary edema and/or infiltrates   4.  Cardiomegaly      DX-ABDOMEN FOR TUBE PLACEMENT   Final Result      Enteric sump tube in situ with its tip at the level of the distal gastric antrum or pylorus.      DX-ABDOMEN FOR TUBE PLACEMENT   Final Result         1.  Nonspecific bowel gas pattern in the upper abdomen.   2.  Nasogastric tube tip terminates overlying the expected location of the pylorus or first duodenal segment.   3.  Hazy interstitial pulmonary edema and/or infiltrates, similar to prior study.   4.  Cardiomegaly      DX-CHEST-PORTABLE (1 VIEW)   Final Result      1.  Supportive tubing as described above.   2.  No other significant change from prior exam.         MR-BRAIN-W/O   Final Result      1.  Multifocal areas of acute infarcts in the left cerebral hemisphere.   2.  Small area of acute infarct in the right basal ganglia adjacent  to the tip of the ventriculostomy catheter.   3.  Cortical infarct in the right medial parietal lobe.   4.  Mild amount of subdural hemorrhages surrounding the bilateral cerebellar hemispheres and right cerebellum.   5.  Subarachnoid hemorrhage.   6.  There is no hydrocephalus.   7.  There are changes secondary to the previous endovascular repair left internal carotid aneurysm.      CT-CTA NECK WITH & W/O-POST PROCESSING   Final Result      No focal high-grade stenosis, dissection or occlusion of the cervical carotid or vertebral arteries.      CT-CEREBRAL PERFUSION ANALYSIS   Final Result      1.  Cerebral blood flow less than 30% likely representing completed infarct = 0 mL.      2.  T Max more than 6 seconds likely representing combination of completed infarct and ischemia = 0 mL.      3.  Mismatched volume likely representing ischemic brain/penumbra = None      4.  Please note that the cerebral perfusion was performed on the limited brain tissue around the basal ganglia region. Infarct/ischemia outside the CT perfusion sections can be missed in this study.      CT-CTA HEAD WITH & W/O-POST PROCESS   Final Result      1.  Prior LEFT middle cranial fossa aneurysm coiling.  Artifact limits exam.   2.  No abrupt large vessel cut off.   3.  Segmental narrowing of LEFT middle cerebral artery, vasospasm versus artifact.   4.  Ventriculostomy catheter in similar position.   5.  Evolving RIGHT caudate infarct.   6.  Stable intracranial hemorrhage.         DX-ABDOMEN FOR TUBE PLACEMENT   Final Result      Orogastric tube tip now at the mid stomach.      DX-ABDOMEN FOR TUBE PLACEMENT   Final Result      Orogastric tube tip at the proximal stomach with side port just above the GE junction.      US-INTRACRANIAL ARTERY LIMITED   Final Result      DX-CHEST-LIMITED (1 VIEW)   Final Result         1.  Stable chest with perihilar and lower lung zone interstitial and minimal airspace opacities most consistent with pulmonary edema.    2.  No new abnormalities.      EC-ECHOCARDIOGRAM COMPLETE W/O CONT   Final Result      US-INTRACRANIAL ARTERY LIMITED   Final Result      DX-CHEST-PORTABLE (1 VIEW)   Final Result      1.  Lines and tubes appear appropriately located      2.  Improvement of pulmonary edema/airspace process      US-INTRACRANIAL ARTERY COMP   Final Result      CT-HEAD W/O   Final Result         1. Slightly decreased subarachnoid hemorrhage overlying the cerebral hemispheres.   2. Otherwise, stable as above.         DX-CHEST-FOR LINE PLACEMENT Perform procedure in: Patient's Room   Final Result      1.  Moderate interstitial pulmonary edema.   2.   Right-sided central venous catheter terminates with the tip projecting over the expected region of the mid to distal superior vena cava.   3.  Endotracheal tube terminates in satisfactory position at the level of the aortic arch.   4.  Enteric feeding tube terminates in the left upper quadrant projecting over the expected location of the stomach.      CT-STEALTH HEAD W/O   Final Result      1.  Interval placement of a right transparietal ventriculostomy catheter which terminates with the tip near the third ventricle. There is minimally decreased caliber of the ventricular system and compared to previous exam.   2.  Findings of intracranial hemorrhage appear similar to the previous exam. No definite new acute intracranial hemorrhage is identified.   3.  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         DX-CHEST-PORTABLE (1 VIEW)   Final Result      CT-HEAD W/O   Final Result      1. Interval aneurysm coiling of left posterior communicating artery aneurysm.   2. Possible increased subarachnoid hemorrhage.   3. Intraventricular hemorrhage. There is developing mild hydrocephalus with mildly increased ventricles.   4. Small right convexity subdural hematoma measuring 5 mm in thickness.      These findings were discussed with EMILY BYRD on 1/31/2024  "3:14 PM.      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   This aneurysm was embolized to occlusion as described above. Final angiographic images demonstrate only minimal filling of the neck of the aneurysm. The patient will be followed up in   the neuro interventional clinic and brought back for a follow-up angiogram in 6 months.      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      IStephanie was physically present and participated during the entire procedure of the IR-EMBOLIZATION.        INR   Date Value Ref Range Status   01/31/2024 1.06 0.87 - 1.13 Final     Comment:     INR - Non-therapeutic Reference Range: 0.87-1.13  INR - Therapeutic Reference Range: 2.0-4.0       No results found for: \"POCINR\"     Intake/Output Summary (Last 24 hours) at 2/12/2024 0811  Last data filed at 2/12/2024 0600  Gross per 24 hour   Intake 2444.57 ml   Output 2345 ml   Net 99.57 ml      Labs not explicitly included in this progress note were reviewed by the author. Radiology/imaging not explicitly included in this progress note was reviewed by the author.     I have performed a physical exam and reviewed and updated ROS and Plan today (2/16/2024).     30 minutes in directly providing and coordinating care and extensive data review.  No time overlap and excludes procedures.  "

## 2024-02-16 NOTE — DISCHARGE PLANNING
"Case Management Discharge Planning    Previously accepted by [facility] which has now declined due to \"no skilled need\".    Patient not medically cleared for DC; Remains on vasospasm watch (Nimodipine through 2/21/2024).    SNF referrals to be submitted closer to DC readiness.   IRF referral to [facility] submitted on 2/12/2024 declined due to \"acuity too high\"; CM to re-submit based on clinical progression.       "

## 2024-02-16 NOTE — CARE PLAN
The patient is Watcher - Medium risk of patient condition declining or worsening    Shift Goals  Clinical Goals: m3lgixq, q12 Na, -200  Patient Goals: padmini  Family Goals: updates    Progress made toward(s) clinical / shift goals:    Problem: Knowledge Deficit - Standard  Goal: Patient and family/care givers will demonstrate understanding of plan of care, disease process/condition, diagnostic tests and medications  Outcome: Progressing     Problem: Safety - Medical Restraint  Goal: Remains free of injury from restraints (Restraint for Interference with Medical Device)  Outcome: Progressing    Q2h restraint monitoring performed.      Problem: Skin Integrity  Goal: Skin integrity is maintained or improved  Outcome: Progressing  Patient received Q2h turns. Pillows were in place for support and positioning. Wound care instructions followed.     Problem: Pain - Standard  Goal: Alleviation of pain or a reduction in pain to the patient’s comfort goal  Outcome: Progressing     Problem: Neuro Status  Goal: Neuro status will remain stable or improve  Outcome: Progressing    Q2h neuro exams performed and have remained stabled during the shift.        Patient is not progressing towards the following goals: N/A
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
## 2024-02-16 NOTE — THERAPY
"Speech Language Pathology   Daily Treatment     Patient Name: Fay Turner  AGE:  63 y.o., SEX:  female  Medical Record #: [id number]  Date of Service: 2/16/2024      Precautions:            Subjective  \"Too salty. Too sweet.\"      Assessment  Per RN, limited intake with tube feeding running 24 hours daily. RN has d/w team consideration for decreasing tube feeding to possibly improve the amount pt is taking PO. Language line used Ruby #[id number] with English. Pt responding in English. Speech is intelligible. Wrist restraint removed for self feeding and replaced after session. Pt required mod tactile cues, and at times hand over hand, to hold spoon or cup with RUE. One to two swallows triggered. No coughing and no oral residue post swallow. Pt with approx 20 intake before declining additional PO stating she was full.       Clinical Impressions  Cont with PU4/TNO 1-1 feeding. Encourage self feeding as pt is able to participate. Consider decreasing NG tube feeding as medically approp.       Recommendations  Treatment Completed: (P) Dysphagia Treatment       Dysphagia Treatment  Diet Consistency: (P) PU4/TNO 1-1 feeding, NG feeding  Instrumentation: (P) None indicated at this time  Medication: (P) Crush with applesauce, as appropriate, Crush with pudding/puree, as appropriate  Supervision: (P) 1:1 feeding with constant supervision  Positioning: (P) Remain upright for 15 minutes after oral intake, Meals sitting upright in a chair, as tolerated  Risk Management : (P) Small bites/sips, Alternate bites and sips, Slow rate of intake, Reduce environmental distractions                     SLP Treatment Plan  Treatment Plan: (P) Dysphagia Treatment  SLP Frequency: (P) 4x Per Week  Estimated Duration: (P) Until Therapy Goals Met      Anticipated Discharge Needs  Discharge Recommendations: (P) Recommend post-acute placement for additional speech therapy services prior to discharge home  Therapy Recommendations Upon DC: (P) " Dysphagia Training, Expression Training, Comprehension Training, Reading Training, Writing Training, Cognitive-Linguistic Training, Patient / Family / Caregiver Education      Patient / Family Goals  Patient / Family Goal #1: (P) nods yes to ice chips  Goal #1 Outcome: (P) Goal met  Short Term Goals  Short Term Goal # 1 B : (P) New 2/14: Pt will consume a PU4/TN0 diet with 1:1 supv without any overt s/s of aspiration or decline in respiratory status  Goal Outcome  # 1 B: (P) Progressing as expected      Poornima Saucedo, SLP

## 2024-02-17 LAB
ANION GAP SERPL CALC-SCNC: 12 MMOL/L (ref 7–16)
BASOPHILS # BLD AUTO: 0.7 % (ref 0–1.8)
BASOPHILS # BLD: 0.08 K/UL (ref 0–0.12)
BUN SERPL-MCNC: 11 MG/DL (ref 8–22)
CALCIUM SERPL-MCNC: 10 MG/DL (ref 8.5–10.5)
CHLORIDE SERPL-SCNC: 101 MMOL/L (ref 96–112)
CO2 SERPL-SCNC: 25 MMOL/L (ref 20–33)
CREAT SERPL-MCNC: 0.38 MG/DL (ref 0.5–1.4)
EOSINOPHIL # BLD AUTO: 0.38 K/UL (ref 0–0.51)
EOSINOPHIL NFR BLD: 3.2 % (ref 0–6.9)
ERYTHROCYTE [DISTWIDTH] IN BLOOD BY AUTOMATED COUNT: 39.7 FL (ref 35.9–50)
GFR SERPLBLD CREATININE-BSD FMLA CKD-EPI: 113 ML/MIN/1.73 M 2
GLUCOSE SERPL-MCNC: 135 MG/DL (ref 65–99)
HCT VFR BLD AUTO: 36.1 % (ref 37–47)
HGB BLD-MCNC: 11.8 G/DL (ref 12–16)
IMM GRANULOCYTES # BLD AUTO: 0.06 K/UL (ref 0–0.11)
IMM GRANULOCYTES NFR BLD AUTO: 0.5 % (ref 0–0.9)
LYMPHOCYTES # BLD AUTO: 1.14 K/UL (ref 1–4.8)
LYMPHOCYTES NFR BLD: 9.6 % (ref 22–41)
MAGNESIUM SERPL-MCNC: 2.2 MG/DL (ref 1.5–2.5)
MCH RBC QN AUTO: 25.9 PG (ref 27–33)
MCHC RBC AUTO-ENTMCNC: 32.7 G/DL (ref 32.2–35.5)
MCV RBC AUTO: 79.2 FL (ref 81.4–97.8)
MONOCYTES # BLD AUTO: 0.57 K/UL (ref 0–0.85)
MONOCYTES NFR BLD AUTO: 4.8 % (ref 0–13.4)
NEUTROPHILS # BLD AUTO: 9.66 K/UL (ref 1.82–7.42)
NEUTROPHILS NFR BLD: 81.2 % (ref 44–72)
NRBC # BLD AUTO: 0 K/UL
NRBC BLD-RTO: 0 /100 WBC (ref 0–0.2)
PHOSPHATE SERPL-MCNC: 3 MG/DL (ref 2.5–4.5)
PLATELET # BLD AUTO: 589 K/UL (ref 164–446)
PMV BLD AUTO: 9.3 FL (ref 9–12.9)
POTASSIUM SERPL-SCNC: 4.1 MMOL/L (ref 3.6–5.5)
RBC # BLD AUTO: 4.56 M/UL (ref 4.2–5.4)
SODIUM SERPL-SCNC: 138 MMOL/L (ref 135–145)
WBC # BLD AUTO: 11.9 K/UL (ref 4.8–10.8)

## 2024-02-17 PROCEDURE — 84100 ASSAY OF PHOSPHORUS: CPT

## 2024-02-17 PROCEDURE — A9270 NON-COVERED ITEM OR SERVICE: HCPCS | Performed by: NURSE PRACTITIONER

## 2024-02-17 PROCEDURE — 83735 ASSAY OF MAGNESIUM: CPT

## 2024-02-17 PROCEDURE — 770022 HCHG ROOM/CARE - ICU (200)

## 2024-02-17 PROCEDURE — 700105 HCHG RX REV CODE 258: Performed by: EMERGENCY MEDICINE

## 2024-02-17 PROCEDURE — 99232 SBSQ HOSP IP/OBS MODERATE 35: CPT | Performed by: PSYCHIATRY & NEUROLOGY

## 2024-02-17 PROCEDURE — 700105 HCHG RX REV CODE 258: Performed by: INTERNAL MEDICINE

## 2024-02-17 PROCEDURE — 700102 HCHG RX REV CODE 250 W/ 637 OVERRIDE(OP): Performed by: INTERNAL MEDICINE

## 2024-02-17 PROCEDURE — A9270 NON-COVERED ITEM OR SERVICE: HCPCS | Performed by: EMERGENCY MEDICINE

## 2024-02-17 PROCEDURE — 700102 HCHG RX REV CODE 250 W/ 637 OVERRIDE(OP): Performed by: PSYCHIATRY & NEUROLOGY

## 2024-02-17 PROCEDURE — 700111 HCHG RX REV CODE 636 W/ 250 OVERRIDE (IP): Mod: JZ | Performed by: INTERNAL MEDICINE

## 2024-02-17 PROCEDURE — 700102 HCHG RX REV CODE 250 W/ 637 OVERRIDE(OP): Performed by: NURSE PRACTITIONER

## 2024-02-17 PROCEDURE — 700102 HCHG RX REV CODE 250 W/ 637 OVERRIDE(OP): Performed by: EMERGENCY MEDICINE

## 2024-02-17 PROCEDURE — A9270 NON-COVERED ITEM OR SERVICE: HCPCS | Performed by: INTERNAL MEDICINE

## 2024-02-17 PROCEDURE — A9270 NON-COVERED ITEM OR SERVICE: HCPCS | Performed by: PSYCHIATRY & NEUROLOGY

## 2024-02-17 PROCEDURE — 99291 CRITICAL CARE FIRST HOUR: CPT | Performed by: EMERGENCY MEDICINE

## 2024-02-17 PROCEDURE — 85025 COMPLETE CBC W/AUTO DIFF WBC: CPT

## 2024-02-17 PROCEDURE — 80048 BASIC METABOLIC PNL TOTAL CA: CPT

## 2024-02-17 RX ORDER — GABAPENTIN 100 MG/1
200 CAPSULE ORAL EVERY 8 HOURS
Status: DISCONTINUED | OUTPATIENT
Start: 2024-02-17 | End: 2024-02-23 | Stop reason: HOSPADM

## 2024-02-17 RX ORDER — OXYCODONE HYDROCHLORIDE 5 MG/1
5-10 TABLET ORAL EVERY 4 HOURS PRN
Status: DISCONTINUED | OUTPATIENT
Start: 2024-02-17 | End: 2024-02-23 | Stop reason: HOSPADM

## 2024-02-17 RX ORDER — SODIUM CHLORIDE 1 G/1
1 TABLET ORAL EVERY 8 HOURS
Status: DISCONTINUED | OUTPATIENT
Start: 2024-02-17 | End: 2024-02-18

## 2024-02-17 RX ORDER — SODIUM CHLORIDE, SODIUM GLUCONATE, SODIUM ACETATE, POTASSIUM CHLORIDE AND MAGNESIUM CHLORIDE 526; 502; 368; 37; 30 MG/100ML; MG/100ML; MG/100ML; MG/100ML; MG/100ML
1000 INJECTION, SOLUTION INTRAVENOUS ONCE
Status: COMPLETED | OUTPATIENT
Start: 2024-02-17 | End: 2024-02-17

## 2024-02-17 RX ADMIN — NIMODIPINE 60 MG: 60 SOLUTION ORAL at 14:00

## 2024-02-17 RX ADMIN — OXYCODONE 5 MG: 5 TABLET ORAL at 20:23

## 2024-02-17 RX ADMIN — NIMODIPINE 60 MG: 60 SOLUTION ORAL at 22:20

## 2024-02-17 RX ADMIN — NYSTATIN: 100000 POWDER TOPICAL at 06:00

## 2024-02-17 RX ADMIN — SODIUM CHLORIDE, SODIUM GLUCONATE, SODIUM ACETATE, POTASSIUM CHLORIDE AND MAGNESIUM CHLORIDE 1000 ML: 526; 502; 368; 37; 30 INJECTION, SOLUTION INTRAVENOUS at 08:15

## 2024-02-17 RX ADMIN — NIMODIPINE 60 MG: 60 SOLUTION ORAL at 10:00

## 2024-02-17 RX ADMIN — SODIUM CHLORIDE 1 G: 1 TABLET ORAL at 22:19

## 2024-02-17 RX ADMIN — ENOXAPARIN SODIUM 40 MG: 100 INJECTION SUBCUTANEOUS at 18:09

## 2024-02-17 RX ADMIN — MICONAZOLE NITRATE: 20 CREAM TOPICAL at 18:00

## 2024-02-17 RX ADMIN — OXYCODONE 5 MG: 5 TABLET ORAL at 11:27

## 2024-02-17 RX ADMIN — NIMODIPINE 60 MG: 60 SOLUTION ORAL at 05:59

## 2024-02-17 RX ADMIN — LEVETIRACETAM 500 MG: 500 TABLET, FILM COATED ORAL at 18:09

## 2024-02-17 RX ADMIN — ACETAMINOPHEN 650 MG: 325 TABLET, FILM COATED ORAL at 08:09

## 2024-02-17 RX ADMIN — NYSTATIN: 100000 POWDER TOPICAL at 18:10

## 2024-02-17 RX ADMIN — GABAPENTIN 200 MG: 100 CAPSULE ORAL at 11:27

## 2024-02-17 RX ADMIN — NIMODIPINE 60 MG: 60 SOLUTION ORAL at 02:39

## 2024-02-17 RX ADMIN — ATORVASTATIN CALCIUM 40 MG: 40 TABLET, FILM COATED ORAL at 18:09

## 2024-02-17 RX ADMIN — SODIUM CHLORIDE 1 G: 1 TABLET ORAL at 14:00

## 2024-02-17 RX ADMIN — GABAPENTIN 200 MG: 100 CAPSULE ORAL at 22:19

## 2024-02-17 RX ADMIN — MICONAZOLE NITRATE: 20 CREAM TOPICAL at 05:59

## 2024-02-17 RX ADMIN — SODIUM CHLORIDE: 9 INJECTION, SOLUTION INTRAVENOUS at 21:53

## 2024-02-17 RX ADMIN — ASPIRIN 81 MG 81 MG: 81 TABLET ORAL at 05:59

## 2024-02-17 RX ADMIN — SODIUM CHLORIDE 2 G: 1 TABLET ORAL at 05:59

## 2024-02-17 RX ADMIN — LEVETIRACETAM 500 MG: 500 TABLET, FILM COATED ORAL at 05:59

## 2024-02-17 RX ADMIN — NIMODIPINE 60 MG: 60 SOLUTION ORAL at 18:09

## 2024-02-17 ASSESSMENT — FIBROSIS 4 INDEX: FIB4 SCORE: 0.65

## 2024-02-17 ASSESSMENT — PAIN DESCRIPTION - PAIN TYPE
TYPE: ACUTE PAIN

## 2024-02-17 ASSESSMENT — ENCOUNTER SYMPTOMS
TINGLING: 0
PALPITATIONS: 0
VOMITING: 0
HEADACHES: 0
SENSORY CHANGE: 0
SHORTNESS OF BREATH: 0
FEVER: 0
NAUSEA: 0

## 2024-02-17 NOTE — CARE PLAN
The patient is Watcher - Medium risk of patient condition declining or worsening    Shift Goals  Clinical Goals: Q2 neuro, vss  Patient Goals: padmini  Family Goals: updates    Progress made toward(s) clinical / shift goals:    Problem: Safety - Medical Restraint  Goal: Remains free of injury from restraints (Restraint for Interference with Medical Device)  Outcome: Progressing     Problem: Fall Risk  Goal: Patient will remain free from falls  Outcome: Progressing     Problem: Pain - Standard  Goal: Alleviation of pain or a reduction in pain to the patient’s comfort goal  Outcome: Progressing       Patient is not progressing towards the following goals:
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
## 2024-02-17 NOTE — PROGRESS NOTES
Neurology Progress Note  Neurohospitalist Service, [facility]    Referring Physician: Emil Pederson M.D.      Interval History: No acute events overnight.   Stable exam.     Review of systems: In addition to what is detailed in the HPI and/or updated in the interval history, all other systems reviewed and are negative.    Past Medical History, Past Surgical History and Social History reviewed and unchanged from prior    Medications:    Current Facility-Administered Medications:     electrolyte-A (Plasmalyte-A) (BOLUS) infusion 1,000 mL, 1,000 mL, Intravenous, Once, Emil Pederson M.D.    sodium chloride (Salt) tablet 2 g, 2 g, Enteral Tube, Q8HRS, Oriana HAINES Latona, 2 g at 02/17/24 0559    aspirin (Asa) chewable tab 81 mg, 81 mg, Enteral Tube, DAILY, Jose Raul Tracy M.D., 81 mg at 02/17/24 0559    lidocaine (Asperflex) 4 % patch 2 Patch, 2 Patch, Transdermal, Q24HR, Dayana Babcock M.D., 2 Patch at 02/16/24 1645    enoxaparin (Lovenox) inj 40 mg, 40 mg, Subcutaneous, DAILY AT 1800, Dayana Babcock M.D., 40 mg at 02/16/24 1752    nystatin (Mycostatin) powder, , Topical, BID, Dayana Babcock M.D., Given at 02/17/24 0600    acetaminophen (Tylenol) tablet 650 mg, 650 mg, Enteral Tube, Q4HRS PRN, 650 mg at 02/16/24 1752 **OR** acetaminophen (Tylenol) suppository 650 mg, 650 mg, Rectal, Q4HRS PRN, Dayana Babcock M.D.    atorvastatin (Lipitor) tablet 40 mg, 40 mg, Enteral Tube, Q EVENING, Dayana Babcock M.D., 40 mg at 02/16/24 1800    miconazole (Micotin) 2 % cream, , Topical, BID, Dayana Babcock M.D., Given at 02/17/24 0559    NS infusion, , Intravenous, Continuous, Jeremy M Gonda, M.D., Last Rate: 83 mL/hr at 02/15/24 2219, New Bag at 02/15/24 2219    enalaprilat (Vasotec) injection 1.25 mg 1 mL, 1.25 mg, Intravenous, Q6HRS PRN, Jeremy M Gonda, M.D.    hydrALAZINE (Apresoline) injection 10-20 mg, 10-20 mg, Intravenous, Q4HRS PRN, Jeremy M Gonda, M.D.    labetalol  "(Normodyne/Trandate) injection 10-20 mg, 10-20 mg, Intravenous, Q4HRS PRN, Jeremy M Gonda, M.D.    niMODipine (Nymalize) oral syringe 60 mg, 60 mg, Enteral Tube, Q4HRS, Jeremy M Gonda, M.D., 60 mg at 02/17/24 0559    levETIRAcetam (Keppra) tablet 500 mg, 500 mg, Enteral Tube, BID, Jeremy M Gonda, M.D., 500 mg at 02/17/24 0559    Pharmacy Consult: Enteral tube insertion - review meds/change route/product selection, 1 Each, Other, PHARMACY TO DOSE, Jeremy M Gonda, M.D.    Respiratory Therapy Consult, , Nebulization, Continuous RT, Jeremy M Gonda, M.D.    senna-docusate (Pericolace Or Senokot S) 8.6-50 MG per tablet 2 Tablet, 2 Tablet, Enteral Tube, BID, 2 Tablet at 02/03/24 0514 **AND** polyethylene glycol/lytes (Miralax) Packet 1 Packet, 1 Packet, Enteral Tube, QDAY PRN **AND** magnesium hydroxide (Milk Of Magnesia) suspension 30 mL, 30 mL, Enteral Tube, QDAY PRN **AND** bisacodyl (Dulcolax) suppository 10 mg, 10 mg, Rectal, QDAY PRN, Jeremy M Gonda, M.D. MD Alert...ICU Electrolyte Replacement per Pharmacy, , Other, PHARMACY TO DOSE, Jeremy M Gonda, M.D.    ondansetron (Zofran ODT) dispertab 4 mg, 4 mg, Enteral Tube, Q4HRS PRN, 4 mg at 01/31/24 2248 **OR** ondansetron (Zofran) syringe/vial injection 4 mg, 4 mg, Intravenous, Q4HRS PRN, Jeremy M Gonda, M.D., 4 mg at 02/02/24 1758    LORazepam (Ativan) injection 2 mg, 2 mg, Intravenous, Q5 MIN PRN, Jeremy M Gonda, M.D.    prochlorperazine (Compazine) injection 10 mg, 10 mg, Intravenous, Q6HRS PRN, David Mclaughlin Jr., D.O., 10 mg at 01/31/24 8473    Physical Examination:   BP (!) 155/91   Pulse 98   Resp (!) 21   Ht 1.575 m (5' 2.01\")   Wt 72.1 kg (159 lb)   SpO2 92%   Breastfeeding No   BMI 29.07 kg/m²       General: Patient is sleepy, arouses easily  Neck: There is normal range of motion  CV: Regular rate   Extremities:  Warm, dry, and intact, without peripheral lower extremity edema    NEUROLOGICAL EXAM:     Mental status: Sleepy, arouses easily  Speech and " language: Speech is clear.  Fluent and follows commands  Cranial nerve exam:  Visual fields are full. There is no nystagmus. Extraocular muscles are intact. Face is symmetric. L ptosis  Motor exam: There is antigravity strength in all 4 extremities  Sensory exam:  Reacts to tactile in all 4 extremities, there is no neglect to double stim  Coordination: No ataxia on elicited movements    Objective Data:    Labs:  Lab Results   Component Value Date/Time    PROTHROMBTM 13.9 01/31/2024 03:25 PM    INR 1.06 01/31/2024 03:25 PM      Lab Results   Component Value Date/Time    WBC 11.9 (H) 02/17/2024 05:39 AM    RBC 4.56 02/17/2024 05:39 AM    HEMOGLOBIN 11.8 (L) 02/17/2024 05:39 AM    HEMATOCRIT 36.1 (L) 02/17/2024 05:39 AM    MCV 79.2 (L) 02/17/2024 05:39 AM    MCH 25.9 (L) 02/17/2024 05:39 AM    MCHC 32.7 02/17/2024 05:39 AM    MPV 9.3 02/17/2024 05:39 AM    NEUTSPOLYS 81.20 (H) 02/17/2024 05:39 AM    LYMPHOCYTES 9.60 (L) 02/17/2024 05:39 AM    MONOCYTES 4.80 02/17/2024 05:39 AM    EOSINOPHILS 3.20 02/17/2024 05:39 AM    BASOPHILS 0.70 02/17/2024 05:39 AM      Lab Results   Component Value Date/Time    SODIUM 138 02/17/2024 05:39 AM    POTASSIUM 4.1 02/17/2024 05:39 AM    CHLORIDE 101 02/17/2024 05:39 AM    CO2 25 02/17/2024 05:39 AM    GLUCOSE 135 (H) 02/17/2024 05:39 AM    BUN 11 02/17/2024 05:39 AM    CREATININE 0.38 (L) 02/17/2024 05:39 AM      Lab Results   Component Value Date/Time    CHOLSTRLTOT 165 01/31/2024 03:25 PM     (H) 01/31/2024 03:25 PM    HDL 36 (A) 01/31/2024 03:25 PM    TRIGLYCERIDE 79 01/31/2024 03:25 PM       Lab Results   Component Value Date/Time    ALKPHOSPHAT 189 (H) 02/12/2024 02:15 AM    ASTSGOT 31 02/12/2024 02:15 AM    ALTSGPT 25 02/12/2024 02:15 AM    TBILIRUBIN 0.4 02/12/2024 02:15 AM        Imaging/Testing:    I interpreted and/or reviewed the patient's neuroimaging    DX-ABDOMEN FOR TUBE PLACEMENT   Final Result         1.  Nonspecific bowel gas pattern in the upper abdomen.   2.   Dobbhoff tube with tip terminating overlying the expected location of the first or second duodenal segment.   3.  Cardiomegaly      CT-CEREBRAL PERFUSION ANALYSIS   Final Result      1.  Cerebral blood flow less than 30% likely representing completed infarct = 0 mL.      2.  T Max more than 6 seconds likely representing combination of completed infarct and ischemia = 0 mL.      3.  Mismatched volume likely representing ischemic brain/penumbra = None      4.  Please note that the cerebral perfusion was performed on the limited brain tissue around the basal ganglia region. Infarct/ischemia outside the CT perfusion sections can be missed in this study.      CT-CTA HEAD WITH & W/O-POST PROCESS   Final Result      1.  Prior LEFT middle cranial fossa aneurysm coiling.  Artifact limits exam.   2.  No abrupt large vessel cut off.   3.  Segmental narrowing of LEFT middle cerebral artery, primarily M1 segment, vasospasm versus artifact.   4.  Removal of ventriculostomy catheter. No hydrocephalus.   5.  Evolving RIGHT caudate infarct.   6.  Slightly improved intracranial hemorrhage.   7.  4 mm right carotid terminus aneurysm.      CT-HEAD W/O   Final Result         1.  Hyperdensity along the bilateral tentorium, compatible with layering subarachnoid hemorrhages, decreased since prior study.   2.  Atherosclerosis.         DX-ABDOMEN FOR TUBE PLACEMENT   Final Result         1.  Nonspecific bowel gas pattern in the upper abdomen.   2.  Nasogastric tube tip terminates overlying the expected location of the antrum or pylorus, somewhat withdrawn since prior study.   3.  Hazy interstitial pulmonary edema and/or infiltrates, stable   4.  Cardiomegaly      DX-ABDOMEN FOR TUBE PLACEMENT   Final Result         1.  Nonspecific bowel gas pattern in the upper abdomen.   2.  Nasogastric tube tip terminates overlying the expected location of the pylorus or first duodenal segment.   3.  Hazy interstitial pulmonary edema and/or infiltrates    4.  Cardiomegaly      DX-CHEST-PORTABLE (1 VIEW)   Final Result         Findings on chest radiograph appear stable since the prior radiograph.  No new abnormalities are identified.      DX-ABDOMEN FOR TUBE PLACEMENT   Final Result      Enteric tube tip projects over the gastric antrum or proximal duodenum, similar to prior      DX-ABDOMEN FOR TUBE PLACEMENT   Final Result      NG tube tip projects at the peripyloric region.      DX-ABDOMEN FOR TUBE PLACEMENT   Final Result      NG tube tip projects at the peripyloric region.      DX-ABDOMEN FOR TUBE PLACEMENT   Final Result      Feeding tube in place with tip at the distal stomach/pylorus.      [id number] VIEW   Final Result         1.  Nonspecific bowel gas pattern in the upper abdomen.   2.  Nasogastric tube tip terminates overlying the expected location of the pylorus or first duodenal segment.   3.  Hazy interstitial pulmonary edema and/or infiltrates   4.  Cardiomegaly      DX-ABDOMEN FOR TUBE PLACEMENT   Final Result      Enteric sump tube in situ with its tip at the level of the distal gastric antrum or pylorus.      DX-ABDOMEN FOR TUBE PLACEMENT   Final Result         1.  Nonspecific bowel gas pattern in the upper abdomen.   2.  Nasogastric tube tip terminates overlying the expected location of the pylorus or first duodenal segment.   3.  Hazy interstitial pulmonary edema and/or infiltrates, similar to prior study.   4.  Cardiomegaly      DX-CHEST-PORTABLE (1 VIEW)   Final Result      1.  Supportive tubing as described above.   2.  No other significant change from prior exam.         MR-BRAIN-W/O   Final Result      1.  Multifocal areas of acute infarcts in the left cerebral hemisphere.   2.  Small area of acute infarct in the right basal ganglia adjacent to the tip of the ventriculostomy catheter.   3.  Cortical infarct in the right medial parietal lobe.   4.  Mild amount of subdural hemorrhages surrounding the bilateral cerebellar hemispheres and right  cerebellum.   5.  Subarachnoid hemorrhage.   6.  There is no hydrocephalus.   7.  There are changes secondary to the previous endovascular repair left internal carotid aneurysm.      CT-CTA NECK WITH & W/O-POST PROCESSING   Final Result      No focal high-grade stenosis, dissection or occlusion of the cervical carotid or vertebral arteries.      CT-CEREBRAL PERFUSION ANALYSIS   Final Result      1.  Cerebral blood flow less than 30% likely representing completed infarct = 0 mL.      2.  T Max more than 6 seconds likely representing combination of completed infarct and ischemia = 0 mL.      3.  Mismatched volume likely representing ischemic brain/penumbra = None      4.  Please note that the cerebral perfusion was performed on the limited brain tissue around the basal ganglia region. Infarct/ischemia outside the CT perfusion sections can be missed in this study.      CT-CTA HEAD WITH & W/O-POST PROCESS   Final Result      1.  Prior LEFT middle cranial fossa aneurysm coiling.  Artifact limits exam.   2.  No abrupt large vessel cut off.   3.  Segmental narrowing of LEFT middle cerebral artery, vasospasm versus artifact.   4.  Ventriculostomy catheter in similar position.   5.  Evolving RIGHT caudate infarct.   6.  Stable intracranial hemorrhage.         DX-ABDOMEN FOR TUBE PLACEMENT   Final Result      Orogastric tube tip now at the mid stomach.      DX-ABDOMEN FOR TUBE PLACEMENT   Final Result      Orogastric tube tip at the proximal stomach with side port just above the GE junction.      US-INTRACRANIAL ARTERY LIMITED   Final Result      DX-CHEST-LIMITED (1 VIEW)   Final Result         1.  Stable chest with perihilar and lower lung zone interstitial and minimal airspace opacities most consistent with pulmonary edema.   2.  No new abnormalities.      EC-ECHOCARDIOGRAM COMPLETE W/O CONT   Final Result      US-INTRACRANIAL ARTERY LIMITED   Final Result      DX-CHEST-PORTABLE (1 VIEW)   Final Result      1.  Lines and  tubes appear appropriately located      2.  Improvement of pulmonary edema/airspace process      US-INTRACRANIAL ARTERY COMP   Final Result      CT-HEAD W/O   Final Result         1. Slightly decreased subarachnoid hemorrhage overlying the cerebral hemispheres.   2. Otherwise, stable as above.         DX-CHEST-FOR LINE PLACEMENT Perform procedure in: Patient's Room   Final Result      1.  Moderate interstitial pulmonary edema.   2.   Right-sided central venous catheter terminates with the tip projecting over the expected region of the mid to distal superior vena cava.   3.  Endotracheal tube terminates in satisfactory position at the level of the aortic arch.   4.  Enteric feeding tube terminates in the left upper quadrant projecting over the expected location of the stomach.      CT-STEALTH HEAD W/O   Final Result      1.  Interval placement of a right transparietal ventriculostomy catheter which terminates with the tip near the third ventricle. There is minimally decreased caliber of the ventricular system and compared to previous exam.   2.  Findings of intracranial hemorrhage appear similar to the previous exam. No definite new acute intracranial hemorrhage is identified.   3.  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         DX-CHEST-PORTABLE (1 VIEW)   Final Result      CT-HEAD W/O   Final Result      1. Interval aneurysm coiling of left posterior communicating artery aneurysm.   2. Possible increased subarachnoid hemorrhage.   3. Intraventricular hemorrhage. There is developing mild hydrocephalus with mildly increased ventricles.   4. Small right convexity subdural hematoma measuring 5 mm in thickness.      These findings were discussed with EMILY BYRD on 1/31/2024 3:14 PM.      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   This aneurysm was embolized to occlusion as described above. Final angiographic images demonstrate only minimal filling of the neck of the aneurysm. The patient will be followed up in   the neuro interventional clinic and brought back for a follow-up angiogram in 6 months.      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      Stephanie COATES was physically present and participated during the entire procedure of the IR-EMBOLIZATION.          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  She is PBD#17, POD#17 from successful endovascular coiling.  No window for TCDs.  MRI with multifocal infarcts.  EVD removed on 2/12.   CTA/P on 2/13 with no flow limiting spasm.  Will continue with keppra given clinical seizure at admission.  Additional supportive therapies including serial neuro exams, mild hypertensive response, and maintaining euvolemia and eunatremia for 2-3 days while in vasospasm window.  Reassess for ARU needs in upcoming days.    Problem list:   Hunt Potts 4, modified Swenson grade 3 SAH   Ruptured cerebral aneurysm    Plan:   - continue neurochecks/NIHSS q2h    - maintain SBP goal 140-200 while in vasospasm window   - TCDs discontinued due to lack of windows   - continue keppra 500mg BID- this will be 3 months of therapy given seizure event    - continue lovenox SQ for DVT chemoppx   - continue ASA 81mg daily given large coil-vessel lumen interface and high risk for coil-lumen thrombosis   - continue nimodipine 60mg q4h while admitted of for 21 days   - maintain normothermia, net even I/O, FSBS    - normal Na goal 135-145, continue salt tabs, 3% PRN   - PT/OT/SLP    The evaluation of the patient, and recommended  management, was discussed with Dr. Pederson, ICU attending. I have performed a physical exam and reviewed and updated ROS and Plan today (2/17/2024).     Jose Raul Tracy MD  Vascular Neurology

## 2024-02-17 NOTE — PROGRESS NOTES
Radiology Progress Note   Author: HARVEY Hernandez Date & Time created: 2/17/2024     Date of admission  1/31/2024  Note to reader: this note follows the APSO format rather than the historical SOAP format. Assessment and plan located at the top of the note for ease of use.    Chief Complaint  63 y.o. female admitted 1/31/2024 with subarachnoid hemorrhage        HPI  Fay France is a 64 yo female with PMH significant for HTN who presented to OSH for sudden onset of new eyelid droop and generalized headache without significant head trauma. Pt seized in OSH CT scan, was loaded with Keppra and transferred for higher level of care. 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 Carpio Potts 4; Modified Swenson scale 3. 01/31/24 MAURICE Eason completed a cerebral aneurysm with coil embolization of large left PCOMM aneurysm.      Interval History:   02/01/24: RIGHT groin access site soft, non-tender, without ecchymosis. Dressing clean, dry, intact. Pedal pulses +2 bilaterally with feet pink, and warm, cap refill <3 sec. Pt is intubated and ventilated with EVD. She is not following commands, but moves right foot to stimuli. I reviewed the most recent labs including WBC 19.6, Hgb 14.2, Cr 0.61. Coordinated post IR procedure care with hospital nursing staff.      02/05/24:  PBD/post Pcom aneurysm embolization with coil #5.  Pt extubated yesterday (2/4). A stat CTA and CT perfusion done early am on 2/4 (increased lethargy) no obvious vasospasm noted. TCD's with poor windows therefor no baseline Tcd's obtained.  I reviewed FU MRI brain(02/04) which showed small L MCA infarcts-Vasopressors infusing and  keeping -200 to mitigate vasospasm. Anterior EVD in place at 10 cm H20 above tragus with good waveform noted on monitor-R groin site soft, nontender without edema, bleeding, small scab with small  ecchymosis. I reviewed today's labs: WBC 13.7;  hemoglobin 10.5;  ; Cr 0.40; respiratory culture- +MSSA in sputum-continuing Rocephin today is day 5 of 5.  I's/O's-last 24 hours+ 551/since admission +1260; ICP 2-10 (good ICP waveform noted on monitor).       02/06/24:  PBD/post Pcom aneurysm embolization with coil #6. TCD's with poor windows, recommend following neuro assessment. I reviewed FU MRI brain(02/04) which showed small L MCA infarcts-Goal -200 to mitigate vasospasm. Anterior EVD in place at 10 cm H20 above tragus with good waveform noted on monitor-R groin site soft, nontender without edema, bleeding, small scab with small  ecchymosis. I reviewed today's labs: WBC 17; hemoglobin 10.8;  ; Cr 0.33; respiratory culture- +MSSA in sputum -completed Rocephin.  I's/O's-last 24 hours -193 /since admission +1037; ICP 5-11 (good ICP waveform noted on monitor). I started ASA therapy 2nd to multiple infarcts noted on MRI.  ASA therapy discussed and approved by Fer Helms and  Dr. Eason.      02/07/24: SAH, Carpio Potts 4;Modified Swenson 3.  PBD/post Pcom aneurysm embolization with coil #7. TCD's discontinued 2nd to poor windows, recommend following neuro assessment. -Goal -200 to mitigate vasospasm. Anterior EVD in place at 10 cm H20 above tragus with good waveform noted on monitor-ecchymosis. I reviewed today's labs: WBC 15.6; Hemoglobin 11.1 ; Cr 0.36; .  I's/O's-last 24 hours -190 /since admission +876; ICP 3-10 (good ICP waveform noted on monitor).      02/08/24: SAH, Carpio Potts 4;Modified Swenson 3.  PBD/post Pcom aneurysm embolization with coil #8. TCD's with poor windows, recommend following neuro assessment. -Goal -200 to mitigate vasospasm. Anterior EVD open, raised today to 20 cm H20 above tragus with good waveform noted on monitor-ecchymosis. I reviewed today's labs: WBC 15.1; Hemoglobin 10.5;  ; Cr 0.31;  I's/O's-last 24 hours --1091/since admission -214 ICP 4-11/CPP > 100 (good ICP waveform noted on  monitor).  Tmax 100.6     02/09/24: SAH, Carpio Potts 4;Modified Swenson 3. PBD/post Pcom aneurysm embolization with coil #9. No longer following TCD's 2nd to poor windows.   Follow neuro assessment. -Goal -200 to mitigate vasospasm. Anterior EVD open, @15 cm H20 above tragus with good waveform noted on monitor. I reviewed today's labs: WBC 14.9; Hemoglobin 11.2;  ; Cr 0.29;  I's/O's-last 24 hours -110 ml; output since admission - 675 ml.  ICP 4-11/CPP ; EVD output -139 mL in the last 24 hours (good ICP waveform noted on monitor). Tmax 100. 9     2/10/24: SAH, Carpio Potts 4; Modified Swenson 3. PBD #10 /post Pcom aneurysm embolization with 10 coils (1/31/24). No longer following TCD's 2nd to poor windows.   Follow neuro assessment. -Goal -200 to mitigate vasospasm. Anterior EVD open, @ 20 cm H20 above tragus with good waveform noted on monitor. I reviewed patient's most recent labs: WBC 14.3; Hemoglobin 10.6;  ; Cr 0.36;  ICP<15; EVD output:139 mL in the last 24 hours. Tmax 100. 4     2/11/24: SAH, Carpio Potts 4; Modified Swenson 3. PBD #11 /post Pcom aneurysm embolization with 10 coils (1/31/24). Pt is verbally responsive with appropriate answers in Spans[place], purposeful and following some commands.  - EVD clamped 2/11/24.   - No longer following TCD's 2nd to poor windows.   Follow neuro assessment.   - Goal -200 to mitigate vasospasm.   - I reviewed patient's most recent labs: WBC 17.5; Hemoglobin 11.0;  ; Cr 0.36; Tmax 99.5    02/12/24 - PBD#12. No acute events overnight. Neuro unchanged per bedside RN. No longer following TCD's 2nd to poor windows; q 2 neuro exams in place.  Labs reviewed; , WBC 17.4. Repeat CT this morning shows decreased hyperdensity along the bilateral tentorium.  Family at bedside; education provided.  Patient discussed with bedside RN.    02/13/24 - PBD#13. No acute events overnight. EVD removed yesterday. Pt more alert today for my assessment; however,  neurologist and intensivist report she is more lethargic and less verbal. CTA and CT perfusion study ordered. Pt squeezing my hand bilaterally and moving all extremities; following some commands. Nurse at bedside administering thick liquid medication eliciting cough. Q 2 neuro exams in place. Labs reviewed; , WBC 15.3. Patient discussed with bedside RN.    02/14/24 - PBD#14. No acute events overnight. CTA yesterday shows possible distal left MCA spasm; CT perfusion without mismatch.  Patient awake and interactive today.  Q 2 neuro exams remain in place. Labs reviewed; , WBC 13.5. Patient discussed with bedside RN.  NSG signed off.    02/15/24 - PBD#15. No acute events overnight. Pt's level of alertness waxes and wanes. Currently very alert and following commands but only oriented to self.A/O x 4 earlier per nursing staff. Moves all extremities equally. Labs reviewed; , WBC 12.0. Patient discussed with intensivist.     02/16/24 - PBD #16. No acute events overnight. Pt's level of alertness waxes and wanes. Currently very alert and following commands but only oriented to self.A/O x 4 earlier per nursing staff. Moves all extremities equally. Labs reviewed; , WBC 11.9; Hgb 11.8; Cr 0.38;  T max 100.2;  Is nd Os- since 2/3/24 -1595/24 hr -1370  Patient discussed with RN at bedside     Assessment/Plan     Principal Problem:    SAH (subarachnoid hemorrhage) ([facility])  Active Problems:    Acute respiratory failure with hypoxia (HCC)    Seizure ([facility])    Hypokalemia    Hypophosphatemia    Anemia    Pneumonia of both lungs due to methicillin susceptible Staphylococcus aureus (MSSA) ([facility])    Aneurysm of ascending aorta without rupture ([facility])    Hyperglycemia      Plan IR  - PBD#16  - -200 per neuro recs  - Continue Nimodipine   - TCD's (poor windows)   -  neuro assessment for vasospasm watch  - If suspect vasospasm consider CTA and CT perfusion  - Continue ASA 81 mg daily/ Lovenox for DVT ppx   -  Continue Q2 neuro checks   - Neurology following      -Thank you for allowing Interventional Radiology team to participate in the patients care, if any additional care or requests are needed in the future please do not hesitate to call or place IR order   [phone]           Review of Systems  Physical Exam   Review of Systems   Constitutional:  Negative for fever.        Able to answer some simple ROS questions   Respiratory:  Negative for shortness of breath.    Cardiovascular:  Negative for chest pain and palpitations.   Gastrointestinal:  Negative for nausea and vomiting.   Neurological:  Negative for tingling, sensory change and headaches.      Vitals:    02/17/24 0700   BP: 138/80   Pulse: (!) 102   Resp:    SpO2: 95%        Physical Exam  Constitutional:       Appearance: Normal appearance.   Eyes:      Comments: Left eye ptosis    Cardiovascular:      Rate and Rhythm: Tachycardia present.      Pulses: Normal pulses.   Pulmonary:      Comments: Without any resp distress  Abdominal:      Palpations: Abdomen is soft.   Skin:     General: Skin is warm and dry.   Neurological:      General: No focal deficit present.      Mental Status: She is alert and oriented to person, place, and time.      Comments: A/O x 1; following commands  L ptosis, L CNIII palsy,   antigravity strength in all 4 extremities with gen weakness  Sensory intact   Psychiatric:         Mood and Affect: Mood normal.         Behavior: Behavior normal.             Labs    Recent Labs     02/15/24  0547 02/16/24  0607 02/17/24  0539   WBC 12.0* 12.8* 11.9*   RBC 3.97* 4.42 4.56   HEMOGLOBIN 10.4* 11.5* 11.8*   HEMATOCRIT 31.6* 34.9* 36.1*   MCV 79.6* 79.0* 79.2*   MCH 26.2* 26.0* 25.9*   MCHC 32.9 33.0 32.7   RDW 40.0 39.2 39.7   PLATELETCT 515* 580* 589*   MPV 9.3 9.3 9.3     Recent Labs     02/15/24  0547 02/15/24  1823 02/16/24  0607 02/17/24  0539   SODIUM 137 135 136 138   POTASSIUM 3.8  --  4.0 4.1   CHLORIDE 102  --  99 101   CO2 24  --  26  25   GLUCOSE 132*  --  136* 135*   BUN 10  --  11 11   CREATININE 0.31*  --  0.32* 0.38*   CALCIUM 8.7  --  9.9 10.0     Recent Labs     02/15/24  0547 02/16/24  0607 02/17/24  0539   CREATININE 0.31* 0.32* 0.38*     DX-ABDOMEN FOR TUBE PLACEMENT   Final Result         1.  Nonspecific bowel gas pattern in the upper abdomen.   2.  Dobbhoff tube with tip terminating overlying the expected location of the first or second duodenal segment.   3.  Cardiomegaly      CT-CEREBRAL PERFUSION ANALYSIS   Final Result      1.  Cerebral blood flow less than 30% likely representing completed infarct = 0 mL.      2.  T Max more than 6 seconds likely representing combination of completed infarct and ischemia = 0 mL.      3.  Mismatched volume likely representing ischemic brain/penumbra = None      4.  Please note that the cerebral perfusion was performed on the limited brain tissue around the basal ganglia region. Infarct/ischemia outside the CT perfusion sections can be missed in this study.      CT-CTA HEAD WITH & W/O-POST PROCESS   Final Result      1.  Prior LEFT middle cranial fossa aneurysm coiling.  Artifact limits exam.   2.  No abrupt large vessel cut off.   3.  Segmental narrowing of LEFT middle cerebral artery, primarily M1 segment, vasospasm versus artifact.   4.  Removal of ventriculostomy catheter. No hydrocephalus.   5.  Evolving RIGHT caudate infarct.   6.  Slightly improved intracranial hemorrhage.   7.  4 mm right carotid terminus aneurysm.      CT-HEAD W/O   Final Result         1.  Hyperdensity along the bilateral tentorium, compatible with layering subarachnoid hemorrhages, decreased since prior study.   2.  Atherosclerosis.         DX-ABDOMEN FOR TUBE PLACEMENT   Final Result         1.  Nonspecific bowel gas pattern in the upper abdomen.   2.  Nasogastric tube tip terminates overlying the expected location of the antrum or pylorus, somewhat withdrawn since prior study.   3.  Hazy interstitial pulmonary edema  and/or infiltrates, stable   4.  Cardiomegaly      DX-ABDOMEN FOR TUBE PLACEMENT   Final Result         1.  Nonspecific bowel gas pattern in the upper abdomen.   2.  Nasogastric tube tip terminates overlying the expected location of the pylorus or first duodenal segment.   3.  Hazy interstitial pulmonary edema and/or infiltrates   4.  Cardiomegaly      DX-CHEST-PORTABLE (1 VIEW)   Final Result         Findings on chest radiograph appear stable since the prior radiograph.  No new abnormalities are identified.      DX-ABDOMEN FOR TUBE PLACEMENT   Final Result      Enteric tube tip projects over the gastric antrum or proximal duodenum, similar to prior      DX-ABDOMEN FOR TUBE PLACEMENT   Final Result      NG tube tip projects at the peripyloric region.      DX-ABDOMEN FOR TUBE PLACEMENT   Final Result      NG tube tip projects at the peripyloric region.      DX-ABDOMEN FOR TUBE PLACEMENT   Final Result      Feeding tube in place with tip at the distal stomach/pylorus.      [id number] VIEW   Final Result         1.  Nonspecific bowel gas pattern in the upper abdomen.   2.  Nasogastric tube tip terminates overlying the expected location of the pylorus or first duodenal segment.   3.  Hazy interstitial pulmonary edema and/or infiltrates   4.  Cardiomegaly      DX-ABDOMEN FOR TUBE PLACEMENT   Final Result      Enteric sump tube in situ with its tip at the level of the distal gastric antrum or pylorus.      DX-ABDOMEN FOR TUBE PLACEMENT   Final Result         1.  Nonspecific bowel gas pattern in the upper abdomen.   2.  Nasogastric tube tip terminates overlying the expected location of the pylorus or first duodenal segment.   3.  Hazy interstitial pulmonary edema and/or infiltrates, similar to prior study.   4.  Cardiomegaly      DX-CHEST-PORTABLE (1 VIEW)   Final Result      1.  Supportive tubing as described above.   2.  No other significant change from prior exam.         MR-BRAIN-W/O   Final Result      1.   Multifocal areas of acute infarcts in the left cerebral hemisphere.   2.  Small area of acute infarct in the right basal ganglia adjacent to the tip of the ventriculostomy catheter.   3.  Cortical infarct in the right medial parietal lobe.   4.  Mild amount of subdural hemorrhages surrounding the bilateral cerebellar hemispheres and right cerebellum.   5.  Subarachnoid hemorrhage.   6.  There is no hydrocephalus.   7.  There are changes secondary to the previous endovascular repair left internal carotid aneurysm.      CT-CTA NECK WITH & W/O-POST PROCESSING   Final Result      No focal high-grade stenosis, dissection or occlusion of the cervical carotid or vertebral arteries.      CT-CEREBRAL PERFUSION ANALYSIS   Final Result      1.  Cerebral blood flow less than 30% likely representing completed infarct = 0 mL.      2.  T Max more than 6 seconds likely representing combination of completed infarct and ischemia = 0 mL.      3.  Mismatched volume likely representing ischemic brain/penumbra = None      4.  Please note that the cerebral perfusion was performed on the limited brain tissue around the basal ganglia region. Infarct/ischemia outside the CT perfusion sections can be missed in this study.      CT-CTA HEAD WITH & W/O-POST PROCESS   Final Result      1.  Prior LEFT middle cranial fossa aneurysm coiling.  Artifact limits exam.   2.  No abrupt large vessel cut off.   3.  Segmental narrowing of LEFT middle cerebral artery, vasospasm versus artifact.   4.  Ventriculostomy catheter in similar position.   5.  Evolving RIGHT caudate infarct.   6.  Stable intracranial hemorrhage.         DX-ABDOMEN FOR TUBE PLACEMENT   Final Result      Orogastric tube tip now at the mid stomach.      DX-ABDOMEN FOR TUBE PLACEMENT   Final Result      Orogastric tube tip at the proximal stomach with side port just above the GE junction.      US-INTRACRANIAL ARTERY LIMITED   Final Result      DX-CHEST-LIMITED (1 VIEW)   Final Result          1.  Stable chest with perihilar and lower lung zone interstitial and minimal airspace opacities most consistent with pulmonary edema.   2.  No new abnormalities.      EC-ECHOCARDIOGRAM COMPLETE W/O CONT   Final Result      US-INTRACRANIAL ARTERY LIMITED   Final Result      DX-CHEST-PORTABLE (1 VIEW)   Final Result      1.  Lines and tubes appear appropriately located      2.  Improvement of pulmonary edema/airspace process      US-INTRACRANIAL ARTERY COMP   Final Result      CT-HEAD W/O   Final Result         1. Slightly decreased subarachnoid hemorrhage overlying the cerebral hemispheres.   2. Otherwise, stable as above.         DX-CHEST-FOR LINE PLACEMENT Perform procedure in: Patient's Room   Final Result      1.  Moderate interstitial pulmonary edema.   2.   Right-sided central venous catheter terminates with the tip projecting over the expected region of the mid to distal superior vena cava.   3.  Endotracheal tube terminates in satisfactory position at the level of the aortic arch.   4.  Enteric feeding tube terminates in the left upper quadrant projecting over the expected location of the stomach.      CT-STEALTH HEAD W/O   Final Result      1.  Interval placement of a right transparietal ventriculostomy catheter which terminates with the tip near the third ventricle. There is minimally decreased caliber of the ventricular system and compared to previous exam.   2.  Findings of intracranial hemorrhage appear similar to the previous exam. No definite new acute intracranial hemorrhage is identified.   3.  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         DX-CHEST-PORTABLE (1 VIEW)   Final Result      CT-HEAD W/O   Final Result      1. Interval aneurysm coiling of left posterior communicating artery aneurysm.   2. Possible increased subarachnoid hemorrhage.   3. Intraventricular hemorrhage. There is developing mild hydrocephalus with mildly increased ventricles.   4.  "Small right convexity subdural hematoma measuring 5 mm in thickness.      These findings were discussed with STEPHANIE EASON on 1/31/2024 3:14 PM.      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   This aneurysm was embolized to occlusion as described above. Final angiographic images demonstrate only minimal filling of the neck of the aneurysm. The patient will be followed up in   the neuro interventional clinic and brought back for a follow-up angiogram in 6 months.      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      I, Stephanie Eason was physically present and participated during the entire procedure of the IR-EMBOLIZATION.        INR   Date Value Ref Range Status   01/31/2024 1.06 0.87 - 1.13 Final     Comment:     INR - Non-therapeutic Reference Range: 0.87-1.13  INR - Therapeutic Reference Range: 2.0-4.0       No results found for: \"POCINR\"     Intake/Output Summary (Last 24 hours) at 2/12/2024 0811  Last data filed at 2/12/2024 0600  Gross per 24 hour   Intake 2444.57 ml   Output 2345 ml   Net 99.57 ml      Labs not explicitly included in this progress note were reviewed by the author. Radiology/imaging not explicitly included in this progress note was reviewed by the author.     I have performed a physical exam and reviewed and updated ROS and Plan today (2/17/2024).     30 minutes in directly providing and coordinating care and extensive data review.  No time overlap and excludes procedures.  "

## 2024-02-18 LAB
ANION GAP SERPL CALC-SCNC: 9 MMOL/L (ref 7–16)
BASOPHILS # BLD AUTO: 0.7 % (ref 0–1.8)
BASOPHILS # BLD: 0.07 K/UL (ref 0–0.12)
BUN SERPL-MCNC: 14 MG/DL (ref 8–22)
CALCIUM SERPL-MCNC: 9.3 MG/DL (ref 8.5–10.5)
CHLORIDE SERPL-SCNC: 104 MMOL/L (ref 96–112)
CO2 SERPL-SCNC: 26 MMOL/L (ref 20–33)
CREAT SERPL-MCNC: 0.36 MG/DL (ref 0.5–1.4)
EOSINOPHIL # BLD AUTO: 0.45 K/UL (ref 0–0.51)
EOSINOPHIL NFR BLD: 4.7 % (ref 0–6.9)
ERYTHROCYTE [DISTWIDTH] IN BLOOD BY AUTOMATED COUNT: 40.6 FL (ref 35.9–50)
GFR SERPLBLD CREATININE-BSD FMLA CKD-EPI: 114 ML/MIN/1.73 M 2
GLUCOSE SERPL-MCNC: 123 MG/DL (ref 65–99)
HCT VFR BLD AUTO: 32.2 % (ref 37–47)
HGB BLD-MCNC: 10.4 G/DL (ref 12–16)
IMM GRANULOCYTES # BLD AUTO: 0.03 K/UL (ref 0–0.11)
IMM GRANULOCYTES NFR BLD AUTO: 0.3 % (ref 0–0.9)
LYMPHOCYTES # BLD AUTO: 1.45 K/UL (ref 1–4.8)
LYMPHOCYTES NFR BLD: 15.3 % (ref 22–41)
MAGNESIUM SERPL-MCNC: 2.3 MG/DL (ref 1.5–2.5)
MCH RBC QN AUTO: 25.8 PG (ref 27–33)
MCHC RBC AUTO-ENTMCNC: 32.3 G/DL (ref 32.2–35.5)
MCV RBC AUTO: 79.9 FL (ref 81.4–97.8)
MONOCYTES # BLD AUTO: 0.55 K/UL (ref 0–0.85)
MONOCYTES NFR BLD AUTO: 5.8 % (ref 0–13.4)
NEUTROPHILS # BLD AUTO: 6.94 K/UL (ref 1.82–7.42)
NEUTROPHILS NFR BLD: 73.2 % (ref 44–72)
NRBC # BLD AUTO: 0 K/UL
NRBC BLD-RTO: 0 /100 WBC (ref 0–0.2)
PHOSPHATE SERPL-MCNC: 3.6 MG/DL (ref 2.5–4.5)
PLATELET # BLD AUTO: 523 K/UL (ref 164–446)
PMV BLD AUTO: 9.4 FL (ref 9–12.9)
POTASSIUM SERPL-SCNC: 4.2 MMOL/L (ref 3.6–5.5)
RBC # BLD AUTO: 4.03 M/UL (ref 4.2–5.4)
SODIUM SERPL-SCNC: 139 MMOL/L (ref 135–145)
WBC # BLD AUTO: 9.5 K/UL (ref 4.8–10.8)

## 2024-02-18 PROCEDURE — 770022 HCHG ROOM/CARE - ICU (200)

## 2024-02-18 PROCEDURE — 85025 COMPLETE CBC W/AUTO DIFF WBC: CPT

## 2024-02-18 PROCEDURE — A9270 NON-COVERED ITEM OR SERVICE: HCPCS | Performed by: INTERNAL MEDICINE

## 2024-02-18 PROCEDURE — 700101 HCHG RX REV CODE 250: Performed by: EMERGENCY MEDICINE

## 2024-02-18 PROCEDURE — 99291 CRITICAL CARE FIRST HOUR: CPT | Performed by: EMERGENCY MEDICINE

## 2024-02-18 PROCEDURE — 99233 SBSQ HOSP IP/OBS HIGH 50: CPT | Performed by: PSYCHIATRY & NEUROLOGY

## 2024-02-18 PROCEDURE — A9270 NON-COVERED ITEM OR SERVICE: HCPCS | Performed by: NURSE PRACTITIONER

## 2024-02-18 PROCEDURE — 700102 HCHG RX REV CODE 250 W/ 637 OVERRIDE(OP): Performed by: INTERNAL MEDICINE

## 2024-02-18 PROCEDURE — A9270 NON-COVERED ITEM OR SERVICE: HCPCS | Performed by: PSYCHIATRY & NEUROLOGY

## 2024-02-18 PROCEDURE — 94640 AIRWAY INHALATION TREATMENT: CPT

## 2024-02-18 PROCEDURE — 84100 ASSAY OF PHOSPHORUS: CPT

## 2024-02-18 PROCEDURE — 700102 HCHG RX REV CODE 250 W/ 637 OVERRIDE(OP): Performed by: EMERGENCY MEDICINE

## 2024-02-18 PROCEDURE — 700111 HCHG RX REV CODE 636 W/ 250 OVERRIDE (IP): Mod: JZ | Performed by: INTERNAL MEDICINE

## 2024-02-18 PROCEDURE — 80048 BASIC METABOLIC PNL TOTAL CA: CPT

## 2024-02-18 PROCEDURE — A9270 NON-COVERED ITEM OR SERVICE: HCPCS | Performed by: EMERGENCY MEDICINE

## 2024-02-18 PROCEDURE — 700102 HCHG RX REV CODE 250 W/ 637 OVERRIDE(OP): Performed by: PSYCHIATRY & NEUROLOGY

## 2024-02-18 PROCEDURE — 700102 HCHG RX REV CODE 250 W/ 637 OVERRIDE(OP): Performed by: NURSE PRACTITIONER

## 2024-02-18 PROCEDURE — 83735 ASSAY OF MAGNESIUM: CPT

## 2024-02-18 RX ORDER — IPRATROPIUM BROMIDE AND ALBUTEROL SULFATE 2.5; .5 MG/3ML; MG/3ML
3 SOLUTION RESPIRATORY (INHALATION)
Status: DISCONTINUED | OUTPATIENT
Start: 2024-02-18 | End: 2024-02-23 | Stop reason: HOSPADM

## 2024-02-18 RX ORDER — IPRATROPIUM BROMIDE AND ALBUTEROL SULFATE 2.5; .5 MG/3ML; MG/3ML
SOLUTION RESPIRATORY (INHALATION)
Status: ACTIVE
Start: 2024-02-18 | End: 2024-02-18

## 2024-02-18 RX ADMIN — NIMODIPINE 60 MG: 60 SOLUTION ORAL at 17:45

## 2024-02-18 RX ADMIN — MICONAZOLE NITRATE: 20 CREAM TOPICAL at 05:24

## 2024-02-18 RX ADMIN — NIMODIPINE 60 MG: 60 SOLUTION ORAL at 13:49

## 2024-02-18 RX ADMIN — IPRATROPIUM BROMIDE AND ALBUTEROL SULFATE 3 ML: 2.5; .5 SOLUTION RESPIRATORY (INHALATION) at 00:29

## 2024-02-18 RX ADMIN — ATORVASTATIN CALCIUM 40 MG: 40 TABLET, FILM COATED ORAL at 17:46

## 2024-02-18 RX ADMIN — LEVETIRACETAM 500 MG: 500 TABLET, FILM COATED ORAL at 17:46

## 2024-02-18 RX ADMIN — NIMODIPINE 60 MG: 60 SOLUTION ORAL at 09:56

## 2024-02-18 RX ADMIN — GABAPENTIN 200 MG: 100 CAPSULE ORAL at 21:45

## 2024-02-18 RX ADMIN — ENOXAPARIN SODIUM 40 MG: 100 INJECTION SUBCUTANEOUS at 17:45

## 2024-02-18 RX ADMIN — NIMODIPINE 60 MG: 60 SOLUTION ORAL at 21:45

## 2024-02-18 RX ADMIN — NIMODIPINE 60 MG: 60 SOLUTION ORAL at 01:59

## 2024-02-18 RX ADMIN — LEVETIRACETAM 500 MG: 500 TABLET, FILM COATED ORAL at 05:22

## 2024-02-18 RX ADMIN — GABAPENTIN 200 MG: 100 CAPSULE ORAL at 05:22

## 2024-02-18 RX ADMIN — ASPIRIN 81 MG 81 MG: 81 TABLET ORAL at 05:23

## 2024-02-18 RX ADMIN — SODIUM CHLORIDE 1 G: 1 TABLET ORAL at 05:23

## 2024-02-18 RX ADMIN — NIMODIPINE 60 MG: 60 SOLUTION ORAL at 05:22

## 2024-02-18 RX ADMIN — GABAPENTIN 200 MG: 100 CAPSULE ORAL at 13:49

## 2024-02-18 ASSESSMENT — PAIN DESCRIPTION - PAIN TYPE
TYPE: ACUTE PAIN

## 2024-02-18 ASSESSMENT — ENCOUNTER SYMPTOMS
SENSORY CHANGE: 0
PALPITATIONS: 0
FEVER: 0
SHORTNESS OF BREATH: 0
HEADACHES: 0
VOMITING: 0
NAUSEA: 0
TINGLING: 0

## 2024-02-18 NOTE — PROGRESS NOTES
Neurology Progress Note  Neurohospitalist Service, [facility]iences    Referring Physician: Emil Pederson M.D.    No chief complaint on file.      HPI: Refer to initial documented Neurology H&P, as detailed in the patient's chart.    Interval History 2/18: No new events overnight.    Review of systems: In addition to what is detailed in the HPI and/or updated in the interval history, all other systems reviewed and are negative.    Past Medical History:    has no past medical history on file.    FHx:  family history is not on file.    SHx:       Medications:    Current Facility-Administered Medications:     ipratropium-albuterol (DUONEB) nebulizer solution, 3 mL, Nebulization, Q2HRS PRN (RT), Emil Pederson M.D., 3 mL at 02/18/24 0029    IPRATROPIUM-ALBUTEROL 0.5-2.5 (3) MG/3ML INH SOLN, , , ,     sodium chloride (Salt) tablet 1 g, 1 g, Enteral Tube, Q8HRS, Emil Pederson M.D., 1 g at 02/18/24 0523    oxyCODONE immediate-release (Roxicodone) tablet 5-10 mg, 5-10 mg, Oral, Q4HRS PRN, Olga Nowak, A.P.R.N., 5 mg at 02/17/24 2023    gabapentin (Neurontin) capsule 200 mg, 200 mg, Oral, Q8HRS, Olga Nowak, A.P.R.N., 200 mg at 02/18/24 0522    aspirin (Asa) chewable tab 81 mg, 81 mg, Enteral Tube, DAILY, Jose Raul Tracy M.D., 81 mg at 02/18/24 0523    lidocaine (Asperflex) 4 % patch 2 Patch, 2 Patch, Transdermal, Q24HR, Dayana Babcock M.D., 2 Patch at 02/16/24 1645    enoxaparin (Lovenox) inj 40 mg, 40 mg, Subcutaneous, DAILY AT 1800, Dayana Babcock M.D., 40 mg at 02/17/24 1809    acetaminophen (Tylenol) tablet 650 mg, 650 mg, Enteral Tube, Q4HRS PRN, 650 mg at 02/17/24 0809 **OR** acetaminophen (Tylenol) suppository 650 mg, 650 mg, Rectal, Q4HRS PRN, Dayana Babcock M.D.    atorvastatin (Lipitor) tablet 40 mg, 40 mg, Enteral Tube, Q EVENING, Dayana Babcock M.D., 40 mg at 02/17/24 1809    NS infusion, , Intravenous, Continuous, Jeremy M Gonda, M.D., Last Rate: 83 mL/hr at  02/17/24 2153, New Bag at 02/17/24 2153    enalaprilat (Vasotec) injection 1.25 mg 1 mL, 1.25 mg, Intravenous, Q6HRS PRN, Jeremy M Gonda, M.D.    hydrALAZINE (Apresoline) injection 10-20 mg, 10-20 mg, Intravenous, Q4HRS PRN, Jeremy M Gonda, M.D.    labetalol (Normodyne/Trandate) injection 10-20 mg, 10-20 mg, Intravenous, Q4HRS PRN, Jeremy M Gonda, M.D.    niMODipine (Nymalize) oral syringe 60 mg, 60 mg, Enteral Tube, Q4HRS, Jeremy M Gonda, M.D., 60 mg at 02/18/24 0522    levETIRAcetam (Keppra) tablet 500 mg, 500 mg, Enteral Tube, BID, Jeremy M Gonda, M.D., 500 mg at 02/18/24 0522    Pharmacy Consult: Enteral tube insertion - review meds/change route/product selection, 1 Each, Other, PHARMACY TO DOSE, Jeremy M Gonda, M.D.    Respiratory Therapy Consult, , Nebulization, Continuous RT, Jeremy M Gonda, M.D.    senna-docusate (Pericolace Or Senokot S) 8.6-50 MG per tablet 2 Tablet, 2 Tablet, Enteral Tube, BID, 2 Tablet at 02/03/24 0514 **AND** polyethylene glycol/lytes (Miralax) Packet 1 Packet, 1 Packet, Enteral Tube, QDAY PRN **AND** magnesium hydroxide (Milk Of Magnesia) suspension 30 mL, 30 mL, Enteral Tube, QDAY PRN **AND** bisacodyl (Dulcolax) suppository 10 mg, 10 mg, Rectal, QDAY PRN, Jeremy M Gonda, M.D. MD Alert...ICU Electrolyte Replacement per Pharmacy, , Other, PHARMACY TO DOSE, Jeremy M Gonda, M.D.    ondansetron (Zofran ODT) dispertab 4 mg, 4 mg, Enteral Tube, Q4HRS PRN, 4 mg at 01/31/24 2248 **OR** ondansetron (Zofran) syringe/vial injection 4 mg, 4 mg, Intravenous, Q4HRS PRN, Jeremy M Gonda, M.D., 4 mg at 02/02/24 1758    LORazepam (Ativan) injection 2 mg, 2 mg, Intravenous, Q5 MIN PRN, Jeremy M Gonda, M.D.    prochlorperazine (Compazine) injection 10 mg, 10 mg, Intravenous, Q6HRS PRN, David Mclaughlin Jr., D.O., 10 mg at 01/31/24 1858    Physical Examination:     Vitals:    02/18/24 0500 02/18/24 0600 02/18/24 0700 02/18/24 0800   BP: (!) 162/95 135/78 (!) 154/87 (!) 141/92   Pulse: 94 97 99 92    Resp: (!) 22 (!) 28 (!) 21 (!) 24   TempSrc:       SpO2: 94% 95% 97% 95%   Weight:       Height:               NEUROLOGICAL EXAM:     Patient is awake and alert.  She knows she is in the hospital.  Not quite sure on she is aware of the situation.  She appears to be better compared to last time I saw her 3 days ago.  Pupils equal reactive.  She is a left-sided ptosis.  Face symmetrical.  Antigravity and in all 4.  Reacts to tactile touch in all 4.    Objective Data:    Labs:  Lab Results   Component Value Date/Time    PROTHROMBTM 13.9 01/31/2024 03:25 PM    INR 1.06 01/31/2024 03:25 PM      Lab Results   Component Value Date/Time    WBC 9.5 02/18/2024 02:03 AM    RBC 4.03 (L) 02/18/2024 02:03 AM    HEMOGLOBIN 10.4 (L) 02/18/2024 02:03 AM    HEMATOCRIT 32.2 (L) 02/18/2024 02:03 AM    MCV 79.9 (L) 02/18/2024 02:03 AM    MCH 25.8 (L) 02/18/2024 02:03 AM    MCHC 32.3 02/18/2024 02:03 AM    MPV 9.4 02/18/2024 02:03 AM    NEUTSPOLYS 73.20 (H) 02/18/2024 02:03 AM    LYMPHOCYTES 15.30 (L) 02/18/2024 02:03 AM    MONOCYTES 5.80 02/18/2024 02:03 AM    EOSINOPHILS 4.70 02/18/2024 02:03 AM    BASOPHILS 0.70 02/18/2024 02:03 AM      Lab Results   Component Value Date/Time    SODIUM 139 02/18/2024 02:03 AM    POTASSIUM 4.2 02/18/2024 02:03 AM    CHLORIDE 104 02/18/2024 02:03 AM    CO2 26 02/18/2024 02:03 AM    GLUCOSE 123 (H) 02/18/2024 02:03 AM    BUN 14 02/18/2024 02:03 AM    CREATININE 0.36 (L) 02/18/2024 02:03 AM      Lab Results   Component Value Date/Time    CHOLSTRLTOT 165 01/31/2024 03:25 PM     (H) 01/31/2024 03:25 PM    HDL 36 (A) 01/31/2024 03:25 PM    TRIGLYCERIDE 79 01/31/2024 03:25 PM       Lab Results   Component Value Date/Time    ALKPHOSPHAT 189 (H) 02/12/2024 02:15 AM    ASTSGOT 31 02/12/2024 02:15 AM    ALTSGPT 25 02/12/2024 02:15 AM    TBILIRUBIN 0.4 02/12/2024 02:15 AM        Imaging/Testing:  DX-ABDOMEN FOR TUBE PLACEMENT   Final Result         1.  Nonspecific bowel gas pattern in the upper abdomen.    2.  Dobbhoff tube with tip terminating overlying the expected location of the first or second duodenal segment.   3.  Cardiomegaly      CT-CEREBRAL PERFUSION ANALYSIS   Final Result      1.  Cerebral blood flow less than 30% likely representing completed infarct = 0 mL.      2.  T Max more than 6 seconds likely representing combination of completed infarct and ischemia = 0 mL.      3.  Mismatched volume likely representing ischemic brain/penumbra = None      4.  Please note that the cerebral perfusion was performed on the limited brain tissue around the basal ganglia region. Infarct/ischemia outside the CT perfusion sections can be missed in this study.      CT-CTA HEAD WITH & W/O-POST PROCESS   Final Result      1.  Prior LEFT middle cranial fossa aneurysm coiling.  Artifact limits exam.   2.  No abrupt large vessel cut off.   3.  Segmental narrowing of LEFT middle cerebral artery, primarily M1 segment, vasospasm versus artifact.   4.  Removal of ventriculostomy catheter. No hydrocephalus.   5.  Evolving RIGHT caudate infarct.   6.  Slightly improved intracranial hemorrhage.   7.  4 mm right carotid terminus aneurysm.      CT-HEAD W/O   Final Result         1.  Hyperdensity along the bilateral tentorium, compatible with layering subarachnoid hemorrhages, decreased since prior study.   2.  Atherosclerosis.         DX-ABDOMEN FOR TUBE PLACEMENT   Final Result         1.  Nonspecific bowel gas pattern in the upper abdomen.   2.  Nasogastric tube tip terminates overlying the expected location of the antrum or pylorus, somewhat withdrawn since prior study.   3.  Hazy interstitial pulmonary edema and/or infiltrates, stable   4.  Cardiomegaly      DX-ABDOMEN FOR TUBE PLACEMENT   Final Result         1.  Nonspecific bowel gas pattern in the upper abdomen.   2.  Nasogastric tube tip terminates overlying the expected location of the pylorus or first duodenal segment.   3.  Hazy interstitial pulmonary edema and/or  infiltrates   4.  Cardiomegaly      DX-CHEST-PORTABLE (1 VIEW)   Final Result         Findings on chest radiograph appear stable since the prior radiograph.  No new abnormalities are identified.      DX-ABDOMEN FOR TUBE PLACEMENT   Final Result      Enteric tube tip projects over the gastric antrum or proximal duodenum, similar to prior      DX-ABDOMEN FOR TUBE PLACEMENT   Final Result      NG tube tip projects at the peripyloric region.      DX-ABDOMEN FOR TUBE PLACEMENT   Final Result      NG tube tip projects at the peripyloric region.      DX-ABDOMEN FOR TUBE PLACEMENT   Final Result      Feeding tube in place with tip at the distal stomach/pylorus.      [id number] VIEW   Final Result         1.  Nonspecific bowel gas pattern in the upper abdomen.   2.  Nasogastric tube tip terminates overlying the expected location of the pylorus or first duodenal segment.   3.  Hazy interstitial pulmonary edema and/or infiltrates   4.  Cardiomegaly      DX-ABDOMEN FOR TUBE PLACEMENT   Final Result      Enteric sump tube in situ with its tip at the level of the distal gastric antrum or pylorus.      DX-ABDOMEN FOR TUBE PLACEMENT   Final Result         1.  Nonspecific bowel gas pattern in the upper abdomen.   2.  Nasogastric tube tip terminates overlying the expected location of the pylorus or first duodenal segment.   3.  Hazy interstitial pulmonary edema and/or infiltrates, similar to prior study.   4.  Cardiomegaly      DX-CHEST-PORTABLE (1 VIEW)   Final Result      1.  Supportive tubing as described above.   2.  No other significant change from prior exam.         MR-BRAIN-W/O   Final Result      1.  Multifocal areas of acute infarcts in the left cerebral hemisphere.   2.  Small area of acute infarct in the right basal ganglia adjacent to the tip of the ventriculostomy catheter.   3.  Cortical infarct in the right medial parietal lobe.   4.  Mild amount of subdural hemorrhages surrounding the bilateral cerebellar  hemispheres and right cerebellum.   5.  Subarachnoid hemorrhage.   6.  There is no hydrocephalus.   7.  There are changes secondary to the previous endovascular repair left internal carotid aneurysm.      CT-CTA NECK WITH & W/O-POST PROCESSING   Final Result      No focal high-grade stenosis, dissection or occlusion of the cervical carotid or vertebral arteries.      CT-CEREBRAL PERFUSION ANALYSIS   Final Result      1.  Cerebral blood flow less than 30% likely representing completed infarct = 0 mL.      2.  T Max more than 6 seconds likely representing combination of completed infarct and ischemia = 0 mL.      3.  Mismatched volume likely representing ischemic brain/penumbra = None      4.  Please note that the cerebral perfusion was performed on the limited brain tissue around the basal ganglia region. Infarct/ischemia outside the CT perfusion sections can be missed in this study.      CT-CTA HEAD WITH & W/O-POST PROCESS   Final Result      1.  Prior LEFT middle cranial fossa aneurysm coiling.  Artifact limits exam.   2.  No abrupt large vessel cut off.   3.  Segmental narrowing of LEFT middle cerebral artery, vasospasm versus artifact.   4.  Ventriculostomy catheter in similar position.   5.  Evolving RIGHT caudate infarct.   6.  Stable intracranial hemorrhage.         DX-ABDOMEN FOR TUBE PLACEMENT   Final Result      Orogastric tube tip now at the mid stomach.      DX-ABDOMEN FOR TUBE PLACEMENT   Final Result      Orogastric tube tip at the proximal stomach with side port just above the GE junction.      US-INTRACRANIAL ARTERY LIMITED   Final Result      DX-CHEST-LIMITED (1 VIEW)   Final Result         1.  Stable chest with perihilar and lower lung zone interstitial and minimal airspace opacities most consistent with pulmonary edema.   2.  No new abnormalities.      EC-ECHOCARDIOGRAM COMPLETE W/O CONT   Final Result      US-INTRACRANIAL ARTERY LIMITED   Final Result      DX-CHEST-PORTABLE (1 VIEW)   Final Result       1.  Lines and tubes appear appropriately located      2.  Improvement of pulmonary edema/airspace process      US-INTRACRANIAL ARTERY COMP   Final Result      CT-HEAD W/O   Final Result         1. Slightly decreased subarachnoid hemorrhage overlying the cerebral hemispheres.   2. Otherwise, stable as above.         DX-CHEST-FOR LINE PLACEMENT Perform procedure in: Patient's Room   Final Result      1.  Moderate interstitial pulmonary edema.   2.   Right-sided central venous catheter terminates with the tip projecting over the expected region of the mid to distal superior vena cava.   3.  Endotracheal tube terminates in satisfactory position at the level of the aortic arch.   4.  Enteric feeding tube terminates in the left upper quadrant projecting over the expected location of the stomach.      CT-STEALTH HEAD W/O   Final Result      1.  Interval placement of a right transparietal ventriculostomy catheter which terminates with the tip near the third ventricle. There is minimally decreased caliber of the ventricular system and compared to previous exam.   2.  Findings of intracranial hemorrhage appear similar to the previous exam. No definite new acute intracranial hemorrhage is identified.   3.  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         DX-CHEST-PORTABLE (1 VIEW)   Final Result      CT-HEAD W/O   Final Result      1. Interval aneurysm coiling of left posterior communicating artery aneurysm.   2. Possible increased subarachnoid hemorrhage.   3. Intraventricular hemorrhage. There is developing mild hydrocephalus with mildly increased ventricles.   4. Small right convexity subdural hematoma measuring 5 mm in thickness.      These findings were discussed with EMILY BYRD on 1/31/2024 3:14 PM.      IR-EMBOLIZATION   Final Result   Impression:      63-year-old patient with sudden onset of worst headache of life followed by obtundation underwent cerebral angiography  which demonstrated a large  13 x 11 x 11 mm aneurysm arising from the dorsal wall of the left ICA incorporating a small left posterior    communicating artery at its neck.   This aneurysm was embolized to occlusion as described above. Final angiographic images demonstrate only minimal filling of the neck of the aneurysm. The patient will be followed up in   the neuro interventional clinic and brought back for a follow-up angiogram in 6 months.      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      Stephanie COATES was physically present and participated during the entire procedure of the IR-EMBOLIZATION.            Assessment and Plan:    63-year-old female with scalp 4.  Modified Swenson score 3.  Secondary from a ruptured left P-comm aneurysm.  Post bleed 8/18.  Postop day 18.  From endovascular coiling.  TCD's been discontinued due to no windows.  MRI brain showed multifocal infarcts of both frontal lobes.  EVD has been removed since the 12th.  Continue supportive therapies for now.  Continue mild hypertension while in the vasospasm window.  For additional 2 days.  Will need inpatient rehab at discharge.       Plan:  1.  Continue with every 2 hours neurochecks  2.  Blood pressure goals between 140-200 systolic.  3.  Continue Keppra 500 mg twice daily for a total of 3 months.  Given the seizure event at admission.  4.  Continue aspirin 81 mg daily  5.  Continue nimodipine 6 mg every 4 hours.  Total of 21 days.  6.  Maintain normothermia.  That evens I's and O's.  7.  Maintain normal sodium levels between 135-145.  On salt tabs.  Current sodium is 139  8.  Follow-up with PMR for potential rehab placement..    Spent over 54 minutes examined the patient reviewing notes including physician and nursing notes with past few days and going over the care plan with the primary team.      This chart was partially generated using voice recognition technology and  sound alike word replacement may be present, best efforts were made to make the chart accurate.    Jose Raul Tenorio MD  Board Certified Neurology, ABPN  t) [phone]
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
## 2024-02-18 NOTE — DIETARY
Nutrition Services: Update   Day 18 of admit.  Fay Turner is a 63 y.o. female with admitting DX of SAH (subarachnoid hemorrhage) (HCC) [I60.9]    Pt with TF initiated on 2/1 and formula is Glucerna 1.2 at goal rate of 55 ml/hr providing 1584 kcals, 79 g protein and 1063 mL free water. This RD received alert that Glucerna 1.2 is out of stock. Tube feed formula will need to change  to equivalent formula, Diabetisource 1.2, to meet nutritional needs.     Recommendations/Plan:  Change Glucerna 1.2 to equivalent formula Diabetisource 1.2 as Glucerna 1.2 becomes unavailable. Restart Diabetisource 1.2 at goal rate of 55 ml/hr 1584 kcals, 79 g protein, 1082 mL free water.   Continue with oral diet of L4: pureed with thin liquids and supplements. Encourage intake of >50% of meals   Fluids per MD    RD following

## 2024-02-18 NOTE — CARE PLAN
The patient is Watcher - Medium risk of patient condition declining or worsening    Shift Goals  Clinical Goals: Q2H neuro, VSS  Patient Goals: rest  Family Goals: updates    Progress made toward(s) clinical / shift goals:      Problem: Safety - Medical Restraint  Goal: Remains free of injury from restraints (Restraint for Interference with Medical Device)  Outcome: Progressing  Flowsheets (Taken 2/5/2024 1926 by Imelda Moreland, R.N.)  Addressed this shift: Remains free of injury from restraints (restraint for interference with medical device):   Determine that other, less restrictive measures have been tried or would not be effective before applying the restraint   Evaluate the patient's condition at the time of restraint application   Inform patient/family regarding the reason for restraint   Every 2 hours: Monitor safety, psychosocial status, comfort, nutrition and hydration     Problem: Pain - Standard  Goal: Alleviation of pain or a reduction in pain to the patient’s comfort goal  Outcome: Progressing     Patient mobilized to chair today, max assist required, stable neuro with Q2H checks in place, pain assessed and PRNs available (see MAR). Patient remains in restraints due to pulling at lines and tubes, educated on restraints and criteria to have them removed, no evidence of learning.         Patient is not progressing towards the following goals:      Problem: Safety - Medical Restraint  Goal: Free from restraint(s) (Restraint for Interference with Medical Device)  Outcome: Not Progressing  Flowsheets (Taken 2/3/2024 0409 by Sandi Lau, R.N.)  Addressed this shift: Free from restraint(s) (restraint for interference with medical device):   ONCE/SHIFT or MINIMUM Every 12 hours: Assess and document the continuing need for restraints   Every 24 hours: Continued use of restraint requires Licensed Independent Practitioner to perform face to face examination and written order   Identify and implement  measures to help patient regain control

## 2024-02-18 NOTE — PROGRESS NOTES
Radiology Progress Note   Author: HARVEY Hernandez Date & Time created: 2/18/2024     Date of admission  1/31/2024  Note to reader: this note follows the APSO format rather than the historical SOAP format. Assessment and plan located at the top of the note for ease of use.    Chief Complaint  63 y.o. female admitted 1/31/2024 with subarachnoid hemorrhage        HPI  Fay France is a 62 yo female with PMH significant for HTN who presented to OSH for sudden onset of new eyelid droop and generalized headache without significant head trauma. Pt seized in OSH CT scan, was loaded with Keppra and transferred for higher level of care. 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 Carpio Potts 4; Modified Swenson scale 3. 01/31/24 MAURICE Eason completed a cerebral aneurysm with coil embolization of large left PCOMM aneurysm.      Interval History:   02/01/24: RIGHT groin access site soft, non-tender, without ecchymosis. Dressing clean, dry, intact. Pedal pulses +2 bilaterally with feet pink, and warm, cap refill <3 sec. Pt is intubated and ventilated with EVD. She is not following commands, but moves right foot to stimuli. I reviewed the most recent labs including WBC 19.6, Hgb 14.2, Cr 0.61. Coordinated post IR procedure care with hospital nursing staff.      02/05/24:  PBD/post Pcom aneurysm embolization with coil #5.  Pt extubated yesterday (2/4). A stat CTA and CT perfusion done early am on 2/4 (increased lethargy) no obvious vasospasm noted. TCD's with poor windows therefor no baseline Tcd's obtained.  I reviewed FU MRI brain(02/04) which showed small L MCA infarcts-Vasopressors infusing and  keeping -200 to mitigate vasospasm. Anterior EVD in place at 10 cm H20 above tragus with good waveform noted on monitor-R groin site soft, nontender without edema, bleeding, small scab with small  ecchymosis. I reviewed today's labs: WBC 13.7;  hemoglobin 10.5;  ; Cr 0.40; respiratory culture- +MSSA in sputum-continuing Rocephin today is day 5 of 5.  I's/O's-last 24 hours+ 551/since admission +1260; ICP 2-10 (good ICP waveform noted on monitor).       02/06/24:  PBD/post Pcom aneurysm embolization with coil #6. TCD's with poor windows, recommend following neuro assessment. I reviewed FU MRI brain(02/04) which showed small L MCA infarcts-Goal -200 to mitigate vasospasm. Anterior EVD in place at 10 cm H20 above tragus with good waveform noted on monitor-R groin site soft, nontender without edema, bleeding, small scab with small  ecchymosis. I reviewed today's labs: WBC 17; hemoglobin 10.8;  ; Cr 0.33; respiratory culture- +MSSA in sputum -completed Rocephin.  I's/O's-last 24 hours -193 /since admission +1037; ICP 5-11 (good ICP waveform noted on monitor). I started ASA therapy 2nd to multiple infarcts noted on MRI.  ASA therapy discussed and approved by Fer Helms and  Dr. Eason.      02/07/24: SAH, Carpio Potts 4;Modified Swenson 3.  PBD/post Pcom aneurysm embolization with coil #7. TCD's discontinued 2nd to poor windows, recommend following neuro assessment. -Goal -200 to mitigate vasospasm. Anterior EVD in place at 10 cm H20 above tragus with good waveform noted on monitor-ecchymosis. I reviewed today's labs: WBC 15.6; Hemoglobin 11.1 ; Cr 0.36; .  I's/O's-last 24 hours -190 /since admission +876; ICP 3-10 (good ICP waveform noted on monitor).      02/08/24: SAH, Carpio Potts 4;Modified Swenson 3.  PBD/post Pcom aneurysm embolization with coil #8. TCD's with poor windows, recommend following neuro assessment. -Goal -200 to mitigate vasospasm. Anterior EVD open, raised today to 20 cm H20 above tragus with good waveform noted on monitor-ecchymosis. I reviewed today's labs: WBC 15.1; Hemoglobin 10.5;  ; Cr 0.31;  I's/O's-last 24 hours --1091/since admission -214 ICP 4-11/CPP > 100 (good ICP waveform noted on  monitor).  Tmax 100.6     02/09/24: SAH, Carpio Potts 4;Modified Swenson 3. PBD/post Pcom aneurysm embolization with coil #9. No longer following TCD's 2nd to poor windows.   Follow neuro assessment. -Goal -200 to mitigate vasospasm. Anterior EVD open, @15 cm H20 above tragus with good waveform noted on monitor. I reviewed today's labs: WBC 14.9; Hemoglobin 11.2;  ; Cr 0.29;  I's/O's-last 24 hours -110 ml; output since admission - 675 ml.  ICP 4-11/CPP ; EVD output -139 mL in the last 24 hours (good ICP waveform noted on monitor). Tmax 100. 9     2/10/24: SAH, Carpio Potts 4; Modified Swenson 3. PBD #10 /post Pcom aneurysm embolization with 10 coils (1/31/24). No longer following TCD's 2nd to poor windows.   Follow neuro assessment. -Goal -200 to mitigate vasospasm. Anterior EVD open, @ 20 cm H20 above tragus with good waveform noted on monitor. I reviewed patient's most recent labs: WBC 14.3; Hemoglobin 10.6;  ; Cr 0.36;  ICP<15; EVD output:139 mL in the last 24 hours. Tmax 100. 4     2/11/24: SAH, Carpio Potts 4; Modified Swenson 3. PBD #11 /post Pcom aneurysm embolization with 10 coils (1/31/24). Pt is verbally responsive with appropriate answers in Spansh, purposeful and following some commands.  - EVD clamped 2/11/24.   - No longer following TCD's 2nd to poor windows.   Follow neuro assessment.   - Goal -200 to mitigate vasospasm.   - I reviewed patient's most recent labs: WBC 17.5; Hemoglobin 11.0;  ; Cr 0.36; Tmax 99.5    02/12/24 - PBD#12. No acute events overnight. Neuro unchanged per bedside RN. No longer following TCD's 2nd to poor windows; q 2 neuro exams in place.  Labs reviewed; , WBC 17.4. Repeat CT this morning shows decreased hyperdensity along the bilateral tentorium.  Family at bedside; education provided.  Patient discussed with bedside RN.    02/13/24 - PBD#13. No acute events overnight. EVD removed yesterday. Pt more alert today for my assessment; however,  neurologist and intensivist report she is more lethargic and less verbal. CTA and CT perfusion study ordered. Pt squeezing my hand bilaterally and moving all extremities; following some commands. Nurse at bedside administering thick liquid medication eliciting cough. Q 2 neuro exams in place. Labs reviewed; , WBC 15.3. Patient discussed with bedside RN.    02/14/24 - PBD#14. No acute events overnight. CTA yesterday shows possible distal left MCA spasm; CT perfusion without mismatch.  Patient awake and interactive today.  Q 2 neuro exams remain in place. Labs reviewed; , WBC 13.5. Patient discussed with bedside RN.  NSG signed off.    02/15/24 - PBD#15. No acute events overnight. Pt's level of alertness waxes and wanes. Currently very alert and following commands but only oriented to self.A/O x 4 earlier per nursing staff. Moves all extremities equally. Labs reviewed; , WBC 12.0. Patient discussed with intensivist.     02/16/24 -  PBD#16. No acute events overnight. Pt's level of alertness waxes and wanes. Currently sleepy but easily arousable and following commands.  Moves all extremities equally. Labs reviewed; , WBC 12.8. Patient discussed with intensivist and neurologis     02/17/24 - PBD #17. Last CTA/P was on 02/13- no spasm identified. No Tcd's due to poor windows.  No acute events overnight.  Labs reviewed; , WBC 11.9; Hgb 11.8; Cr 0.38;  T max 100.2;  Is nd Os- since 2/3/24 -1595/24 hr -1370      Assessment/Plan     Principal Problem:    SAH (subarachnoid hemorrhage) (HCC)  Active Problems:    Acute respiratory failure with hypoxia (HCC)    Seizure (HCC)    Hypokalemia    Hypophosphatemia    Anemia    Pneumonia of both lungs due to methicillin susceptible Staphylococcus aureus (MSSA) (HCC)    Aneurysm of ascending aorta without rupture (HCC)    Hyperglycemia      Plan IR  - PBD#17  - -200 per neuro recs  - Continue Nimodipine   - Continue ASA 81 mg due to risk of  thrombosis   - TCD's dcd (poor windows)   - goal Na 135-145; Euvolemic, euglycemia   -  neuro assessment for vasospasm watch  - If suspect vasospasm consider CTA and CT perfusion  - Continue ASA 81 mg daily/ Lovenox for DVT ppx   - Continue Q2 neuro checks   - Neurology following  - Continue Lovenox for DVT PPX     -Thank you for allowing Interventional Radiology team to participate in the patients care, if any additional care or requests are needed in the future please do not hesitate to call or place IR order   [phone]           Review of Systems  Physical Exam   Review of Systems   Constitutional:  Negative for fever.        Able to answer some simple ROS questions   Respiratory:  Negative for shortness of breath.    Cardiovascular:  Negative for chest pain and palpitations.   Gastrointestinal:  Negative for nausea and vomiting.   Neurological:  Negative for tingling, sensory change and headaches.      Vitals:    02/18/24 0700   BP: (!) 154/87   Pulse: 99   Resp: (!) 21   SpO2: 97%        Physical Exam  Vitals and nursing note reviewed.   Constitutional:       General: She is awake.      Appearance: Normal appearance.   Eyes:      Comments: Left eye ptosis    Cardiovascular:      Rate and Rhythm: Normal rate.      Pulses: Normal pulses.   Pulmonary:      Comments: Without any resp distress  Abdominal:      Palpations: Abdomen is soft.   Skin:     General: Skin is warm and dry.   Neurological:      General: No focal deficit present.      Mental Status: She is alert and oriented to person, place, and time.      Comments: A/O x 1; following commands  L ptosis, L CNIII palsy,   antigravity strength in all 4 extremities with gen weakness  Sensory intact   Psychiatric:         Behavior: Behavior is cooperative.             Labs    Recent Labs     02/16/24  0607 02/17/24  0539 02/18/24  0203   WBC 12.8* 11.9* 9.5   RBC 4.42 4.56 4.03*   HEMOGLOBIN 11.5* 11.8* 10.4*   HEMATOCRIT 34.9* 36.1* 32.2*   MCV 79.0* 79.2* 79.9*    MCH 26.0* 25.9* 25.8*   MCHC 33.0 32.7 32.3   RDW 39.2 39.7 40.6   PLATELETCT 580* 589* 523*   MPV 9.3 9.3 9.4     Recent Labs     02/16/24  0607 02/17/24  0539 02/18/24  0203   SODIUM 136 138 139   POTASSIUM 4.0 4.1 4.2   CHLORIDE 99 101 104   CO2 26 25 26   GLUCOSE 136* 135* 123*   BUN 11 11 14   CREATININE 0.32* 0.38* 0.36*   CALCIUM 9.9 10.0 9.3     Recent Labs     02/16/24  0607 02/17/24  0539 02/18/24  0203   CREATININE 0.32* 0.38* 0.36*     DX-ABDOMEN FOR TUBE PLACEMENT   Final Result         1.  Nonspecific bowel gas pattern in the upper abdomen.   2.  Dobbhoff tube with tip terminating overlying the expected location of the first or second duodenal segment.   3.  Cardiomegaly      CT-CEREBRAL PERFUSION ANALYSIS   Final Result      1.  Cerebral blood flow less than 30% likely representing completed infarct = 0 mL.      2.  T Max more than 6 seconds likely representing combination of completed infarct and ischemia = 0 mL.      3.  Mismatched volume likely representing ischemic brain/penumbra = None      4.  Please note that the cerebral perfusion was performed on the limited brain tissue around the basal ganglia region. Infarct/ischemia outside the CT perfusion sections can be missed in this study.      CT-CTA HEAD WITH & W/O-POST PROCESS   Final Result      1.  Prior LEFT middle cranial fossa aneurysm coiling.  Artifact limits exam.   2.  No abrupt large vessel cut off.   3.  Segmental narrowing of LEFT middle cerebral artery, primarily M1 segment, vasospasm versus artifact.   4.  Removal of ventriculostomy catheter. No hydrocephalus.   5.  Evolving RIGHT caudate infarct.   6.  Slightly improved intracranial hemorrhage.   7.  4 mm right carotid terminus aneurysm.      CT-HEAD W/O   Final Result         1.  Hyperdensity along the bilateral tentorium, compatible with layering subarachnoid hemorrhages, decreased since prior study.   2.  Atherosclerosis.         DX-ABDOMEN FOR TUBE PLACEMENT   Final Result          1.  Nonspecific bowel gas pattern in the upper abdomen.   2.  Nasogastric tube tip terminates overlying the expected location of the antrum or pylorus, somewhat withdrawn since prior study.   3.  Hazy interstitial pulmonary edema and/or infiltrates, stable   4.  Cardiomegaly      DX-ABDOMEN FOR TUBE PLACEMENT   Final Result         1.  Nonspecific bowel gas pattern in the upper abdomen.   2.  Nasogastric tube tip terminates overlying the expected location of the pylorus or first duodenal segment.   3.  Hazy interstitial pulmonary edema and/or infiltrates   4.  Cardiomegaly      DX-CHEST-PORTABLE (1 VIEW)   Final Result         Findings on chest radiograph appear stable since the prior radiograph.  No new abnormalities are identified.      DX-ABDOMEN FOR TUBE PLACEMENT   Final Result      Enteric tube tip projects over the gastric antrum or proximal duodenum, similar to prior      DX-ABDOMEN FOR TUBE PLACEMENT   Final Result      NG tube tip projects at the peripyloric region.      DX-ABDOMEN FOR TUBE PLACEMENT   Final Result      NG tube tip projects at the peripyloric region.      DX-ABDOMEN FOR TUBE PLACEMENT   Final Result      Feeding tube in place with tip at the distal stomach/pylorus.      [id number] VIEW   Final Result         1.  Nonspecific bowel gas pattern in the upper abdomen.   2.  Nasogastric tube tip terminates overlying the expected location of the pylorus or first duodenal segment.   3.  Hazy interstitial pulmonary edema and/or infiltrates   4.  Cardiomegaly      DX-ABDOMEN FOR TUBE PLACEMENT   Final Result      Enteric sump tube in situ with its tip at the level of the distal gastric antrum or pylorus.      DX-ABDOMEN FOR TUBE PLACEMENT   Final Result         1.  Nonspecific bowel gas pattern in the upper abdomen.   2.  Nasogastric tube tip terminates overlying the expected location of the pylorus or first duodenal segment.   3.  Hazy interstitial pulmonary edema and/or infiltrates, similar  to prior study.   4.  Cardiomegaly      DX-CHEST-PORTABLE (1 VIEW)   Final Result      1.  Supportive tubing as described above.   2.  No other significant change from prior exam.         MR-BRAIN-W/O   Final Result      1.  Multifocal areas of acute infarcts in the left cerebral hemisphere.   2.  Small area of acute infarct in the right basal ganglia adjacent to the tip of the ventriculostomy catheter.   3.  Cortical infarct in the right medial parietal lobe.   4.  Mild amount of subdural hemorrhages surrounding the bilateral cerebellar hemispheres and right cerebellum.   5.  Subarachnoid hemorrhage.   6.  There is no hydrocephalus.   7.  There are changes secondary to the previous endovascular repair left internal carotid aneurysm.      CT-CTA NECK WITH & W/O-POST PROCESSING   Final Result      No focal high-grade stenosis, dissection or occlusion of the cervical carotid or vertebral arteries.      CT-CEREBRAL PERFUSION ANALYSIS   Final Result      1.  Cerebral blood flow less than 30% likely representing completed infarct = 0 mL.      2.  T Max more than 6 seconds likely representing combination of completed infarct and ischemia = 0 mL.      3.  Mismatched volume likely representing ischemic brain/penumbra = None      4.  Please note that the cerebral perfusion was performed on the limited brain tissue around the basal ganglia region. Infarct/ischemia outside the CT perfusion sections can be missed in this study.      CT-CTA HEAD WITH & W/O-POST PROCESS   Final Result      1.  Prior LEFT middle cranial fossa aneurysm coiling.  Artifact limits exam.   2.  No abrupt large vessel cut off.   3.  Segmental narrowing of LEFT middle cerebral artery, vasospasm versus artifact.   4.  Ventriculostomy catheter in similar position.   5.  Evolving RIGHT caudate infarct.   6.  Stable intracranial hemorrhage.         DX-ABDOMEN FOR TUBE PLACEMENT   Final Result      Orogastric tube tip now at the mid stomach.      DX-ABDOMEN  FOR TUBE PLACEMENT   Final Result      Orogastric tube tip at the proximal stomach with side port just above the GE junction.      US-INTRACRANIAL ARTERY LIMITED   Final Result      DX-CHEST-LIMITED (1 VIEW)   Final Result         1.  Stable chest with perihilar and lower lung zone interstitial and minimal airspace opacities most consistent with pulmonary edema.   2.  No new abnormalities.      EC-ECHOCARDIOGRAM COMPLETE W/O CONT   Final Result      US-INTRACRANIAL ARTERY LIMITED   Final Result      DX-CHEST-PORTABLE (1 VIEW)   Final Result      1.  Lines and tubes appear appropriately located      2.  Improvement of pulmonary edema/airspace process      US-INTRACRANIAL ARTERY COMP   Final Result      CT-HEAD W/O   Final Result         1. Slightly decreased subarachnoid hemorrhage overlying the cerebral hemispheres.   2. Otherwise, stable as above.         DX-CHEST-FOR LINE PLACEMENT Perform procedure in: Patient's Room   Final Result      1.  Moderate interstitial pulmonary edema.   2.   Right-sided central venous catheter terminates with the tip projecting over the expected region of the mid to distal superior vena cava.   3.  Endotracheal tube terminates in satisfactory position at the level of the aortic arch.   4.  Enteric feeding tube terminates in the left upper quadrant projecting over the expected location of the stomach.      CT-STEALTH HEAD W/O   Final Result      1.  Interval placement of a right transparietal ventriculostomy catheter which terminates with the tip near the third ventricle. There is minimally decreased caliber of the ventricular system and compared to previous exam.   2.  Findings of intracranial hemorrhage appear similar to the previous exam. No definite new acute intracranial hemorrhage is identified.   3.  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         DX-CHEST-PORTABLE (1 VIEW)   Final Result      CT-HEAD W/O   Final Result      1. Interval  "aneurysm coiling of left posterior communicating artery aneurysm.   2. Possible increased subarachnoid hemorrhage.   3. Intraventricular hemorrhage. There is developing mild hydrocephalus with mildly increased ventricles.   4. Small right convexity subdural hematoma measuring 5 mm in thickness.      These findings were discussed with STEPHANIE EASON on 1/31/2024 3:14 PM.      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   This aneurysm was embolized to occlusion as described above. Final angiographic images demonstrate only minimal filling of the neck of the aneurysm. The patient will be followed up in   the neuro interventional clinic and brought back for a follow-up angiogram in 6 months.      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      I, Stephanie Eason was physically present and participated during the entire procedure of the IR-EMBOLIZATION.        INR   Date Value Ref Range Status   01/31/2024 1.06 0.87 - 1.13 Final     Comment:     INR - Non-therapeutic Reference Range: 0.87-1.13  INR - Therapeutic Reference Range: 2.0-4.0       No results found for: \"POCINR\"     Intake/Output Summary (Last 24 hours) at 2/12/2024 0811  Last data filed at 2/12/2024 0600  Gross per 24 hour   Intake 2444.57 ml   Output 2345 ml   Net 99.57 ml      Labs not explicitly included in this progress note were reviewed by the author. Radiology/imaging not explicitly included in this progress note was reviewed by the author.     I have performed a physical exam and reviewed and updated ROS and Plan today (2/18/2024).     30 minutes in directly providing and coordinating care and " extensive data review.  No time overlap and excludes procedures.

## 2024-02-19 LAB
ALBUMIN SERPL BCP-MCNC: 3.7 G/DL (ref 3.2–4.9)
ALBUMIN/GLOB SERPL: 0.9 G/DL
ALP SERPL-CCNC: 159 U/L (ref 30–99)
ALT SERPL-CCNC: 23 U/L (ref 2–50)
ANION GAP SERPL CALC-SCNC: 12 MMOL/L (ref 7–16)
AST SERPL-CCNC: 29 U/L (ref 12–45)
BASOPHILS # BLD AUTO: 0.7 % (ref 0–1.8)
BASOPHILS # BLD: 0.07 K/UL (ref 0–0.12)
BILIRUB SERPL-MCNC: 0.2 MG/DL (ref 0.1–1.5)
BUN SERPL-MCNC: 15 MG/DL (ref 8–22)
CALCIUM ALBUM COR SERPL-MCNC: 10.3 MG/DL (ref 8.5–10.5)
CALCIUM SERPL-MCNC: 10.1 MG/DL (ref 8.5–10.5)
CHLORIDE SERPL-SCNC: 99 MMOL/L (ref 96–112)
CO2 SERPL-SCNC: 24 MMOL/L (ref 20–33)
CREAT SERPL-MCNC: 0.31 MG/DL (ref 0.5–1.4)
EOSINOPHIL # BLD AUTO: 0.42 K/UL (ref 0–0.51)
EOSINOPHIL NFR BLD: 4 % (ref 0–6.9)
ERYTHROCYTE [DISTWIDTH] IN BLOOD BY AUTOMATED COUNT: 40.8 FL (ref 35.9–50)
GFR SERPLBLD CREATININE-BSD FMLA CKD-EPI: 118 ML/MIN/1.73 M 2
GLOBULIN SER CALC-MCNC: 3.9 G/DL (ref 1.9–3.5)
GLUCOSE SERPL-MCNC: 127 MG/DL (ref 65–99)
HCT VFR BLD AUTO: 35.6 % (ref 37–47)
HGB BLD-MCNC: 11.7 G/DL (ref 12–16)
IMM GRANULOCYTES # BLD AUTO: 0.03 K/UL (ref 0–0.11)
IMM GRANULOCYTES NFR BLD AUTO: 0.3 % (ref 0–0.9)
LYMPHOCYTES # BLD AUTO: 1.36 K/UL (ref 1–4.8)
LYMPHOCYTES NFR BLD: 13 % (ref 22–41)
MAGNESIUM SERPL-MCNC: 2.4 MG/DL (ref 1.5–2.5)
MCH RBC QN AUTO: 26.1 PG (ref 27–33)
MCHC RBC AUTO-ENTMCNC: 32.9 G/DL (ref 32.2–35.5)
MCV RBC AUTO: 79.5 FL (ref 81.4–97.8)
MONOCYTES # BLD AUTO: 0.62 K/UL (ref 0–0.85)
MONOCYTES NFR BLD AUTO: 5.9 % (ref 0–13.4)
NEUTROPHILS # BLD AUTO: 7.93 K/UL (ref 1.82–7.42)
NEUTROPHILS NFR BLD: 76.1 % (ref 44–72)
NRBC # BLD AUTO: 0 K/UL
NRBC BLD-RTO: 0 /100 WBC (ref 0–0.2)
PHOSPHATE SERPL-MCNC: 3.8 MG/DL (ref 2.5–4.5)
PLATELET # BLD AUTO: 513 K/UL (ref 164–446)
PMV BLD AUTO: 9.4 FL (ref 9–12.9)
POTASSIUM SERPL-SCNC: 3.9 MMOL/L (ref 3.6–5.5)
PROT SERPL-MCNC: 7.6 G/DL (ref 6–8.2)
RBC # BLD AUTO: 4.48 M/UL (ref 4.2–5.4)
SODIUM SERPL-SCNC: 135 MMOL/L (ref 135–145)
WBC # BLD AUTO: 10.4 K/UL (ref 4.8–10.8)

## 2024-02-19 PROCEDURE — A9270 NON-COVERED ITEM OR SERVICE: HCPCS | Performed by: INTERNAL MEDICINE

## 2024-02-19 PROCEDURE — 97530 THERAPEUTIC ACTIVITIES: CPT

## 2024-02-19 PROCEDURE — 700102 HCHG RX REV CODE 250 W/ 637 OVERRIDE(OP): Performed by: INTERNAL MEDICINE

## 2024-02-19 PROCEDURE — 700102 HCHG RX REV CODE 250 W/ 637 OVERRIDE(OP): Performed by: PSYCHIATRY & NEUROLOGY

## 2024-02-19 PROCEDURE — A9270 NON-COVERED ITEM OR SERVICE: HCPCS | Performed by: NURSE PRACTITIONER

## 2024-02-19 PROCEDURE — 85025 COMPLETE CBC W/AUTO DIFF WBC: CPT

## 2024-02-19 PROCEDURE — 700111 HCHG RX REV CODE 636 W/ 250 OVERRIDE (IP): Mod: JZ | Performed by: INTERNAL MEDICINE

## 2024-02-19 PROCEDURE — 84100 ASSAY OF PHOSPHORUS: CPT

## 2024-02-19 PROCEDURE — 700102 HCHG RX REV CODE 250 W/ 637 OVERRIDE(OP): Performed by: NURSE PRACTITIONER

## 2024-02-19 PROCEDURE — A9270 NON-COVERED ITEM OR SERVICE: HCPCS | Performed by: PSYCHIATRY & NEUROLOGY

## 2024-02-19 PROCEDURE — 83735 ASSAY OF MAGNESIUM: CPT

## 2024-02-19 PROCEDURE — 99291 CRITICAL CARE FIRST HOUR: CPT | Performed by: INTERNAL MEDICINE

## 2024-02-19 PROCEDURE — 770022 HCHG ROOM/CARE - ICU (200)

## 2024-02-19 PROCEDURE — 80053 COMPREHEN METABOLIC PANEL: CPT

## 2024-02-19 PROCEDURE — 99232 SBSQ HOSP IP/OBS MODERATE 35: CPT | Performed by: PSYCHIATRY & NEUROLOGY

## 2024-02-19 RX ORDER — HYDRALAZINE HYDROCHLORIDE 20 MG/ML
10-20 INJECTION INTRAMUSCULAR; INTRAVENOUS EVERY 4 HOURS PRN
Status: DISCONTINUED | OUTPATIENT
Start: 2024-02-19 | End: 2024-02-23 | Stop reason: HOSPADM

## 2024-02-19 RX ORDER — LABETALOL HYDROCHLORIDE 5 MG/ML
10-20 INJECTION, SOLUTION INTRAVENOUS EVERY 4 HOURS PRN
Status: DISCONTINUED | OUTPATIENT
Start: 2024-02-19 | End: 2024-02-23 | Stop reason: HOSPADM

## 2024-02-19 RX ORDER — ENALAPRILAT 1.25 MG/ML
1.25 INJECTION INTRAVENOUS EVERY 6 HOURS PRN
Status: DISCONTINUED | OUTPATIENT
Start: 2024-02-19 | End: 2024-02-23 | Stop reason: HOSPADM

## 2024-02-19 RX ADMIN — LEVETIRACETAM 500 MG: 500 TABLET, FILM COATED ORAL at 05:19

## 2024-02-19 RX ADMIN — NIMODIPINE 60 MG: 60 SOLUTION ORAL at 10:50

## 2024-02-19 RX ADMIN — GABAPENTIN 200 MG: 100 CAPSULE ORAL at 14:00

## 2024-02-19 RX ADMIN — GABAPENTIN 200 MG: 100 CAPSULE ORAL at 21:51

## 2024-02-19 RX ADMIN — NIMODIPINE 60 MG: 60 SOLUTION ORAL at 02:40

## 2024-02-19 RX ADMIN — NIMODIPINE 60 MG: 60 SOLUTION ORAL at 17:23

## 2024-02-19 RX ADMIN — ATORVASTATIN CALCIUM 40 MG: 40 TABLET, FILM COATED ORAL at 17:23

## 2024-02-19 RX ADMIN — GABAPENTIN 200 MG: 100 CAPSULE ORAL at 05:19

## 2024-02-19 RX ADMIN — LEVETIRACETAM 500 MG: 500 TABLET, FILM COATED ORAL at 17:23

## 2024-02-19 RX ADMIN — POTASSIUM BICARBONATE 25 MEQ: 978 TABLET, EFFERVESCENT ORAL at 08:49

## 2024-02-19 RX ADMIN — ACETAMINOPHEN 650 MG: 325 TABLET, FILM COATED ORAL at 08:49

## 2024-02-19 RX ADMIN — ACETAMINOPHEN 650 MG: 325 TABLET, FILM COATED ORAL at 15:10

## 2024-02-19 RX ADMIN — NIMODIPINE 60 MG: 60 SOLUTION ORAL at 14:00

## 2024-02-19 RX ADMIN — OXYCODONE 5 MG: 5 TABLET ORAL at 15:42

## 2024-02-19 RX ADMIN — NIMODIPINE 60 MG: 60 SOLUTION ORAL at 05:27

## 2024-02-19 RX ADMIN — ENOXAPARIN SODIUM 40 MG: 100 INJECTION SUBCUTANEOUS at 17:22

## 2024-02-19 RX ADMIN — ASPIRIN 81 MG 81 MG: 81 TABLET ORAL at 05:19

## 2024-02-19 RX ADMIN — NIMODIPINE 60 MG: 60 SOLUTION ORAL at 21:52

## 2024-02-19 ASSESSMENT — COGNITIVE AND FUNCTIONAL STATUS - GENERAL
DAILY ACTIVITIY SCORE: 11
MOBILITY SCORE: 7
EATING MEALS: TOTAL
DRESSING REGULAR UPPER BODY CLOTHING: A LOT
MOVING TO AND FROM BED TO CHAIR: UNABLE
DRESSING REGULAR LOWER BODY CLOTHING: A LOT
SUGGESTED CMS G CODE MODIFIER DAILY ACTIVITY: CL
TURNING FROM BACK TO SIDE WHILE IN FLAT BAD: UNABLE
WALKING IN HOSPITAL ROOM: TOTAL
MOVING FROM LYING ON BACK TO SITTING ON SIDE OF FLAT BED: UNABLE
HELP NEEDED FOR BATHING: A LOT
CLIMB 3 TO 5 STEPS WITH RAILING: TOTAL
SUGGESTED CMS G CODE MODIFIER MOBILITY: CM
TOILETING: A LOT
PERSONAL GROOMING: A LOT
STANDING UP FROM CHAIR USING ARMS: A LOT

## 2024-02-19 ASSESSMENT — PAIN DESCRIPTION - PAIN TYPE
TYPE: ACUTE PAIN

## 2024-02-19 ASSESSMENT — ENCOUNTER SYMPTOMS
VOMITING: 0
HEADACHES: 0
TINGLING: 0
PALPITATIONS: 0
NAUSEA: 0
FEVER: 0
FOCAL WEAKNESS: 1
CHILLS: 0
SHORTNESS OF BREATH: 0

## 2024-02-19 ASSESSMENT — FIBROSIS 4 INDEX: FIB4 SCORE: 0.73

## 2024-02-19 ASSESSMENT — GAIT ASSESSMENTS: GAIT LEVEL OF ASSIST: UNABLE TO PARTICIPATE

## 2024-02-19 NOTE — THERAPY
Occupational Therapy  Daily Treatment     Patient Name: Fay Turner  Age:  63 y.o., Sex:  female  Medical Record #: [id number]  Today's Date: 2/19/2024     Precautions  Precautions: Fall Risk, Swallow Precautions  Comments: EVD removed    Assessment    Pt currently limited by decreased functional mobility, activity tolerance, cognition, strength,  balance, and pain which are affecting pt's ability to complete ADLs/IADLs at baseline. Pt would benefit from OT services in the acute care setting to maximize functional recovery.      Plan    Treatment Plan Status: (P) Continue Current Treatment Plan  Type of Treatment: Self Care / Activities of Daily Living, Therapeutic Activity, Neuro Re-Education / Balance  Treatment Frequency: 3 Times per Week  Treatment Duration: Until Therapy Goals Met       Discharge Recommendations: (P) Recommend post-acute placement for additional occupational therapy services prior to discharge home    Subjective      Objective     02/19/24 0923   Activities of Daily Living   Grooming Maximal Assist   Upper Body Dressing Moderate Assist   Lower Body Dressing Maximal Assist   Toileting Maximal Assist   Functional Mobility   Sit to Stand Maximal Assist   Bed, Chair, Wheelchair Transfer Maximal Assist   Short Term Goals   Short Term Goal # 1 mod A with G/H sitting EOB   Goal Outcome # 1 Progressing as expected   Short Term Goal # 2 mod A with UB dressing   Goal Outcome # 2 Progressing as expected   Short Term Goal # 3 mod A with ADL txfs   Goal Outcome # 3 Progressing as expected   Occupational Therapy Treatment Plan    O.T. Treatment Plan Continue Current Treatment Plan   Anticipated Discharge Equipment and Recommendations   Discharge Recommendations Recommend post-acute placement for additional occupational therapy services prior to discharge home

## 2024-02-19 NOTE — DIETARY
"Nutrition support weekly update:  Day 19 of admit.  Fay Turner is a 63 y.o. female with admitting DX of Acute aneurysmal SAH.      Tube feeding initiated on 2/1. Current TF via IRIS feeding tube is Glucerna 1.2 with goal rate 55 ml/hr to provide 1584 kcals, 79 gm protein, and 1063 ml free water per day. Current free water order is 30 ml Q 4 hours. Pt is also on oral diet of pureed diet with thin liquids and supplements with meals.     Assessment:  Weight 2/19: 72 kg via bed scale. Weight is stable.   Height: 5'2\" (157.5 cm), BMI: 29.02 (overweight)    Evaluation:   Pt is receiving TF at goal rate up until rounds this am when we discussed changing to nocturnal TF so TF was stopped and plan to resume tonight. Still having frequent BMs watery, banatrol does not appear to be helping per rounds discussion.  SLP has been following, last saw on 2/16 with recommendations for pureed diet with thin liquids.   Skin: R head incision, no edema noted.   Labs: Glucose 127, Creat 0.31, Alk phos 159  Meds: lipitor, neurontin, keppra, anti-emetics prn  Last BM: 2/19  Glucose has remained stable and A1C was 5.8 on 2/1, will trial standard formula.    Malnutrition risk: Pt remains adequately nourished, no other risk noted.     Recommendations/Plan:  Change TF to nocturnal TF starting at 1800 today through 0600. Start TF Jevity 1.5 at goal rate 50 ml/hr from 7926-9313 to provide 900 kcals, 38 gm protein, and 456 ml free water per day which meets ~50% of estimated needs.   Fluids per MD.   Diet per SLP. Oral supplements with meals. Provided strawberry Glucerna shakes at bedside as pt requesting strawberry oral supplement and this is the only one in stock currently. RN aware.    Once consistently eating 50%+ of meals will consider discontinuing TF orders.     RD following.                    "
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
## 2024-02-19 NOTE — THERAPY
"Physical Therapy   Daily Treatment     Patient Name: Fay Turner  Age:  63 y.o., Sex:  female  Medical Record #: [id number]  Today's Date: 2/19/2024     Precautions  Precautions: Fall Risk;Swallow Precautions  Comments: EVD removed    Assessment    Pt received in bed and agreeable to PT session. Pt demonstrated some improvement in initiation with mobility and accepted weight through BLE in standing, which she had not done on prior standing attempts. Pt alert and conversing, still having difficulty consistently following motor commands. Pt is pleasant and seems to be motivated to participate. Will continue to follow for acute PT. Recommend placement post-acute.    Plan    Treatment Plan Status: Continue Current Treatment Plan  Type of Treatment: Equipment, Bed Mobility, Manual Therapy, Neuro Re-Education / Balance, Stair Training, Self Care / Home Evaluation, Therapeutic Activities, Therapeutic Exercise  Treatment Frequency: 3 Times per Week  Treatment Duration: Until Therapy Goals Met    DC Equipment Recommendations: Unable to determine at this time  Discharge Recommendations: Recommend post-acute placement for additional physical therapy services prior to discharge home    Subjective    \"I'm tired now\"     Objective       02/19/24 0901   Precautions   Precautions Fall Risk;Swallow Precautions   Comments EVD removed   Vitals   Vitals Comments HR 80's at rest, 100's with mobility   Pain   Pain Scales 0 to 10 Scale    Pain 0 - 10 Group   Therapist Pain Assessment Post Activity Pain Same as Prior to Activity;Nurse Notified;0  (denies pain)   Cognition    Speech/ Communication Delayed Responses   Level of Consciousness Alert   Ability To Follow Commands 1 Step  (inconsistent, requires repetition)   Comments Pt moving UE and LE in bed, initiating towards EOB with L being delayed. Still not following commands consistently. L eye open during session.   Strength Lower Body   Comments Initiates moving LE towards EOB, L " slower than R. Accepted weight in standing   Balance   Sitting Balance (Static) Fair -   Sitting Balance (Dynamic) Poor +   Standing Balance (Static) Trace   Standing Balance (Dynamic) Dependent   Weight Shift Sitting Poor   Weight Shift Standing Absent   Skilled Intervention Verbal Cuing;Tactile Cuing;Sequencing;Facilitation   Comments Able to maintain balance in sitting with intermittent UE support. Accepting weight through LE with standing this session   Bed Mobility    Supine to Sit Moderate Assist   Sit to Supine Moderate Assist   Scooting Moderate Assist   Skilled Intervention Verbal Cuing;Tactile Cuing;Sequencing   Comments Pt initiating towards EOB with cues, initiates scooting using UE for support   Gait Analysis   Gait Level Of Assist Unable to Participate   Functional Mobility   Sit to Stand Maximal Assist   Mobility EOB, STSx2   Skilled Intervention Verbal Cuing;Tactile Cuing;Facilitation   How much difficulty does the patient currently have...   Turning over in bed (including adjusting bedclothes, sheets and blankets)? 1   Sitting down on and standing up from a chair with arms (e.g., wheelchair, bedside commode, etc.) 1   Moving from lying on back to sitting on the side of the bed? 1   How much help from another person does the patient currently need...   Moving to and from a bed to a chair (including a wheelchair)? 2   Need to walk in a hospital room? 1   Climbing 3-5 steps with a railing? 1   6 clicks Mobility Score 7   Short Term Goals    Short Term Goal # 1 Pt will perform supine<>sit from flat HOB/no railing with supervision within 6 visits to ensure independent mobility at home.   Goal Outcome # 1 goal not met   Short Term Goal # 2 Pt will perform sit<>stand with FWW and supervision within 6 visits to ensure independent mobility.   Goal Outcome # 2 Goal not met   Short Term Goal # 3 Pt will ambulate x 150ft with FWW and supervision within 6 visits to ensure independent mobility at home.   Goal  Outcome # 3 Goal not met   Short Term Goal # 4 Pt will follow 100% of function based command to demosntrate improved participation in PT sessions within 6 visits.   Goal Outcome # 4 Goal not met   Physical Therapy Treatment Plan   Physical Therapy Treatment Plan Continue Current Treatment Plan   Anticipated Discharge Equipment and Recommendations   DC Equipment Recommendations Unable to determine at this time   Discharge Recommendations Recommend post-acute placement for additional physical therapy services prior to discharge home   Interdisciplinary Plan of Care Collaboration   IDT Collaboration with  Nursing   Patient Position at End of Therapy In Bed;Wrist Restraints Applied;Call Light within Reach;Bed Alarm On   Collaboration Comments RN updated

## 2024-02-19 NOTE — PROGRESS NOTES
Neuro Interventional Radiology   Progress Note     Author: Allison Weber DNP. Date & Time created: 2/19/2024     Date of admission  1/31/2024  Note to reader: this note follows the APSO format rather than the historical SOAP format. Assessment and plan located at the top of the note for ease of use.    Chief Complaint  63 y.o. female admitted 1/31/2024 with subarachnoid hemorrhage      HPI  Fay France is a 64 yo female with PMH significant for HTN who presented to OSH for sudden onset of new eyelid droop and generalized headache without significant head trauma. Pt seized in OSH CT scan, was loaded with Keppra and transferred for higher level of care. OSH imaging demonstrated a large left PCOM aneurysm with subarachnoid hemorrhage and a small right ICA terminus aneurysm. Caripo Potts 4; Modified Swenson scale 3. 01/31/24 MAURICE Dr. Eason completed a cerebral aneurysm with coil embolization of large left PCOMM aneurysm.      Interval History MAURICE:   02/01/24: RIGHT groin access site soft, non-tender, without ecchymosis. Dressing clean, dry, intact. Pedal pulses +2 bilaterally with feet pink, and warm, cap refill <3 sec. Pt is intubated and ventilated with EVD. She is not following commands, but moves right foot to stimuli. I reviewed the most recent labs including WBC 19.6, Hgb 14.2, Cr 0.61. Coordinated post IR procedure care with hospital nursing staff.      02/05/24:  PBD/post Pcom aneurysm embolization with coil #5.  Pt extubated yesterday (2/4). A stat CTA and CT perfusion done early am on 2/4 (increased lethargy) no obvious vasospasm noted. TCD's with poor windows therefor no baseline Tcd's obtained.  I reviewed FU MRI brain(02/04) which showed small L MCA infarcts-Vasopressors infusing and  keeping -200 to mitigate vasospasm. Anterior EVD in place at 10 cm H20 above tragus with good waveform noted on monitor-R groin site soft, nontender without edema, bleeding, small scab with small  ecchymosis. I reviewed  today's labs: WBC 13.7; hemoglobin 10.5;  ; Cr 0.40; respiratory culture- +MSSA in sputum-continuing Rocephin today is day 5 of 5.  I's/O's-last 24 hours+ 551/since admission +1260; ICP 2-10 (good ICP waveform noted on monitor).       02/06/24:  PBD/post Pcom aneurysm embolization with coil #6. TCD's with poor windows, recommend following neuro assessment. I reviewed FU MRI brain(02/04) which showed small L MCA infarcts-Goal -200 to mitigate vasospasm. Anterior EVD in place at 10 cm H20 above tragus with good waveform noted on monitor-R groin site soft, nontender without edema, bleeding, small scab with small  ecchymosis. I reviewed today's labs: WBC 17; hemoglobin 10.8;  ; Cr 0.33; respiratory culture- +MSSA in sputum -completed Rocephin.  I's/O's-last 24 hours -193 /since admission +1037; ICP 5-11 (good ICP waveform noted on monitor). I started ASA therapy 2nd to multiple infarcts noted on MRI.  ASA therapy discussed and approved by Fer Helms and  Dr. Eason.      02/07/24: SAH, Carpio Potts 4;Modified Swenson 3.  PBD/post Pcom aneurysm embolization with coil #7. TCD's discontinued 2nd to poor windows, recommend following neuro assessment. -Goal -200 to mitigate vasospasm. Anterior EVD in place at 10 cm H20 above tragus with good waveform noted on monitor-ecchymosis. I reviewed today's labs: WBC 15.6; Hemoglobin 11.1 ; Cr 0.36; .  I's/O's-last 24 hours -190 /since admission +876; ICP 3-10 (good ICP waveform noted on monitor).      02/08/24: SAH, Carpio Potts 4;Modified Swenson 3.  PBD/post Pcom aneurysm embolization with coil #8. TCD's with poor windows, recommend following neuro assessment. -Goal -200 to mitigate vasospasm. Anterior EVD open, raised today to 20 cm H20 above tragus with good waveform noted on monitor-ecchymosis. I reviewed today's labs: WBC 15.1; Hemoglobin 10.5;  ; Cr 0.31;  I's/O's-last 24 hours --1091/since admission -214 ICP 4-11/CPP > 100 (good  ICP waveform noted on monitor).  Tmax 100.6     02/09/24: SAH, Carpio Potts 4;Modified Swenson 3. PBD/post Pcom aneurysm embolization with coil #9. No longer following TCD's 2nd to poor windows.   Follow neuro assessment. -Goal -200 to mitigate vasospasm. Anterior EVD open, @15 cm H20 above tragus with good waveform noted on monitor. I reviewed today's labs: WBC 14.9; Hemoglobin 11.2;  ; Cr 0.29;  I's/O's-last 24 hours -110 ml; output since admission - 675 ml.  ICP 4-11/CPP ; EVD output -139 mL in the last 24 hours (good ICP waveform noted on monitor). Tmax 100. 9     2/10/24: SAH, Carpio Potts 4; Modified Swenson 3. PBD #10 /post Pcom aneurysm embolization with 10 coils (1/31/24). No longer following TCD's 2nd to poor windows.   Follow neuro assessment. -Goal -200 to mitigate vasospasm. Anterior EVD open, @ 20 cm H20 above tragus with good waveform noted on monitor. I reviewed patient's most recent labs: WBC 14.3; Hemoglobin 10.6;  ; Cr 0.36;  ICP<15; EVD output:139 mL in the last 24 hours. Tmax 100. 4     2/11/24: SAH, Carpio Potts 4; Modified Swenson 3. PBD #11 /post Pcom aneurysm embolization with 10 coils (1/31/24). Pt is verbally responsive with appropriate answers in Spansh, purposeful and following some commands.  - EVD clamped 2/11/24.   - No longer following TCD's 2nd to poor windows.   Follow neuro assessment.   - Goal -200 to mitigate vasospasm.   - I reviewed patient's most recent labs: WBC 17.5; Hemoglobin 11.0;  ; Cr 0.36; Tmax 99.5    02/12/24 - PBD#12. No acute events overnight. Neuro unchanged per bedside RN. No longer following TCD's 2nd to poor windows; q 2 neuro exams in place.  Labs reviewed; , WBC 17.4. Repeat CT this morning shows decreased hyperdensity along the bilateral tentorium.  Family at bedside; education provided.  Patient discussed with bedside RN.    02/13/24 - PBD#13. No acute events overnight. EVD removed yesterday. Pt more alert today for  my assessment; however, neurologist and intensivist report she is more lethargic and less verbal. CTA and CT perfusion study ordered. Pt squeezing my hand bilaterally and moving all extremities; following some commands. Nurse at bedside administering thick liquid medication eliciting cough. Q 2 neuro exams in place. Labs reviewed; , WBC 15.3. Patient discussed with bedside RN.    02/14/24 - PBD#14. No acute events overnight. CTA yesterday shows possible distal left MCA spasm; CT perfusion without mismatch.  Patient awake and interactive today.  Q 2 neuro exams remain in place. Labs reviewed; , WBC 13.5. Patient discussed with bedside RN.  NSG signed off.    02/15/24 - PBD#15. No acute events overnight. Pt's level of alertness waxes and wanes. Currently very alert and following commands but only oriented to self.A/O x 4 earlier per nursing staff. Moves all extremities equally. Labs reviewed; , WBC 12.0. Patient discussed with intensivist.     02/16/24 -  PBD#16. No acute events overnight. Pt's level of alertness waxes and wanes. Currently sleepy but easily arousable and following commands.  Moves all extremities equally. Labs reviewed; , WBC 12.8. Patient discussed with intensivist and neurologis     02/17/24 - PBD #17. Last CTA/P was on 02/13- no spasm identified. No Tcd's due to poor windows.  No acute events overnight.  Labs reviewed; , WBC 11.9; Hgb 11.8; Cr 0.38;  T max 100.2;  Is nd Os- since 2/3/24 -1595/24 hr -1370      2/19/24: PBD #19. No acute events overnight. Alert and oriented, answers questions with complete sentences, follows commands. PT/OT/SLP. I reviewed patient's most recent labs including WBC 10.4, Hgb 11.7, Cr 0.31, Na 135. Tmax 37.3.    Assessment/Plan     Principal Problem:    SAH (subarachnoid hemorrhage) (HCC)  Active Problems:    Acute respiratory failure with hypoxia (HCC)    Seizure (HCC)    Hypokalemia    Hypophosphatemia    Anemia    Pneumonia of both lungs  due to methicillin susceptible Staphylococcus aureus (MSSA) (HCC)    Aneurysm of ascending aorta without rupture (HCC)    Hyperglycemia      Plan IR  - PBD #19  - Liberalize -160 per Vascular Neurology recommendations  - Continue Nimodipine   - TCD's dcd (poor windows)   - Goal of euvolemia, euglycemia, normothermia, and eunatremia  - Neuro assessment per unit protocol for potential vasospasm; for suspected vasospasm consider CTA head and CTP  - Continue ASA 81 mg daily/ Lovenox for DVT ppx    - Vascular Neurology and neurosurgery following  - Pending inpatient rehab.      -Thank you for allowing Interventional Radiology team to participate in the patients care, if any additional care or requests are needed in the future please do not hesitate to call or place IR order   [phone]           Review of Systems  Physical Exam   Review of Systems   Constitutional:  Negative for chills and fever.        Able to answer in short complete sentences.   Respiratory:  Negative for shortness of breath.    Cardiovascular:  Negative for chest pain and palpitations.   Gastrointestinal:  Negative for nausea and vomiting.   Neurological:  Positive for focal weakness. Negative for tingling and headaches.      Vitals:    02/19/24 1300   BP: 135/80   Pulse: 86   Resp: (!) 21   SpO2: 100%        Physical Exam  Vitals and nursing note reviewed.   Constitutional:       General: She is awake.      Appearance: Normal appearance.   Eyes:      Comments: Left eye ptosis    Cardiovascular:      Rate and Rhythm: Normal rate.      Pulses: Normal pulses.   Pulmonary:      Comments: Without any resp distress  Abdominal:      Palpations: Abdomen is soft.   Musculoskeletal:         General: Normal range of motion.      Right lower leg: No edema.      Left lower leg: No edema.   Skin:     General: Skin is warm and dry.   Neurological:      General: No focal deficit present.      Mental Status: She is alert and oriented to person, place, and  time.      Cranial Nerves: Cranial nerve deficit present.      Sensory: No sensory deficit.      Comments: A/O x 2, follows commands  L ptosis, L CNIII palsy,   antigravity strength in all 4 extremities   Gross sensation intact   Psychiatric:         Behavior: Behavior normal. Behavior is cooperative.             Labs    Recent Labs     02/17/24 0539 02/18/24 0203 02/19/24 0413   WBC 11.9* 9.5 10.4   RBC 4.56 4.03* 4.48   HEMOGLOBIN 11.8* 10.4* 11.7*   HEMATOCRIT 36.1* 32.2* 35.6*   MCV 79.2* 79.9* 79.5*   MCH 25.9* 25.8* 26.1*   MCHC 32.7 32.3 32.9   RDW 39.7 40.6 40.8   PLATELETCT 589* 523* 513*   MPV 9.3 9.4 9.4     Recent Labs     02/17/24 0539 02/18/24 0203 02/19/24 0413   SODIUM 138 139 135   POTASSIUM 4.1 4.2 3.9   CHLORIDE 101 104 99   CO2 25 26 24   GLUCOSE 135* 123* 127*   BUN 11 14 15   CREATININE 0.38* 0.36* 0.31*   CALCIUM 10.0 9.3 10.1     Recent Labs     02/17/24 0539 02/18/24 0203 02/19/24  0413   ALBUMIN  --   --  3.7   TBILIRUBIN  --   --  0.2   ALKPHOSPHAT  --   --  159*   TOTPROTEIN  --   --  7.6   ALTSGPT  --   --  23   ASTSGOT  --   --  29   CREATININE 0.38* 0.36* 0.31*     DX-ABDOMEN FOR TUBE PLACEMENT   Final Result         1.  Nonspecific bowel gas pattern in the upper abdomen.   2.  Dobbhoff tube with tip terminating overlying the expected location of the first or second duodenal segment.   3.  Cardiomegaly      CT-CEREBRAL PERFUSION ANALYSIS   Final Result      1.  Cerebral blood flow less than 30% likely representing completed infarct = 0 mL.      2.  T Max more than 6 seconds likely representing combination of completed infarct and ischemia = 0 mL.      3.  Mismatched volume likely representing ischemic brain/penumbra = None      4.  Please note that the cerebral perfusion was performed on the limited brain tissue around the basal ganglia region. Infarct/ischemia outside the CT perfusion sections can be missed in this study.      CT-CTA HEAD WITH & W/O-POST PROCESS   Final Result       1.  Prior LEFT middle cranial fossa aneurysm coiling.  Artifact limits exam.   2.  No abrupt large vessel cut off.   3.  Segmental narrowing of LEFT middle cerebral artery, primarily M1 segment, vasospasm versus artifact.   4.  Removal of ventriculostomy catheter. No hydrocephalus.   5.  Evolving RIGHT caudate infarct.   6.  Slightly improved intracranial hemorrhage.   7.  4 mm right carotid terminus aneurysm.      CT-HEAD W/O   Final Result         1.  Hyperdensity along the bilateral tentorium, compatible with layering subarachnoid hemorrhages, decreased since prior study.   2.  Atherosclerosis.         DX-ABDOMEN FOR TUBE PLACEMENT   Final Result         1.  Nonspecific bowel gas pattern in the upper abdomen.   2.  Nasogastric tube tip terminates overlying the expected location of the antrum or pylorus, somewhat withdrawn since prior study.   3.  Hazy interstitial pulmonary edema and/or infiltrates, stable   4.  Cardiomegaly      DX-ABDOMEN FOR TUBE PLACEMENT   Final Result         1.  Nonspecific bowel gas pattern in the upper abdomen.   2.  Nasogastric tube tip terminates overlying the expected location of the pylorus or first duodenal segment.   3.  Hazy interstitial pulmonary edema and/or infiltrates   4.  Cardiomegaly      DX-CHEST-PORTABLE (1 VIEW)   Final Result         Findings on chest radiograph appear stable since the prior radiograph.  No new abnormalities are identified.      DX-ABDOMEN FOR TUBE PLACEMENT   Final Result      Enteric tube tip projects over the gastric antrum or proximal duodenum, similar to prior      DX-ABDOMEN FOR TUBE PLACEMENT   Final Result      NG tube tip projects at the peripyloric region.      DX-ABDOMEN FOR TUBE PLACEMENT   Final Result      NG tube tip projects at the peripyloric region.      DX-ABDOMEN FOR TUBE PLACEMENT   Final Result      Feeding tube in place with tip at the distal stomach/pylorus.      XQ-UANPIRO-3 VIEW   Final Result         1.  Nonspecific bowel  gas pattern in the upper abdomen.   2.  Nasogastric tube tip terminates overlying the expected location of the pylorus or first duodenal segment.   3.  Hazy interstitial pulmonary edema and/or infiltrates   4.  Cardiomegaly      DX-ABDOMEN FOR TUBE PLACEMENT   Final Result      Enteric sump tube in situ with its tip at the level of the distal gastric antrum or pylorus.      DX-ABDOMEN FOR TUBE PLACEMENT   Final Result         1.  Nonspecific bowel gas pattern in the upper abdomen.   2.  Nasogastric tube tip terminates overlying the expected location of the pylorus or first duodenal segment.   3.  Hazy interstitial pulmonary edema and/or infiltrates, similar to prior study.   4.  Cardiomegaly      DX-CHEST-PORTABLE (1 VIEW)   Final Result      1.  Supportive tubing as described above.   2.  No other significant change from prior exam.         MR-BRAIN-W/O   Final Result      1.  Multifocal areas of acute infarcts in the left cerebral hemisphere.   2.  Small area of acute infarct in the right basal ganglia adjacent to the tip of the ventriculostomy catheter.   3.  Cortical infarct in the right medial parietal lobe.   4.  Mild amount of subdural hemorrhages surrounding the bilateral cerebellar hemispheres and right cerebellum.   5.  Subarachnoid hemorrhage.   6.  There is no hydrocephalus.   7.  There are changes secondary to the previous endovascular repair left internal carotid aneurysm.      CT-CTA NECK WITH & W/O-POST PROCESSING   Final Result      No focal high-grade stenosis, dissection or occlusion of the cervical carotid or vertebral arteries.      CT-CEREBRAL PERFUSION ANALYSIS   Final Result      1.  Cerebral blood flow less than 30% likely representing completed infarct = 0 mL.      2.  T Max more than 6 seconds likely representing combination of completed infarct and ischemia = 0 mL.      3.  Mismatched volume likely representing ischemic brain/penumbra = None      4.  Please note that the cerebral  perfusion was performed on the limited brain tissue around the basal ganglia region. Infarct/ischemia outside the CT perfusion sections can be missed in this study.      CT-CTA HEAD WITH & W/O-POST PROCESS   Final Result      1.  Prior LEFT middle cranial fossa aneurysm coiling.  Artifact limits exam.   2.  No abrupt large vessel cut off.   3.  Segmental narrowing of LEFT middle cerebral artery, vasospasm versus artifact.   4.  Ventriculostomy catheter in similar position.   5.  Evolving RIGHT caudate infarct.   6.  Stable intracranial hemorrhage.         DX-ABDOMEN FOR TUBE PLACEMENT   Final Result      Orogastric tube tip now at the mid stomach.      DX-ABDOMEN FOR TUBE PLACEMENT   Final Result      Orogastric tube tip at the proximal stomach with side port just above the GE junction.      US-INTRACRANIAL ARTERY LIMITED   Final Result      DX-CHEST-LIMITED (1 VIEW)   Final Result         1.  Stable chest with perihilar and lower lung zone interstitial and minimal airspace opacities most consistent with pulmonary edema.   2.  No new abnormalities.      EC-ECHOCARDIOGRAM COMPLETE W/O CONT   Final Result      US-INTRACRANIAL ARTERY LIMITED   Final Result      DX-CHEST-PORTABLE (1 VIEW)   Final Result      1.  Lines and tubes appear appropriately located      2.  Improvement of pulmonary edema/airspace process      US-INTRACRANIAL ARTERY COMP   Final Result      CT-HEAD W/O   Final Result         1. Slightly decreased subarachnoid hemorrhage overlying the cerebral hemispheres.   2. Otherwise, stable as above.         DX-CHEST-FOR LINE PLACEMENT Perform procedure in: Patient's Room   Final Result      1.  Moderate interstitial pulmonary edema.   2.   Right-sided central venous catheter terminates with the tip projecting over the expected region of the mid to distal superior vena cava.   3.  Endotracheal tube terminates in satisfactory position at the level of the aortic arch.   4.  Enteric feeding tube terminates in  the left upper quadrant projecting over the expected location of the stomach.      CT-STEALTH HEAD W/O   Final Result      1.  Interval placement of a right transparietal ventriculostomy catheter which terminates with the tip near the third ventricle. There is minimally decreased caliber of the ventricular system and compared to previous exam.   2.  Findings of intracranial hemorrhage appear similar to the previous exam. No definite new acute intracranial hemorrhage is identified.   3.  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         DX-CHEST-PORTABLE (1 VIEW)   Final Result      CT-HEAD W/O   Final Result      1. Interval aneurysm coiling of left posterior communicating artery aneurysm.   2. Possible increased subarachnoid hemorrhage.   3. Intraventricular hemorrhage. There is developing mild hydrocephalus with mildly increased ventricles.   4. Small right convexity subdural hematoma measuring 5 mm in thickness.      These findings were discussed with STEPHANIE EASON on 1/31/2024 3:14 PM.      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   This aneurysm was embolized to occlusion as described above. Final angiographic images demonstrate only minimal filling of the neck of the aneurysm. The patient will be followed up in   the neuro interventional clinic and brought back for a follow-up angiogram in 6 months.      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      I, Stephanie Eason was physically present and participated during the entire procedure of the IR-EMBOLIZATION.        INR   Date Value Ref Range Status   01/31/2024  "1.06 0.87 - 1.13 Final     Comment:     INR - Non-therapeutic Reference Range: 0.87-1.13  INR - Therapeutic Reference Range: 2.0-4.0       No results found for: \"POCINR\"     Intake/Output Summary (Last 24 hours) at 2/12/2024 0811  Last data filed at 2/12/2024 0600  Gross per 24 hour   Intake 2444.57 ml   Output 2345 ml   Net 99.57 ml      Labs not explicitly included in this progress note were reviewed by the author. Radiology/imaging not explicitly included in this progress note was reviewed by the author.     I have performed a physical exam and reviewed and updated ROS and Plan today (2/19/2024).     36 minutes in directly providing and coordinating care and extensive data review.  No time overlap and excludes procedures.  "

## 2024-02-19 NOTE — PROGRESS NOTES
Neurology Progress Note  Neurohospitalist Service, [facility] Neurosciences    Referring Physician: Emil Pederson M.D.    No chief complaint on file.      HPI: Refer to initial documented Neurology H&P, as detailed in the patient's chart.    Interval History 2/19: No new events overnight.    Review of systems: In addition to what is detailed in the HPI and/or updated in the interval history, all other systems reviewed and are negative.    Past Medical History:    has no past medical history on file.    FHx:  family history is not on file.    SHx:       Medications:    Current Facility-Administered Medications:     ipratropium-albuterol (DUONEB) nebulizer solution, 3 mL, Nebulization, Q2HRS PRN (RT), Emil Pederson M.D., 3 mL at 02/18/24 0029    oxyCODONE immediate-release (Roxicodone) tablet 5-10 mg, 5-10 mg, Oral, Q4HRS PRN, Olga Nowak, A.P.R.N., 5 mg at 02/17/24 2023    gabapentin (Neurontin) capsule 200 mg, 200 mg, Oral, Q8HRS, Olga Nowak, A.P.R.N., 200 mg at 02/19/24 0519    aspirin (Asa) chewable tab 81 mg, 81 mg, Enteral Tube, DAILY, Jose Raul Tracy M.D., 81 mg at 02/19/24 0519    lidocaine (Asperflex) 4 % patch 2 Patch, 2 Patch, Transdermal, Q24HR, Dayana Babcock M.D., 2 Patch at 02/16/24 1645    enoxaparin (Lovenox) inj 40 mg, 40 mg, Subcutaneous, DAILY AT 1800, Dayana Babcock M.D., 40 mg at 02/18/24 1745    acetaminophen (Tylenol) tablet 650 mg, 650 mg, Enteral Tube, Q4HRS PRN, 650 mg at 02/19/24 0849 **OR** acetaminophen (Tylenol) suppository 650 mg, 650 mg, Rectal, Q4HRS PRN, Dayana Babcock M.D.    atorvastatin (Lipitor) tablet 40 mg, 40 mg, Enteral Tube, Q EVENING, Dayana Babcock M.D., 40 mg at 02/18/24 1746    enalaprilat (Vasotec) injection 1.25 mg 1 mL, 1.25 mg, Intravenous, Q6HRS PRN, Jeremy M Gonda, M.D.    hydrALAZINE (Apresoline) injection 10-20 mg, 10-20 mg, Intravenous, Q4HRS PRN, Jeremy M Gonda, M.D.    labetalol (Normodyne/Trandate) injection 10-20 mg,  10-20 mg, Intravenous, Q4HRS PRN, Jeremy M Gonda, M.D.    niMODipine (Nymalize) oral syringe 60 mg, 60 mg, Enteral Tube, Q4HRS, Jeremy M Gonda, M.D., 60 mg at 02/19/24 0527    levETIRAcetam (Keppra) tablet 500 mg, 500 mg, Enteral Tube, BID, Jeremy M Gonda, M.D., 500 mg at 02/19/24 0519    Pharmacy Consult: Enteral tube insertion - review meds/change route/product selection, 1 Each, Other, PHARMACY TO DOSE, Jeremy M Gonda, M.D.    Respiratory Therapy Consult, , Nebulization, Continuous RT, Jeremy M Gonda, M.D.    senna-docusate (Pericolace Or Senokot S) 8.6-50 MG per tablet 2 Tablet, 2 Tablet, Enteral Tube, BID, 2 Tablet at 02/03/24 0514 **AND** polyethylene glycol/lytes (Miralax) Packet 1 Packet, 1 Packet, Enteral Tube, QDAY PRN **AND** magnesium hydroxide (Milk Of Magnesia) suspension 30 mL, 30 mL, Enteral Tube, QDAY PRN **AND** bisacodyl (Dulcolax) suppository 10 mg, 10 mg, Rectal, QDAY PRN, Jeremy M Gonda, M.D.    MD Alert...ICU Electrolyte Replacement per Pharmacy, , Other, PHARMACY TO DOSE, Jeremy M Gonda, M.D.    ondansetron (Zofran ODT) dispertab 4 mg, 4 mg, Enteral Tube, Q4HRS PRN, 4 mg at 01/31/24 2248 **OR** ondansetron (Zofran) syringe/vial injection 4 mg, 4 mg, Intravenous, Q4HRS PRN, Jeremy M Gonda, M.D., 4 mg at 02/02/24 1758    LORazepam (Ativan) injection 2 mg, 2 mg, Intravenous, Q5 MIN PRN, Jeremy M Gonda, M.D.    prochlorperazine (Compazine) injection 10 mg, 10 mg, Intravenous, Q6HRS PRN, David Mclaughlin Jr., D.OJennifer, 10 mg at 01/31/24 1858    Physical Examination:     Vitals:    02/19/24 0500 02/19/24 0600 02/19/24 0700 02/19/24 0737   BP: (!) 161/96 137/83 (!) 148/92    Pulse: 99 91 88 82   Resp:   (!) 33 (!) 21   TempSrc:       SpO2: 92% 99% 99% 100%   Weight:       Height:               NEUROLOGICAL EXAM:     Patient is awake and alert.  She knows she is in the hospital.  Not quite sure on she is aware of the situation.  She appears to be better compared to last time I saw her 3 days ago.  Pupils  equal reactive.  She is a left-sided ptosis.  Face symmetrical.  Antigravity and in all 4.  Reacts to tactile touch in all 4.    No significant change in examination.  Patient seen eating on her own now.    Objective Data:    Labs:  Lab Results   Component Value Date/Time    PROTHROMBTM 13.9 01/31/2024 03:25 PM    INR 1.06 01/31/2024 03:25 PM      Lab Results   Component Value Date/Time    WBC 10.4 02/19/2024 04:13 AM    RBC 4.48 02/19/2024 04:13 AM    HEMOGLOBIN 11.7 (L) 02/19/2024 04:13 AM    HEMATOCRIT 35.6 (L) 02/19/2024 04:13 AM    MCV 79.5 (L) 02/19/2024 04:13 AM    MCH 26.1 (L) 02/19/2024 04:13 AM    MCHC 32.9 02/19/2024 04:13 AM    MPV 9.4 02/19/2024 04:13 AM    NEUTSPOLYS 76.10 (H) 02/19/2024 04:13 AM    LYMPHOCYTES 13.00 (L) 02/19/2024 04:13 AM    MONOCYTES 5.90 02/19/2024 04:13 AM    EOSINOPHILS 4.00 02/19/2024 04:13 AM    BASOPHILS 0.70 02/19/2024 04:13 AM      Lab Results   Component Value Date/Time    SODIUM 135 02/19/2024 04:13 AM    POTASSIUM 3.9 02/19/2024 04:13 AM    CHLORIDE 99 02/19/2024 04:13 AM    CO2 24 02/19/2024 04:13 AM    GLUCOSE 127 (H) 02/19/2024 04:13 AM    BUN 15 02/19/2024 04:13 AM    CREATININE 0.31 (L) 02/19/2024 04:13 AM      Lab Results   Component Value Date/Time    CHOLSTRLTOT 165 01/31/2024 03:25 PM     (H) 01/31/2024 03:25 PM    HDL 36 (A) 01/31/2024 03:25 PM    TRIGLYCERIDE 79 01/31/2024 03:25 PM       Lab Results   Component Value Date/Time    ALKPHOSPHAT 159 (H) 02/19/2024 04:13 AM    ASTSGOT 29 02/19/2024 04:13 AM    ALTSGPT 23 02/19/2024 04:13 AM    TBILIRUBIN 0.2 02/19/2024 04:13 AM        Imaging/Testing:  DX-ABDOMEN FOR TUBE PLACEMENT   Final Result         1.  Nonspecific bowel gas pattern in the upper abdomen.   2.  Dobbhoff tube with tip terminating overlying the expected location of the first or second duodenal segment.   3.  Cardiomegaly      CT-CEREBRAL PERFUSION ANALYSIS   Final Result      1.  Cerebral blood flow less than 30% likely representing  completed infarct = 0 mL.      2.  T Max more than 6 seconds likely representing combination of completed infarct and ischemia = 0 mL.      3.  Mismatched volume likely representing ischemic brain/penumbra = None      4.  Please note that the cerebral perfusion was performed on the limited brain tissue around the basal ganglia region. Infarct/ischemia outside the CT perfusion sections can be missed in this study.      CT-CTA HEAD WITH & W/O-POST PROCESS   Final Result      1.  Prior LEFT middle cranial fossa aneurysm coiling.  Artifact limits exam.   2.  No abrupt large vessel cut off.   3.  Segmental narrowing of LEFT middle cerebral artery, primarily M1 segment, vasospasm versus artifact.   4.  Removal of ventriculostomy catheter. No hydrocephalus.   5.  Evolving RIGHT caudate infarct.   6.  Slightly improved intracranial hemorrhage.   7.  4 mm right carotid terminus aneurysm.      CT-HEAD W/O   Final Result         1.  Hyperdensity along the bilateral tentorium, compatible with layering subarachnoid hemorrhages, decreased since prior study.   2.  Atherosclerosis.         DX-ABDOMEN FOR TUBE PLACEMENT   Final Result         1.  Nonspecific bowel gas pattern in the upper abdomen.   2.  Nasogastric tube tip terminates overlying the expected location of the antrum or pylorus, somewhat withdrawn since prior study.   3.  Hazy interstitial pulmonary edema and/or infiltrates, stable   4.  Cardiomegaly      DX-ABDOMEN FOR TUBE PLACEMENT   Final Result         1.  Nonspecific bowel gas pattern in the upper abdomen.   2.  Nasogastric tube tip terminates overlying the expected location of the pylorus or first duodenal segment.   3.  Hazy interstitial pulmonary edema and/or infiltrates   4.  Cardiomegaly      DX-CHEST-PORTABLE (1 VIEW)   Final Result         Findings on chest radiograph appear stable since the prior radiograph.  No new abnormalities are identified.      DX-ABDOMEN FOR TUBE PLACEMENT   Final Result       Enteric tube tip projects over the gastric antrum or proximal duodenum, similar to prior      DX-ABDOMEN FOR TUBE PLACEMENT   Final Result      NG tube tip projects at the peripyloric region.      DX-ABDOMEN FOR TUBE PLACEMENT   Final Result      NG tube tip projects at the peripyloric region.      DX-ABDOMEN FOR TUBE PLACEMENT   Final Result      Feeding tube in place with tip at the distal stomach/pylorus.      [id number] VIEW   Final Result         1.  Nonspecific bowel gas pattern in the upper abdomen.   2.  Nasogastric tube tip terminates overlying the expected location of the pylorus or first duodenal segment.   3.  Hazy interstitial pulmonary edema and/or infiltrates   4.  Cardiomegaly      DX-ABDOMEN FOR TUBE PLACEMENT   Final Result      Enteric sump tube in situ with its tip at the level of the distal gastric antrum or pylorus.      DX-ABDOMEN FOR TUBE PLACEMENT   Final Result         1.  Nonspecific bowel gas pattern in the upper abdomen.   2.  Nasogastric tube tip terminates overlying the expected location of the pylorus or first duodenal segment.   3.  Hazy interstitial pulmonary edema and/or infiltrates, similar to prior study.   4.  Cardiomegaly      DX-CHEST-PORTABLE (1 VIEW)   Final Result      1.  Supportive tubing as described above.   2.  No other significant change from prior exam.         MR-BRAIN-W/O   Final Result      1.  Multifocal areas of acute infarcts in the left cerebral hemisphere.   2.  Small area of acute infarct in the right basal ganglia adjacent to the tip of the ventriculostomy catheter.   3.  Cortical infarct in the right medial parietal lobe.   4.  Mild amount of subdural hemorrhages surrounding the bilateral cerebellar hemispheres and right cerebellum.   5.  Subarachnoid hemorrhage.   6.  There is no hydrocephalus.   7.  There are changes secondary to the previous endovascular repair left internal carotid aneurysm.      CT-CTA NECK WITH & W/O-POST PROCESSING   Final  Result      No focal high-grade stenosis, dissection or occlusion of the cervical carotid or vertebral arteries.      CT-CEREBRAL PERFUSION ANALYSIS   Final Result      1.  Cerebral blood flow less than 30% likely representing completed infarct = 0 mL.      2.  T Max more than 6 seconds likely representing combination of completed infarct and ischemia = 0 mL.      3.  Mismatched volume likely representing ischemic brain/penumbra = None      4.  Please note that the cerebral perfusion was performed on the limited brain tissue around the basal ganglia region. Infarct/ischemia outside the CT perfusion sections can be missed in this study.      CT-CTA HEAD WITH & W/O-POST PROCESS   Final Result      1.  Prior LEFT middle cranial fossa aneurysm coiling.  Artifact limits exam.   2.  No abrupt large vessel cut off.   3.  Segmental narrowing of LEFT middle cerebral artery, vasospasm versus artifact.   4.  Ventriculostomy catheter in similar position.   5.  Evolving RIGHT caudate infarct.   6.  Stable intracranial hemorrhage.         DX-ABDOMEN FOR TUBE PLACEMENT   Final Result      Orogastric tube tip now at the mid stomach.      DX-ABDOMEN FOR TUBE PLACEMENT   Final Result      Orogastric tube tip at the proximal stomach with side port just above the GE junction.      US-INTRACRANIAL ARTERY LIMITED   Final Result      DX-CHEST-LIMITED (1 VIEW)   Final Result         1.  Stable chest with perihilar and lower lung zone interstitial and minimal airspace opacities most consistent with pulmonary edema.   2.  No new abnormalities.      EC-ECHOCARDIOGRAM COMPLETE W/O CONT   Final Result      US-INTRACRANIAL ARTERY LIMITED   Final Result      DX-CHEST-PORTABLE (1 VIEW)   Final Result      1.  Lines and tubes appear appropriately located      2.  Improvement of pulmonary edema/airspace process      US-INTRACRANIAL ARTERY COMP   Final Result      CT-HEAD W/O   Final Result         1. Slightly decreased subarachnoid hemorrhage  overlying the cerebral hemispheres.   2. Otherwise, stable as above.         DX-CHEST-FOR LINE PLACEMENT Perform procedure in: Patient's Room   Final Result      1.  Moderate interstitial pulmonary edema.   2.   Right-sided central venous catheter terminates with the tip projecting over the expected region of the mid to distal superior vena cava.   3.  Endotracheal tube terminates in satisfactory position at the level of the aortic arch.   4.  Enteric feeding tube terminates in the left upper quadrant projecting over the expected location of the stomach.      CT-[facility] HEAD W/O   Final Result      1.  Interval placement of a right transparietal ventriculostomy catheter which terminates with the tip near the third ventricle. There is minimally decreased caliber of the ventricular system and compared to previous exam.   2.  Findings of intracranial hemorrhage appear similar to the previous exam. No definite new acute intracranial hemorrhage is identified.   3.  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         DX-CHEST-PORTABLE (1 VIEW)   Final Result      CT-HEAD W/O   Final Result      1. Interval aneurysm coiling of left posterior communicating artery aneurysm.   2. Possible increased subarachnoid hemorrhage.   3. Intraventricular hemorrhage. There is developing mild hydrocephalus with mildly increased ventricles.   4. Small right convexity subdural hematoma measuring 5 mm in thickness.      These findings were discussed with EMILY BYRD on 1/31/2024 3:14 PM.      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   This aneurysm was embolized to occlusion as described above. Final  angiographic images demonstrate only minimal filling of the neck of the aneurysm. The patient will be followed up in   the neuro interventional clinic and brought back for a follow-up angiogram in 6 months.      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      AMINATA Stephanie Eason was physically present and participated during the entire procedure of the IR-EMBOLIZATION.            Assessment and Plan:    63-year-old female with scalp 4.  Modified Swenson score 3.  Secondary from a ruptured left P-comm aneurysm.  Post bleed #18.  Postop day 18.  From endovascular coiling.  TCD's been discontinued due to no windows.  MRI brain showed multifocal infarcts of both frontal lobes.  EVD has been removed since the 12th.  Continue supportive therapies for now.  Continue mild hypertension while in the vasospasm window.  For additional 2 days.  Will need inpatient rehab at discharge.     Update 2/19  Post bleed day #19.  Postop day #19.  Going to start liberalizing blood pressure parameters.  120-180.  Anticipate 2 more days of nimodipine use.  Then we can discontinue.  Recommend follow-up with PMR for rehab placement next few days.      Plan:  1.  Continue with every 2 hours neurochecks  2.  Blood pressure goals between 120-180 systolic.  3.  Continue Keppra 500 mg twice daily for a total of 3 months.  Given the seizure event at admission.  4.  Continue aspirin 81 mg daily  5.  Continue nimodipine 6 mg every 4 hours.  Total of 21 days.  6.  Maintain normothermia.  That evens I's and O's.  7.  Maintain normal sodium levels between 135-145.  On salt tabs.  Current sodium is 135  8.  Follow-up with [facility] for potential rehab placement..          This chart was partially generated using voice recognition technology and sound alike word replacement may be present, best efforts were made to make the chart accurate.    Jose Raul Tenorio MD  Board Certified Neurology, ABPN  (t)  [phone]

## 2024-02-19 NOTE — DISCHARGE PLANNING
Case Management Discharge Planning    Chart reviewed and case discussed in ICU rounds:  POD#19 s/p cerebral angiogram and  intracranial aneurysm embolization, POD#19 s/p right frontal external ventricular drain, POD#19 s/p right frontal external ventricular drain under [facility].   Patient extubated on 2/3/2024, EVD removed on 2/12/2024.  AAOx4.  Poor PO intake.  +O2 2L nc/PO diet & tube feeds (NGT to be removed with adequate oral intake)/Q2H neurochecks/Nimodipine through 2/21/24. NSGY signed off, Neurology following.     PT/OT/SLP recommending post-acute placement.    IRF referral to [facility] re-submitted for reconsideration as patient is now moderate to max assist.   [facility] [facility] referrals also submitted.    CM updated patient & her son, Jake, on DCP.    PASRR completed; PASRR [id number]

## 2024-02-20 PROBLEM — I10 PRIMARY HYPERTENSION: Status: ACTIVE | Noted: 2024-02-20

## 2024-02-20 PROBLEM — D64.9 ANEMIA: Status: RESOLVED | Noted: 2024-02-02 | Resolved: 2024-02-20

## 2024-02-20 PROBLEM — E03.9 HYPOTHYROID: Status: ACTIVE | Noted: 2024-02-20

## 2024-02-20 LAB
ANION GAP SERPL CALC-SCNC: 11 MMOL/L (ref 7–16)
BASOPHILS # BLD AUTO: 0.6 % (ref 0–1.8)
BASOPHILS # BLD: 0.05 K/UL (ref 0–0.12)
BUN SERPL-MCNC: 19 MG/DL (ref 8–22)
CALCIUM SERPL-MCNC: 10.3 MG/DL (ref 8.5–10.5)
CHLORIDE SERPL-SCNC: 99 MMOL/L (ref 96–112)
CO2 SERPL-SCNC: 27 MMOL/L (ref 20–33)
CREAT SERPL-MCNC: 0.44 MG/DL (ref 0.5–1.4)
CRP SERPL HS-MCNC: 0.43 MG/DL (ref 0–0.75)
EOSINOPHIL # BLD AUTO: 0.44 K/UL (ref 0–0.51)
EOSINOPHIL NFR BLD: 5.1 % (ref 0–6.9)
ERYTHROCYTE [DISTWIDTH] IN BLOOD BY AUTOMATED COUNT: 41.4 FL (ref 35.9–50)
GFR SERPLBLD CREATININE-BSD FMLA CKD-EPI: 109 ML/MIN/1.73 M 2
GLUCOSE SERPL-MCNC: 130 MG/DL (ref 65–99)
HCT VFR BLD AUTO: 38.1 % (ref 37–47)
HGB BLD-MCNC: 12 G/DL (ref 12–16)
IMM GRANULOCYTES # BLD AUTO: 0.03 K/UL (ref 0–0.11)
IMM GRANULOCYTES NFR BLD AUTO: 0.3 % (ref 0–0.9)
LYMPHOCYTES # BLD AUTO: 1.06 K/UL (ref 1–4.8)
LYMPHOCYTES NFR BLD: 12.3 % (ref 22–41)
MAGNESIUM SERPL-MCNC: 2.5 MG/DL (ref 1.5–2.5)
MCH RBC QN AUTO: 25.8 PG (ref 27–33)
MCHC RBC AUTO-ENTMCNC: 31.5 G/DL (ref 32.2–35.5)
MCV RBC AUTO: 81.8 FL (ref 81.4–97.8)
MONOCYTES # BLD AUTO: 0.49 K/UL (ref 0–0.85)
MONOCYTES NFR BLD AUTO: 5.7 % (ref 0–13.4)
NEUTROPHILS # BLD AUTO: 6.53 K/UL (ref 1.82–7.42)
NEUTROPHILS NFR BLD: 76 % (ref 44–72)
NRBC # BLD AUTO: 0 K/UL
NRBC BLD-RTO: 0 /100 WBC (ref 0–0.2)
PHOSPHATE SERPL-MCNC: 4.6 MG/DL (ref 2.5–4.5)
PLATELET # BLD AUTO: 531 K/UL (ref 164–446)
PMV BLD AUTO: 9.6 FL (ref 9–12.9)
POTASSIUM SERPL-SCNC: 4.6 MMOL/L (ref 3.6–5.5)
PREALB SERPL-MCNC: 21.5 MG/DL (ref 18–38)
RBC # BLD AUTO: 4.66 M/UL (ref 4.2–5.4)
SODIUM SERPL-SCNC: 137 MMOL/L (ref 135–145)
WBC # BLD AUTO: 8.6 K/UL (ref 4.8–10.8)

## 2024-02-20 PROCEDURE — 700102 HCHG RX REV CODE 250 W/ 637 OVERRIDE(OP): Performed by: PSYCHIATRY & NEUROLOGY

## 2024-02-20 PROCEDURE — 99232 SBSQ HOSP IP/OBS MODERATE 35: CPT | Performed by: PSYCHIATRY & NEUROLOGY

## 2024-02-20 PROCEDURE — 700102 HCHG RX REV CODE 250 W/ 637 OVERRIDE(OP): Performed by: INTERNAL MEDICINE

## 2024-02-20 PROCEDURE — A9270 NON-COVERED ITEM OR SERVICE: HCPCS | Performed by: INTERNAL MEDICINE

## 2024-02-20 PROCEDURE — A9270 NON-COVERED ITEM OR SERVICE: HCPCS | Performed by: NURSE PRACTITIONER

## 2024-02-20 PROCEDURE — 86140 C-REACTIVE PROTEIN: CPT

## 2024-02-20 PROCEDURE — 92526 ORAL FUNCTION THERAPY: CPT

## 2024-02-20 PROCEDURE — 84134 ASSAY OF PREALBUMIN: CPT

## 2024-02-20 PROCEDURE — 99291 CRITICAL CARE FIRST HOUR: CPT | Performed by: INTERNAL MEDICINE

## 2024-02-20 PROCEDURE — 700111 HCHG RX REV CODE 636 W/ 250 OVERRIDE (IP): Mod: JZ | Performed by: INTERNAL MEDICINE

## 2024-02-20 PROCEDURE — 83735 ASSAY OF MAGNESIUM: CPT

## 2024-02-20 PROCEDURE — 84100 ASSAY OF PHOSPHORUS: CPT

## 2024-02-20 PROCEDURE — 99222 1ST HOSP IP/OBS MODERATE 55: CPT | Performed by: HOSPITALIST

## 2024-02-20 PROCEDURE — 700102 HCHG RX REV CODE 250 W/ 637 OVERRIDE(OP): Performed by: NURSE PRACTITIONER

## 2024-02-20 PROCEDURE — A9270 NON-COVERED ITEM OR SERVICE: HCPCS | Performed by: PSYCHIATRY & NEUROLOGY

## 2024-02-20 PROCEDURE — 80048 BASIC METABOLIC PNL TOTAL CA: CPT

## 2024-02-20 PROCEDURE — 85025 COMPLETE CBC W/AUTO DIFF WBC: CPT

## 2024-02-20 PROCEDURE — 770001 HCHG ROOM/CARE - MED/SURG/GYN PRIV*

## 2024-02-20 RX ORDER — ATENOLOL 100 MG/1
100 TABLET ORAL DAILY
COMMUNITY

## 2024-02-20 RX ORDER — LOSARTAN POTASSIUM 100 MG/1
100 TABLET ORAL DAILY
COMMUNITY

## 2024-02-20 RX ADMIN — NIMODIPINE 60 MG: 60 SOLUTION ORAL at 21:36

## 2024-02-20 RX ADMIN — LEVETIRACETAM 500 MG: 500 TABLET, FILM COATED ORAL at 18:15

## 2024-02-20 RX ADMIN — GABAPENTIN 200 MG: 100 CAPSULE ORAL at 21:36

## 2024-02-20 RX ADMIN — ATORVASTATIN CALCIUM 40 MG: 40 TABLET, FILM COATED ORAL at 18:15

## 2024-02-20 RX ADMIN — NIMODIPINE 60 MG: 60 SOLUTION ORAL at 13:48

## 2024-02-20 RX ADMIN — NIMODIPINE 60 MG: 60 SOLUTION ORAL at 18:16

## 2024-02-20 RX ADMIN — NIMODIPINE 60 MG: 60 SOLUTION ORAL at 10:09

## 2024-02-20 RX ADMIN — ENOXAPARIN SODIUM 40 MG: 100 INJECTION SUBCUTANEOUS at 18:16

## 2024-02-20 RX ADMIN — ASPIRIN 81 MG 81 MG: 81 TABLET ORAL at 05:34

## 2024-02-20 RX ADMIN — LEVETIRACETAM 500 MG: 500 TABLET, FILM COATED ORAL at 05:34

## 2024-02-20 RX ADMIN — GABAPENTIN 200 MG: 100 CAPSULE ORAL at 05:34

## 2024-02-20 RX ADMIN — NIMODIPINE 60 MG: 60 SOLUTION ORAL at 02:08

## 2024-02-20 RX ADMIN — NIMODIPINE 60 MG: 60 SOLUTION ORAL at 05:35

## 2024-02-20 RX ADMIN — GABAPENTIN 200 MG: 100 CAPSULE ORAL at 13:48

## 2024-02-20 ASSESSMENT — ENCOUNTER SYMPTOMS
VOMITING: 0
CHILLS: 0
DIZZINESS: 0
SHORTNESS OF BREATH: 0
HEADACHES: 0
NAUSEA: 0
ROS GI COMMENTS: POOR APPETITE
FEVER: 0
TINGLING: 0
DIARRHEA: 1
FOCAL WEAKNESS: 1
PALPITATIONS: 0

## 2024-02-20 ASSESSMENT — LIFESTYLE VARIABLES
DOES PATIENT WANT TO STOP DRINKING: NO
EVER FELT BAD OR GUILTY ABOUT YOUR DRINKING: NO
HAVE PEOPLE ANNOYED YOU BY CRITICIZING YOUR DRINKING: NO
AVERAGE NUMBER OF DAYS PER WEEK YOU HAVE A DRINK CONTAINING ALCOHOL: 0
HOW MANY TIMES IN THE PAST YEAR HAVE YOU HAD 5 OR MORE DRINKS IN A DAY: 0
ON A TYPICAL DAY WHEN YOU DRINK ALCOHOL HOW MANY DRINKS DO YOU HAVE: 0
TOTAL SCORE: 0
TOTAL SCORE: 0
CONSUMPTION TOTAL: NEGATIVE
TOTAL SCORE: 0
EVER HAD A DRINK FIRST THING IN THE MORNING TO STEADY YOUR NERVES TO GET RID OF A HANGOVER: NO
ALCOHOL_USE: NO
HAVE YOU EVER FELT YOU SHOULD CUT DOWN ON YOUR DRINKING: NO

## 2024-02-20 ASSESSMENT — PATIENT HEALTH QUESTIONNAIRE - PHQ9
SUM OF ALL RESPONSES TO PHQ9 QUESTIONS 1 AND 2: 0
1. LITTLE INTEREST OR PLEASURE IN DOING THINGS: NOT AT ALL
2. FEELING DOWN, DEPRESSED, IRRITABLE, OR HOPELESS: NOT AT ALL

## 2024-02-20 ASSESSMENT — PAIN DESCRIPTION - PAIN TYPE
TYPE: ACUTE PAIN

## 2024-02-20 NOTE — ASSESSMENT & PLAN NOTE
Non-traumatic SAH status posterior communicating artery aneurysm coiling with resultant hydrocephalus requiring external ventricular drain by Dr. Monroe x 2.  Transcranial Dopplers were not done as she has no windows  She was treated with IV vasopressors for presumptive vasospasm  Blood pressure control  Daily aspirin in order to mitigate clotting of the coils  Need placement, case and assisting

## 2024-02-20 NOTE — CARE PLAN
The patient is Stable - Low risk of patient condition declining or worsening    Shift Goals  Clinical Goals: stable neuro, vss  Patient Goals: feel better, rest  Family Goals: updates    Progress made toward(s) clinical / shift goals:      Problem: Knowledge Deficit - Standard  Goal: Patient and family/care givers will demonstrate understanding of plan of care, disease process/condition, diagnostic tests and medications  Outcome: Progressing     Problem: Safety - Medical Restraint  Goal: Remains free of injury from restraints (Restraint for Interference with Medical Device)  Outcome: Progressing  Flowsheets (Taken 2/5/2024 1926 by Imelda Moreland R.N.)  Addressed this shift: Remains free of injury from restraints (restraint for interference with medical device):   Determine that other, less restrictive measures have been tried or would not be effective before applying the restraint   Evaluate the patient's condition at the time of restraint application   Inform patient/family regarding the reason for restraint   Every 2 hours: Monitor safety, psychosocial status, comfort, nutrition and hydration     Problem: Pain - Standard  Goal: Alleviation of pain or a reduction in pain to the patient’s comfort goal  Outcome: Progressing     Problem: Neuro Status  Goal: Neuro status will remain stable or improve  Outcome: Progressing       Patient on Q4H neuro checks, remains stable. Pain assessed and PRNs available (see MAR), patient educated on restraints and use needs reinforcement. Speech saw patient to increase diet level. Patient mobilized to edge of bed, tolerated well.       Patient is not progressing towards the following goals:      Problem: Safety - Medical Restraint  Goal: Free from restraint(s) (Restraint for Interference with Medical Device)  Outcome: Not Progressing  Flowsheets (Taken 2/3/2024 0409 by Sandi Lau, R.N.)  Addressed this shift: Free from restraint(s) (restraint for interference with medical  device):   ONCE/SHIFT or MINIMUM Every 12 hours: Assess and document the continuing need for restraints   Every 24 hours: Continued use of restraint requires Licensed Independent Practitioner to perform face to face examination and written order   Identify and implement measures to help patient regain control

## 2024-02-20 NOTE — DISCHARGE PLANNING
"Case Management Discharge Planning    Voice message left for Tanna #[phone], Admission liaison @ [facility], inquiring about referral status and requesting callback.    SNF acceptance noted in [facility].    **1037 Hrs - CM communicated with Tanna @ [facility] who stated that they are \"interested and reviewing\" referral; Tanna will inform CM of decision.    **1130 Hrs - CM updated patient and daughter-in-law, Asmita, on DCP (IRF and SNF referrals statuses).   Patient selected [facility] as preferred IRF; Choice form faxed to [facility].   SNF choices to be obtained if [facility] unable to accept.    **1423 Hrs - Received voice message from Tanna @ [facility] stating that referral was again \"respectfully declined\" due to patient \"being too functionally debilitated and would not make enough functional gain during IRF LOS to successfully return to next level of care/home\".    T/C placed to following SNFs to inquire about SNF referral status:  -[facility] #[phone]; Mindy will review  -[facility] #[phone]; [facility] will review   -[facility] #[phone]; [facility] will review  -[facility] #[phone]; Left voice message inquiring about status and requesting callback     "

## 2024-02-20 NOTE — PROGRESS NOTES
Neurology Progress Note  Neurohospitalist Service, [facility]iences    Referring Physician: Dudley Root MD      Interval History: No acute events overnight.   Stable exam.     Review of systems: In addition to what is detailed in the HPI and/or updated in the interval history, all other systems reviewed and are negative.    Past Medical History, Past Surgical History and Social History reviewed and unchanged from prior    Medications:    Current Facility-Administered Medications:     enalaprilat (Vasotec) injection 1.25 mg 1 mL, 1.25 mg, Intravenous, Q6HRS PRN, Dudley Root M.D.    hydrALAZINE (Apresoline) injection 10-20 mg, 10-20 mg, Intravenous, Q4HRS PRN, Dudley Root M.D.    labetalol (Normodyne/Trandate) injection 10-20 mg, 10-20 mg, Intravenous, Q4HRS PRN, Dudley Root M.D.    ipratropium-albuterol (DUONEB) nebulizer solution, 3 mL, Nebulization, Q2HRS PRN (RT), Emil Pederson M.D., 3 mL at 02/18/24 0029    oxyCODONE immediate-release (Roxicodone) tablet 5-10 mg, 5-10 mg, Oral, Q4HRS PRN, Olga Nowak, A.P.R.N., 5 mg at 02/19/24 1542    gabapentin (Neurontin) capsule 200 mg, 200 mg, Oral, Q8HRS, Olga Nowak, A.P.R.N., 200 mg at 02/20/24 0534    aspirin (Asa) chewable tab 81 mg, 81 mg, Enteral Tube, DAILY, Jose Raul Tracy M.D., 81 mg at 02/20/24 0534    lidocaine (Asperflex) 4 % patch 2 Patch, 2 Patch, Transdermal, Q24HR, Dayana Babcock M.D., 2 Patch at 02/16/24 1645    enoxaparin (Lovenox) inj 40 mg, 40 mg, Subcutaneous, DAILY AT 1800, Dayana Babcock M.D., 40 mg at 02/19/24 1722    acetaminophen (Tylenol) tablet 650 mg, 650 mg, Enteral Tube, Q4HRS PRN, 650 mg at 02/19/24 1510 **OR** acetaminophen (Tylenol) suppository 650 mg, 650 mg, Rectal, Q4HRS PRN, Dayana Babcock M.D.    atorvastatin (Lipitor) tablet 40 mg, 40 mg, Enteral Tube, Q EVENING, Dayana Babcock M.D., 40 mg at 02/19/24 1723    niMODipine (Nymalize) oral syringe 60 mg, 60 mg, Enteral Tube, Q4HRS,  "Jeremy M Gonda, M.D., 60 mg at 02/20/24 0535    levETIRAcetam (Keppra) tablet 500 mg, 500 mg, Enteral Tube, BID, Jeremy M Gonda, M.D., 500 mg at 02/20/24 0534    Pharmacy Consult: Enteral tube insertion - review meds/change route/product selection, 1 Each, Other, PHARMACY TO DOSE, Jeremy M Gonda, M.D.    Respiratory Therapy Consult, , Nebulization, Continuous RT, Jeremy M Gonda, M.D. MD Alert...ICU Electrolyte Replacement per Pharmacy, , Other, PHARMACY TO DOSE, Jeremy M Gonda, M.D.    ondansetron (Zofran ODT) dispertab 4 mg, 4 mg, Enteral Tube, Q4HRS PRN, 4 mg at 01/31/24 2248 **OR** ondansetron (Zofran) syringe/vial injection 4 mg, 4 mg, Intravenous, Q4HRS PRN, Jeremy M Gonda, M.D., 4 mg at 02/02/24 1758    LORazepam (Ativan) injection 2 mg, 2 mg, Intravenous, Q5 MIN PRN, Jeremy M Gonda, M.D.    prochlorperazine (Compazine) injection 10 mg, 10 mg, Intravenous, Q6HRS PRN, David Mclaughlin Jr., D.O., 10 mg at 01/31/24 4338    Physical Examination:   /78   Pulse 93   Resp (!) 21   Ht 1.575 m (5' 2.01\")   Wt 72 kg (158 lb 11.7 oz)   SpO2 92%   Breastfeeding No   BMI 29.02 kg/m²       General: Patient is sleepy, arouses easily  Neck: There is normal range of motion  CV: Regular rate   Extremities:  Warm, dry, and intact, without peripheral lower extremity edema    NEUROLOGICAL EXAM:     Mental status: Sleepy, arouses easily  Speech and language: Speech is clear.  Fluent and follows commands  Cranial nerve exam:  Visual fields are full. There is no nystagmus. Extraocular muscles are intact. Face is symmetric. L ptosis  Motor exam: There is antigravity strength in all 4 extremities  Sensory exam:  Reacts to tactile in all 4 extremities, there is no neglect to double stim  Coordination: No ataxia on elicited movements    Objective Data:    Labs:  Lab Results   Component Value Date/Time    PROTHROMBTM 13.9 01/31/2024 03:25 PM    INR 1.06 01/31/2024 03:25 PM      Lab Results   Component Value Date/Time    WBC " 8.6 02/20/2024 04:38 AM    RBC 4.66 02/20/2024 04:38 AM    HEMOGLOBIN 12.0 02/20/2024 04:38 AM    HEMATOCRIT 38.1 02/20/2024 04:38 AM    MCV 81.8 02/20/2024 04:38 AM    MCH 25.8 (L) 02/20/2024 04:38 AM    MCHC 31.5 (L) 02/20/2024 04:38 AM    MPV 9.6 02/20/2024 04:38 AM    NEUTSPOLYS 76.00 (H) 02/20/2024 04:38 AM    LYMPHOCYTES 12.30 (L) 02/20/2024 04:38 AM    MONOCYTES 5.70 02/20/2024 04:38 AM    EOSINOPHILS 5.10 02/20/2024 04:38 AM    BASOPHILS 0.60 02/20/2024 04:38 AM      Lab Results   Component Value Date/Time    SODIUM 137 02/20/2024 04:38 AM    POTASSIUM 4.6 02/20/2024 04:38 AM    CHLORIDE 99 02/20/2024 04:38 AM    CO2 27 02/20/2024 04:38 AM    GLUCOSE 130 (H) 02/20/2024 04:38 AM    BUN 19 02/20/2024 04:38 AM    CREATININE 0.44 (L) 02/20/2024 04:38 AM      Lab Results   Component Value Date/Time    CHOLSTRLTOT 165 01/31/2024 03:25 PM     (H) 01/31/2024 03:25 PM    HDL 36 (A) 01/31/2024 03:25 PM    TRIGLYCERIDE 79 01/31/2024 03:25 PM       Lab Results   Component Value Date/Time    ALKPHOSPHAT 159 (H) 02/19/2024 04:13 AM    ASTSGOT 29 02/19/2024 04:13 AM    ALTSGPT 23 02/19/2024 04:13 AM    TBILIRUBIN 0.2 02/19/2024 04:13 AM        Imaging/Testing:    I interpreted and/or reviewed the patient's neuroimaging    DX-ABDOMEN FOR TUBE PLACEMENT   Final Result         1.  Nonspecific bowel gas pattern in the upper abdomen.   2.  Dobbhoff tube with tip terminating overlying the expected location of the first or second duodenal segment.   3.  Cardiomegaly      CT-CEREBRAL PERFUSION ANALYSIS   Final Result      1.  Cerebral blood flow less than 30% likely representing completed infarct = 0 mL.      2.  T Max more than 6 seconds likely representing combination of completed infarct and ischemia = 0 mL.      3.  Mismatched volume likely representing ischemic brain/penumbra = None      4.  Please note that the cerebral perfusion was performed on the limited brain tissue around the basal ganglia region.  Infarct/ischemia outside the CT perfusion sections can be missed in this study.      CT-CTA HEAD WITH & W/O-POST PROCESS   Final Result      1.  Prior LEFT middle cranial fossa aneurysm coiling.  Artifact limits exam.   2.  No abrupt large vessel cut off.   3.  Segmental narrowing of LEFT middle cerebral artery, primarily M1 segment, vasospasm versus artifact.   4.  Removal of ventriculostomy catheter. No hydrocephalus.   5.  Evolving RIGHT caudate infarct.   6.  Slightly improved intracranial hemorrhage.   7.  4 mm right carotid terminus aneurysm.      CT-HEAD W/O   Final Result         1.  Hyperdensity along the bilateral tentorium, compatible with layering subarachnoid hemorrhages, decreased since prior study.   2.  Atherosclerosis.         DX-ABDOMEN FOR TUBE PLACEMENT   Final Result         1.  Nonspecific bowel gas pattern in the upper abdomen.   2.  Nasogastric tube tip terminates overlying the expected location of the antrum or pylorus, somewhat withdrawn since prior study.   3.  Hazy interstitial pulmonary edema and/or infiltrates, stable   4.  Cardiomegaly      DX-ABDOMEN FOR TUBE PLACEMENT   Final Result         1.  Nonspecific bowel gas pattern in the upper abdomen.   2.  Nasogastric tube tip terminates overlying the expected location of the pylorus or first duodenal segment.   3.  Hazy interstitial pulmonary edema and/or infiltrates   4.  Cardiomegaly      DX-CHEST-PORTABLE (1 VIEW)   Final Result         Findings on chest radiograph appear stable since the prior radiograph.  No new abnormalities are identified.      DX-ABDOMEN FOR TUBE PLACEMENT   Final Result      Enteric tube tip projects over the gastric antrum or proximal duodenum, similar to prior      DX-ABDOMEN FOR TUBE PLACEMENT   Final Result      NG tube tip projects at the peripyloric region.      DX-ABDOMEN FOR TUBE PLACEMENT   Final Result      NG tube tip projects at the peripyloric region.      DX-ABDOMEN FOR TUBE PLACEMENT   Final  Result      Feeding tube in place with tip at the distal stomach/pylorus.      AW-CSMUCLM-0 VIEW   Final Result         1.  Nonspecific bowel gas pattern in the upper abdomen.   2.  Nasogastric tube tip terminates overlying the expected location of the pylorus or first duodenal segment.   3.  Hazy interstitial pulmonary edema and/or infiltrates   4.  Cardiomegaly      DX-ABDOMEN FOR TUBE PLACEMENT   Final Result      Enteric sump tube in situ with its tip at the level of the distal gastric antrum or pylorus.      DX-ABDOMEN FOR TUBE PLACEMENT   Final Result         1.  Nonspecific bowel gas pattern in the upper abdomen.   2.  Nasogastric tube tip terminates overlying the expected location of the pylorus or first duodenal segment.   3.  Hazy interstitial pulmonary edema and/or infiltrates, similar to prior study.   4.  Cardiomegaly      DX-CHEST-PORTABLE (1 VIEW)   Final Result      1.  Supportive tubing as described above.   2.  No other significant change from prior exam.         MR-BRAIN-W/O   Final Result      1.  Multifocal areas of acute infarcts in the left cerebral hemisphere.   2.  Small area of acute infarct in the right basal ganglia adjacent to the tip of the ventriculostomy catheter.   3.  Cortical infarct in the right medial parietal lobe.   4.  Mild amount of subdural hemorrhages surrounding the bilateral cerebellar hemispheres and right cerebellum.   5.  Subarachnoid hemorrhage.   6.  There is no hydrocephalus.   7.  There are changes secondary to the previous endovascular repair left internal carotid aneurysm.      CT-CTA NECK WITH & W/O-POST PROCESSING   Final Result      No focal high-grade stenosis, dissection or occlusion of the cervical carotid or vertebral arteries.      CT-CEREBRAL PERFUSION ANALYSIS   Final Result      1.  Cerebral blood flow less than 30% likely representing completed infarct = 0 mL.      2.  T Max more than 6 seconds likely representing combination of completed infarct and  ischemia = 0 mL.      3.  Mismatched volume likely representing ischemic brain/penumbra = None      4.  Please note that the cerebral perfusion was performed on the limited brain tissue around the basal ganglia region. Infarct/ischemia outside the CT perfusion sections can be missed in this study.      CT-CTA HEAD WITH & W/O-POST PROCESS   Final Result      1.  Prior LEFT middle cranial fossa aneurysm coiling.  Artifact limits exam.   2.  No abrupt large vessel cut off.   3.  Segmental narrowing of LEFT middle cerebral artery, vasospasm versus artifact.   4.  Ventriculostomy catheter in similar position.   5.  Evolving RIGHT caudate infarct.   6.  Stable intracranial hemorrhage.         DX-ABDOMEN FOR TUBE PLACEMENT   Final Result      Orogastric tube tip now at the mid stomach.      DX-ABDOMEN FOR TUBE PLACEMENT   Final Result      Orogastric tube tip at the proximal stomach with side port just above the GE junction.      US-INTRACRANIAL ARTERY LIMITED   Final Result      DX-CHEST-LIMITED (1 VIEW)   Final Result         1.  Stable chest with perihilar and lower lung zone interstitial and minimal airspace opacities most consistent with pulmonary edema.   2.  No new abnormalities.      EC-ECHOCARDIOGRAM COMPLETE W/O CONT   Final Result      US-INTRACRANIAL ARTERY LIMITED   Final Result      DX-CHEST-PORTABLE (1 VIEW)   Final Result      1.  Lines and tubes appear appropriately located      2.  Improvement of pulmonary edema/airspace process      US-INTRACRANIAL ARTERY COMP   Final Result      CT-HEAD W/O   Final Result         1. Slightly decreased subarachnoid hemorrhage overlying the cerebral hemispheres.   2. Otherwise, stable as above.         DX-CHEST-FOR LINE PLACEMENT Perform procedure in: Patient's Room   Final Result      1.  Moderate interstitial pulmonary edema.   2.   Right-sided central venous catheter terminates with the tip projecting over the expected region of the mid to distal superior vena cava.    3.  Endotracheal tube terminates in satisfactory position at the level of the aortic arch.   4.  Enteric feeding tube terminates in the left upper quadrant projecting over the expected location of the stomach.      CT-STEALTH HEAD W/O   Final Result      1.  Interval placement of a right transparietal ventriculostomy catheter which terminates with the tip near the third ventricle. There is minimally decreased caliber of the ventricular system and compared to previous exam.   2.  Findings of intracranial hemorrhage appear similar to the previous exam. No definite new acute intracranial hemorrhage is identified.   3.  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         DX-CHEST-PORTABLE (1 VIEW)   Final Result      CT-HEAD W/O   Final Result      1. Interval aneurysm coiling of left posterior communicating artery aneurysm.   2. Possible increased subarachnoid hemorrhage.   3. Intraventricular hemorrhage. There is developing mild hydrocephalus with mildly increased ventricles.   4. Small right convexity subdural hematoma measuring 5 mm in thickness.      These findings were discussed with STEPHANIE BYRD on 1/31/2024 3:14 PM.      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   This aneurysm was embolized to occlusion as described above. Final angiographic images demonstrate only minimal filling of the neck of the aneurysm. The patient will be followed up in   the neuro interventional clinic and brought back for a follow-up angiogram in 6 months.      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      Stephanie COATES was  physically present and participated during the entire procedure of the IR-EMBOLIZATION.          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  She is PBD#20, POD#20 from successful endovascular coiling.  No window for TCDs.  MRI with multifocal infarcts.  EVD removed on 2/12.   CTA/P on 2/13 with no flow limiting spasm.  Will continue with keppra given clinical seizure at admission.  She is now out of her vasospasm window- will liberalize neurochecks and BP goals further    Problem list:   Hunt Potts 4, modified Swenson grade 3 SAH   Ruptured cerebral aneurysm    Plan:   - transition to q4h neurochecks   - long-term BP goal is 110-130/60-80, currently at goal, but may need PO anti-HTN once nimodipine discontinued   - TCDs discontinued due to lack of windows   - continue keppra 500mg BID- this will be 3 months of therapy given seizure event (wean in outpatient Neurology clinic)   - continue lovenox SQ for DVT chemoppx   - continue ASA 81mg daily given large coil-vessel lumen interface and high risk for coil-lumen thrombosis   - continue nimodipine 60mg q4h until 2/22   - maintain normothermia, net even I/O, FSBS    - normal Na goal 135-145, off 3% and salt tabs   - PT/OT/SLP, therapies advising ARU placement    The evaluation of the patient, and recommended management, was discussed with Dr. Root, ICU attending. I have performed a physical exam and reviewed and updated ROS and Plan today (2/20/2024).     Jose Raul Tracy MD  Vascular Neurology

## 2024-02-20 NOTE — DIETARY
Nutrition Services: Brief Update    Pt remains on nocturnal TF meeting ~50% of estimated needs. Pt seen by SLP this am and recommends soft and bite-sized diet with thin liquids which is upgraded. Pt reporting frequent stools and not wanting to eat much at times r/t BMs. Pt has been eating 25-50% of meals and liking oral supplements but again intake appears r/t frequent BMs.     At this time, I recommend d/c feeding tube and monitoring for adequate oral intake. Continue supplements with meals. Will d/c TF orders, RN aware and in agreement.     RD following.

## 2024-02-20 NOTE — CONSULTS
Hospital Medicine Consultation    Date of Service  2/20/2024    Referring Physician  Dudley Root M.D.    Consulting Physician  Kingsley Garcia M.D.    Reason for Consultation  hypertension    History of Presenting Illness  63 y.o. female who presented 1/31/2024 with altered mental status.  Ms. Turner has a past medical history of hypertension on losartan and atenolol and hypothyroidism that presented to [facility] on 1/31/2024 with altered mental status and left eyelid droop and headache.  Apparently she was markedly hypertensive with a blood pressure of 216/127.  CT of the head there revealed a large left posterior communicating artery aneurysm and subarachnoid hemorrhage.  She was transferred here and went emergently for posterior communicating artery aneurysm with coiling by interventional radiology.  She subsequently developed hydrocephalus and had EVD placed x 2 by Dr. Monroe neurosurgery.  She was treated with pneumonia pain and transcranial Dopplers did not reveal appropriate windows though CTA suspicious for vasospasm for which she was treated with IV pressors.  She was successfully extubated on 2/3/2024.  Tracheal cultures were positive for MSSA and she was treated with a course of antibiotics.  MRI of the brain revealed small areas of infarct in the left cerebral hemisphere, right basal ganglia,.  Due to seizures upon presentation she has been treated with Keppra and Neurontin.  Because of the coiling she has been treated with aspirin.  She was treated with salt tabs and hypertonic saline which were both tapered off.    Review of Systems  Review of Systems   Gastrointestinal:  Positive for diarrhea.        Poor appetite    Neurological:  Negative for dizziness and headaches.   All other systems reviewed and are negative.      Past Medical History  hypertension    Surgical History   has a past surgical history that includes craniotomy (Right, 1/31/2024).    Family History  Mother had  an unknown type of stroke    Social History   No EtOH and no smoking    Medications  none    Allergies  Allergies   Allergen Reactions    Penicillins Rash     Rxn noted 2014 per Care Everywhere review        Physical Exam  Pulse:  [] 96  Resp:  [11-37] 28  BP: (101-161)/(60-95) 121/88  SpO2:  [87 %-97 %] 94 %    Physical Exam  HENT:      Head:      Comments: Sutures right scalp     Nose:      Comments: Nasogastric tube.     Mouth/Throat:      Mouth: Mucous membranes are dry.   Eyes:      Comments: Left eye closed  Left pupil irregular    Cardiovascular:      Rate and Rhythm: Normal rate and regular rhythm.   Pulmonary:      Effort: Pulmonary effort is normal.      Breath sounds: Normal breath sounds.      Comments: 1 liter oxygen  Abdominal:      General: There is no distension.      Tenderness: There is no abdominal tenderness.   Neurological:      Mental Status: She is alert.      Comments: She moves her extremities equally with a delay    Psychiatric:      Comments: Slow mentation          Fluids  Date 02/20/24 0700 - 02/21/24 0659   Shift 3941-1989 5813-6407 7323-3940 24 Hour Total   INTAKE   P.O. 473   473   NG/GT 0   0   Enteral 60   60   Shift Total 533   533   OUTPUT   Urine 450   450   Shift Total 450   450   Weight (kg) 72 72 72 72       Laboratory  Recent Labs     02/18/24  0203 02/19/24 0413 02/20/24  0438   WBC 9.5 10.4 8.6   RBC 4.03* 4.48 4.66   HEMOGLOBIN 10.4* 11.7* 12.0   HEMATOCRIT 32.2* 35.6* 38.1   MCV 79.9* 79.5* 81.8   MCH 25.8* 26.1* 25.8*   MCHC 32.3 32.9 31.5*   RDW 40.6 40.8 41.4   PLATELETCT 523* 513* 531*   MPV 9.4 9.4 9.6     Recent Labs     02/18/24  0203 02/19/24 0413 02/20/24  0438   SODIUM 139 135 137   POTASSIUM 4.2 3.9 4.6   CHLORIDE 104 99 99   CO2 26 24 27   GLUCOSE 123* 127* 130*   BUN 14 15 19   CREATININE 0.36* 0.31* 0.44*   CALCIUM 9.3 10.1 10.3                     Imaging  DX-ABDOMEN FOR TUBE PLACEMENT   Final Result         1.  Nonspecific bowel gas pattern in the  upper abdomen.   2.  Dobbhoff tube with tip terminating overlying the expected location of the first or second duodenal segment.   3.  Cardiomegaly      CT-CEREBRAL PERFUSION ANALYSIS   Final Result      1.  Cerebral blood flow less than 30% likely representing completed infarct = 0 mL.      2.  T Max more than 6 seconds likely representing combination of completed infarct and ischemia = 0 mL.      3.  Mismatched volume likely representing ischemic brain/penumbra = None      4.  Please note that the cerebral perfusion was performed on the limited brain tissue around the basal ganglia region. Infarct/ischemia outside the CT perfusion sections can be missed in this study.      CT-CTA HEAD WITH & W/O-POST PROCESS   Final Result      1.  Prior LEFT middle cranial fossa aneurysm coiling.  Artifact limits exam.   2.  No abrupt large vessel cut off.   3.  Segmental narrowing of LEFT middle cerebral artery, primarily M1 segment, vasospasm versus artifact.   4.  Removal of ventriculostomy catheter. No hydrocephalus.   5.  Evolving RIGHT caudate infarct.   6.  Slightly improved intracranial hemorrhage.   7.  4 mm right carotid terminus aneurysm.      CT-HEAD W/O   Final Result         1.  Hyperdensity along the bilateral tentorium, compatible with layering subarachnoid hemorrhages, decreased since prior study.   2.  Atherosclerosis.         DX-ABDOMEN FOR TUBE PLACEMENT   Final Result         1.  Nonspecific bowel gas pattern in the upper abdomen.   2.  Nasogastric tube tip terminates overlying the expected location of the antrum or pylorus, somewhat withdrawn since prior study.   3.  Hazy interstitial pulmonary edema and/or infiltrates, stable   4.  Cardiomegaly      DX-ABDOMEN FOR TUBE PLACEMENT   Final Result         1.  Nonspecific bowel gas pattern in the upper abdomen.   2.  Nasogastric tube tip terminates overlying the expected location of the pylorus or first duodenal segment.   3.  Hazy interstitial pulmonary edema  and/or infiltrates   4.  Cardiomegaly      DX-CHEST-PORTABLE (1 VIEW)   Final Result         Findings on chest radiograph appear stable since the prior radiograph.  No new abnormalities are identified.      DX-ABDOMEN FOR TUBE PLACEMENT   Final Result      Enteric tube tip projects over the gastric antrum or proximal duodenum, similar to prior      DX-ABDOMEN FOR TUBE PLACEMENT   Final Result      NG tube tip projects at the peripyloric region.      DX-ABDOMEN FOR TUBE PLACEMENT   Final Result      NG tube tip projects at the peripyloric region.      DX-ABDOMEN FOR TUBE PLACEMENT   Final Result      Feeding tube in place with tip at the distal stomach/pylorus.      DH-TRSIVDR-5 VIEW   Final Result         1.  Nonspecific bowel gas pattern in the upper abdomen.   2.  Nasogastric tube tip terminates overlying the expected location of the pylorus or first duodenal segment.   3.  Hazy interstitial pulmonary edema and/or infiltrates   4.  Cardiomegaly      DX-ABDOMEN FOR TUBE PLACEMENT   Final Result      Enteric sump tube in situ with its tip at the level of the distal gastric antrum or pylorus.      DX-ABDOMEN FOR TUBE PLACEMENT   Final Result         1.  Nonspecific bowel gas pattern in the upper abdomen.   2.  Nasogastric tube tip terminates overlying the expected location of the pylorus or first duodenal segment.   3.  Hazy interstitial pulmonary edema and/or infiltrates, similar to prior study.   4.  Cardiomegaly      DX-CHEST-PORTABLE (1 VIEW)   Final Result      1.  Supportive tubing as described above.   2.  No other significant change from prior exam.         MR-BRAIN-W/O   Final Result      1.  Multifocal areas of acute infarcts in the left cerebral hemisphere.   2.  Small area of acute infarct in the right basal ganglia adjacent to the tip of the ventriculostomy catheter.   3.  Cortical infarct in the right medial parietal lobe.   4.  Mild amount of subdural hemorrhages surrounding the bilateral cerebellar  hemispheres and right cerebellum.   5.  Subarachnoid hemorrhage.   6.  There is no hydrocephalus.   7.  There are changes secondary to the previous endovascular repair left internal carotid aneurysm.      CT-CTA NECK WITH & W/O-POST PROCESSING   Final Result      No focal high-grade stenosis, dissection or occlusion of the cervical carotid or vertebral arteries.      CT-CEREBRAL PERFUSION ANALYSIS   Final Result      1.  Cerebral blood flow less than 30% likely representing completed infarct = 0 mL.      2.  T Max more than 6 seconds likely representing combination of completed infarct and ischemia = 0 mL.      3.  Mismatched volume likely representing ischemic brain/penumbra = None      4.  Please note that the cerebral perfusion was performed on the limited brain tissue around the basal ganglia region. Infarct/ischemia outside the CT perfusion sections can be missed in this study.      CT-CTA HEAD WITH & W/O-POST PROCESS   Final Result      1.  Prior LEFT middle cranial fossa aneurysm coiling.  Artifact limits exam.   2.  No abrupt large vessel cut off.   3.  Segmental narrowing of LEFT middle cerebral artery, vasospasm versus artifact.   4.  Ventriculostomy catheter in similar position.   5.  Evolving RIGHT caudate infarct.   6.  Stable intracranial hemorrhage.         DX-ABDOMEN FOR TUBE PLACEMENT   Final Result      Orogastric tube tip now at the mid stomach.      DX-ABDOMEN FOR TUBE PLACEMENT   Final Result      Orogastric tube tip at the proximal stomach with side port just above the GE junction.      US-INTRACRANIAL ARTERY LIMITED   Final Result      DX-CHEST-LIMITED (1 VIEW)   Final Result         1.  Stable chest with perihilar and lower lung zone interstitial and minimal airspace opacities most consistent with pulmonary edema.   2.  No new abnormalities.      EC-ECHOCARDIOGRAM COMPLETE W/O CONT   Final Result      US-INTRACRANIAL ARTERY LIMITED   Final Result      DX-CHEST-PORTABLE (1 VIEW)   Final Result       1.  Lines and tubes appear appropriately located      2.  Improvement of pulmonary edema/airspace process      US-INTRACRANIAL ARTERY COMP   Final Result      CT-HEAD W/O   Final Result         1. Slightly decreased subarachnoid hemorrhage overlying the cerebral hemispheres.   2. Otherwise, stable as above.         DX-CHEST-FOR LINE PLACEMENT Perform procedure in: Patient's Room   Final Result      1.  Moderate interstitial pulmonary edema.   2.   Right-sided central venous catheter terminates with the tip projecting over the expected region of the mid to distal superior vena cava.   3.  Endotracheal tube terminates in satisfactory position at the level of the aortic arch.   4.  Enteric feeding tube terminates in the left upper quadrant projecting over the expected location of the stomach.      CT-STEALTH HEAD W/O   Final Result      1.  Interval placement of a right transparietal ventriculostomy catheter which terminates with the tip near the third ventricle. There is minimally decreased caliber of the ventricular system and compared to previous exam.   2.  Findings of intracranial hemorrhage appear similar to the previous exam. No definite new acute intracranial hemorrhage is identified.   3.  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         DX-CHEST-PORTABLE (1 VIEW)   Final Result      CT-HEAD W/O   Final Result      1. Interval aneurysm coiling of left posterior communicating artery aneurysm.   2. Possible increased subarachnoid hemorrhage.   3. Intraventricular hemorrhage. There is developing mild hydrocephalus with mildly increased ventricles.   4. Small right convexity subdural hematoma measuring 5 mm in thickness.      These findings were discussed with EMILY BYRD on 1/31/2024 3:14 PM.      IR-EMBOLIZATION   Final Result   Impression:      63-year-old patient with sudden onset of worst headache of life followed by obtundation underwent cerebral angiography  which demonstrated a large  13 x 11 x 11 mm aneurysm arising from the dorsal wall of the left ICA incorporating a small left posterior    communicating artery at its neck.   This aneurysm was embolized to occlusion as described above. Final angiographic images demonstrate only minimal filling of the neck of the aneurysm. The patient will be followed up in   the neuro interventional clinic and brought back for a follow-up angiogram in 6 months.      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      Stephanie COATES was physically present and participated during the entire procedure of the IR-EMBOLIZATION.          Assessment/Plan  * SAH (subarachnoid hemorrhage) (HCC)- (present on admission)  Assessment & Plan  Non-traumatic SAH status posterior communicating artery aneurysm coiling with resultant hydrocephalus requiring external ventricular drain by Dr. Monroe x 2.  Status post 3% saline and salt tabs which have been stopped  Transcranial Dopplers were not done as she has no windows  She was treated with IV vasopressors for presumptive vasospasm  Blood pressure control  Nimodipine scheduled  Daily aspirin in order to mitigate clotting of the coils  PT/OT/ST consults  She is very appropriate for inpatient rehab    Primary hypertension- (present on admission)  Assessment & Plan  She is reportedly on Losartan and atenolol at home. Family will bring in the doses.  Her blood pressure is controlled on Nimodipine.    Seizure (HCC)- (present on admission)  Assessment & Plan  Keppra 500 mg BID and neurontin 200 mg TID  Seizure precautions.    Hypothyroid- (present on admission)  Assessment & Plan  Hx of  Family is brining in a list of her home meds and restart levothyroxine     Aneurysm of ascending aorta without rupture (HCC)- (present on admission)  Assessment & Plan  4.1 cm  This is appropriate to follow up outpatient    Pneumonia of both lungs due to  methicillin susceptible Staphylococcus aureus (MSSA) ([facility])- (present on admission)  Assessment & Plan  Tracheal aspirate from 1/31 was + for MSSA which was treated.    Acute respiratory failure with hypoxia ([facility])- (present on admission)  Assessment & Plan  She required intubation due to airway protection  She is on 1 liter of oxygen.

## 2024-02-20 NOTE — THERAPY
"Speech Language Pathology   Daily Treatment     Patient Name: Fay Turner  AGE:  63 y.o., SEX:  female  Medical Record #: [id number]  Date of Service: 2/20/2024    Precautions: Fall Risk, Swallow Precautions    Subjective  Pt cleared by RN for dysphagia treatment. Per RN, pt has been placed on nocturnal TF to help increase PO intake. Pt/dtr present at bedside upon arrival. Pt endorsing \"not wanting to poop,\" along with diet levels, which suspect has impeded oral intake.     Assessment  With SLP A for set-up, pt managed trials of TN0 (cup, straw) and EC7 completed. Pt w/ adequate oral acceptance/containment. Slightly prolonged, however, coordinated mastication. Suspect mastication appropriate as bolus was visualized well mashed in the oral cavity prior to swallow initiation. Presumed complete A/P transport. No significant oral bolus residue or pocketing. Pt w/ throat clear intermittently on PO intake. Per FEES, pt coughing/throat clearing irrespective of PO intake. Pt endorsing transient globus on EC7 which improved w/ liquid wash and cue for secondary swallow. Per FEES, exercises targeting BoT retraction, pharyngeal shortening/constriction, and LVC indicated. SLP demonstrated effortful swallow. Effortful swallow completed x17 with fair-good effort and accuracy.     Clinical Impressions  Pt w/ appropriate KENDRA on this date and while mastication was slightly prolonged on EC7 it appears functional for a diet upgrade. SLP recommending initiation of a SB6/TN0 diet with 1:1 supervision and adherence to the swallow strategies listed below- specifically alternating bites/sips. Consider removal of NGT w/ adequate oral intake. RN and family updated on recommendations. SLP following.     Recommendations  Treatment Completed: Dysphagia Treatment  Diet Consistency: SB6/TN0 w/ 1:1 feeding w/ NGT  Instrumentation: None indicated at this time  Medication: Crush with applesauce, as appropriate, Crush with pudding/puree, as " appropriate, Whole with puree, As tolerated  Supervision: 1:1 feeding with constant supervision  Positioning: Fully upright and midline during oral intake, Meals sitting upright in a chair, as tolerated  Risk Management : Small bites/sips, Alternate bites and sips, Slow rate of intake, Reduce environmental distractions  Oral Care: Q6h    SLP Treatment Plan  Treatment Plan: Dysphagia Treatment, Cognitive Treatment, Patient/Family/Caregiver Training (motor speech)  SLP Frequency: 4x Per Week  Estimated Duration: Until Therapy Goals Met    Anticipated Discharge Needs  Discharge Recommendations: Recommend post-acute placement for additional speech therapy services prior to discharge home  Therapy Recommendations Upon DC: Dysphagia Training, Comprehension Training, Expression Training, Reading Training, Writing Training, Cognitive-Linguistic Training, Community Re-Integration, Patient / Family / Caregiver Education    Patient / Family Goals  Patient / Family Goal #1: nods yes to ice chips  Goal #1 Outcome: Goal met  Short Term Goals  Short Term Goal # 1: NEW 2/13: Pt will participate in an repeat instrumental swallow study via FEES to determine airway protection/swallow efficiency and guide dysphagia management.  Goal Outcome # 1: Goal met  Short Term Goal # 1 B : New 2/20: Pt will consume a SB6/TN0 diet with 1:1 supv without any overt s/s of aspiration or decline in respiratory status  Goal Outcome  # 1 B: Goal not met  Short Term Goal # 2: With mod-max cues, pt will be AAOx4 during >65% of opportunities.  Goal Outcome # 2 : Other (see comments) (not targeted this date)  Short Term Goal # 3: With max cues, pt will recall salient/functional information after a 5 min delay during >65% of opportunities.  Goal Outcome  # 3: Other (see comments) (not targeted this date)  Short Term Goal # 4: With min-mod cues, pt will participate in additional cognitive testing to further guide POC.  Goal Outcome  # 4: Other (see comments)  (not targeted this date)  Short Term Goal # 5: Pt will utilize 'clear speech' techniques to increase intelligibility at the conversational level to >90%.  Goal Outcome  # 5: Other (see comments) (not targeted this date)    Anai Dumont, SLP

## 2024-02-20 NOTE — CARE PLAN
The patient is Watcher - Medium risk of patient condition declining or worsening    Shift Goals  Clinical Goals: Q2 neuro, VSS  Patient Goals: rest  Family Goals: padmini    Progress made toward(s) clinical / shift goals:    Problem: Safety - Medical Restraint  Goal: Remains free of injury from restraints (Restraint for Interference with Medical Device)  Outcome: Progressing  Goal: Free from restraint(s) (Restraint for Interference with Medical Device)  Outcome: Progressing     Problem: Skin Integrity  Goal: Skin integrity is maintained or improved  Outcome: Progressing     Problem: Pain - Standard  Goal: Alleviation of pain or a reduction in pain to the patient’s comfort goal  Outcome: Progressing     Problem: Hemodynamics  Goal: Patient's hemodynamics, fluid balance and neurologic status will be stable or improve  Outcome: Progressing     Problem: Neuro Status  Goal: Neuro status will remain stable or improve  Outcome: Progressing       Patient is not progressing towards the following goals:

## 2024-02-21 LAB
ANION GAP SERPL CALC-SCNC: 12 MMOL/L (ref 7–16)
BUN SERPL-MCNC: 16 MG/DL (ref 8–22)
CALCIUM SERPL-MCNC: 10.4 MG/DL (ref 8.5–10.5)
CHLORIDE SERPL-SCNC: 100 MMOL/L (ref 96–112)
CO2 SERPL-SCNC: 27 MMOL/L (ref 20–33)
CREAT SERPL-MCNC: 0.49 MG/DL (ref 0.5–1.4)
GFR SERPLBLD CREATININE-BSD FMLA CKD-EPI: 106 ML/MIN/1.73 M 2
GLUCOSE SERPL-MCNC: 118 MG/DL (ref 65–99)
POTASSIUM SERPL-SCNC: 3.7 MMOL/L (ref 3.6–5.5)
SODIUM SERPL-SCNC: 139 MMOL/L (ref 135–145)

## 2024-02-21 PROCEDURE — A9270 NON-COVERED ITEM OR SERVICE: HCPCS | Performed by: INTERNAL MEDICINE

## 2024-02-21 PROCEDURE — 700102 HCHG RX REV CODE 250 W/ 637 OVERRIDE(OP): Performed by: INTERNAL MEDICINE

## 2024-02-21 PROCEDURE — 97535 SELF CARE MNGMENT TRAINING: CPT

## 2024-02-21 PROCEDURE — 770001 HCHG ROOM/CARE - MED/SURG/GYN PRIV*

## 2024-02-21 PROCEDURE — 97530 THERAPEUTIC ACTIVITIES: CPT

## 2024-02-21 PROCEDURE — A9270 NON-COVERED ITEM OR SERVICE: HCPCS | Performed by: NURSE PRACTITIONER

## 2024-02-21 PROCEDURE — 80048 BASIC METABOLIC PNL TOTAL CA: CPT

## 2024-02-21 PROCEDURE — 700111 HCHG RX REV CODE 636 W/ 250 OVERRIDE (IP): Mod: JZ | Performed by: INTERNAL MEDICINE

## 2024-02-21 PROCEDURE — 36415 COLL VENOUS BLD VENIPUNCTURE: CPT

## 2024-02-21 PROCEDURE — 51798 US URINE CAPACITY MEASURE: CPT

## 2024-02-21 PROCEDURE — 700102 HCHG RX REV CODE 250 W/ 637 OVERRIDE(OP): Performed by: PSYCHIATRY & NEUROLOGY

## 2024-02-21 PROCEDURE — 99233 SBSQ HOSP IP/OBS HIGH 50: CPT | Performed by: STUDENT IN AN ORGANIZED HEALTH CARE EDUCATION/TRAINING PROGRAM

## 2024-02-21 PROCEDURE — A9270 NON-COVERED ITEM OR SERVICE: HCPCS | Performed by: PSYCHIATRY & NEUROLOGY

## 2024-02-21 PROCEDURE — 99232 SBSQ HOSP IP/OBS MODERATE 35: CPT | Performed by: PSYCHIATRY & NEUROLOGY

## 2024-02-21 PROCEDURE — 700102 HCHG RX REV CODE 250 W/ 637 OVERRIDE(OP): Performed by: NURSE PRACTITIONER

## 2024-02-21 RX ORDER — ONDANSETRON 2 MG/ML
4 INJECTION INTRAMUSCULAR; INTRAVENOUS EVERY 4 HOURS PRN
Status: DISCONTINUED | OUTPATIENT
Start: 2024-02-21 | End: 2024-02-23 | Stop reason: HOSPADM

## 2024-02-21 RX ORDER — ASPIRIN 81 MG/1
81 TABLET, CHEWABLE ORAL DAILY
Status: DISCONTINUED | OUTPATIENT
Start: 2024-02-22 | End: 2024-02-23 | Stop reason: HOSPADM

## 2024-02-21 RX ORDER — ONDANSETRON 4 MG/1
4 TABLET, ORALLY DISINTEGRATING ORAL EVERY 4 HOURS PRN
Status: DISCONTINUED | OUTPATIENT
Start: 2024-02-21 | End: 2024-02-23 | Stop reason: HOSPADM

## 2024-02-21 RX ORDER — ACETAMINOPHEN 650 MG/1
650 SUPPOSITORY RECTAL EVERY 4 HOURS PRN
Status: DISCONTINUED | OUTPATIENT
Start: 2024-02-21 | End: 2024-02-23 | Stop reason: HOSPADM

## 2024-02-21 RX ORDER — ACETAMINOPHEN 325 MG/1
650 TABLET ORAL EVERY 4 HOURS PRN
Status: DISCONTINUED | OUTPATIENT
Start: 2024-02-21 | End: 2024-02-23 | Stop reason: HOSPADM

## 2024-02-21 RX ORDER — ATORVASTATIN CALCIUM 40 MG/1
40 TABLET, FILM COATED ORAL EVERY EVENING
Status: DISCONTINUED | OUTPATIENT
Start: 2024-02-22 | End: 2024-02-23 | Stop reason: HOSPADM

## 2024-02-21 RX ORDER — LEVETIRACETAM 500 MG/1
500 TABLET ORAL 2 TIMES DAILY
Status: DISCONTINUED | OUTPATIENT
Start: 2024-02-22 | End: 2024-02-23 | Stop reason: HOSPADM

## 2024-02-21 RX ADMIN — NIMODIPINE 60 MG: 60 SOLUTION ORAL at 04:37

## 2024-02-21 RX ADMIN — LEVETIRACETAM 500 MG: 500 TABLET, FILM COATED ORAL at 17:33

## 2024-02-21 RX ADMIN — NIMODIPINE 60 MG: 60 SOLUTION ORAL at 10:59

## 2024-02-21 RX ADMIN — GABAPENTIN 200 MG: 100 CAPSULE ORAL at 20:25

## 2024-02-21 RX ADMIN — GABAPENTIN 200 MG: 100 CAPSULE ORAL at 04:37

## 2024-02-21 RX ADMIN — NIMODIPINE 60 MG: 60 SOLUTION ORAL at 13:48

## 2024-02-21 RX ADMIN — LEVETIRACETAM 500 MG: 500 TABLET, FILM COATED ORAL at 04:36

## 2024-02-21 RX ADMIN — NIMODIPINE 60 MG: 60 SOLUTION ORAL at 01:52

## 2024-02-21 RX ADMIN — GABAPENTIN 200 MG: 100 CAPSULE ORAL at 13:48

## 2024-02-21 RX ADMIN — ATORVASTATIN CALCIUM 40 MG: 40 TABLET, FILM COATED ORAL at 17:33

## 2024-02-21 RX ADMIN — ASPIRIN 81 MG 81 MG: 81 TABLET ORAL at 04:36

## 2024-02-21 RX ADMIN — ENOXAPARIN SODIUM 40 MG: 100 INJECTION SUBCUTANEOUS at 17:33

## 2024-02-21 ASSESSMENT — COGNITIVE AND FUNCTIONAL STATUS - GENERAL
TOILETING: TOTAL
MOVING TO AND FROM BED TO CHAIR: UNABLE
HELP NEEDED FOR BATHING: A LOT
DRESSING REGULAR LOWER BODY CLOTHING: A LOT
DAILY ACTIVITIY SCORE: 14
SUGGESTED CMS G CODE MODIFIER DAILY ACTIVITY: CK
CLIMB 3 TO 5 STEPS WITH RAILING: TOTAL
MOVING FROM LYING ON BACK TO SITTING ON SIDE OF FLAT BED: UNABLE
EATING MEALS: A LITTLE
TURNING FROM BACK TO SIDE WHILE IN FLAT BAD: UNABLE
WALKING IN HOSPITAL ROOM: TOTAL
STANDING UP FROM CHAIR USING ARMS: TOTAL
PERSONAL GROOMING: A LITTLE
SUGGESTED CMS G CODE MODIFIER MOBILITY: CN
DRESSING REGULAR UPPER BODY CLOTHING: A LITTLE
MOBILITY SCORE: 6

## 2024-02-21 ASSESSMENT — ENCOUNTER SYMPTOMS
VOMITING: 0
FEVER: 0
FOCAL WEAKNESS: 1
HEADACHES: 0
TINGLING: 0
CHILLS: 0
SHORTNESS OF BREATH: 0
PALPITATIONS: 0
NAUSEA: 0

## 2024-02-21 ASSESSMENT — GAIT ASSESSMENTS: GAIT LEVEL OF ASSIST: UNABLE TO PARTICIPATE

## 2024-02-21 ASSESSMENT — PAIN DESCRIPTION - PAIN TYPE: TYPE: ACUTE PAIN

## 2024-02-21 NOTE — CARE PLAN
The patient is Watcher - Medium risk of patient condition declining or worsening    Shift Goals  Clinical Goals: stable neuro assessments, comfort, safety  Patient Goals: rest  Family Goals: safety, comfort, participate in the plan of care    Patient is not progressing towards the following goals:      Problem: Safety - Medical Restraint  Goal: Remains free of injury from restraints (Restraint for Interference with Medical Device)  Outcome: Not Progressing     Problem: Neuro Status  Goal: Neuro status will remain stable or improve  Outcome: Not Progressing

## 2024-02-21 NOTE — THERAPY
"Occupational Therapy  Daily Treatment     Patient Name: Fay Turner  Age:  63 y.o., Sex:  female  Medical Record #: [id number]  Today's Date: 2/21/2024     Precautions  Precautions: Fall Risk, Swallow Precautions  Comments: EVD removed    Assessment    Pt seen for OT session. Progressing with activity tolerance, strength, and sitting balance. Continues to be limited by decreased functional mobility, activity tolerance, cognition, strength, coordination, balance, and vision which are currently affecting pt's ability to complete ADLs/IADLs at baseline. Will continue to follow.     Plan    Treatment Plan Status: Continue Current Treatment Plan  Type of Treatment: Self Care / Activities of Daily Living, Therapeutic Activity, Neuro Re-Education / Balance  Treatment Frequency: 3 Times per Week  Treatment Duration: Until Therapy Goals Met    DC Equipment Recommendations: Unable to determine at this time  Discharge Recommendations: Recommend post-acute placement for additional occupational therapy services prior to discharge home     Objective     02/21/24 1041   Precautions   Precautions Fall Risk;Swallow Precautions   Vitals   O2 (LPM) 1   O2 Delivery Device Silicone Nasal Cannula   Vitals Comments found and left on O2, pt req mod v/cs to keep O2 on face   Pain 0 - 10 Group   Therapist Pain Assessment During Activity;Nurse Notified;Post Activity Pain Same as Prior to Activity  (not quantified)   Cognition    Cognition / Consciousness X   Speech/ Communication Delayed Responses   Level of Consciousness Alert   Ability To Follow Commands 1 Step   Safety Awareness Impaired   New Learning Impaired   Attention Impaired   Sequencing Impaired   Initiation Impaired   Comments cooperative w/encouragement. Stated \"I woke up too lazy for this\"; req encouragement and better with stating vs asking about participation. pt with delayed/poor motor planning, inititation, problem solving, and termination. grossly delayed and some minimal " R neglect   Passive ROM Upper Body   Passive ROM Upper Body WDL   Active ROM Upper Body   Active ROM Upper Body  WDL   Strength Upper Body   Upper Body Strength  X   Comments LUE 4/5, RUE 3+/5; delayed during testing   Sensation Upper Body   Upper Extremity Sensation  WDL   Comments reports is baseline   Neuro-Muscular Treatments   Neuro-Muscular Treatments Anterior weight shift;Facilitation;Joint Approximation;Postural Changes;Postural Facilitation;Sequencing;Tactile Cuing;Verbal Cuing;Weight Shift Right;Weight Shift Left   Comments req single step instructions for STSs and side steps for txf, including foot placement, weight shift, and lifting feet   Other Treatments   Other Treatments Provided Educated on adaptive techniques for ADLs and importance of continued OOB activity.   Balance   Sitting Balance (Static) Fair   Sitting Balance (Dynamic) Fair -   Standing Balance (Static) Poor -   Standing Balance (Dynamic) Trace +   Weight Shift Sitting Fair   Weight Shift Standing Poor   Skilled Intervention Verbal Cuing;Tactile Cuing;Sequencing;Postural Facilitation;Facilitation;Compensatory Strategies   Comments no LOB at EOB unsupported, req x2 ppl in standing/txf   Bed Mobility    Supine to Sit Moderate Assist  (assist for initiation/sequencing)   Sit to Supine   (left up in chair)   Scooting Moderate Assist   Skilled Intervention Verbal Cuing;Tactile Cuing;Sequencing;Postural Facilitation;Facilitation;Compensatory Strategies   Comments Req extra time, delayed. HOB elevated   Activities of Daily Living   Eating Minimal Assist   Grooming Minimal Assist;Seated  (wiping nose/face)   Lower Body Dressing Maximal Assist  (perseverative and gathering socks to toe before threading toes, but then unable to get gathered and easily distracted/poor attention unable to complete)   Toileting Total Assist  (BM in standing, req cleanup of floor and pt)   Skilled Intervention Verbal Cuing;Tactile Cuing;Sequencing;Postural  Facilitation;Facilitation;Compensatory Strategies   Functional Mobility   Sit to Stand Maximal Assist  (x2 ppl; req v/cs for foot placement and sequencing)   Bed, Chair, Wheelchair Transfer Maximal Assist  (x2 ppl)   Toilet Transfers   (declined)   Transfer Method Stand Step   Mobility w/B HHA and x2 ppl. x4 STSs   Skilled Intervention Verbal Cuing;Tactile Cuing;Sequencing;Postural Facilitation;Facilitation;Compensatory Strategies   Visual Perception   Comments ptosis of L eye, unable to lift eyelid   Activity Tolerance   Sitting in Chair left up in recliner   Sitting Edge of Bed 8min   Standing ~1min total   Patient / Family Goals   Patient / Family Goal #1 none stated   Short Term Goals   Short Term Goal # 1 min A with G/H sitting EOB   Goal Outcome # 1 Progressing as expected   Short Term Goal # 2 mod A with UB dressing   Goal Outcome # 2 Progressing as expected   Short Term Goal # 3 mod A with ADL txfs   Goal Outcome # 3 Progressing as expected   Education Group   Education Provided Pathology of bedrest;Activities of Daily Living;Transfers   Transfers Patient Response Patient;Family;Acceptance;Explanation;Reinforcement Needed;No Learning Evidence   ADL Patient Response Patient;Family;Acceptance;Explanation;Demonstration;Reinforcement Needed;No Learning Evidence   Pathology of Bedrest Patient Response Patient;Family;Acceptance;Explanation;Verbal Demonstration;Reinforcement Needed

## 2024-02-21 NOTE — DISCHARGE PLANNING
Case Management Discharge Planning    Admission Date: 1/31/2024  GMLOS: 10.2  ALOS: 21    6-Clicks ADL Score: 14  6-Clicks Mobility Score: 7  PT and/or OT Eval ordered: Yes  Post-acute Referrals Ordered: Yes  Post-acute Choice Obtained: Yes  Has referral(s) been sent to post-acute provider:  Yes      Anticipated Discharge Dispo: Discharge Disposition: D/T to SNF with Medicare cert in anticipation of skilled care (03)    DME Needed: No    Action(s) Taken: Updated Provider/Nurse on Discharge Plan, Choice obtained, and Referral(s) sent  RN CM met with Pt an d Pt's son at bedside to  obtained SNF choice. Choice form obtained and completed. Received verbal choice for [facility]. DPA to call [facility]  [facility] to start request for insurance auth.      Escalations Completed: Provider    Medically Clear: No    Next Steps: Follow up SNF acceptance    Barriers to Discharge: Medical clearance and Pending Placement

## 2024-02-21 NOTE — THERAPY
Physical Therapy   Daily Treatment     Patient Name: Fay Turner  Age:  63 y.o., Sex:  female  Medical Record #: [id number]  Today's Date: 2/21/2024     Precautions  Precautions: Fall Risk;Swallow Precautions;Other (See Comments)  Comments: s/p aneurysm coiling, EVD removal    Assessment    Patient seen for PT treatment session. Patient seated in recliner, agreeable for the session. Able to participate with sit-stand and LE ex's as detailed below. Will continue to benefit from PT services and recommend post-acute placement at this time.     Plan    Treatment Plan Status: Modify Current Treatment Plan  Type of Treatment: Bed Mobility, Neuro Re-Education / Balance, Therapeutic Activities, Therapeutic Exercise  Treatment Frequency: 4 Times per Week  Treatment Duration: Until Therapy Goals Met    DC Equipment Recommendations: Unable to determine at this time  Discharge Recommendations: Recommend post-acute placement for additional physical therapy services prior to discharge home    Objective     02/21/24 1438   Precautions   Precautions Fall Risk;Swallow Precautions;Other (See Comments)   Comments s/p aneurysm coiling, EVD removal   Vitals   Pulse (!) 117   Patient BP Position Sitting  (In the chair, prior to activity)   Blood Pressure 107/74   Pulse Oximetry (!) 87 %   O2 Delivery Device None - Room Air   Vitals Comments Patient placed back on 2L O2 NC due to increased HR and low SpO2. Post activity: /83, , SpO2 95. Patient left on 2L NC. RN made aware.   Pain   Pain Scales 0 to 10 Scale    Intervention Declines   Pain 0 - 10 Group   Therapist Pain Assessment   (Just reported feeling lightheaded and dizziness.)   Cognition    Cognition / Consciousness X   Speech/ Communication Delayed Responses   Level of Consciousness Alert   Ability To Follow Commands 1 Step   Safety Awareness Impaired   New Learning Impaired   Attention Impaired   Sequencing Impaired   Initiation Impaired   Comments Flat affect;  fluctuating level of alertness.   Passive ROM Lower Body   Passive ROM Lower Body WDL   Active ROM Lower Body    Active ROM Lower Body  X   Comments Patient able to perform B/L knee extension; Able to initiate partial hip flexion; No active movements in B/L ankle & toes   Sensation Lower Body   Lower Extremity Sensation   X   Comments Reports different sensation in BLE especially with light touch; unable to identify which LE feels more or lighter   Sitting Lower Body Exercises   Sit to Stand   (3 reps with Katharina Steady)   Comments Attempted few reps of B/L hip flexion and B/L knee extension   Neuro-Muscular Treatments   Neuro-Muscular Treatments Postural Facilitation;Tactile Cuing;Verbal Cuing;Anterior weight shift;Sequencing   Comments Seated in recliner without back support; Sit-stand with Katharina Steady   Vision   Vision Comments L eye ptosis (+); Able to look over to the R and identify objects and verbalize colors.   Other Treatments   Other Treatments Provided Patient was assisted with set up for lunch tray and son in the room encouraged to assist the patient with her meal.   Balance   Sitting Balance (Static) Fair -  (Seated in recliner W/O back support)   Sitting Balance (Dynamic) Poor +   Standing Balance (Static) Poor +  (With BUE/BLE support on Katharina Steady)   Standing Balance (Dynamic) Trace   Weight Shift Sitting Fair   Weight Shift Standing Poor   Skilled Intervention Verbal Cuing;Sequencing;Tactile Cuing;Facilitation;Postural Facilitation   Comments Patient able to maintain balance without physical assistance while standing on Katharina Steady; Knee buckling (-); Able to support self with BUE   Bed Mobility    Comments In the chair, prior & after, per patient's request   Gait Analysis   Gait Level Of Assist Unable to Participate   Comments Due to LE weakness   Functional Mobility   Sit to Stand Maximal Assist  (x2 person assist)   Mobility Sit-stand from recliner: Attempted with 1 person assist x 2 times, PT  assistance from the front-patient able to perform forward trunk lean, but increased difficulty to elevate her pelvis; Attempted with 2 person assist, x2 times, BUE support on the back of the chair and/or from hand rails on the recliner, patient barely able to clear her pelvis off the recliner. Attempted with Katharina Steady x3 times-patient able to participate better and maintain standing for atleast 10-15 seconds each time.   Skilled Intervention Verbal Cuing;Tactile Cuing;Sequencing;Facilitation;Postural Facilitation   How much difficulty does the patient currently have...   Turning over in bed (including adjusting bedclothes, sheets and blankets)? 1   Sitting down on and standing up from a chair with arms (e.g., wheelchair, bedside commode, etc.) 1   Moving from lying on back to sitting on the side of the bed? 1   How much help from another person does the patient currently need...   Moving to and from a bed to a chair (including a wheelchair)? 1   Need to walk in a hospital room? 1   Climbing 3-5 steps with a railing? 1   6 clicks Mobility Score 6   Activity Tolerance   Sitting in Chair Pre & Post session   Patient / Family Goals    Patient / Family Goal #1 None stated   Short Term Goals    Short Term Goal # 1 Pt will perform supine<>sit from flat HOB/no railing with Min A within 6 visits to ensure independent mobility at home.   Goal Outcome # 1 goal not met   Short Term Goal # 2 Pt will perform sit<>stand with FWW and Min A within 6 visits to ensure independent mobility.   Goal Outcome # 2 Goal not met   Education Group   Education Provided Role of Physical Therapist   Role of Physical Therapist Patient Response Patient;Family;Acceptance;Explanation;Verbal Demonstration   Physical Therapy Treatment Plan   Physical Therapy Treatment Plan Modify Current Treatment Plan   Treatment Plan  Bed Mobility;Neuro Re-Education / Balance;Therapeutic Activities;Therapeutic Exercise   Treatment Frequency 4 Times per Week    Duration Until Therapy Goals Met   Anticipated Discharge Equipment and Recommendations   DC Equipment Recommendations Unable to determine at this time   Discharge Recommendations Recommend post-acute placement for additional physical therapy services prior to discharge home   Interdisciplinary Plan of Care Collaboration   IDT Collaboration with  Nursing;Family / Caregiver   Patient Position at End of Therapy Seated;Chair Alarm On;Call Light within Reach;Tray Table within Reach;Family / Friend in Room   Session Information   Date / Session Number  2/21-6(2/4, 2/22)

## 2024-02-21 NOTE — PROGRESS NOTES
Hospital Medicine Daily Progress Note    Date of Service  2/21/2024    Chief Complaint  Fay Turner is a 63 y.o. female admitted 1/31/2024 with hypertension    Hospital Course    63 y.o. female who presented 1/31/2024 with altered mental status.  Ms. Turner has a past medical history of hypertension on losartan and atenolol and hypothyroidism that presented to [facility] on 1/31/2024 with altered mental status and left eyelid droop and headache.  Apparently she was markedly hypertensive with a blood pressure of 216/127.  CT of the head there revealed a large left posterior communicating artery aneurysm and subarachnoid hemorrhage.  She was transferred here and went emergently for posterior communicating artery aneurysm with coiling by interventional radiology.  She subsequently developed hydrocephalus and had EVD placed x 2 by Dr. Monroe neurosurgery.  She was treated with pneumonia pain and transcranial Dopplers did not reveal appropriate windows though CTA suspicious for vasospasm for which she was treated with IV pressors.  She was successfully extubated on 2/3/2024.  Tracheal cultures were positive for MSSA and she was treated with a course of antibiotics.  MRI of the brain revealed small areas of infarct in the left cerebral hemisphere, right basal ganglia,.  Due to seizures upon presentation she has been treated with Keppra and Neurontin.  Because of the coiling she has been treated with aspirin.  She was treated with salt tabs and hypertonic saline which were both tapered off.    Interval Problem Update    2/21/2024  Patient seen and examined at bedside  She  is alert oriented x 2, on restraint  Somnolent on exam  Moving all extremities spontaneously  Meds reviewed, on aspirin, Lipitor, Lovenox, gabapentin, Keppra  Labs reviewed noted normal white count, hemoglobin 12, BMP unremarkable      Case discussed with neurology    SLP following  Continue with neurocheck  Remove Cuellar  catheter, initiate voiding trial  PT/OT recommending placement   assisting      Repeat BMP in a.m. to monitor electrolytes and toxicity while   Need close monitoring of blood counts, electrolytes and renal function due to potential of organ dysfunction.          Patient has a high medical complexity, complex decision making and is at high risk of complication, morbidity and mortality          I have discussed this patient's plan of care and discharge plan at IDT rounds today with Case Management, Nursing, Nursing leadership, and other members of the IDT team.    Consultants/Specialty  neurology    Code Status  Full Code    Disposition  The patient is medically cleared for discharge to home or a post-acute facility.  Anticipate discharge to: skilled nursing facility    I have placed the appropriate orders for post-discharge needs.    Review of Systems  Review of Systems   Unable to perform ROS: Acuity of condition        Physical Exam  Temp:  [36.6 °C (97.9 °F)-37.2 °C (99 °F)] 36.7 °C (98.1 °F)  Pulse:  [] 103  Resp:  [13-42] 18  BP: (123-135)/(76-90) 123/87  SpO2:  [89 %-95 %] 92 %    Physical Exam  Constitutional:       Appearance: She is ill-appearing.   Cardiovascular:      Rate and Rhythm: Normal rate and regular rhythm.   Skin:     General: Skin is warm.   Neurological:      General: No focal deficit present.      Mental Status: She is alert. She is disoriented.      Comments: Alert oriented x 2, moving all extremity spontaneously         Fluids    Intake/Output Summary (Last 24 hours) at 2/21/2024 1355  Last data filed at 2/21/2024 0400  Gross per 24 hour   Intake --   Output 800 ml   Net -800 ml       Laboratory  Recent Labs     02/19/24 0413 02/20/24  0438   WBC 10.4 8.6   RBC 4.48 4.66   HEMOGLOBIN 11.7* 12.0   HEMATOCRIT 35.6* 38.1   MCV 79.5* 81.8   MCH 26.1* 25.8*   MCHC 32.9 31.5*   RDW 40.8 41.4   PLATELETCT 513* 531*   MPV 9.4 9.6     Recent Labs     02/19/24 0413 02/20/24  0438  02/21/24  0356   SODIUM 135 137 139   POTASSIUM 3.9 4.6 3.7   CHLORIDE 99 99 100   CO2 24 27 27   GLUCOSE 127* 130* 118*   BUN 15 19 16   CREATININE 0.31* 0.44* 0.49*   CALCIUM 10.1 10.3 10.4                   Imaging  DX-ABDOMEN FOR TUBE PLACEMENT   Final Result         1.  Nonspecific bowel gas pattern in the upper abdomen.   2.  Dobbhoff tube with tip terminating overlying the expected location of the first or second duodenal segment.   3.  Cardiomegaly      CT-CEREBRAL PERFUSION ANALYSIS   Final Result      1.  Cerebral blood flow less than 30% likely representing completed infarct = 0 mL.      2.  T Max more than 6 seconds likely representing combination of completed infarct and ischemia = 0 mL.      3.  Mismatched volume likely representing ischemic brain/penumbra = None      4.  Please note that the cerebral perfusion was performed on the limited brain tissue around the basal ganglia region. Infarct/ischemia outside the CT perfusion sections can be missed in this study.      CT-CTA HEAD WITH & W/O-POST PROCESS   Final Result      1.  Prior LEFT middle cranial fossa aneurysm coiling.  Artifact limits exam.   2.  No abrupt large vessel cut off.   3.  Segmental narrowing of LEFT middle cerebral artery, primarily M1 segment, vasospasm versus artifact.   4.  Removal of ventriculostomy catheter. No hydrocephalus.   5.  Evolving RIGHT caudate infarct.   6.  Slightly improved intracranial hemorrhage.   7.  4 mm right carotid terminus aneurysm.      CT-HEAD W/O   Final Result         1.  Hyperdensity along the bilateral tentorium, compatible with layering subarachnoid hemorrhages, decreased since prior study.   2.  Atherosclerosis.         DX-ABDOMEN FOR TUBE PLACEMENT   Final Result         1.  Nonspecific bowel gas pattern in the upper abdomen.   2.  Nasogastric tube tip terminates overlying the expected location of the antrum or pylorus, somewhat withdrawn since prior study.   3.  Hazy interstitial pulmonary edema  and/or infiltrates, stable   4.  Cardiomegaly      DX-ABDOMEN FOR TUBE PLACEMENT   Final Result         1.  Nonspecific bowel gas pattern in the upper abdomen.   2.  Nasogastric tube tip terminates overlying the expected location of the pylorus or first duodenal segment.   3.  Hazy interstitial pulmonary edema and/or infiltrates   4.  Cardiomegaly      DX-CHEST-PORTABLE (1 VIEW)   Final Result         Findings on chest radiograph appear stable since the prior radiograph.  No new abnormalities are identified.      DX-ABDOMEN FOR TUBE PLACEMENT   Final Result      Enteric tube tip projects over the gastric antrum or proximal duodenum, similar to prior      DX-ABDOMEN FOR TUBE PLACEMENT   Final Result      NG tube tip projects at the peripyloric region.      DX-ABDOMEN FOR TUBE PLACEMENT   Final Result      NG tube tip projects at the peripyloric region.      DX-ABDOMEN FOR TUBE PLACEMENT   Final Result      Feeding tube in place with tip at the distal stomach/pylorus.      PE-JADUBXC-0 VIEW   Final Result         1.  Nonspecific bowel gas pattern in the upper abdomen.   2.  Nasogastric tube tip terminates overlying the expected location of the pylorus or first duodenal segment.   3.  Hazy interstitial pulmonary edema and/or infiltrates   4.  Cardiomegaly      DX-ABDOMEN FOR TUBE PLACEMENT   Final Result      Enteric sump tube in situ with its tip at the level of the distal gastric antrum or pylorus.      DX-ABDOMEN FOR TUBE PLACEMENT   Final Result         1.  Nonspecific bowel gas pattern in the upper abdomen.   2.  Nasogastric tube tip terminates overlying the expected location of the pylorus or first duodenal segment.   3.  Hazy interstitial pulmonary edema and/or infiltrates, similar to prior study.   4.  Cardiomegaly      DX-CHEST-PORTABLE (1 VIEW)   Final Result      1.  Supportive tubing as described above.   2.  No other significant change from prior exam.         MR-BRAIN-W/O   Final Result      1.   Multifocal areas of acute infarcts in the left cerebral hemisphere.   2.  Small area of acute infarct in the right basal ganglia adjacent to the tip of the ventriculostomy catheter.   3.  Cortical infarct in the right medial parietal lobe.   4.  Mild amount of subdural hemorrhages surrounding the bilateral cerebellar hemispheres and right cerebellum.   5.  Subarachnoid hemorrhage.   6.  There is no hydrocephalus.   7.  There are changes secondary to the previous endovascular repair left internal carotid aneurysm.      CT-CTA NECK WITH & W/O-POST PROCESSING   Final Result      No focal high-grade stenosis, dissection or occlusion of the cervical carotid or vertebral arteries.      CT-CEREBRAL PERFUSION ANALYSIS   Final Result      1.  Cerebral blood flow less than 30% likely representing completed infarct = 0 mL.      2.  T Max more than 6 seconds likely representing combination of completed infarct and ischemia = 0 mL.      3.  Mismatched volume likely representing ischemic brain/penumbra = None      4.  Please note that the cerebral perfusion was performed on the limited brain tissue around the basal ganglia region. Infarct/ischemia outside the CT perfusion sections can be missed in this study.      CT-CTA HEAD WITH & W/O-POST PROCESS   Final Result      1.  Prior LEFT middle cranial fossa aneurysm coiling.  Artifact limits exam.   2.  No abrupt large vessel cut off.   3.  Segmental narrowing of LEFT middle cerebral artery, vasospasm versus artifact.   4.  Ventriculostomy catheter in similar position.   5.  Evolving RIGHT caudate infarct.   6.  Stable intracranial hemorrhage.         DX-ABDOMEN FOR TUBE PLACEMENT   Final Result      Orogastric tube tip now at the mid stomach.      DX-ABDOMEN FOR TUBE PLACEMENT   Final Result      Orogastric tube tip at the proximal stomach with side port just above the GE junction.      US-INTRACRANIAL ARTERY LIMITED   Final Result      DX-CHEST-LIMITED (1 VIEW)   Final Result          1.  Stable chest with perihilar and lower lung zone interstitial and minimal airspace opacities most consistent with pulmonary edema.   2.  No new abnormalities.      EC-ECHOCARDIOGRAM COMPLETE W/O CONT   Final Result      US-INTRACRANIAL ARTERY LIMITED   Final Result      DX-CHEST-PORTABLE (1 VIEW)   Final Result      1.  Lines and tubes appear appropriately located      2.  Improvement of pulmonary edema/airspace process      US-INTRACRANIAL ARTERY COMP   Final Result      CT-HEAD W/O   Final Result         1. Slightly decreased subarachnoid hemorrhage overlying the cerebral hemispheres.   2. Otherwise, stable as above.         DX-CHEST-FOR LINE PLACEMENT Perform procedure in: Patient's Room   Final Result      1.  Moderate interstitial pulmonary edema.   2.   Right-sided central venous catheter terminates with the tip projecting over the expected region of the mid to distal superior vena cava.   3.  Endotracheal tube terminates in satisfactory position at the level of the aortic arch.   4.  Enteric feeding tube terminates in the left upper quadrant projecting over the expected location of the stomach.      CT-STEALTH HEAD W/O   Final Result      1.  Interval placement of a right transparietal ventriculostomy catheter which terminates with the tip near the third ventricle. There is minimally decreased caliber of the ventricular system and compared to previous exam.   2.  Findings of intracranial hemorrhage appear similar to the previous exam. No definite new acute intracranial hemorrhage is identified.   3.  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         DX-CHEST-PORTABLE (1 VIEW)   Final Result      CT-HEAD W/O   Final Result      1. Interval aneurysm coiling of left posterior communicating artery aneurysm.   2. Possible increased subarachnoid hemorrhage.   3. Intraventricular hemorrhage. There is developing mild hydrocephalus with mildly increased ventricles.   4.  Small right convexity subdural hematoma measuring 5 mm in thickness.      These findings were discussed with STEPHANIE EASON on 1/31/2024 3:14 PM.      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   This aneurysm was embolized to occlusion as described above. Final angiographic images demonstrate only minimal filling of the neck of the aneurysm. The patient will be followed up in   the neuro interventional clinic and brought back for a follow-up angiogram in 6 months.      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      I, Stephanie Eason was physically present and participated during the entire procedure of the IR-EMBOLIZATION.           Assessment/Plan  * SAH (subarachnoid hemorrhage) (HCC)- (present on admission)  Assessment & Plan  Non-traumatic SAH status posterior communicating artery aneurysm coiling with resultant hydrocephalus requiring external ventricular drain by Dr. Mornoe x 2.  Status post 3% saline and salt tabs which have been stopped  Transcranial Dopplers were not done as she has no windows  She was treated with IV vasopressors for presumptive vasospasm  Blood pressure control  Nimodipine scheduled  Daily aspirin in order to mitigate clotting of the coils  PT/OT/ST consults  She is very appropriate for inpatient rehab    Hypothyroid- (present on admission)  Assessment & Plan  Hx of  Family is brining in a list of her home meds and restart levothyroxine     Primary hypertension- (present on admission)  Assessment & Plan  She is reportedly on Losartan and atenolol at home. Family will bring in the doses.  Her blood pressure is controlled on  Nimodipine.    Aneurysm of ascending aorta without rupture (HCC)- (present on admission)  Assessment & Plan  4.1 cm  This is appropriate to follow up outpatient    Pneumonia of both lungs due to methicillin susceptible Staphylococcus aureus (MSSA) (HCC)- (present on admission)  Assessment & Plan  Tracheal aspirate from 1/31 was + for MSSA which was treated.    Seizure (HCC)- (present on admission)  Assessment & Plan  Keppra 500 mg BID and neurontin 200 mg TID  Seizure precautions.    Acute respiratory failure with hypoxia (HCC)- (present on admission)  Assessment & Plan  She required intubation due to airway protection  She is on 1 liter of oxygen.         VTE prophylaxis: lovenox    I have performed a physical exam and reviewed and updated ROS and Plan today (2/21/2024). In review of yesterday's note (2/20/2024), there are no changes except as documented above.       Greater than 51 minutes spent preparing to see patient (e.g. review of tests) obtaining and/or reviewing separately obtained history. Performing a medically appropriate examination and/ evaluation.  Counseling and educating the patient/family/caregiver.  Ordering medications, tests, or procedures.  Referring and communicating with other health care professionals.  Documenting clinical information in EPIC.  Independently interpreting results and communicating results to patient/family/caregiver.  Care coordination.

## 2024-02-21 NOTE — PROGRESS NOTES
Neuro Interventional Radiology   Progress Note     Author: Allison Weber DNP. Date & Time created: 2/20/2024     Date of admission  1/31/2024  Note to reader: this note follows the APSO format rather than the historical SOAP format. Assessment and plan located at the top of the note for ease of use.    Chief Complaint  63 y.o. female admitted 1/31/2024 with subarachnoid hemorrhage      HPI  Fay France is a 62 yo female with PMH significant for HTN who presented to OSH for sudden onset of new eyelid droop and generalized headache without significant head trauma. Pt seized in OSH CT scan, was loaded with Keppra and transferred for higher level of care. OSH imaging demonstrated a large left PCOM aneurysm with subarachnoid hemorrhage and a small right ICA terminus aneurysm. Carpio Potts 4; Modified Swenson scale 3. 01/31/24 MAURICE Dr. Eason completed a cerebral aneurysm with coil embolization of large left PCOMM aneurysm.      Interval History MAURICE:   02/01/24: RIGHT groin access site soft, non-tender, without ecchymosis. Dressing clean, dry, intact. Pedal pulses +2 bilaterally with feet pink, and warm, cap refill <3 sec. Pt is intubated and ventilated with EVD. She is not following commands, but moves right foot to stimuli. I reviewed the most recent labs including WBC 19.6, Hgb 14.2, Cr 0.61. Coordinated post IR procedure care with hospital nursing staff.      02/05/24:  PBD/post Pcom aneurysm embolization with coil #5.  Pt extubated yesterday (2/4). A stat CTA and CT perfusion done early am on 2/4 (increased lethargy) no obvious vasospasm noted. TCD's with poor windows therefor no baseline Tcd's obtained.  I reviewed FU MRI brain(02/04) which showed small L MCA infarcts-Vasopressors infusing and  keeping -200 to mitigate vasospasm. Anterior EVD in place at 10 cm H20 above tragus with good waveform noted on monitor-R groin site soft, nontender without edema, bleeding, small scab with small  ecchymosis. I reviewed  today's labs: WBC 13.7; hemoglobin 10.5;  ; Cr 0.40; respiratory culture- +MSSA in sputum-continuing Rocephin today is day 5 of 5.  I's/O's-last 24 hours+ 551/since admission +1260; ICP 2-10 (good ICP waveform noted on monitor).       02/06/24:  PBD/post Pcom aneurysm embolization with coil #6. TCD's with poor windows, recommend following neuro assessment. I reviewed FU MRI brain(02/04) which showed small L MCA infarcts-Goal -200 to mitigate vasospasm. Anterior EVD in place at 10 cm H20 above tragus with good waveform noted on monitor-R groin site soft, nontender without edema, bleeding, small scab with small  ecchymosis. I reviewed today's labs: WBC 17; hemoglobin 10.8;  ; Cr 0.33; respiratory culture- +MSSA in sputum -completed Rocephin.  I's/O's-last 24 hours -193 /since admission +1037; ICP 5-11 (good ICP waveform noted on monitor). I started ASA therapy 2nd to multiple infarcts noted on MRI.  ASA therapy discussed and approved by Fer Helms and  Dr. Eason.      02/07/24: SAH, Carpio Potts 4;Modified Swenson 3.  PBD/post Pcom aneurysm embolization with coil #7. TCD's discontinued 2nd to poor windows, recommend following neuro assessment. -Goal -200 to mitigate vasospasm. Anterior EVD in place at 10 cm H20 above tragus with good waveform noted on monitor-ecchymosis. I reviewed today's labs: WBC 15.6; Hemoglobin 11.1 ; Cr 0.36; .  I's/O's-last 24 hours -190 /since admission +876; ICP 3-10 (good ICP waveform noted on monitor).      02/08/24: SAH, Carpio Potts 4;Modified Swenson 3.  PBD/post Pcom aneurysm embolization with coil #8. TCD's with poor windows, recommend following neuro assessment. -Goal -200 to mitigate vasospasm. Anterior EVD open, raised today to 20 cm H20 above tragus with good waveform noted on monitor-ecchymosis. I reviewed today's labs: WBC 15.1; Hemoglobin 10.5;  ; Cr 0.31;  I's/O's-last 24 hours --1091/since admission -214 ICP 4-11/CPP > 100 (good  ICP waveform noted on monitor).  Tmax 100.6     02/09/24: SAH, Carpio Potts 4;Modified Swenson 3. PBD/post Pcom aneurysm embolization with coil #9. No longer following TCD's 2nd to poor windows.   Follow neuro assessment. -Goal -200 to mitigate vasospasm. Anterior EVD open, @15 cm H20 above tragus with good waveform noted on monitor. I reviewed today's labs: WBC 14.9; Hemoglobin 11.2;  ; Cr 0.29;  I's/O's-last 24 hours -110 ml; output since admission - 675 ml.  ICP 4-11/CPP ; EVD output -139 mL in the last 24 hours (good ICP waveform noted on monitor). Tmax 100. 9     2/10/24: SAH, Carpio Potts 4; Modified Swenson 3. PBD #10 /post Pcom aneurysm embolization with 10 coils (1/31/24). No longer following TCD's 2nd to poor windows.   Follow neuro assessment. -Goal -200 to mitigate vasospasm. Anterior EVD open, @ 20 cm H20 above tragus with good waveform noted on monitor. I reviewed patient's most recent labs: WBC 14.3; Hemoglobin 10.6;  ; Cr 0.36;  ICP<15; EVD output:139 mL in the last 24 hours. Tmax 100. 4     2/11/24: SAH, Carpio Potts 4; Modified Swenson 3. PBD #11 /post Pcom aneurysm embolization with 10 coils (1/31/24). Pt is verbally responsive with appropriate answers in Spansh, purposeful and following some commands.  - EVD clamped 2/11/24.   - No longer following TCD's 2nd to poor windows.   Follow neuro assessment.   - Goal -200 to mitigate vasospasm.   - I reviewed patient's most recent labs: WBC 17.5; Hemoglobin 11.0;  ; Cr 0.36; Tmax 99.5    02/12/24 - PBD#12. No acute events overnight. Neuro unchanged per bedside RN. No longer following TCD's 2nd to poor windows; q 2 neuro exams in place.  Labs reviewed; , WBC 17.4. Repeat CT this morning shows decreased hyperdensity along the bilateral tentorium.  Family at bedside; education provided.  Patient discussed with bedside RN.    02/13/24 - PBD#13. No acute events overnight. EVD removed yesterday. Pt more alert today for  my assessment; however, neurologist and intensivist report she is more lethargic and less verbal. CTA and CT perfusion study ordered. Pt squeezing my hand bilaterally and moving all extremities; following some commands. Nurse at bedside administering thick liquid medication eliciting cough. Q 2 neuro exams in place. Labs reviewed; , WBC 15.3. Patient discussed with bedside RN.    02/14/24 - PBD#14. No acute events overnight. CTA yesterday shows possible distal left MCA spasm; CT perfusion without mismatch.  Patient awake and interactive today.  Q 2 neuro exams remain in place. Labs reviewed; , WBC 13.5. Patient discussed with bedside RN.  NSG signed off.    02/15/24 - PBD#15. No acute events overnight. Pt's level of alertness waxes and wanes. Currently very alert and following commands but only oriented to self.A/O x 4 earlier per nursing staff. Moves all extremities equally. Labs reviewed; , WBC 12.0. Patient discussed with intensivist.     02/16/24 -  PBD#16. No acute events overnight. Pt's level of alertness waxes and wanes. Currently sleepy but easily arousable and following commands.  Moves all extremities equally. Labs reviewed; , WBC 12.8. Patient discussed with intensivist and neurologis     02/17/24 - PBD #17. Last CTA/P was on 02/13- no spasm identified. No Tcd's due to poor windows.  No acute events overnight.  Labs reviewed; , WBC 11.9; Hgb 11.8; Cr 0.38;  T max 100.2;  Is nd Os- since 2/3/24 -1595/24 hr -1370      2/19/24: PBD #19. No acute events overnight. Alert and oriented, answers questions with complete sentences, follows commands. PT/OT/SLP. I reviewed patient's most recent labs including WBC 10.4, Hgb 11.7, Cr 0.31, Na 135. Tmax 37.3.    2/20/24: PBD #20. No acute events overnight. Alert and oriented, answers questions with complete sentences, follows commands. Family present in room noting than alertness and interactivity waxes and wanes through the day.  I  reviewed patient's most recent labs including WBC 8.6, Hgb 12.0, Cr 0.44, Na 137. Tmax 37.2.    Assessment/Plan     Principal Problem:    SAH (subarachnoid hemorrhage) (HCC)  Active Problems:    Acute respiratory failure with hypoxia (HCC)    Seizure (HCC)    Hypokalemia    Hypophosphatemia    Pneumonia of both lungs due to methicillin susceptible Staphylococcus aureus (MSSA) (HCC)    Aneurysm of ascending aorta without rupture (HCC)    Hyperglycemia    Primary hypertension    Hypothyroid      Plan IR  - PBD #20  - Long-term -130 per Vascular Neurology recommendations  - Continue Nimodipine   - TCD's dcd (poor windows)   - Goal of euvolemia, euglycemia, normothermia, and eunatremia - off salt tabs  - Neuro assessment per unit protocol for potential vasospasm; for suspected vasospasm consider CTA head and CTP  - Continue ASA 81 mg daily/ Lovenox SQ for DVT ppx    - Vascular Neurology and neurosurgery following  - Pending inpatient rehab.      -Thank you for allowing Interventional Radiology team to participate in the patients care, if any additional care or requests are needed in the future please do not hesitate to call or place IR order   [phone]           Review of Systems  Physical Exam   Review of Systems   Constitutional:  Negative for chills and fever.        Able to answer in short complete sentences.   Respiratory:  Negative for shortness of breath.    Cardiovascular:  Negative for chest pain and palpitations.   Gastrointestinal:  Negative for nausea and vomiting.   Neurological:  Positive for focal weakness. Negative for tingling and headaches.      Vitals:    02/20/24 1732   BP: 128/83   Pulse: 100   Resp: 20   Temp: 37.2 °C (99 °F)   SpO2: 95%        Physical Exam  Vitals and nursing note reviewed.   Constitutional:       General: She is awake.      Appearance: Normal appearance.   Eyes:      Comments: Left eyelid ptosis    Cardiovascular:      Rate and Rhythm: Normal rate.      Pulses: Normal  pulses.   Pulmonary:      Comments: Without any resp distress  Abdominal:      Palpations: Abdomen is soft.   Musculoskeletal:         General: Normal range of motion.      Right lower leg: No edema.      Left lower leg: No edema.   Skin:     General: Skin is warm and dry.   Neurological:      General: No focal deficit present.      Mental Status: She is alert and oriented to person, place, and time.      Cranial Nerves: Cranial nerve deficit present.      Sensory: No sensory deficit.      Comments: A/O x 2, follows commands  L ptosis, L CNIII palsy,   antigravity strength in all 4 extremities   Gross sensation intact   Psychiatric:         Behavior: Behavior is cooperative.             Labs    Recent Labs     02/18/24 0203 02/19/24 0413 02/20/24 0438   WBC 9.5 10.4 8.6   RBC 4.03* 4.48 4.66   HEMOGLOBIN 10.4* 11.7* 12.0   HEMATOCRIT 32.2* 35.6* 38.1   MCV 79.9* 79.5* 81.8   MCH 25.8* 26.1* 25.8*   MCHC 32.3 32.9 31.5*   RDW 40.6 40.8 41.4   PLATELETCT 523* 513* 531*   MPV 9.4 9.4 9.6     Recent Labs     02/18/24 0203 02/19/24 0413 02/20/24  0438   SODIUM 139 135 137   POTASSIUM 4.2 3.9 4.6   CHLORIDE 104 99 99   CO2 26 24 27   GLUCOSE 123* 127* 130*   BUN 14 15 19   CREATININE 0.36* 0.31* 0.44*   CALCIUM 9.3 10.1 10.3     Recent Labs     02/18/24 0203 02/19/24 0413 02/20/24  0438   ALBUMIN  --  3.7  --    TBILIRUBIN  --  0.2  --    ALKPHOSPHAT  --  159*  --    TOTPROTEIN  --  7.6  --    ALTSGPT  --  23  --    ASTSGOT  --  29  --    CREATININE 0.36* 0.31* 0.44*     DX-ABDOMEN FOR TUBE PLACEMENT   Final Result         1.  Nonspecific bowel gas pattern in the upper abdomen.   2.  Dobbhoff tube with tip terminating overlying the expected location of the first or second duodenal segment.   3.  Cardiomegaly      CT-CEREBRAL PERFUSION ANALYSIS   Final Result      1.  Cerebral blood flow less than 30% likely representing completed infarct = 0 mL.      2.  T Max more than 6 seconds likely representing combination of  completed infarct and ischemia = 0 mL.      3.  Mismatched volume likely representing ischemic brain/penumbra = None      4.  Please note that the cerebral perfusion was performed on the limited brain tissue around the basal ganglia region. Infarct/ischemia outside the CT perfusion sections can be missed in this study.      CT-CTA HEAD WITH & W/O-POST PROCESS   Final Result      1.  Prior LEFT middle cranial fossa aneurysm coiling.  Artifact limits exam.   2.  No abrupt large vessel cut off.   3.  Segmental narrowing of LEFT middle cerebral artery, primarily M1 segment, vasospasm versus artifact.   4.  Removal of ventriculostomy catheter. No hydrocephalus.   5.  Evolving RIGHT caudate infarct.   6.  Slightly improved intracranial hemorrhage.   7.  4 mm right carotid terminus aneurysm.      CT-HEAD W/O   Final Result         1.  Hyperdensity along the bilateral tentorium, compatible with layering subarachnoid hemorrhages, decreased since prior study.   2.  Atherosclerosis.         DX-ABDOMEN FOR TUBE PLACEMENT   Final Result         1.  Nonspecific bowel gas pattern in the upper abdomen.   2.  Nasogastric tube tip terminates overlying the expected location of the antrum or pylorus, somewhat withdrawn since prior study.   3.  Hazy interstitial pulmonary edema and/or infiltrates, stable   4.  Cardiomegaly      DX-ABDOMEN FOR TUBE PLACEMENT   Final Result         1.  Nonspecific bowel gas pattern in the upper abdomen.   2.  Nasogastric tube tip terminates overlying the expected location of the pylorus or first duodenal segment.   3.  Hazy interstitial pulmonary edema and/or infiltrates   4.  Cardiomegaly      DX-CHEST-PORTABLE (1 VIEW)   Final Result         Findings on chest radiograph appear stable since the prior radiograph.  No new abnormalities are identified.      DX-ABDOMEN FOR TUBE PLACEMENT   Final Result      Enteric tube tip projects over the gastric antrum or proximal duodenum, similar to prior       DX-ABDOMEN FOR TUBE PLACEMENT   Final Result      NG tube tip projects at the peripyloric region.      DX-ABDOMEN FOR TUBE PLACEMENT   Final Result      NG tube tip projects at the peripyloric region.      DX-ABDOMEN FOR TUBE PLACEMENT   Final Result      Feeding tube in place with tip at the distal stomach/pylorus.      SF-[id number]-2 VIEW   Final Result         1.  Nonspecific bowel gas pattern in the upper abdomen.   2.  Nasogastric tube tip terminates overlying the expected location of the pylorus or first duodenal segment.   3.  Hazy interstitial pulmonary edema and/or infiltrates   4.  Cardiomegaly      DX-ABDOMEN FOR TUBE PLACEMENT   Final Result      Enteric sump tube in situ with its tip at the level of the distal gastric antrum or pylorus.      DX-ABDOMEN FOR TUBE PLACEMENT   Final Result         1.  Nonspecific bowel gas pattern in the upper abdomen.   2.  Nasogastric tube tip terminates overlying the expected location of the pylorus or first duodenal segment.   3.  Hazy interstitial pulmonary edema and/or infiltrates, similar to prior study.   4.  Cardiomegaly      DX-CHEST-PORTABLE (1 VIEW)   Final Result      1.  Supportive tubing as described above.   2.  No other significant change from prior exam.         MR-BRAIN-W/O   Final Result      1.  Multifocal areas of acute infarcts in the left cerebral hemisphere.   2.  Small area of acute infarct in the right basal ganglia adjacent to the tip of the ventriculostomy catheter.   3.  Cortical infarct in the right medial parietal lobe.   4.  Mild amount of subdural hemorrhages surrounding the bilateral cerebellar hemispheres and right cerebellum.   5.  Subarachnoid hemorrhage.   6.  There is no hydrocephalus.   7.  There are changes secondary to the previous endovascular repair left internal carotid aneurysm.      CT-CTA NECK WITH & W/O-POST PROCESSING   Final Result      No focal high-grade stenosis, dissection or occlusion of the cervical carotid or  vertebral arteries.      CT-CEREBRAL PERFUSION ANALYSIS   Final Result      1.  Cerebral blood flow less than 30% likely representing completed infarct = 0 mL.      2.  T Max more than 6 seconds likely representing combination of completed infarct and ischemia = 0 mL.      3.  Mismatched volume likely representing ischemic brain/penumbra = None      4.  Please note that the cerebral perfusion was performed on the limited brain tissue around the basal ganglia region. Infarct/ischemia outside the CT perfusion sections can be missed in this study.      CT-CTA HEAD WITH & W/O-POST PROCESS   Final Result      1.  Prior LEFT middle cranial fossa aneurysm coiling.  Artifact limits exam.   2.  No abrupt large vessel cut off.   3.  Segmental narrowing of LEFT middle cerebral artery, vasospasm versus artifact.   4.  Ventriculostomy catheter in similar position.   5.  Evolving RIGHT caudate infarct.   6.  Stable intracranial hemorrhage.         DX-ABDOMEN FOR TUBE PLACEMENT   Final Result      Orogastric tube tip now at the mid stomach.      DX-ABDOMEN FOR TUBE PLACEMENT   Final Result      Orogastric tube tip at the proximal stomach with side port just above the GE junction.      US-INTRACRANIAL ARTERY LIMITED   Final Result      DX-CHEST-LIMITED (1 VIEW)   Final Result         1.  Stable chest with perihilar and lower lung zone interstitial and minimal airspace opacities most consistent with pulmonary edema.   2.  No new abnormalities.      EC-ECHOCARDIOGRAM COMPLETE W/O CONT   Final Result      US-INTRACRANIAL ARTERY LIMITED   Final Result      DX-CHEST-PORTABLE (1 VIEW)   Final Result      1.  Lines and tubes appear appropriately located      2.  Improvement of pulmonary edema/airspace process      US-INTRACRANIAL ARTERY COMP   Final Result      CT-HEAD W/O   Final Result         1. Slightly decreased subarachnoid hemorrhage overlying the cerebral hemispheres.   2. Otherwise, stable as above.         DX-CHEST-FOR LINE  PLACEMENT Perform procedure in: Patient's Room   Final Result      1.  Moderate interstitial pulmonary edema.   2.   Right-sided central venous catheter terminates with the tip projecting over the expected region of the mid to distal superior vena cava.   3.  Endotracheal tube terminates in satisfactory position at the level of the aortic arch.   4.  Enteric feeding tube terminates in the left upper quadrant projecting over the expected location of the stomach.      CT-STEALTH HEAD W/O   Final Result      1.  Interval placement of a right transparietal ventriculostomy catheter which terminates with the tip near the third ventricle. There is minimally decreased caliber of the ventricular system and compared to previous exam.   2.  Findings of intracranial hemorrhage appear similar to the previous exam. No definite new acute intracranial hemorrhage is identified.   3.  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         DX-CHEST-PORTABLE (1 VIEW)   Final Result      CT-HEAD W/O   Final Result      1. Interval aneurysm coiling of left posterior communicating artery aneurysm.   2. Possible increased subarachnoid hemorrhage.   3. Intraventricular hemorrhage. There is developing mild hydrocephalus with mildly increased ventricles.   4. Small right convexity subdural hematoma measuring 5 mm in thickness.      These findings were discussed with EMILY BYRD on 1/31/2024 3:14 PM.      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   This aneurysm was embolized to occlusion as described above. Final angiographic images demonstrate only minimal filling of the neck of the aneurysm. The patient will  "be followed up in   the neuro interventional clinic and brought back for a follow-up angiogram in 6 months.      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      I, Stephanie Eason was physically present and participated during the entire procedure of the IR-EMBOLIZATION.        INR   Date Value Ref Range Status   01/31/2024 1.06 0.87 - 1.13 Final     Comment:     INR - Non-therapeutic Reference Range: 0.87-1.13  INR - Therapeutic Reference Range: 2.0-4.0       No results found for: \"POCINR\"     Intake/Output Summary (Last 24 hours) at 2/12/2024 0811  Last data filed at 2/12/2024 0600  Gross per 24 hour   Intake 2444.57 ml   Output 2345 ml   Net 99.57 ml      Labs not explicitly included in this progress note were reviewed by the author. Radiology/imaging not explicitly included in this progress note was reviewed by the author.     I have performed a physical exam and reviewed and updated ROS and Plan today (2/20/2024).     35 minutes in directly providing and coordinating care and extensive data review.  No time overlap and excludes procedures.  "

## 2024-02-21 NOTE — DISCHARGE PLANNING
Repeat PMR consult referral received from Dr. Lemons.      Please review the consult from Dr. Dyer regarding post acute recommendations.  TCC will no longer follow.  Please reach out to myself with any questions.

## 2024-02-21 NOTE — PROGRESS NOTES
Neurology Progress Note  Neurohospitalist Service, [facility]    Referring Physician: Dudley Root MD      Interval History: No acute events overnight.   Transferred to floor.    Review of systems: In addition to what is detailed in the HPI and/or updated in the interval history, all other systems reviewed and are negative.    Past Medical History, Past Surgical History and Social History reviewed and unchanged from prior    Medications:    Current Facility-Administered Medications:     enalaprilat (Vasotec) injection 1.25 mg 1 mL, 1.25 mg, Intravenous, Q6HRS PRN, Dudley Root M.D.    hydrALAZINE (Apresoline) injection 10-20 mg, 10-20 mg, Intravenous, Q4HRS PRN, Dudley Root M.D.    labetalol (Normodyne/Trandate) injection 10-20 mg, 10-20 mg, Intravenous, Q4HRS PRN, Dudley Root M.D.    ipratropium-albuterol (DUONEB) nebulizer solution, 3 mL, Nebulization, Q2HRS PRN (RT), Emil Pederson M.D., 3 mL at 02/18/24 0029    oxyCODONE immediate-release (Roxicodone) tablet 5-10 mg, 5-10 mg, Oral, Q4HRS PRN, Olga Nowak, A.P.R.N., 5 mg at 02/19/24 1542    gabapentin (Neurontin) capsule 200 mg, 200 mg, Oral, Q8HRS, Olga Nowak, A.P.R.N., 200 mg at 02/21/24 0437    aspirin (Asa) chewable tab 81 mg, 81 mg, Enteral Tube, DAILY, Jose Raul Tracy M.D., 81 mg at 02/21/24 0436    lidocaine (Asperflex) 4 % patch 2 Patch, 2 Patch, Transdermal, Q24HR, Dayana Babcock M.D., 2 Patch at 02/16/24 1645    enoxaparin (Lovenox) inj 40 mg, 40 mg, Subcutaneous, DAILY AT 1800, Dayana Babcock M.D., 40 mg at 02/20/24 1816    acetaminophen (Tylenol) tablet 650 mg, 650 mg, Enteral Tube, Q4HRS PRN, 650 mg at 02/19/24 1510 **OR** acetaminophen (Tylenol) suppository 650 mg, 650 mg, Rectal, Q4HRS PRN, Dayana Babcock M.D.    atorvastatin (Lipitor) tablet 40 mg, 40 mg, Enteral Tube, Q EVENING, Dayana Babcock M.D., 40 mg at 02/20/24 1815    niMODipine (Nymalize) oral syringe 60 mg, 60 mg, Enteral Tube,  "Q4HRS, Jeremy M Gonda, M.D., 60 mg at 02/21/24 0437    levETIRAcetam (Keppra) tablet 500 mg, 500 mg, Enteral Tube, BID, Jeremy M Gonda, M.D., 500 mg at 02/21/24 0436    Pharmacy Consult: Enteral tube insertion - review meds/change route/product selection, 1 Each, Other, PHARMACY TO DOSE, Jeremy M Gonda, M.D.    Respiratory Therapy Consult, , Nebulization, Continuous RT, Jeremy M Gonda, M.D. MD Alert...ICU Electrolyte Replacement per Pharmacy, , Other, PHARMACY TO DOSE, Jeremy M Gonda, M.D.    ondansetron (Zofran ODT) dispertab 4 mg, 4 mg, Enteral Tube, Q4HRS PRN, 4 mg at 01/31/24 2248 **OR** ondansetron (Zofran) syringe/vial injection 4 mg, 4 mg, Intravenous, Q4HRS PRN, Jeremy M Gonda, M.D., 4 mg at 02/02/24 1758    LORazepam (Ativan) injection 2 mg, 2 mg, Intravenous, Q5 MIN PRN, Jeremy M Gonda, M.D.    prochlorperazine (Compazine) injection 10 mg, 10 mg, Intravenous, Q6HRS PRN, David Mclaughlin Jr., D.O., 10 mg at 01/31/24 9908    Physical Examination:   /78   Pulse 100   Temp 36.6 °C (97.9 °F) (Temporal)   Resp 18   Ht 1.575 m (5' 2.01\")   Wt 72 kg (158 lb 11.7 oz)   SpO2 90%   Breastfeeding No   BMI 29.02 kg/m²       General: Patient is sleepy, arouses easily  Neck: There is normal range of motion  CV: Regular rate   Extremities:  Warm, dry, and intact, without peripheral lower extremity edema    NEUROLOGICAL EXAM:     Mental status: Sleepy, arouses easily  Speech and language: Speech is clear.  Fluent and follows commands  Cranial nerve exam:  Visual fields are full. There is no nystagmus. Extraocular muscles are intact. Face is symmetric. L ptosis  Motor exam: There is antigravity strength in all 4 extremities  Sensory exam:  Reacts to tactile in all 4 extremities, there is no neglect to double stim  Coordination: No ataxia on elicited movements    Objective Data:    Labs:  Lab Results   Component Value Date/Time    PROTHROMBTM 13.9 01/31/2024 03:25 PM    INR 1.06 01/31/2024 03:25 PM      Lab " Results   Component Value Date/Time    WBC 8.6 02/20/2024 04:38 AM    RBC 4.66 02/20/2024 04:38 AM    HEMOGLOBIN 12.0 02/20/2024 04:38 AM    HEMATOCRIT 38.1 02/20/2024 04:38 AM    MCV 81.8 02/20/2024 04:38 AM    MCH 25.8 (L) 02/20/2024 04:38 AM    MCHC 31.5 (L) 02/20/2024 04:38 AM    MPV 9.6 02/20/2024 04:38 AM    NEUTSPOLYS 76.00 (H) 02/20/2024 04:38 AM    LYMPHOCYTES 12.30 (L) 02/20/2024 04:38 AM    MONOCYTES 5.70 02/20/2024 04:38 AM    EOSINOPHILS 5.10 02/20/2024 04:38 AM    BASOPHILS 0.60 02/20/2024 04:38 AM      Lab Results   Component Value Date/Time    SODIUM 139 02/21/2024 03:56 AM    POTASSIUM 3.7 02/21/2024 03:56 AM    CHLORIDE 100 02/21/2024 03:56 AM    CO2 27 02/21/2024 03:56 AM    GLUCOSE 118 (H) 02/21/2024 03:56 AM    BUN 16 02/21/2024 03:56 AM    CREATININE 0.49 (L) 02/21/2024 03:56 AM      Lab Results   Component Value Date/Time    CHOLSTRLTOT 165 01/31/2024 03:25 PM     (H) 01/31/2024 03:25 PM    HDL 36 (A) 01/31/2024 03:25 PM    TRIGLYCERIDE 79 01/31/2024 03:25 PM       Lab Results   Component Value Date/Time    ALKPHOSPHAT 159 (H) 02/19/2024 04:13 AM    ASTSGOT 29 02/19/2024 04:13 AM    ALTSGPT 23 02/19/2024 04:13 AM    TBILIRUBIN 0.2 02/19/2024 04:13 AM        Imaging/Testing:    I interpreted and/or reviewed the patient's neuroimaging    DX-ABDOMEN FOR TUBE PLACEMENT   Final Result         1.  Nonspecific bowel gas pattern in the upper abdomen.   2.  Dobbhoff tube with tip terminating overlying the expected location of the first or second duodenal segment.   3.  Cardiomegaly      CT-CEREBRAL PERFUSION ANALYSIS   Final Result      1.  Cerebral blood flow less than 30% likely representing completed infarct = 0 mL.      2.  T Max more than 6 seconds likely representing combination of completed infarct and ischemia = 0 mL.      3.  Mismatched volume likely representing ischemic brain/penumbra = None      4.  Please note that the cerebral perfusion was performed on the limited brain tissue  around the basal ganglia region. Infarct/ischemia outside the CT perfusion sections can be missed in this study.      CT-CTA HEAD WITH & W/O-POST PROCESS   Final Result      1.  Prior LEFT middle cranial fossa aneurysm coiling.  Artifact limits exam.   2.  No abrupt large vessel cut off.   3.  Segmental narrowing of LEFT middle cerebral artery, primarily M1 segment, vasospasm versus artifact.   4.  Removal of ventriculostomy catheter. No hydrocephalus.   5.  Evolving RIGHT caudate infarct.   6.  Slightly improved intracranial hemorrhage.   7.  4 mm right carotid terminus aneurysm.      CT-HEAD W/O   Final Result         1.  Hyperdensity along the bilateral tentorium, compatible with layering subarachnoid hemorrhages, decreased since prior study.   2.  Atherosclerosis.         DX-ABDOMEN FOR TUBE PLACEMENT   Final Result         1.  Nonspecific bowel gas pattern in the upper abdomen.   2.  Nasogastric tube tip terminates overlying the expected location of the antrum or pylorus, somewhat withdrawn since prior study.   3.  Hazy interstitial pulmonary edema and/or infiltrates, stable   4.  Cardiomegaly      DX-ABDOMEN FOR TUBE PLACEMENT   Final Result         1.  Nonspecific bowel gas pattern in the upper abdomen.   2.  Nasogastric tube tip terminates overlying the expected location of the pylorus or first duodenal segment.   3.  Hazy interstitial pulmonary edema and/or infiltrates   4.  Cardiomegaly      DX-CHEST-PORTABLE (1 VIEW)   Final Result         Findings on chest radiograph appear stable since the prior radiograph.  No new abnormalities are identified.      DX-ABDOMEN FOR TUBE PLACEMENT   Final Result      Enteric tube tip projects over the gastric antrum or proximal duodenum, similar to prior      DX-ABDOMEN FOR TUBE PLACEMENT   Final Result      NG tube tip projects at the peripyloric region.      DX-ABDOMEN FOR TUBE PLACEMENT   Final Result      NG tube tip projects at the peripyloric region.      DX-ABDOMEN  FOR TUBE PLACEMENT   Final Result      Feeding tube in place with tip at the distal stomach/pylorus.      [id number] VIEW   Final Result         1.  Nonspecific bowel gas pattern in the upper abdomen.   2.  Nasogastric tube tip terminates overlying the expected location of the pylorus or first duodenal segment.   3.  Hazy interstitial pulmonary edema and/or infiltrates   4.  Cardiomegaly      DX-ABDOMEN FOR TUBE PLACEMENT   Final Result      Enteric sump tube in situ with its tip at the level of the distal gastric antrum or pylorus.      DX-ABDOMEN FOR TUBE PLACEMENT   Final Result         1.  Nonspecific bowel gas pattern in the upper abdomen.   2.  Nasogastric tube tip terminates overlying the expected location of the pylorus or first duodenal segment.   3.  Hazy interstitial pulmonary edema and/or infiltrates, similar to prior study.   4.  Cardiomegaly      DX-CHEST-PORTABLE (1 VIEW)   Final Result      1.  Supportive tubing as described above.   2.  No other significant change from prior exam.         MR-BRAIN-W/O   Final Result      1.  Multifocal areas of acute infarcts in the left cerebral hemisphere.   2.  Small area of acute infarct in the right basal ganglia adjacent to the tip of the ventriculostomy catheter.   3.  Cortical infarct in the right medial parietal lobe.   4.  Mild amount of subdural hemorrhages surrounding the bilateral cerebellar hemispheres and right cerebellum.   5.  Subarachnoid hemorrhage.   6.  There is no hydrocephalus.   7.  There are changes secondary to the previous endovascular repair left internal carotid aneurysm.      CT-CTA NECK WITH & W/O-POST PROCESSING   Final Result      No focal high-grade stenosis, dissection or occlusion of the cervical carotid or vertebral arteries.      CT-CEREBRAL PERFUSION ANALYSIS   Final Result      1.  Cerebral blood flow less than 30% likely representing completed infarct = 0 mL.      2.  T Max more than 6 seconds likely representing  combination of completed infarct and ischemia = 0 mL.      3.  Mismatched volume likely representing ischemic brain/penumbra = None      4.  Please note that the cerebral perfusion was performed on the limited brain tissue around the basal ganglia region. Infarct/ischemia outside the CT perfusion sections can be missed in this study.      CT-CTA HEAD WITH & W/O-POST PROCESS   Final Result      1.  Prior LEFT middle cranial fossa aneurysm coiling.  Artifact limits exam.   2.  No abrupt large vessel cut off.   3.  Segmental narrowing of LEFT middle cerebral artery, vasospasm versus artifact.   4.  Ventriculostomy catheter in similar position.   5.  Evolving RIGHT caudate infarct.   6.  Stable intracranial hemorrhage.         DX-ABDOMEN FOR TUBE PLACEMENT   Final Result      Orogastric tube tip now at the mid stomach.      DX-ABDOMEN FOR TUBE PLACEMENT   Final Result      Orogastric tube tip at the proximal stomach with side port just above the GE junction.      US-INTRACRANIAL ARTERY LIMITED   Final Result      DX-CHEST-LIMITED (1 VIEW)   Final Result         1.  Stable chest with perihilar and lower lung zone interstitial and minimal airspace opacities most consistent with pulmonary edema.   2.  No new abnormalities.      EC-ECHOCARDIOGRAM COMPLETE W/O CONT   Final Result      US-INTRACRANIAL ARTERY LIMITED   Final Result      DX-CHEST-PORTABLE (1 VIEW)   Final Result      1.  Lines and tubes appear appropriately located      2.  Improvement of pulmonary edema/airspace process      US-INTRACRANIAL ARTERY COMP   Final Result      CT-HEAD W/O   Final Result         1. Slightly decreased subarachnoid hemorrhage overlying the cerebral hemispheres.   2. Otherwise, stable as above.         DX-CHEST-FOR LINE PLACEMENT Perform procedure in: Patient's Room   Final Result      1.  Moderate interstitial pulmonary edema.   2.   Right-sided central venous catheter terminates with the tip projecting over the expected region of the  mid to distal superior vena cava.   3.  Endotracheal tube terminates in satisfactory position at the level of the aortic arch.   4.  Enteric feeding tube terminates in the left upper quadrant projecting over the expected location of the stomach.      CT-STEALTH HEAD W/O   Final Result      1.  Interval placement of a right transparietal ventriculostomy catheter which terminates with the tip near the third ventricle. There is minimally decreased caliber of the ventricular system and compared to previous exam.   2.  Findings of intracranial hemorrhage appear similar to the previous exam. No definite new acute intracranial hemorrhage is identified.   3.  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         DX-CHEST-PORTABLE (1 VIEW)   Final Result      CT-HEAD W/O   Final Result      1. Interval aneurysm coiling of left posterior communicating artery aneurysm.   2. Possible increased subarachnoid hemorrhage.   3. Intraventricular hemorrhage. There is developing mild hydrocephalus with mildly increased ventricles.   4. Small right convexity subdural hematoma measuring 5 mm in thickness.      These findings were discussed with EMILY BYRD on 1/31/2024 3:14 PM.      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   This aneurysm was embolized to occlusion as described above. Final angiographic images demonstrate only minimal filling of the neck of the aneurysm. The patient will be followed up in   the neuro interventional clinic and brought back for a follow-up angiogram in 6 months.      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       I, Stephanie Eason was physically present and participated during the entire procedure of the IR-EMBOLIZATION.          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  She is PBD#21, POD#21 from successful endovascular coiling.  No window for TCDs.  MRI with multifocal infarcts.  EVD removed on 2/12.   CTA/P on 2/13 with no flow limiting spasm.  Will continue with keppra given clinical seizure at admission.  She is now out of her vasospasm window, neurochecks, BP goals have been liberalized.  Awaiting ARU placement for continued recovery.    Problem list:   Hunt Potts 4, modified Swenson grade 3 SAH   Ruptured cerebral aneurysm    Plan:   - q4h neurochecks   - long-term BP goal is 110-130/60-80, currently at goal, but may need PO anti-HTN once nimodipine discontinued   - continue keppra 500mg BID- this will be 3 months of therapy given seizure event (wean in outpatient Neurology clinic)   - continue lovenox SQ for DVT chemoppx   - continue ASA 81mg daily given large coil-vessel lumen interface and high risk for coil-lumen thrombosis   - continue nimodipine 60mg q4h- discontinue after today   - maintain normothermia, net even I/O, FSBS    - normal Na goal 135-145   - PT/OT/SLP, therapies advising ARU placement   - Neuro IR follow up   - please reconsult Stroke Neurology with any additional questions or concerns    The evaluation of the patient, and recommended management, was discussed with Dr. Ayoub, attending hospitalist. I have performed a physical exam and reviewed and updated ROS and Plan today (2/21/2024).     Jose Raul Tracy MD  Vascular Neurology

## 2024-02-21 NOTE — CARE PLAN
The patient is Stable - Low risk of patient condition declining or worsening    Shift Goals  Clinical Goals: Mobilize  Patient Goals: ALEJANDRA  Family Goals: to get to chair    Progress made toward(s) clinical / shift goals:  Discontinued restraints. Family at bedside. Mobilized X2 pivot to recliner chair this AM. Patient remains in chair and able to verbalize her comfort.

## 2024-02-21 NOTE — DISCHARGE PLANNING
3221  RN CM advised to call [facility] to start ins auth  Agency/Facility Name: [facility]  Spoke To: Kade  Outcome: DPA inquired that they can start ins auth on pt.  Advised CHARLES CORDERO

## 2024-02-22 LAB
ANION GAP SERPL CALC-SCNC: 13 MMOL/L (ref 7–16)
BUN SERPL-MCNC: 21 MG/DL (ref 8–22)
CALCIUM SERPL-MCNC: 10.3 MG/DL (ref 8.5–10.5)
CHLORIDE SERPL-SCNC: 99 MMOL/L (ref 96–112)
CO2 SERPL-SCNC: 24 MMOL/L (ref 20–33)
CREAT SERPL-MCNC: 0.55 MG/DL (ref 0.5–1.4)
GFR SERPLBLD CREATININE-BSD FMLA CKD-EPI: 103 ML/MIN/1.73 M 2
GLUCOSE SERPL-MCNC: 103 MG/DL (ref 65–99)
MAGNESIUM SERPL-MCNC: 2.2 MG/DL (ref 1.5–2.5)
PHOSPHATE SERPL-MCNC: 4.2 MG/DL (ref 2.5–4.5)
POTASSIUM SERPL-SCNC: 3.6 MMOL/L (ref 3.6–5.5)
SODIUM SERPL-SCNC: 136 MMOL/L (ref 135–145)

## 2024-02-22 PROCEDURE — A9270 NON-COVERED ITEM OR SERVICE: HCPCS | Performed by: STUDENT IN AN ORGANIZED HEALTH CARE EDUCATION/TRAINING PROGRAM

## 2024-02-22 PROCEDURE — 84100 ASSAY OF PHOSPHORUS: CPT

## 2024-02-22 PROCEDURE — 770001 HCHG ROOM/CARE - MED/SURG/GYN PRIV*

## 2024-02-22 PROCEDURE — 83735 ASSAY OF MAGNESIUM: CPT

## 2024-02-22 PROCEDURE — 700102 HCHG RX REV CODE 250 W/ 637 OVERRIDE(OP): Performed by: NURSE PRACTITIONER

## 2024-02-22 PROCEDURE — 700102 HCHG RX REV CODE 250 W/ 637 OVERRIDE(OP): Performed by: STUDENT IN AN ORGANIZED HEALTH CARE EDUCATION/TRAINING PROGRAM

## 2024-02-22 PROCEDURE — 36415 COLL VENOUS BLD VENIPUNCTURE: CPT

## 2024-02-22 PROCEDURE — 700111 HCHG RX REV CODE 636 W/ 250 OVERRIDE (IP): Mod: JZ | Performed by: INTERNAL MEDICINE

## 2024-02-22 PROCEDURE — 80048 BASIC METABOLIC PNL TOTAL CA: CPT

## 2024-02-22 PROCEDURE — 51798 US URINE CAPACITY MEASURE: CPT

## 2024-02-22 PROCEDURE — A9270 NON-COVERED ITEM OR SERVICE: HCPCS | Performed by: NURSE PRACTITIONER

## 2024-02-22 PROCEDURE — 99232 SBSQ HOSP IP/OBS MODERATE 35: CPT | Performed by: STUDENT IN AN ORGANIZED HEALTH CARE EDUCATION/TRAINING PROGRAM

## 2024-02-22 RX ADMIN — ENOXAPARIN SODIUM 40 MG: 100 INJECTION SUBCUTANEOUS at 17:23

## 2024-02-22 RX ADMIN — LEVETIRACETAM 500 MG: 500 TABLET, FILM COATED ORAL at 17:24

## 2024-02-22 RX ADMIN — ATORVASTATIN CALCIUM 40 MG: 40 TABLET, FILM COATED ORAL at 17:24

## 2024-02-22 RX ADMIN — LEVETIRACETAM 500 MG: 500 TABLET, FILM COATED ORAL at 05:20

## 2024-02-22 RX ADMIN — GABAPENTIN 200 MG: 100 CAPSULE ORAL at 20:32

## 2024-02-22 RX ADMIN — ASPIRIN 81 MG 81 MG: 81 TABLET ORAL at 05:21

## 2024-02-22 RX ADMIN — GABAPENTIN 200 MG: 100 CAPSULE ORAL at 14:15

## 2024-02-22 RX ADMIN — GABAPENTIN 200 MG: 100 CAPSULE ORAL at 05:20

## 2024-02-22 ASSESSMENT — PAIN DESCRIPTION - PAIN TYPE: TYPE: ACUTE PAIN

## 2024-02-22 ASSESSMENT — FIBROSIS 4 INDEX: FIB4 SCORE: 0.71

## 2024-02-22 ASSESSMENT — ENCOUNTER SYMPTOMS: SHORTNESS OF BREATH: 0

## 2024-02-22 NOTE — PROGRESS NOTES
Neuro Interventional Radiology   Progress Note     Author: Allison Weber DNP. Date & Time created: 2/21/2024     Date of admission  1/31/2024  Note to reader: this note follows the APSO format rather than the historical SOAP format. Assessment and plan located at the top of the note for ease of use.    Chief Complaint  63 y.o. female admitted 1/31/2024 with subarachnoid hemorrhage      HPI  Fay France is a 64 yo female with PMH significant for HTN who presented to OSH for sudden onset of new eyelid droop and generalized headache without significant head trauma. Pt seized in OSH CT scan, was loaded with Keppra and transferred for higher level of care. OSH imaging demonstrated a large left PCOM aneurysm with subarachnoid hemorrhage and a small right ICA terminus aneurysm. Carpio Potts 4; Modified Swenson scale 3. 01/31/24 MAURICE Dr. Eason completed a cerebral aneurysm with coil embolization of large left PCOMM aneurysm.      Interval History MAURICE:   02/01/24: RIGHT groin access site soft, non-tender, without ecchymosis. Dressing clean, dry, intact. Pedal pulses +2 bilaterally with feet pink, and warm, cap refill <3 sec. Pt is intubated and ventilated with EVD. She is not following commands, but moves right foot to stimuli. I reviewed the most recent labs including WBC 19.6, Hgb 14.2, Cr 0.61. Coordinated post IR procedure care with hospital nursing staff.      02/05/24:  PBD/post Pcom aneurysm embolization with coil #5.  Pt extubated yesterday (2/4). A stat CTA and CT perfusion done early am on 2/4 (increased lethargy) no obvious vasospasm noted. TCD's with poor windows therefor no baseline Tcd's obtained.  I reviewed FU MRI brain(02/04) which showed small L MCA infarcts-Vasopressors infusing and  keeping -200 to mitigate vasospasm. Anterior EVD in place at 10 cm H20 above tragus with good waveform noted on monitor-R groin site soft, nontender without edema, bleeding, small scab with small  ecchymosis. I reviewed  today's labs: WBC 13.7; hemoglobin 10.5;  ; Cr 0.40; respiratory culture- +MSSA in sputum-continuing Rocephin today is day 5 of 5.  I's/O's-last 24 hours+ 551/since admission +1260; ICP 2-10 (good ICP waveform noted on monitor).       02/06/24:  PBD/post Pcom aneurysm embolization with coil #6. TCD's with poor windows, recommend following neuro assessment. I reviewed FU MRI brain(02/04) which showed small L MCA infarcts-Goal -200 to mitigate vasospasm. Anterior EVD in place at 10 cm H20 above tragus with good waveform noted on monitor-R groin site soft, nontender without edema, bleeding, small scab with small  ecchymosis. I reviewed today's labs: WBC 17; hemoglobin 10.8;  ; Cr 0.33; respiratory culture- +MSSA in sputum -completed Rocephin.  I's/O's-last 24 hours -193 /since admission +1037; ICP 5-11 (good ICP waveform noted on monitor). I started ASA therapy 2nd to multiple infarcts noted on MRI.  ASA therapy discussed and approved by Fer Helms and  Dr. Eason.      02/07/24: SAH, Carpio Potts 4;Modified Swenson 3.  PBD/post Pcom aneurysm embolization with coil #7. TCD's discontinued 2nd to poor windows, recommend following neuro assessment. -Goal -200 to mitigate vasospasm. Anterior EVD in place at 10 cm H20 above tragus with good waveform noted on monitor-ecchymosis. I reviewed today's labs: WBC 15.6; Hemoglobin 11.1 ; Cr 0.36; .  I's/O's-last 24 hours -190 /since admission +876; ICP 3-10 (good ICP waveform noted on monitor).      02/08/24: SAH, Carpio Potts 4;Modified Swenson 3.  PBD/post Pcom aneurysm embolization with coil #8. TCD's with poor windows, recommend following neuro assessment. -Goal -200 to mitigate vasospasm. Anterior EVD open, raised today to 20 cm H20 above tragus with good waveform noted on monitor-ecchymosis. I reviewed today's labs: WBC 15.1; Hemoglobin 10.5;  ; Cr 0.31;  I's/O's-last 24 hours --1091/since admission -214 ICP 4-11/CPP > 100 (good  ICP waveform noted on monitor).  Tmax 100.6     02/09/24: SAH, Carpio Potts 4;Modified Swenson 3. PBD/post Pcom aneurysm embolization with coil #9. No longer following TCD's 2nd to poor windows.   Follow neuro assessment. -Goal -200 to mitigate vasospasm. Anterior EVD open, @15 cm H20 above tragus with good waveform noted on monitor. I reviewed today's labs: WBC 14.9; Hemoglobin 11.2;  ; Cr 0.29;  I's/O's-last 24 hours -110 ml; output since admission - 675 ml.  ICP 4-11/CPP ; EVD output -139 mL in the last 24 hours (good ICP waveform noted on monitor). Tmax 100. 9     2/10/24: SAH, Carpio Potts 4; Modified Swenson 3. PBD #10 /post Pcom aneurysm embolization with 10 coils (1/31/24). No longer following TCD's 2nd to poor windows.   Follow neuro assessment. -Goal -200 to mitigate vasospasm. Anterior EVD open, @ 20 cm H20 above tragus with good waveform noted on monitor. I reviewed patient's most recent labs: WBC 14.3; Hemoglobin 10.6;  ; Cr 0.36;  ICP<15; EVD output:139 mL in the last 24 hours. Tmax 100. 4     2/11/24: SAH, Carpio Potts 4; Modified Swenson 3. PBD #11 /post Pcom aneurysm embolization with 10 coils (1/31/24). Pt is verbally responsive with appropriate answers in Spansh, purposeful and following some commands.  - EVD clamped 2/11/24.   - No longer following TCD's 2nd to poor windows.   Follow neuro assessment.   - Goal -200 to mitigate vasospasm.   - I reviewed patient's most recent labs: WBC 17.5; Hemoglobin 11.0;  ; Cr 0.36; Tmax 99.5    02/12/24 - PBD#12. No acute events overnight. Neuro unchanged per bedside RN. No longer following TCD's 2nd to poor windows; q 2 neuro exams in place.  Labs reviewed; , WBC 17.4. Repeat CT this morning shows decreased hyperdensity along the bilateral tentorium.  Family at bedside; education provided.  Patient discussed with bedside RN.    02/13/24 - PBD#13. No acute events overnight. EVD removed yesterday. Pt more alert today for  my assessment; however, neurologist and intensivist report she is more lethargic and less verbal. CTA and CT perfusion study ordered. Pt squeezing my hand bilaterally and moving all extremities; following some commands. Nurse at bedside administering thick liquid medication eliciting cough. Q 2 neuro exams in place. Labs reviewed; , WBC 15.3. Patient discussed with bedside RN.    02/14/24 - PBD#14. No acute events overnight. CTA yesterday shows possible distal left MCA spasm; CT perfusion without mismatch.  Patient awake and interactive today.  Q 2 neuro exams remain in place. Labs reviewed; , WBC 13.5. Patient discussed with bedside RN.  NSG signed off.    02/15/24 - PBD#15. No acute events overnight. Pt's level of alertness waxes and wanes. Currently very alert and following commands but only oriented to self.A/O x 4 earlier per nursing staff. Moves all extremities equally. Labs reviewed; , WBC 12.0. Patient discussed with intensivist.     02/16/24 -  PBD#16. No acute events overnight. Pt's level of alertness waxes and wanes. Currently sleepy but easily arousable and following commands.  Moves all extremities equally. Labs reviewed; , WBC 12.8. Patient discussed with intensivist and neurologis     02/17/24 - PBD #17. Last CTA/P was on 02/13- no spasm identified. No Tcd's due to poor windows.  No acute events overnight.  Labs reviewed; , WBC 11.9; Hgb 11.8; Cr 0.38;  T max 100.2;  Is nd Os- since 2/3/24 -1595/24 hr -1370      2/19/24: PBD #19. No acute events overnight. Alert and oriented, answers questions with complete sentences, follows commands. PT/OT/SLP. I reviewed patient's most recent labs including WBC 10.4, Hgb 11.7, Cr 0.31, Na 135. Tmax 37.3.    2/20/24: PBD #20. No acute events overnight. Alert and oriented, answers questions with complete sentences, follows commands. Family present in room noting than alertness and interactivity waxes and wanes through the day.  I  reviewed patient's most recent labs including WBC 8.6, Hgb 12.0, Cr 0.44, Na 137. Tmax 37.2.    2/21/24: PBD #21. No acute events overnight. Transferred to neurosciences. Alert and oriented, seated in a chair with spouse in room. Fay answers questions with complete sentences and follows commands. I reviewed patient's most recent labs including WBC 8.6, Hgb 12.0, Cr 0.49, Na 139. I coordinated post MAURICE procedure care with hospital nursing staff.    Assessment/Plan     Principal Problem:    SAH (subarachnoid hemorrhage) (HCC)  Active Problems:    Acute respiratory failure with hypoxia (HCC)    Seizure (HCC)    Hypokalemia    Hypophosphatemia    Pneumonia of both lungs due to methicillin susceptible Staphylococcus aureus (MSSA) (HCC)    Aneurysm of ascending aorta without rupture (HCC)    Hyperglycemia    Primary hypertension    Hypothyroid      Plan IR  - PBD #21  - Long-term goal for -130 per Vascular Neurology recommendations  - Discontinue Nimodipine after today, per Vascular Neurology   - Goal of euvolemia, euglycemia, normothermia, and eunatremia   - Neuro assessment per unit protocol for potential vasospasm; for suspected vasospasm consider CTA head and CTP  - Continue ASA 81 mg daily  - Continue Lovenox SQ for DVT ppx    - Vascular Neurology and neurosurgery following  - PT/OT/SLP, therapies advising ARU placement.  - Neurointerventional Radiology signing off.      -Thank you for allowing Interventional Radiology team to participate in the patients care, if any additional care or requests are needed in the future please do not hesitate to call or place IR order   [phone]           Review of Systems  Physical Exam   Review of Systems   Constitutional:  Negative for chills and fever.        Able to answer in short complete sentences.   Respiratory:  Negative for shortness of breath.    Cardiovascular:  Negative for chest pain and palpitations.   Gastrointestinal:  Negative for nausea and vomiting.    Neurological:  Positive for focal weakness. Negative for tingling and headaches.      Vitals:    02/21/24 1559   BP: 105/70   Pulse: 100   Resp: 18   Temp: 36.8 °C (98.2 °F)   SpO2: 89%        Physical Exam  Vitals and nursing note reviewed.   Constitutional:       General: She is awake.      Appearance: Normal appearance.   Eyes:      Comments: Left eyelid ptosis    Cardiovascular:      Rate and Rhythm: Normal rate.      Pulses: Normal pulses.   Pulmonary:      Effort: No respiratory distress.   Abdominal:      Palpations: Abdomen is soft.      Tenderness: There is no abdominal tenderness.   Musculoskeletal:         General: Normal range of motion.      Right lower leg: No edema.      Left lower leg: No edema.   Skin:     General: Skin is warm and dry.      Capillary Refill: Capillary refill takes 2 to 3 seconds.   Neurological:      General: No focal deficit present.      Mental Status: She is alert and oriented to person, place, and time.      Cranial Nerves: Cranial nerve deficit present.      Sensory: No sensory deficit.      Comments: A/O x 2, follows commands  L ptosis, L CNIII palsy,   antigravity strength in all 4 extremities   Gross sensation intact   Psychiatric:         Mood and Affect: Mood normal.         Behavior: Behavior normal. Behavior is cooperative.      Comments: Anticipates acute rehab placement             Labs    Recent Labs     02/19/24 0413 02/20/24  0438   WBC 10.4 8.6   RBC 4.48 4.66   HEMOGLOBIN 11.7* 12.0   HEMATOCRIT 35.6* 38.1   MCV 79.5* 81.8   MCH 26.1* 25.8*   MCHC 32.9 31.5*   RDW 40.8 41.4   PLATELETCT 513* 531*   MPV 9.4 9.6     Recent Labs     02/19/24 0413 02/20/24 0438 02/21/24  0356   SODIUM 135 137 139   POTASSIUM 3.9 4.6 3.7   CHLORIDE 99 99 100   CO2 24 27 27   GLUCOSE 127* 130* 118*   BUN 15 19 16   CREATININE 0.31* 0.44* 0.49*   CALCIUM 10.1 10.3 10.4     Recent Labs     02/19/24 0413 02/20/24 0438 02/21/24  0356   ALBUMIN 3.7  --   --    TBILIRUBIN 0.2  --   --     ALKPHOSPHAT 159*  --   --    TOTPROTEIN 7.6  --   --    ALTSGPT 23  --   --    ASTSGOT 29  --   --    CREATININE 0.31* 0.44* 0.49*     DX-ABDOMEN FOR TUBE PLACEMENT   Final Result         1.  Nonspecific bowel gas pattern in the upper abdomen.   2.  Dobbhoff tube with tip terminating overlying the expected location of the first or second duodenal segment.   3.  Cardiomegaly      CT-CEREBRAL PERFUSION ANALYSIS   Final Result      1.  Cerebral blood flow less than 30% likely representing completed infarct = 0 mL.      2.  T Max more than 6 seconds likely representing combination of completed infarct and ischemia = 0 mL.      3.  Mismatched volume likely representing ischemic brain/penumbra = None      4.  Please note that the cerebral perfusion was performed on the limited brain tissue around the basal ganglia region. Infarct/ischemia outside the CT perfusion sections can be missed in this study.      CT-CTA HEAD WITH & W/O-POST PROCESS   Final Result      1.  Prior LEFT middle cranial fossa aneurysm coiling.  Artifact limits exam.   2.  No abrupt large vessel cut off.   3.  Segmental narrowing of LEFT middle cerebral artery, primarily M1 segment, vasospasm versus artifact.   4.  Removal of ventriculostomy catheter. No hydrocephalus.   5.  Evolving RIGHT caudate infarct.   6.  Slightly improved intracranial hemorrhage.   7.  4 mm right carotid terminus aneurysm.      CT-HEAD W/O   Final Result         1.  Hyperdensity along the bilateral tentorium, compatible with layering subarachnoid hemorrhages, decreased since prior study.   2.  Atherosclerosis.         DX-ABDOMEN FOR TUBE PLACEMENT   Final Result         1.  Nonspecific bowel gas pattern in the upper abdomen.   2.  Nasogastric tube tip terminates overlying the expected location of the antrum or pylorus, somewhat withdrawn since prior study.   3.  Hazy interstitial pulmonary edema and/or infiltrates, stable   4.  Cardiomegaly      DX-ABDOMEN FOR TUBE PLACEMENT    Final Result         1.  Nonspecific bowel gas pattern in the upper abdomen.   2.  Nasogastric tube tip terminates overlying the expected location of the pylorus or first duodenal segment.   3.  Hazy interstitial pulmonary edema and/or infiltrates   4.  Cardiomegaly      DX-CHEST-PORTABLE (1 VIEW)   Final Result         Findings on chest radiograph appear stable since the prior radiograph.  No new abnormalities are identified.      DX-ABDOMEN FOR TUBE PLACEMENT   Final Result      Enteric tube tip projects over the gastric antrum or proximal duodenum, similar to prior      DX-ABDOMEN FOR TUBE PLACEMENT   Final Result      NG tube tip projects at the peripyloric region.      DX-ABDOMEN FOR TUBE PLACEMENT   Final Result      NG tube tip projects at the peripyloric region.      DX-ABDOMEN FOR TUBE PLACEMENT   Final Result      Feeding tube in place with tip at the distal stomach/pylorus.      [id number] VIEW   Final Result         1.  Nonspecific bowel gas pattern in the upper abdomen.   2.  Nasogastric tube tip terminates overlying the expected location of the pylorus or first duodenal segment.   3.  Hazy interstitial pulmonary edema and/or infiltrates   4.  Cardiomegaly      DX-ABDOMEN FOR TUBE PLACEMENT   Final Result      Enteric sump tube in situ with its tip at the level of the distal gastric antrum or pylorus.      DX-ABDOMEN FOR TUBE PLACEMENT   Final Result         1.  Nonspecific bowel gas pattern in the upper abdomen.   2.  Nasogastric tube tip terminates overlying the expected location of the pylorus or first duodenal segment.   3.  Hazy interstitial pulmonary edema and/or infiltrates, similar to prior study.   4.  Cardiomegaly      DX-CHEST-PORTABLE (1 VIEW)   Final Result      1.  Supportive tubing as described above.   2.  No other significant change from prior exam.         MR-BRAIN-W/O   Final Result      1.  Multifocal areas of acute infarcts in the left cerebral hemisphere.   2.  Small area of  acute infarct in the right basal ganglia adjacent to the tip of the ventriculostomy catheter.   3.  Cortical infarct in the right medial parietal lobe.   4.  Mild amount of subdural hemorrhages surrounding the bilateral cerebellar hemispheres and right cerebellum.   5.  Subarachnoid hemorrhage.   6.  There is no hydrocephalus.   7.  There are changes secondary to the previous endovascular repair left internal carotid aneurysm.      CT-CTA NECK WITH & W/O-POST PROCESSING   Final Result      No focal high-grade stenosis, dissection or occlusion of the cervical carotid or vertebral arteries.      CT-CEREBRAL PERFUSION ANALYSIS   Final Result      1.  Cerebral blood flow less than 30% likely representing completed infarct = 0 mL.      2.  T Max more than 6 seconds likely representing combination of completed infarct and ischemia = 0 mL.      3.  Mismatched volume likely representing ischemic brain/penumbra = None      4.  Please note that the cerebral perfusion was performed on the limited brain tissue around the basal ganglia region. Infarct/ischemia outside the CT perfusion sections can be missed in this study.      CT-CTA HEAD WITH & W/O-POST PROCESS   Final Result      1.  Prior LEFT middle cranial fossa aneurysm coiling.  Artifact limits exam.   2.  No abrupt large vessel cut off.   3.  Segmental narrowing of LEFT middle cerebral artery, vasospasm versus artifact.   4.  Ventriculostomy catheter in similar position.   5.  Evolving RIGHT caudate infarct.   6.  Stable intracranial hemorrhage.         DX-ABDOMEN FOR TUBE PLACEMENT   Final Result      Orogastric tube tip now at the mid stomach.      DX-ABDOMEN FOR TUBE PLACEMENT   Final Result      Orogastric tube tip at the proximal stomach with side port just above the GE junction.      US-INTRACRANIAL ARTERY LIMITED   Final Result      DX-CHEST-LIMITED (1 VIEW)   Final Result         1.  Stable chest with perihilar and lower lung zone interstitial and minimal  airspace opacities most consistent with pulmonary edema.   2.  No new abnormalities.      EC-ECHOCARDIOGRAM COMPLETE W/O CONT   Final Result      US-INTRACRANIAL ARTERY LIMITED   Final Result      DX-CHEST-PORTABLE (1 VIEW)   Final Result      1.  Lines and tubes appear appropriately located      2.  Improvement of pulmonary edema/airspace process      US-INTRACRANIAL ARTERY COMP   Final Result      CT-HEAD W/O   Final Result         1. Slightly decreased subarachnoid hemorrhage overlying the cerebral hemispheres.   2. Otherwise, stable as above.         DX-CHEST-FOR LINE PLACEMENT Perform procedure in: Patient's Room   Final Result      1.  Moderate interstitial pulmonary edema.   2.   Right-sided central venous catheter terminates with the tip projecting over the expected region of the mid to distal superior vena cava.   3.  Endotracheal tube terminates in satisfactory position at the level of the aortic arch.   4.  Enteric feeding tube terminates in the left upper quadrant projecting over the expected location of the stomach.      CT-STEALTH HEAD W/O   Final Result      1.  Interval placement of a right transparietal ventriculostomy catheter which terminates with the tip near the third ventricle. There is minimally decreased caliber of the ventricular system and compared to previous exam.   2.  Findings of intracranial hemorrhage appear similar to the previous exam. No definite new acute intracranial hemorrhage is identified.   3.  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         DX-CHEST-PORTABLE (1 VIEW)   Final Result      CT-HEAD W/O   Final Result      1. Interval aneurysm coiling of left posterior communicating artery aneurysm.   2. Possible increased subarachnoid hemorrhage.   3. Intraventricular hemorrhage. There is developing mild hydrocephalus with mildly increased ventricles.   4. Small right convexity subdural hematoma measuring 5 mm in thickness.      These  "findings were discussed with STEPHANIE EASON on 1/31/2024 3:14 PM.      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   This aneurysm was embolized to occlusion as described above. Final angiographic images demonstrate only minimal filling of the neck of the aneurysm. The patient will be followed up in   the neuro interventional clinic and brought back for a follow-up angiogram in 6 months.      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      I, Stephanie Eason was physically present and participated during the entire procedure of the IR-EMBOLIZATION.        INR   Date Value Ref Range Status   01/31/2024 1.06 0.87 - 1.13 Final     Comment:     INR - Non-therapeutic Reference Range: 0.87-1.13  INR - Therapeutic Reference Range: 2.0-4.0       No results found for: \"POCINR\"     Intake/Output Summary (Last 24 hours) at 2/12/2024 0811  Last data filed at 2/12/2024 0600  Gross per 24 hour   Intake 2444.57 ml   Output 2345 ml   Net 99.57 ml      Labs not explicitly included in this progress note were reviewed by the author. Radiology/imaging not explicitly included in this progress note was reviewed by the author.     I have performed a physical exam and reviewed and updated ROS and Plan today (2/21/2024).     36 minutes in directly providing and coordinating care and extensive data review.  No time overlap and excludes procedures.  "

## 2024-02-22 NOTE — CARE PLAN
The patient is Stable - Low risk of patient condition declining or worsening    Shift Goals  Clinical Goals: Safety/Monitor Neuro Status  Patient Goals: Rest  Family Goals: Patient to feel better    Progress made toward(s) clinical / shift goals: Assumed care of patient at 1915. Patient is A&Ox3, only disoriented to year. Patient is able to verbalize correct month. Q4hr neuro checks are being completed. SBP < 160 mmHg.  in place. Fall/Seizure/Aspiration precautions. Bed is low and locked, bed alarm is on, call light is within reach, hourly rounding continues.     Problem: Fall Risk  Goal: Patient will remain free from falls  Outcome: Progressing  Note: Patient's room is close to nursing station, patient verbalizes understanding of level of risk and agrees to utilize call light when needing assistance.      Problem: Neuro Status  Goal: Neuro status will remain stable or improve  Outcome: Progressing  Note: Q4hr neuro checks - stable      Problem: Safety - Medical Restraint  Goal: Free from restraint(s) (Restraint for Interference with Medical Device)  Outcome: Progressing  Note: Patient no longer requires restraints, impulsivity has decreased and patient follows commands.      Problem: Skin Integrity  Goal: Skin integrity is maintained or improved  Outcome: Progressing  Note: Waffle overlay placed, barrier paste applied to sacrum, TAPs in place, and encouraging patient to turn.      Problem: Urinary Elimination  Goal: Establish and maintain regular urinary output  Outcome: Progressing  Note: Q6hr bladder scan in place.      Patient is not progressing towards the following goals: N/A

## 2024-02-22 NOTE — DISCHARGE PLANNING
OPAL spoke with Minal at [facility]. She will check on status of insurance auth and call this SW back.    1400: Opal spoke with Minal. They got insurance insurance auth and can accept pt tomorrow. SW to call [facility] in morning to confirm discharge time and will arrange transport.

## 2024-02-22 NOTE — PROGRESS NOTES
Hospital Medicine Daily Progress Note    Date of Service  2/22/2024    Chief Complaint  Fay Turner is a 63 y.o. female admitted 1/31/2024 with hypertension    Hospital Course    63 y.o. female with past medical history of hypertension who presented 1/31/2024 with altered mental status.  CT of the head there revealed a large left posterior communicating artery aneurysm and subarachnoid hemorrhage.  S/p Cerebral Angiogram and Intracranial aneurysm embolization on 1/31/2024 . S/p EVD placed for hydrocephalus on 1/31.  S/p  extubated on 2/3/2024.  Tracheal cultures were positive for MSSA and she was treated with a course of antibiotics.  MRI of the brain revealed small areas of infarct in the left cerebral hemisphere, right basal ganglia,.  Due to seizures upon presentation she has been treated with Keppra and Neurontin.  Because of the coiling she has been treated with aspirin.  PT/OT recommended postacute placement.    Interval Problem Update    2/22/2024  Patient seen and examined at bedside  Vitals remained stable, denies any discomfort, denies any new weakness/numbness  Labs reviewed, sodium 130s, potassium 3.6, glucose 103  Continue on aspirin, Lipitor, Keppra 5 mg twice daily  Cuellar removed, passed voiding trial  Monitor closely for seizure on as needed Ativan for seizure  Monitor blood pressure closely, on as needed labetalol      Patient has a high medical complexity, complex decision making and is at high risk of complication, morbidity and mortality          I have discussed this patient's plan of care and discharge plan at IDT rounds today with Case Management, Nursing, Nursing leadership, and other members of the IDT team.    Consultants/Specialty  neurology    Code Status  Full Code    Disposition  The patient is medically cleared for discharge to home or a post-acute facility.  Anticipate discharge to: skilled nursing facility    I have placed the appropriate orders for post-discharge  needs.    Review of Systems  Review of Systems   Constitutional:  Positive for malaise/fatigue.   Respiratory:  Negative for shortness of breath.    Cardiovascular:  Negative for chest pain.        Physical Exam  Temp:  [36.6 °C (97.9 °F)-37.1 °C (98.8 °F)] 36.6 °C (97.9 °F)  Pulse:  [] 118  Resp:  [18] 18  BP: (105-129)/(70-84) 129/84  SpO2:  [89 %-92 %] 91 %    Physical Exam  Constitutional:       Appearance: She is ill-appearing.   Cardiovascular:      Rate and Rhythm: Normal rate and regular rhythm.   Skin:     General: Skin is warm.   Neurological:      General: No focal deficit present.      Mental Status: She is alert. She is disoriented.      Comments: Alert oriented x 2, moving all extremity spontaneously, left eyelid drop.          Fluids    Intake/Output Summary (Last 24 hours) at 2/22/2024 1558  Last data filed at 2/22/2024 1100  Gross per 24 hour   Intake 120 ml   Output 400 ml   Net -280 ml       Laboratory  Recent Labs     02/20/24  0438   WBC 8.6   RBC 4.66   HEMOGLOBIN 12.0   HEMATOCRIT 38.1   MCV 81.8   MCH 25.8*   MCHC 31.5*   RDW 41.4   PLATELETCT 531*   MPV 9.6     Recent Labs     02/20/24  0438 02/21/24  0356 02/22/24  0053   SODIUM 137 139 136   POTASSIUM 4.6 3.7 3.6   CHLORIDE 99 100 99   CO2 27 27 24   GLUCOSE 130* 118* 103*   BUN 19 16 21   CREATININE 0.44* 0.49* 0.55   CALCIUM 10.3 10.4 10.3                   Imaging  DX-ABDOMEN FOR TUBE PLACEMENT   Final Result         1.  Nonspecific bowel gas pattern in the upper abdomen.   2.  Dobbhoff tube with tip terminating overlying the expected location of the first or second duodenal segment.   3.  Cardiomegaly      CT-CEREBRAL PERFUSION ANALYSIS   Final Result      1.  Cerebral blood flow less than 30% likely representing completed infarct = 0 mL.      2.  T Max more than 6 seconds likely representing combination of completed infarct and ischemia = 0 mL.      3.  Mismatched volume likely representing ischemic brain/penumbra = None      4.   Please note that the cerebral perfusion was performed on the limited brain tissue around the basal ganglia region. Infarct/ischemia outside the CT perfusion sections can be missed in this study.      CT-CTA HEAD WITH & W/O-POST PROCESS   Final Result      1.  Prior LEFT middle cranial fossa aneurysm coiling.  Artifact limits exam.   2.  No abrupt large vessel cut off.   3.  Segmental narrowing of LEFT middle cerebral artery, primarily M1 segment, vasospasm versus artifact.   4.  Removal of ventriculostomy catheter. No hydrocephalus.   5.  Evolving RIGHT caudate infarct.   6.  Slightly improved intracranial hemorrhage.   7.  4 mm right carotid terminus aneurysm.      CT-HEAD W/O   Final Result         1.  Hyperdensity along the bilateral tentorium, compatible with layering subarachnoid hemorrhages, decreased since prior study.   2.  Atherosclerosis.         DX-ABDOMEN FOR TUBE PLACEMENT   Final Result         1.  Nonspecific bowel gas pattern in the upper abdomen.   2.  Nasogastric tube tip terminates overlying the expected location of the antrum or pylorus, somewhat withdrawn since prior study.   3.  Hazy interstitial pulmonary edema and/or infiltrates, stable   4.  Cardiomegaly      DX-ABDOMEN FOR TUBE PLACEMENT   Final Result         1.  Nonspecific bowel gas pattern in the upper abdomen.   2.  Nasogastric tube tip terminates overlying the expected location of the pylorus or first duodenal segment.   3.  Hazy interstitial pulmonary edema and/or infiltrates   4.  Cardiomegaly      DX-CHEST-PORTABLE (1 VIEW)   Final Result         Findings on chest radiograph appear stable since the prior radiograph.  No new abnormalities are identified.      DX-ABDOMEN FOR TUBE PLACEMENT   Final Result      Enteric tube tip projects over the gastric antrum or proximal duodenum, similar to prior      DX-ABDOMEN FOR TUBE PLACEMENT   Final Result      NG tube tip projects at the peripyloric region.      DX-ABDOMEN FOR TUBE PLACEMENT    Final Result      NG tube tip projects at the peripyloric region.      DX-ABDOMEN FOR TUBE PLACEMENT   Final Result      Feeding tube in place with tip at the distal stomach/pylorus.      [id number] VIEW   Final Result         1.  Nonspecific bowel gas pattern in the upper abdomen.   2.  Nasogastric tube tip terminates overlying the expected location of the pylorus or first duodenal segment.   3.  Hazy interstitial pulmonary edema and/or infiltrates   4.  Cardiomegaly      DX-ABDOMEN FOR TUBE PLACEMENT   Final Result      Enteric sump tube in situ with its tip at the level of the distal gastric antrum or pylorus.      DX-ABDOMEN FOR TUBE PLACEMENT   Final Result         1.  Nonspecific bowel gas pattern in the upper abdomen.   2.  Nasogastric tube tip terminates overlying the expected location of the pylorus or first duodenal segment.   3.  Hazy interstitial pulmonary edema and/or infiltrates, similar to prior study.   4.  Cardiomegaly      DX-CHEST-PORTABLE (1 VIEW)   Final Result      1.  Supportive tubing as described above.   2.  No other significant change from prior exam.         MR-BRAIN-W/O   Final Result      1.  Multifocal areas of acute infarcts in the left cerebral hemisphere.   2.  Small area of acute infarct in the right basal ganglia adjacent to the tip of the ventriculostomy catheter.   3.  Cortical infarct in the right medial parietal lobe.   4.  Mild amount of subdural hemorrhages surrounding the bilateral cerebellar hemispheres and right cerebellum.   5.  Subarachnoid hemorrhage.   6.  There is no hydrocephalus.   7.  There are changes secondary to the previous endovascular repair left internal carotid aneurysm.      CT-CTA NECK WITH & W/O-POST PROCESSING   Final Result      No focal high-grade stenosis, dissection or occlusion of the cervical carotid or vertebral arteries.      CT-CEREBRAL PERFUSION ANALYSIS   Final Result      1.  Cerebral blood flow less than 30% likely representing  completed infarct = 0 mL.      2.  T Max more than 6 seconds likely representing combination of completed infarct and ischemia = 0 mL.      3.  Mismatched volume likely representing ischemic brain/penumbra = None      4.  Please note that the cerebral perfusion was performed on the limited brain tissue around the basal ganglia region. Infarct/ischemia outside the CT perfusion sections can be missed in this study.      CT-CTA HEAD WITH & W/O-POST PROCESS   Final Result      1.  Prior LEFT middle cranial fossa aneurysm coiling.  Artifact limits exam.   2.  No abrupt large vessel cut off.   3.  Segmental narrowing of LEFT middle cerebral artery, vasospasm versus artifact.   4.  Ventriculostomy catheter in similar position.   5.  Evolving RIGHT caudate infarct.   6.  Stable intracranial hemorrhage.         DX-ABDOMEN FOR TUBE PLACEMENT   Final Result      Orogastric tube tip now at the mid stomach.      DX-ABDOMEN FOR TUBE PLACEMENT   Final Result      Orogastric tube tip at the proximal stomach with side port just above the GE junction.      US-INTRACRANIAL ARTERY LIMITED   Final Result      DX-CHEST-LIMITED (1 VIEW)   Final Result         1.  Stable chest with perihilar and lower lung zone interstitial and minimal airspace opacities most consistent with pulmonary edema.   2.  No new abnormalities.      EC-ECHOCARDIOGRAM COMPLETE W/O CONT   Final Result      US-INTRACRANIAL ARTERY LIMITED   Final Result      DX-CHEST-PORTABLE (1 VIEW)   Final Result      1.  Lines and tubes appear appropriately located      2.  Improvement of pulmonary edema/airspace process      US-INTRACRANIAL ARTERY COMP   Final Result      CT-HEAD W/O   Final Result         1. Slightly decreased subarachnoid hemorrhage overlying the cerebral hemispheres.   2. Otherwise, stable as above.         DX-CHEST-FOR LINE PLACEMENT Perform procedure in: Patient's Room   Final Result      1.  Moderate interstitial pulmonary edema.   2.   Right-sided central  venous catheter terminates with the tip projecting over the expected region of the mid to distal superior vena cava.   3.  Endotracheal tube terminates in satisfactory position at the level of the aortic arch.   4.  Enteric feeding tube terminates in the left upper quadrant projecting over the expected location of the stomach.      CT-STEALTH HEAD W/O   Final Result      1.  Interval placement of a right transparietal ventriculostomy catheter which terminates with the tip near the third ventricle. There is minimally decreased caliber of the ventricular system and compared to previous exam.   2.  Findings of intracranial hemorrhage appear similar to the previous exam. No definite new acute intracranial hemorrhage is identified.   3.  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         DX-CHEST-PORTABLE (1 VIEW)   Final Result      CT-HEAD W/O   Final Result      1. Interval aneurysm coiling of left posterior communicating artery aneurysm.   2. Possible increased subarachnoid hemorrhage.   3. Intraventricular hemorrhage. There is developing mild hydrocephalus with mildly increased ventricles.   4. Small right convexity subdural hematoma measuring 5 mm in thickness.      These findings were discussed with EMILY BYRD on 1/31/2024 3:14 PM.      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   This aneurysm was embolized to occlusion as described above. Final angiographic images demonstrate only minimal filling of the neck of the aneurysm. The patient will be followed up in   the neuro interventional clinic and brought back for a follow-up angiogram in 6 months.      Also noted is a 5 mm  right supraclinoid ICA terminus aneurysm projecting posteriorly and superiorly.      I Stephanie Eason was physically present and participated during the entire procedure of the IR-EMBOLIZATION.           Assessment/Plan  * SAH (subarachnoid hemorrhage) (HCC)- (present on admission)  Assessment & Plan  Non-traumatic SAH status posterior communicating artery aneurysm coiling with resultant hydrocephalus requiring external ventricular drain by Dr. Monroe x 2.  Transcranial Dopplers were not done as she has no windows  She was treated with IV vasopressors for presumptive vasospasm  Blood pressure control  Daily aspirin in order to mitigate clotting of the coils  Need placement, case and assisting    Hypothyroid- (present on admission)  Assessment & Plan  Hx of  Family is brining in a list of her home meds and restart levothyroxine     Primary hypertension- (present on admission)  Assessment & Plan  Blood pressure acceptable      Aneurysm of ascending aorta without rupture (HCC)- (present on admission)  Assessment & Plan  4.1 cm  This is appropriate to follow up outpatient    Pneumonia of both lungs due to methicillin susceptible Staphylococcus aureus (MSSA) (HCC)- (present on admission)  Assessment & Plan  Tracheal aspirate from 1/31 was + for MSSA which was treated.    Seizure (HCC)- (present on admission)  Assessment & Plan  Keppra 500 mg BID and neurontin 200 mg TID  Seizure precautions.    Acute respiratory failure with hypoxia (HCC)- (present on admission)  Assessment & Plan  She required intubation due to airway protection  She is on 1 liter of oxygen.         VTE prophylaxis: lovenox    I have performed a physical exam and reviewed and updated ROS and Plan today (2/22/2024). In review of yesterday's note (2/21/2024), there are no changes except as documented above.

## 2024-02-23 VITALS
DIASTOLIC BLOOD PRESSURE: 83 MMHG | TEMPERATURE: 98.4 F | BODY MASS INDEX: 29.25 KG/M2 | HEART RATE: 101 BPM | HEIGHT: 62 IN | RESPIRATION RATE: 18 BRPM | WEIGHT: 158.95 LBS | OXYGEN SATURATION: 89 % | SYSTOLIC BLOOD PRESSURE: 130 MMHG

## 2024-02-23 PROCEDURE — 700102 HCHG RX REV CODE 250 W/ 637 OVERRIDE(OP): Performed by: STUDENT IN AN ORGANIZED HEALTH CARE EDUCATION/TRAINING PROGRAM

## 2024-02-23 PROCEDURE — 97130 THER IVNTJ EA ADDL 15 MIN: CPT

## 2024-02-23 PROCEDURE — 99239 HOSP IP/OBS DSCHRG MGMT >30: CPT | Performed by: STUDENT IN AN ORGANIZED HEALTH CARE EDUCATION/TRAINING PROGRAM

## 2024-02-23 PROCEDURE — 92526 ORAL FUNCTION THERAPY: CPT

## 2024-02-23 PROCEDURE — A9270 NON-COVERED ITEM OR SERVICE: HCPCS | Performed by: STUDENT IN AN ORGANIZED HEALTH CARE EDUCATION/TRAINING PROGRAM

## 2024-02-23 PROCEDURE — 97129 THER IVNTJ 1ST 15 MIN: CPT

## 2024-02-23 PROCEDURE — 700102 HCHG RX REV CODE 250 W/ 637 OVERRIDE(OP): Performed by: NURSE PRACTITIONER

## 2024-02-23 PROCEDURE — A9270 NON-COVERED ITEM OR SERVICE: HCPCS | Performed by: NURSE PRACTITIONER

## 2024-02-23 RX ORDER — GABAPENTIN 100 MG/1
200 CAPSULE ORAL EVERY 8 HOURS
Status: SHIPPED
Start: 2024-02-23 | End: 2024-02-23

## 2024-02-23 RX ORDER — ATORVASTATIN CALCIUM 40 MG/1
40 TABLET, FILM COATED ORAL EVERY EVENING
Status: SHIPPED
Start: 2024-02-23 | End: 2024-02-23

## 2024-02-23 RX ORDER — GABAPENTIN 100 MG/1
200 CAPSULE ORAL EVERY 8 HOURS
Status: SHIPPED
Start: 2024-02-23 | End: 2024-03-24

## 2024-02-23 RX ORDER — LEVETIRACETAM 500 MG/1
500 TABLET ORAL 2 TIMES DAILY
Status: SHIPPED
Start: 2024-02-23 | End: 2024-05-23

## 2024-02-23 RX ORDER — ASPIRIN 81 MG/1
81 TABLET, CHEWABLE ORAL DAILY
Status: SHIPPED
Start: 2024-02-24 | End: 2024-03-25

## 2024-02-23 RX ORDER — ASPIRIN 81 MG/1
81 TABLET, CHEWABLE ORAL DAILY
Status: SHIPPED
Start: 2024-02-24 | End: 2024-02-23

## 2024-02-23 RX ORDER — ATORVASTATIN CALCIUM 40 MG/1
40 TABLET, FILM COATED ORAL EVERY EVENING
Status: SHIPPED
Start: 2024-02-23 | End: 2024-03-24

## 2024-02-23 RX ORDER — LEVETIRACETAM 500 MG/1
500 TABLET ORAL 2 TIMES DAILY
Status: SHIPPED
Start: 2024-02-23 | End: 2024-02-23

## 2024-02-23 RX ADMIN — ASPIRIN 81 MG 81 MG: 81 TABLET ORAL at 04:03

## 2024-02-23 RX ADMIN — GABAPENTIN 200 MG: 100 CAPSULE ORAL at 04:03

## 2024-02-23 RX ADMIN — LEVETIRACETAM 500 MG: 500 TABLET, FILM COATED ORAL at 04:03

## 2024-02-23 NOTE — DIETARY
Nutrition Services: Update   Day 23 of admit.  Fay Turner is a 63 y.o. female with admitting DX of SAH (subarachnoid hemorrhage)     Problem: Nutritional:  Goal: Achieve adequate nutritional intake  Description: Patient will consume 50-75% of meals  Outcome: Progressing     Pt is currently on Level 6: soft and bite sized diet with thin liquids. Per last meals documented since nocturnal TF was discontinued, pt has consumed 50-75% for the two meals recorded. Pt progressing toward meeting nutrition demands if able to continue with consistency. Noted pt with discharge orders today for SNF. RD to monitor in the event pt remains inpatient.     Recommendations/Plan:  Continue Ensure Plus TID  Encourage intake of meals  Document intake of all meals as % taken in ADL's to provide interdisciplinary communication across all shifts.   Monitor weight.    RD following if remains inpatient

## 2024-02-23 NOTE — CARE PLAN
The patient is Stable - Low risk of patient condition declining or worsening    Shift Goals  Clinical Goals: Safety/Monitor Neuro Status  Patient Goals: Rest  Family Goals: ALEJANDRA    Progress made toward(s) clinical / shift goals: Assumed care of patient at 1915. Patient is A&Ox3, disoriented to the year. Q4hr neuro checks are being completed. SBP < 160 mmHg.  in place. Fall/Seizure/Aspiration precautions. Bed is low and locked, bed alarm is on, call light is within reach, hourly rounding continues.     Problem: Fall Risk  Goal: Patient will remain free from falls  Outcome: Progressing  Note: Patient's room is close to nursing station, bed and frame alarm are on, treaded slippers are on, mobility present on board, and patient has been educated on level of risk and to utilize call light when needing to get up.      Problem: Neuro Status  Goal: Neuro status will remain stable or improve  Outcome: Progressing  Note: Q4hr neuro checks - stable      Problem: Skin Integrity  Goal: Skin integrity is maintained or improved  Outcome: Progressing  Note: Waffle overlay in place, barrier paste is being applied to sacrum/perineal area, TAPs in place, and skin integrity is being monitored.      Patient is not progressing towards the following goals: N/A

## 2024-02-23 NOTE — THERAPY
Speech Language Pathology   Daily Treatment     Patient Name: Fay Turner  AGE:  63 y.o., SEX:  female  Medical Record #: [id number]  Date of Service: 2024      Precautions:  Precautions: Fall Risk, Swallow Precautions         Subjective  Patient cleared by RN for session. Pt received asleep, easily roused to verbal cues. Pt's spouse at bedside. Pt endorsed tolerance and preference for current SB/TN diet. Pt's spouse reported noticeable increased confusion this date.       Assessment  Dysphagia Tx: Pt agreeable to PO of coffee and dry cracker. Patient w/ adequate self-feeding with appropriate rate and volume of intake. Adequate oral bolus acceptance/containment. No cough/throat clear appreciated with PO intake. Vocal quality remained clear. Single swallow completed per bolus. No signs of esophageal dysfunction.  Provided education regarding general aspiration precautions as well as signs of aspiration. RN updated.      Cognitive Tx: Pt orientated to self and  only. Pt directed to pt board and and re-oriented to date/place/situation. Pt was not able retain information despite max multimodal cues. Pt with clear speech this date. Pt's spouse reported wax and wane mentation. Pt tangential and confused during session; therefore, unable to participate in additional cognitive testing.      Clinical Impressions  -Patient presents with a functional swallow; however, would like to continue with SB6/TN0 diet. Okay to upgrade to regular diet as tolerated.     -Patient presents with cognitive linguistic testing indicate that the pt has moderate deficits in receptive language (suspect 2/2 STM deficits), mild-severe cognitive deficits, and a mild dysarthria. Suspect deficits are acute 2/2 SAH and multifocal acute CVAs. Pt will benefit from intermittent supervision throughout the day and direct A/ IADLS upon discharge d/t same.  Service following.      Recommendations  Treatment Completed: Dysphagia Treatment, Cognitive  "Treatment       Dysphagia Treatment  Diet Consistency: SB6/TN0; feeding A as needed  Instrumentation: None indicated at this time  Medication: Crush with applesauce, as appropriate, Crush with pudding/puree, as appropriate, Whole with puree, As tolerated  Supervision: Independent (feeding A as needed)  Positioning: Fully upright and midline during oral intake, Meals sitting upright in a chair, as tolerated  Risk Management : Small bites/sips, Alternate bites and sips, Slow rate of intake, Reduce environmental distractions  Oral Care: BID         Cognitive Treatment  Supervision Needs Upons Discharge: Intermittent supervision throughout the day and direct assistance with IADLs (see below)  IADLs: Medication management, Financial management, Appointment management, Household chores, Cooking           SLP Treatment Plan  Treatment Plan: Dysphagia Treatment, Cognitive Treatment, Patient/Family/Caregiver Training  SLP Frequency: 4x Per Week  Estimated Duration: Until Therapy Goals Met      Anticipated Discharge Needs  Discharge Recommendations: Recommend post-acute placement for additional speech therapy services prior to discharge home  Therapy Recommendations Upon DC: Dysphagia Training, Cognitive-Linguistic Training, Community Re-Integration, Patient / Family / Caregiver Education      Patient / Family Goals  Patient / Family Goal #1: 2/23: \"Can I have coffee?\"  Short Term Goals  Short Term Goal # 1 B : New 2/20: Pt will consume a SB6/TN0 diet with 1:1 supv without any overt s/s of aspiration or decline in respiratory status  Goal Outcome  # 1 B: Progressing as expected  Short Term Goal # 2: With mod-max cues, pt will be AAOx4 during >65% of opportunities.  Goal Outcome # 2 : Progressing slower than expected  Short Term Goal # 3: With max cues, pt will recall salient/functional information after a 5 min delay during >65% of opportunities.  Goal Outcome  # 3: Progressing slower than expected  Short Term Goal # 4: With " min-mod cues, pt will participate in additional cognitive testing to further guide POC.  Goal Outcome  # 4: Other (see comments) (not targeted this date)  Short Term Goal # 5: Pt will utilize 'clear speech' techniques to increase intelligibility at the conversational level to >90%.  Goal Outcome  # 5: Goal met      Cely Pineda, SLP

## 2024-02-23 NOTE — DISCHARGE PLANNING
SW spoke with [facility]. They can accept today and provide transportation at 12pm. SW notified pt's dtr, Christel, by phone. SW provided COBRA/transfer packet to bedside RN.

## 2024-02-23 NOTE — DISCHARGE SUMMARY
Discharge Summary    CHIEF COMPLAINT ON ADMISSION  No chief complaint on file.      Reason for Admission  STROKE IR     Admission Date  1/31/2024    CODE STATUS  Full Code    HPI & HOSPITAL COURSE  3 y.o. female with past medical history of hypertension who presented 1/31/2024 with altered mental status.  CT of the head there revealed a large left posterior communicating artery aneurysm and subarachnoid hemorrhage.S/p Cerebral Angiogram and Intracranial aneurysm embolization on 1/31/2024 . S/p EVD placed for hydrocephalus on 1/31.S/p  extubated on 2/3/2024.  Tracheal cultures were positive for MSSA and she was treated with a course of antibiotics.  MRI of the brain revealed small areas of infarct in the left cerebral hemisphere, right basal ganglia,.  Due to seizures upon presentation she has been treated with Keppra and Neurontin.  Because of the coiling she has been treated with aspirin.  Aneurysm ascending aorta 4.1 cm need to follow-up with vascular as outpatient.  PT/OT recommended postacute placement.    Patient seen and examined at bedside on day of discharge.  Vitals remained stable, on exam moving all extremities.  Denies any discomfort.  Labs reviewed CBC, BMP unremarkable.  Recommended to follow-up with neurology and vascular surgery for aneurysm of the ascending aorta and discharge.    At the time of discharge patient remain hemodynamically stable ,asymptomatic ,labs remain unremarkable .  Patient will be discharged with close follow up with PCP, neurology, vascular.Discharge plan was discussed with patient in details .  Patient agreed with discharge plan  and  all questions answered.      Therefore, she is discharged in good and stable condition to home with close outpatient follow-up.    The patient met 2-midnight criteria for an inpatient stay at the time of discharge.    Discharge Date  2/23/2024          DISCHARGE DIAGNOSES  Principal Problem:    SAH (subarachnoid hemorrhage) (HCC) (POA:  Yes)  Active Problems:    Acute respiratory failure with hypoxia (HCC) (POA: Yes)    Seizure (HCC) (POA: Yes)    Hypokalemia (POA: Unknown)    Hypophosphatemia (POA: Unknown)    Pneumonia of both lungs due to methicillin susceptible Staphylococcus aureus (MSSA) (HCC) (POA: Yes)    Aneurysm of ascending aorta without rupture (HCC) (POA: Yes)    Hyperglycemia (POA: Unknown)    Primary hypertension (POA: Yes)    Hypothyroid (POA: Yes)  Resolved Problems:    Secondary hypertension (POA: Unknown)    Acute pulmonary edema (HCC) (POA: Unknown)    Hypotension due to hypovolemia (POA: Unknown)    Anemia (POA: Unknown)      FOLLOW UP  Follow-up with neurology    MEDICATIONS ON DISCHARGE     Medication List        ASK your doctor about these medications        Instructions   atenolol 100 MG Tabs  Commonly known as: Tenormin   Take 100 mg by mouth every day.  Dose: 100 mg     losartan 100 MG Tabs  Commonly known as: Cozaar   Take 100 mg by mouth every day.  Dose: 100 mg              Allergies  Allergies   Allergen Reactions    Penicillins Rash     Rxn noted 2014 per Care Everywhere review        DIET  Orders Placed This Encounter   Procedures    Diet Order Diet: Level 6 - Soft and Bite Sized (crush meds/float in applesauce); Liquid level: Level 0 - Thin; Tray Modifications (optional): SLP - 1:1 Supervision by Nursing, SLP - Deliver to Nursing Station     Standing Status:   Standing     Number of Occurrences:   1     Order Specific Question:   Diet:     Answer:   Level 6 - Soft and Bite Sized [23]     Comments:   crush meds/float in applesauce     Order Specific Question:   Liquid level     Answer:   Level 0 - Thin     Order Specific Question:   Tray Modifications (optional)     Answer:   SLP - 1:1 Supervision by Nursing     Order Specific Question:   Tray Modifications (optional)     Answer:   SLP - Deliver to Nursing Station       ACTIVITY  As tolerated.  Weight bearing as  tolerated    CONSULTATIONS  Neurology    PROCEDURES  DX-ABDOMEN FOR TUBE PLACEMENT   Final Result         1.  Nonspecific bowel gas pattern in the upper abdomen.   2.  Dobbhoff tube with tip terminating overlying the expected location of the first or second duodenal segment.   3.  Cardiomegaly      CT-CEREBRAL PERFUSION ANALYSIS   Final Result      1.  Cerebral blood flow less than 30% likely representing completed infarct = 0 mL.      2.  T Max more than 6 seconds likely representing combination of completed infarct and ischemia = 0 mL.      3.  Mismatched volume likely representing ischemic brain/penumbra = None      4.  Please note that the cerebral perfusion was performed on the limited brain tissue around the basal ganglia region. Infarct/ischemia outside the CT perfusion sections can be missed in this study.      CT-CTA HEAD WITH & W/O-POST PROCESS   Final Result      1.  Prior LEFT middle cranial fossa aneurysm coiling.  Artifact limits exam.   2.  No abrupt large vessel cut off.   3.  Segmental narrowing of LEFT middle cerebral artery, primarily M1 segment, vasospasm versus artifact.   4.  Removal of ventriculostomy catheter. No hydrocephalus.   5.  Evolving RIGHT caudate infarct.   6.  Slightly improved intracranial hemorrhage.   7.  4 mm right carotid terminus aneurysm.      CT-HEAD W/O   Final Result         1.  Hyperdensity along the bilateral tentorium, compatible with layering subarachnoid hemorrhages, decreased since prior study.   2.  Atherosclerosis.         DX-ABDOMEN FOR TUBE PLACEMENT   Final Result         1.  Nonspecific bowel gas pattern in the upper abdomen.   2.  Nasogastric tube tip terminates overlying the expected location of the antrum or pylorus, somewhat withdrawn since prior study.   3.  Hazy interstitial pulmonary edema and/or infiltrates, stable   4.  Cardiomegaly      DX-ABDOMEN FOR TUBE PLACEMENT   Final Result         1.  Nonspecific bowel gas pattern in the upper abdomen.   2.   Nasogastric tube tip terminates overlying the expected location of the pylorus or first duodenal segment.   3.  Hazy interstitial pulmonary edema and/or infiltrates   4.  Cardiomegaly      DX-CHEST-PORTABLE (1 VIEW)   Final Result         Findings on chest radiograph appear stable since the prior radiograph.  No new abnormalities are identified.      DX-ABDOMEN FOR TUBE PLACEMENT   Final Result      Enteric tube tip projects over the gastric antrum or proximal duodenum, similar to prior      DX-ABDOMEN FOR TUBE PLACEMENT   Final Result      NG tube tip projects at the peripyloric region.      DX-ABDOMEN FOR TUBE PLACEMENT   Final Result      NG tube tip projects at the peripyloric region.      DX-ABDOMEN FOR TUBE PLACEMENT   Final Result      Feeding tube in place with tip at the distal stomach/pylorus.      [id number] VIEW   Final Result         1.  Nonspecific bowel gas pattern in the upper abdomen.   2.  Nasogastric tube tip terminates overlying the expected location of the pylorus or first duodenal segment.   3.  Hazy interstitial pulmonary edema and/or infiltrates   4.  Cardiomegaly      DX-ABDOMEN FOR TUBE PLACEMENT   Final Result      Enteric sump tube in situ with its tip at the level of the distal gastric antrum or pylorus.      DX-ABDOMEN FOR TUBE PLACEMENT   Final Result         1.  Nonspecific bowel gas pattern in the upper abdomen.   2.  Nasogastric tube tip terminates overlying the expected location of the pylorus or first duodenal segment.   3.  Hazy interstitial pulmonary edema and/or infiltrates, similar to prior study.   4.  Cardiomegaly      DX-CHEST-PORTABLE (1 VIEW)   Final Result      1.  Supportive tubing as described above.   2.  No other significant change from prior exam.         MR-BRAIN-W/O   Final Result      1.  Multifocal areas of acute infarcts in the left cerebral hemisphere.   2.  Small area of acute infarct in the right basal ganglia adjacent to the tip of the ventriculostomy  catheter.   3.  Cortical infarct in the right medial parietal lobe.   4.  Mild amount of subdural hemorrhages surrounding the bilateral cerebellar hemispheres and right cerebellum.   5.  Subarachnoid hemorrhage.   6.  There is no hydrocephalus.   7.  There are changes secondary to the previous endovascular repair left internal carotid aneurysm.      CT-CTA NECK WITH & W/O-POST PROCESSING   Final Result      No focal high-grade stenosis, dissection or occlusion of the cervical carotid or vertebral arteries.      CT-CEREBRAL PERFUSION ANALYSIS   Final Result      1.  Cerebral blood flow less than 30% likely representing completed infarct = 0 mL.      2.  T Max more than 6 seconds likely representing combination of completed infarct and ischemia = 0 mL.      3.  Mismatched volume likely representing ischemic brain/penumbra = None      4.  Please note that the cerebral perfusion was performed on the limited brain tissue around the basal ganglia region. Infarct/ischemia outside the CT perfusion sections can be missed in this study.      CT-CTA HEAD WITH & W/O-POST PROCESS   Final Result      1.  Prior LEFT middle cranial fossa aneurysm coiling.  Artifact limits exam.   2.  No abrupt large vessel cut off.   3.  Segmental narrowing of LEFT middle cerebral artery, vasospasm versus artifact.   4.  Ventriculostomy catheter in similar position.   5.  Evolving RIGHT caudate infarct.   6.  Stable intracranial hemorrhage.         DX-ABDOMEN FOR TUBE PLACEMENT   Final Result      Orogastric tube tip now at the mid stomach.      DX-ABDOMEN FOR TUBE PLACEMENT   Final Result      Orogastric tube tip at the proximal stomach with side port just above the GE junction.      US-INTRACRANIAL ARTERY LIMITED   Final Result      DX-CHEST-LIMITED (1 VIEW)   Final Result         1.  Stable chest with perihilar and lower lung zone interstitial and minimal airspace opacities most consistent with pulmonary edema.   2.  No new abnormalities.       EC-ECHOCARDIOGRAM COMPLETE W/O CONT   Final Result      US-INTRACRANIAL ARTERY LIMITED   Final Result      DX-CHEST-PORTABLE (1 VIEW)   Final Result      1.  Lines and tubes appear appropriately located      2.  Improvement of pulmonary edema/airspace process      US-INTRACRANIAL ARTERY COMP   Final Result      CT-HEAD W/O   Final Result         1. Slightly decreased subarachnoid hemorrhage overlying the cerebral hemispheres.   2. Otherwise, stable as above.         DX-CHEST-FOR LINE PLACEMENT Perform procedure in: Patient's Room   Final Result      1.  Moderate interstitial pulmonary edema.   2.   Right-sided central venous catheter terminates with the tip projecting over the expected region of the mid to distal superior vena cava.   3.  Endotracheal tube terminates in satisfactory position at the level of the aortic arch.   4.  Enteric feeding tube terminates in the left upper quadrant projecting over the expected location of the stomach.      CT-STEALTH HEAD W/O   Final Result      1.  Interval placement of a right transparietal ventriculostomy catheter which terminates with the tip near the third ventricle. There is minimally decreased caliber of the ventricular system and compared to previous exam.   2.  Findings of intracranial hemorrhage appear similar to the previous exam. No definite new acute intracranial hemorrhage is identified.   3.  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         DX-CHEST-PORTABLE (1 VIEW)   Final Result      CT-HEAD W/O   Final Result      1. Interval aneurysm coiling of left posterior communicating artery aneurysm.   2. Possible increased subarachnoid hemorrhage.   3. Intraventricular hemorrhage. There is developing mild hydrocephalus with mildly increased ventricles.   4. Small right convexity subdural hematoma measuring 5 mm in thickness.      These findings were discussed with EMILY BYRD on 1/31/2024 3:14 PM.      IR-EMBOLIZATION    Final Result   Impression:      63-year-old patient with sudden onset of worst headache of life followed by obtundation underwent cerebral angiography which demonstrated a large  13 x 11 x 11 mm aneurysm arising from the dorsal wall of the left ICA incorporating a small left posterior    communicating artery at its neck.   This aneurysm was embolized to occlusion as described above. Final angiographic images demonstrate only minimal filling of the neck of the aneurysm. The patient will be followed up in   the neuro interventional clinic and brought back for a follow-up angiogram in 6 months.      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      Stpehanie COATES was physically present and participated during the entire procedure of the IR-EMBOLIZATION.            LABORATORY  Lab Results   Component Value Date    SODIUM 136 02/22/2024    POTASSIUM 3.6 02/22/2024    CHLORIDE 99 02/22/2024    CO2 24 02/22/2024    GLUCOSE 103 (H) 02/22/2024    BUN 21 02/22/2024    CREATININE 0.55 02/22/2024        Lab Results   Component Value Date    WBC 8.6 02/20/2024    HEMOGLOBIN 12.0 02/20/2024    HEMATOCRIT 38.1 02/20/2024    PLATELETCT 531 (H) 02/20/2024        Total time of the discharge process exceeds 37  minutes.

## (undated) DEVICE — TUBE CONNECT SUCTION CLEAR 120 X 1/4" (50EA/CA)"

## (undated) DEVICE — CATHETER FOLEY ROBINSON 14FR 16IN STRL (12EA/CA)

## (undated) DEVICE — BLADE SURGICAL CLIPPER - (50EA/CA)

## (undated) DEVICE — SYRINGE ASEPTO - (50EA/CA

## (undated) DEVICE — SUCTION INSTRUMENT YANKAUER BULBOUS TIP W/O VENT (50EA/CA)

## (undated) DEVICE — SCALP CLIP RANEY 20-1037 (10EA/PK 20PK/CA)

## (undated) DEVICE — SUTURE 3-0 ETHILON FS-1 - (36/BX) 30 INCH

## (undated) DEVICE — GOWN SURGEONS X-LARGE - DISP. (30/CA)

## (undated) DEVICE — TOWELS CLOTH SURGICAL - (4/PK 20PK/CA)

## (undated) DEVICE — PERFORATER DISP TIP DGR-0

## (undated) DEVICE — SET EXTENSION WITH 2 PORTS (48EA/CA) ***PART #2C8610 IS A SUBSTITUTE*****

## (undated) DEVICE — CANISTER SUCTION 3000ML MECHANICAL FILTER AUTO SHUTOFF MEDI-VAC NONSTERILE LF DISP  (40EA/CA)

## (undated) DEVICE — TOOL MR8 2.3CM F2/7CM TAPER (1/EA)

## (undated) DEVICE — SUTURE GENERAL

## (undated) DEVICE — KIT SURGIFLO W/OUT THROMBIN - (6EA/CA)

## (undated) DEVICE — TUBING CLEARLINK DUO-VENT - C-FLO (48EA/CA)

## (undated) DEVICE — STYLET COIL 2 SINGLE PACKAGE

## (undated) DEVICE — TUBING C&T SET FLYING LEADS DRAIN TUBING (10EA/BX)

## (undated) DEVICE — PATTIES SURG X-RAYCOTTONOID - 1/2 X 3 IN (200/CA)

## (undated) DEVICE — LACTATED RINGERS INJ 1000 ML - (14EA/CA 60CA/PF)

## (undated) DEVICE — GOWN WARMING STANDARD FLEX - (30/CA)

## (undated) DEVICE — POINTER TRACKER 1-PACK

## (undated) DEVICE — Device

## (undated) DEVICE — PATTIES SURG NEURO X-RAY 1/2X1/2 (10EA/PK 20PK/CS)

## (undated) DEVICE — GLOVE BIOGEL INDICATOR SZ 8 SURGICAL PF LTX - (50/BX 4BX/CA)

## (undated) DEVICE — DRAIN CSF WITH ANTI REFLUX VALVE

## (undated) DEVICE — GOWN SURGEONS LARGE - (32/CA)

## (undated) DEVICE — GLOVE BIOGEL PI INDICATOR SZ 6.5 SURGICAL PF LF - (50/BX 4BX/CA)

## (undated) DEVICE — PACK CRANI - (1EA/CA)

## (undated) DEVICE — COVER LIGHT HANDLE ALC PLUS DISP (18EA/BX)

## (undated) DEVICE — STAPLER SKIN DISP - (6/BX 10BX/CA) VISISTAT

## (undated) DEVICE — SODIUM CHL IRRIGATION 0.9% 1000ML (12EA/CA)

## (undated) DEVICE — GLOVE SZ 6 BIOGEL PI MICRO - PF LF (50PR/BX 4BX/CA)

## (undated) DEVICE — CORETEMP DRAPE FORM-FITTED EASY DROPANDGO DRAPE FOR USE ON THE CORETEMP FLUID MANAGEMENT 56IN X 56IN

## (undated) DEVICE — TRACKER ENT PATIENT

## (undated) DEVICE — DRAPE T CRANIOTOMY W/POUCH - (9/CA)

## (undated) DEVICE — SURGIFOAM (SIZE 100) - (6EA/CA)

## (undated) DEVICE — SET LEADWIRE 5 LEAD BEDSIDE DISPOSABLE ECG (1SET OF 5/EA)

## (undated) DEVICE — ELECTRODE DUAL RETURN W/ CORD - (50/PK)

## (undated) DEVICE — SUTURE 2-0 SILK FS (12EA/BX)

## (undated) DEVICE — SPHERE NAVIGATION STEALTH (5EA/TY 12TY/PK)

## (undated) DEVICE — HEMOSTAT SURG ABSORBABLE - 4 X 8 IN SURGICEL (24EA/CA)

## (undated) DEVICE — GLOVE BIOGEL SZ 8 SURGICAL PF LTX - (50PR/BX 4BX/CA)

## (undated) DEVICE — FORCEPS IRRIGATING 9 X 0.5MM (5EA/BX)

## (undated) DEVICE — BLADE CLIPPER FITS 2501 CLIPPER (BLUE)  (20EA/CA)

## (undated) DEVICE — SPONGE GAUZESTER 4 X 4 4PLY - (128PK/CA)